# Patient Record
Sex: FEMALE | Race: WHITE | NOT HISPANIC OR LATINO | Employment: OTHER | URBAN - METROPOLITAN AREA
[De-identification: names, ages, dates, MRNs, and addresses within clinical notes are randomized per-mention and may not be internally consistent; named-entity substitution may affect disease eponyms.]

---

## 2017-12-23 ENCOUNTER — HOSPITAL ENCOUNTER (EMERGENCY)
Facility: CLINIC | Age: 82
Discharge: HOME | End: 2017-12-23
Attending: EMERGENCY MEDICINE | Admitting: EMERGENCY MEDICINE
Payer: MEDICARE

## 2017-12-23 ENCOUNTER — APPOINTMENT (OUTPATIENT)
Dept: GENERAL RADIOLOGY | Facility: CLINIC | Age: 82
End: 2017-12-23
Attending: EMERGENCY MEDICINE
Payer: MEDICARE

## 2017-12-23 VITALS
TEMPERATURE: 98.3 F | SYSTOLIC BLOOD PRESSURE: 133 MMHG | OXYGEN SATURATION: 94 % | RESPIRATION RATE: 16 BRPM | HEIGHT: 63 IN | DIASTOLIC BLOOD PRESSURE: 79 MMHG

## 2017-12-23 DIAGNOSIS — J06.9 VIRAL URI WITH COUGH: ICD-10-CM

## 2017-12-23 DIAGNOSIS — R05.9 COUGH: ICD-10-CM

## 2017-12-23 LAB
ANION GAP SERPL CALCULATED.3IONS-SCNC: 5 MMOL/L (ref 3–14)
BASOPHILS # BLD AUTO: 0 10E9/L (ref 0–0.2)
BASOPHILS NFR BLD AUTO: 0.3 %
BUN SERPL-MCNC: 21 MG/DL (ref 7–30)
CALCIUM SERPL-MCNC: 9.4 MG/DL (ref 8.5–10.1)
CHLORIDE SERPL-SCNC: 109 MMOL/L (ref 94–109)
CO2 SERPL-SCNC: 29 MMOL/L (ref 20–32)
CREAT SERPL-MCNC: 1.06 MG/DL (ref 0.52–1.04)
DIFFERENTIAL METHOD BLD: ABNORMAL
EOSINOPHIL # BLD AUTO: 0.1 10E9/L (ref 0–0.7)
EOSINOPHIL NFR BLD AUTO: 1.3 %
ERYTHROCYTE [DISTWIDTH] IN BLOOD BY AUTOMATED COUNT: 12.7 % (ref 10–15)
GFR SERPL CREATININE-BSD FRML MDRD: 49 ML/MIN/1.7M2
GLUCOSE SERPL-MCNC: 125 MG/DL (ref 70–99)
HCT VFR BLD AUTO: 41.3 % (ref 35–47)
HGB BLD-MCNC: 14 G/DL (ref 11.7–15.7)
IMM GRANULOCYTES # BLD: 0 10E9/L (ref 0–0.4)
IMM GRANULOCYTES NFR BLD: 0.3 %
LYMPHOCYTES # BLD AUTO: 1.7 10E9/L (ref 0.8–5.3)
LYMPHOCYTES NFR BLD AUTO: 22.5 %
MCH RBC QN AUTO: 33.3 PG (ref 26.5–33)
MCHC RBC AUTO-ENTMCNC: 33.9 G/DL (ref 31.5–36.5)
MCV RBC AUTO: 98 FL (ref 78–100)
MONOCYTES # BLD AUTO: 0.8 10E9/L (ref 0–1.3)
MONOCYTES NFR BLD AUTO: 10.2 %
NEUTROPHILS # BLD AUTO: 4.9 10E9/L (ref 1.6–8.3)
NEUTROPHILS NFR BLD AUTO: 65.4 %
NRBC # BLD AUTO: 0 10*3/UL
NRBC BLD AUTO-RTO: 0 /100
PLATELET # BLD AUTO: 178 10E9/L (ref 150–450)
POTASSIUM SERPL-SCNC: 4.5 MMOL/L (ref 3.4–5.3)
RBC # BLD AUTO: 4.21 10E12/L (ref 3.8–5.2)
SODIUM SERPL-SCNC: 143 MMOL/L (ref 133–144)
WBC # BLD AUTO: 7.4 10E9/L (ref 4–11)

## 2017-12-23 PROCEDURE — 25000128 H RX IP 250 OP 636: Performed by: EMERGENCY MEDICINE

## 2017-12-23 PROCEDURE — 96360 HYDRATION IV INFUSION INIT: CPT

## 2017-12-23 PROCEDURE — 85025 COMPLETE CBC W/AUTO DIFF WBC: CPT | Performed by: EMERGENCY MEDICINE

## 2017-12-23 PROCEDURE — 71020 XR CHEST 2 VW: CPT

## 2017-12-23 PROCEDURE — 80048 BASIC METABOLIC PNL TOTAL CA: CPT | Performed by: EMERGENCY MEDICINE

## 2017-12-23 PROCEDURE — 99284 EMERGENCY DEPT VISIT MOD MDM: CPT | Mod: 25

## 2017-12-23 PROCEDURE — 94640 AIRWAY INHALATION TREATMENT: CPT

## 2017-12-23 PROCEDURE — 25000125 ZZHC RX 250: Performed by: EMERGENCY MEDICINE

## 2017-12-23 RX ORDER — ALBUTEROL SULFATE 5 MG/ML
2.5 SOLUTION RESPIRATORY (INHALATION) ONCE
Status: COMPLETED | OUTPATIENT
Start: 2017-12-23 | End: 2017-12-23

## 2017-12-23 RX ADMIN — SODIUM CHLORIDE 1000 ML: 9 INJECTION, SOLUTION INTRAVENOUS at 19:30

## 2017-12-23 RX ADMIN — ALBUTEROL SULFATE 2.5 MG: 2.5 SOLUTION RESPIRATORY (INHALATION) at 19:30

## 2017-12-23 ASSESSMENT — ENCOUNTER SYMPTOMS
NAUSEA: 0
FATIGUE: 1
COUGH: 1
VOMITING: 0
DYSURIA: 0
DIARRHEA: 0

## 2017-12-23 NOTE — ED AVS SNAPSHOT
Emergency Department    [address] [facility] [address]    Phone:  [phone]    Fax:  [phone]                                       Joelle Freeman   MRN: [id number]    Department:   Emergency Department   Date of Visit:  12/23/2017           Patient Information     Date Of Birth          5/25/1928        Your diagnoses for this visit were:     Cough     Viral URI with cough        You were seen by Roxanna Yu MD.      Follow-up Information     Follow up with Osman Small In 3 days.    Specialty:  Family Practice    Contact information:    [facility]  [address] [facility] [address]  [facility] [address] [phone]          Follow up with  Emergency Department.    Specialty:  EMERGENCY MEDICINE    Why:  If symptoms worsen    Contact information:    [address] [facility] [address] [phone]        Discharge Instructions       Continue albuterol. Cough after pneumonia can linger for a couple of weeks.  Follow-up with your primary care provider.   Return to the emergency department if you have shortness of breath, chest pain, fever greater than 101F, or any other concerns.    Discharge References/Attachments     URI, VIRAL, NO ABX (ADULT) (ENGLISH)    BRONCHITIS, NO ANTIBIOTIC (ADULT) (ENGLISH)      24 Hour Appointment Hotline       To make an appointment at any [facility], call [phone] ([phone]). If you don't have a family doctor or clinic, we will help you find one. [facility] [place] [facility] are conveniently located to serve the needs of you and your family.             Review of your medicines      Our records show that you are taking the medicines listed below. If these are incorrect, please call your family doctor or clinic.        Dose / Directions Last dose taken    * ACETAMINOPHEN PO   Dose:  650 mg        Take 650 mg by mouth 2 times daily as needed for pain   Refills:  0        * ACETAMINOPHEN PO   Dose:  650 mg        Take 650 mg by mouth  daily Daily at 16:00.   Refills:  0        aspirin 81 MG EC tablet   Dose:  81 mg   Quantity:  30 tablet        Take 1 tablet (81 mg) by mouth daily   Refills:  1        atorvastatin 10 MG tablet   Commonly known as:  LIPITOR   Dose:  10 mg   Quantity:  30 tablet        Take 1 tablet (10 mg) by mouth daily   Refills:  1        cholecalciferol 1000 UNITS Tabs   Dose:  1000 Units   Quantity:  30 tablet        Take 1,000 Units by mouth daily   Refills:  1        citalopram 10 MG tablet   Commonly known as:  celeXA   Dose:  10 mg   Quantity:  30 tablet        Take 1 tablet (10 mg) by mouth daily   Refills:  1        donepezil 10 MG tablet   Commonly known as:  ARICEPT   Dose:  10 mg   Quantity:  30 tablet        Take 1 tablet (10 mg) by mouth At Bedtime   Refills:  1        levothyroxine 100 MCG tablet   Commonly known as:  SYNTHROID/LEVOTHROID   Dose:  100 mcg   Quantity:  30 tablet        Take 1 tablet (100 mcg) by mouth daily   Refills:  1        LISINOPRIL PO   Dose:  10 mg        Take 10 mg by mouth daily Hold if SBP<110 and notify NP   Refills:  0        MELATONIN PO   Dose:  3 mg        Take 3 mg by mouth nightly as needed   Refills:  0        nystatin cream   Commonly known as:  MYCOSTATIN        Apply topically as needed for dry skin (use with butt paste)   Refills:  0        senna-docusate 8.6-50 MG per tablet   Commonly known as:  SENOKOT-S;PERICOLACE   Dose:  1 tablet        Take 1 tablet by mouth 2 times daily as needed   Refills:  0        TOPROL XL PO   Dose:  12.5 mg        Take 12.5 mg by mouth daily Hold if SBP<110 or HR <55 and notify NP/MD   Refills:  0        * Notice:  This list has 2 medication(s) that are the same as other medications prescribed for you. Read the directions carefully, and ask your doctor or other care provider to review them with you.            Procedures and tests performed during your visit     Basic metabolic panel    CBC with platelets differential    XR Chest 2 Views       Orders Needing Specimen Collection     None      Pending Results     No orders found from 12/21/2017 to 12/24/2017.            Pending Culture Results     No orders found from 12/21/2017 to 12/24/2017.            Pending Results Instructions     If you had any lab results that were not finalized at the time of your Discharge, you can call the ED Lab Result RN at [phone]. You will be contacted by this team for any positive Lab results or changes in treatment. The nurses are available 7 days a week from 10A to 6:30P.  You can leave a message 24 hours per day and they will return your call.        Test Results From Your Hospital Stay        12/23/2017  7:04 PM      Component Results     Component Value Ref Range & Units Status    WBC 7.4 4.0 - 11.0 10e9/L Final    RBC Count 4.21 3.8 - 5.2 10e12/L Final    Hemoglobin 14.0 11.7 - 15.7 g/dL Final    Hematocrit 41.3 35.0 - 47.0 % Final    MCV 98 78 - 100 fl Final    MCH 33.3 (H) 26.5 - 33.0 pg Final    MCHC 33.9 31.5 - 36.5 g/dL Final    RDW 12.7 10.0 - 15.0 % Final    Platelet Count 178 150 - 450 10e9/L Final    Diff Method Automated Method  Final    % Neutrophils 65.4 % Final    % Lymphocytes 22.5 % Final    % Monocytes 10.2 % Final    % Eosinophils 1.3 % Final    % Basophils 0.3 % Final    % Immature Granulocytes 0.3 % Final    Nucleated RBCs 0 0 /100 Final    Absolute Neutrophil 4.9 1.6 - 8.3 10e9/L Final    Absolute Lymphocytes 1.7 0.8 - 5.3 10e9/L Final    Absolute Monocytes 0.8 0.0 - 1.3 10e9/L Final    Absolute Eosinophils 0.1 0.0 - 0.7 10e9/L Final    Absolute Basophils 0.0 0.0 - 0.2 10e9/L Final    Abs Immature Granulocytes 0.0 0 - 0.4 10e9/L Final    Absolute Nucleated RBC 0.0  Final         12/23/2017  7:17 PM      Component Results     Component Value Ref Range & Units Status    Sodium 143 133 - 144 mmol/L Final    Potassium 4.5 3.4 - 5.3 mmol/L Final    Chloride 109 94 - 109 mmol/L Final    Carbon Dioxide 29 20 - 32 mmol/L Final    Anion Gap 5 3 - 14  mmol/L Final    Glucose 125 (H) 70 - 99 mg/dL Final    Urea Nitrogen 21 7 - 30 mg/dL Final    Creatinine 1.06 (H) 0.52 - 1.04 mg/dL Final    GFR Estimate 49 (L) >60 mL/min/1.7m2 Final    Non  GFR Calc    GFR Estimate If Black 59 (L) >60 mL/min/1.7m2 Final    African American GFR Calc    Calcium 9.4 8.5 - 10.1 mg/dL Final         12/23/2017  8:15 PM      Narrative     CHEST TWO VIEWS    12/23/2017 7:53 PM     HISTORY: Cough.    COMPARISON: Chest x-ray 6/22/2015.        Impression     IMPRESSION: No focal airspace opacity. No pleural effusion or  pneumothorax. Cardiac silhouette is unchanged. Presumed  ventriculoperitoneal shunt catheter projects over the right chest  wall. Mild anterior wedging of a midthoracic vertebral body is  unchanged.     LOIS GUERRERO MD                Clinical Quality Measure: Blood Pressure Screening     Your blood pressure was checked while you were in the emergency department today. The last reading we obtained was  BP: 133/79 . Please read the guidelines below about what these numbers mean and what you should do about them.  If your systolic blood pressure (the top number) is less than 120 and your diastolic blood pressure (the bottom number) is less than 80, then your blood pressure is normal. There is nothing more that you need to do about it.  If your systolic blood pressure (the top number) is 120-139 or your diastolic blood pressure (the bottom number) is 80-89, your blood pressure may be higher than it should be. You should have your blood pressure rechecked within a year by a primary care provider.  If your systolic blood pressure (the top number) is 140 or greater or your diastolic blood pressure (the bottom number) is 90 or greater, you may have high blood pressure. High blood pressure is treatable, but if left untreated over time it can put you at risk for heart attack, stroke, or kidney failure. You should have your blood pressure rechecked by a primary care  "provider within the next 4 weeks.  If your provider in the emergency department today gave you specific instructions to follow-up with your doctor or provider even sooner than that, you should follow that instruction and not wait for up to 4 weeks for your follow-up visit.        Thank you for choosing [facility]       Thank you for choosing [facility] for your care. Our goal is always to provide you with excellent care. Hearing back from our patients is one way we can continue to improve our services. Please take a few minutes to complete the written survey that you may receive in the mail after you visit with us. Thank you!        [employer] Information     [employer] lets you send messages to your doctor, view your test results, renew your prescriptions, schedule appointments and more. To sign up, go to [email or web address] . Click on \"Log in\" on the left side of the screen, which will take you to the Welcome page. Then click on \"Sign up Now\" on the right side of the page.     You will be asked to enter the access code listed below, as well as some personal information. Please follow the directions to create your username and password.     Your access code is: [email or web address]  Expires: 3/23/2018  8:59 PM     Your access code will  in 90 days. If you need help or a new code, please call your [facility] clinic or [phone].        Care EveryWhere ID     This is your Care EveryWhere ID. This could be used by other organizations to access your [facility] medical records  [id number]        Equal Access to Services     MARCY WHITE : Hadii little herbert Sozack, waaxda lufridaadaha, qaybta kaalmaalexander obregon . So [facility] [phone].    ATENCIÓN: Si habla español, tiene a simpson disposición servicios gratuitos de asistencia lingüística. Llame al [phone].    We comply with applicable federal civil rights laws and [place] laws. We do not discriminate on the basis of race, color, " national origin, age, disability, sex, sexual orientation, or gender identity.            After Visit Summary       This is your record. Keep this with you and show to your community pharmacist(s) and doctor(s) at your next visit.

## 2017-12-23 NOTE — ED AVS SNAPSHOT
Emergency Department    [address] [place] [address]    Phone:  [phone]    Fax:  [phone]                                       Joelle Freeman   MRN: [id number]    Department:   Emergency Department   Date of Visit:  12/23/2017           After Visit Summary Signature Page     I have received my discharge instructions, and my questions have been answered. I have discussed any challenges I see with this plan with the nurse or doctor.    ..........................................................................................................................................  Patient/Patient Representative Signature      ..........................................................................................................................................  Patient Representative Print Name and Relationship to Patient    ..................................................               ................................................  Date                                            Time    ..........................................................................................................................................  Reviewed by Signature/Title    ...................................................              ..............................................  Date                                                            Time

## 2017-12-24 NOTE — DISCHARGE INSTRUCTIONS
Continue albuterol. Cough after pneumonia can linger for a couple of weeks.  Follow-up with your primary care provider.   Return to the emergency department if you have shortness of breath, chest pain, fever greater than 101F, or any other concerns.

## 2017-12-24 NOTE — ED PROVIDER NOTES
History     Chief Complaint:  Cough    The history is provided by the patient.      Joelle Freeman is a 89 year old female with a recent pneumonia s/p Augmentin who presents to the emergency department today for evaluation of cough. The patient notes she took Augmentin (and prior to that a second antibiotic she does not know the name of) over the last 2 weeks. However, today her cough seems worse. She states cough has been non-productive and today she also feels fatigued. Denies sinus congestion. Patient has had recent sick contacts at her assisted living facility. No nausea, vomiting, diarrhea, or dysuria. No chest pain or alexandr shortness of breath.     Allergies:  Tramadol    Medications:    Metoprolol Succinate (TOPROL XL PO)  LISINOPRIL PO  nystatin (MYCOSTATIN) cream  aspirin EC 81 MG EC tablet  citalopram (CELEXA) 10 MG tablet  atorvastatin (LIPITOR) 10 MG tablet  donepezil (ARICEPT) 10 MG tablet  levothyroxine (SYNTHROID,LEVOTHROID) 100 MCG tablet  senna-docusate (SENOKOT-S;PERICOLACE) 8.6-50 MG per tablet     Past Medical History:    Asthma   CAD (coronary artery disease)   Cerebral ventriculomegaly   CVA (cerebral infarction)    Dementia   Depression   Diverticulosis   Dyslipidemia   Heterozygous factor V Leiden mutation (H)   HTN (hypertension)   Hyperlipidemia   Hypothyroidism   Normal pressure hydrocephalus   Posterior reversible encephalopathy syndrome   Subarachnoid hemorrhage (H)   Subdural hematoma (H)   TIA (transient ischaemic attack)   Unspecified cerebral artery occlusion with cerebral infarction   Urinary incontinence   UTI (lower urinary tract infection)    Past Surgical History:    APPENDECTOMY   BIOPSY-breast  CARDIAC SURGERY-stent to the right coronary artery in 2001  CHOLECYSTECTOMY   ENT SURGERY-onsillectomy  GENITOURINARY SURGERY   Bladder suspension  GYN SURGERY-D & C  GYN SURGERY-laproscopy oophorectomy unilateral  HERNIA REPAIR   HYSTERECTOMY   LUMBAR PUNCTURE   OPEN REDUCTION  "INTERNAL FIXATION HIP NAILING   FRACTURE;  Surgeon: John Venegas MD;  Location:  OR  OPTICAL TRACKING SYSTEM IMPLANT SHUNT VENTRICULOPERITONEAL   STENT- RCA  wisdom teeth extraction     Family History:    Blood disease  Cerebrovascular disease     Social History:  The patient was accompanied to the ED by son.  Smoking Status: Never smoker  Smokeless Tobacco: No  Alcohol Use: Yes  Marital Status:        Review of Systems   Constitutional: Positive for fatigue.   HENT: Negative for congestion.    Respiratory: Positive for cough.    Gastrointestinal: Negative for diarrhea, nausea and vomiting.   Genitourinary: Negative for dysuria.   All other systems reviewed and are negative.    Physical Exam   Vitals:  Patient Vitals for the past 24 hrs:   BP Temp Temp src Heart Rate Resp SpO2 Height   12/23/17 1825 133/79 98.3  F (36.8  C) Oral 75 16 97 % 1.6 m (5' 3\")       Physical Exam   General: Well-developed and well-nourished; well appearing elderly  female; cooperative  Head:  Atraumatic  Eyes:  Conjunctivae, lids, and sclerae are normal  ENT:    Normal nose; moist mucous membranes  Neck:  Supple; normal range of motion  CV:  Regular rate and rhythm; normal heart sounds with no murmurs, rubs, or gallops detected  Resp:  No respiratory distress; no decreased breath sounds, wheezing, or rales; scattered rhonchi  GI:  Soft; non-distended; non-tender    MS:  Normal ROM  Skin:  Warm; non-diaphoretic; no pallor  Neuro:  Awake; A&Ox3; normal strength  Psych: Normal mood and affect; normal speech  Vitals reviewed.    Emergency Department Course   EKG  Time: 1907  Rate 81 bpm. UT interval 156. QRS duration 90. QT/QTc 372/432.   Normal sinus rhythm with sinus arrhythmia   Pulmonary disease pattern  Left anterior fascicular block  Voltage criteria for left ventricular hypertrophy  Cannot rule out septal infarct, age undetermined  Abnormal ECG  No acute ST changes.  T waves  In III, V3-V6 no longer " inverted as compared to prior, dated 11/22/2015    Imaging:  Radiology findings were communicated with the patient who voiced understanding of the findings.    Chest XR 2 Views:  No focal airspace opacity. No pleural effusion or  pneumothorax. Cardiac silhouette is unchanged. Presumed  ventriculoperitoneal shunt catheter projects over the right chest  wall. Mild anterior wedging of a midthoracic vertebral body is  unchanged.   Reading per radiology    Laboratory:  Laboratory findings were communicated with the patient who voiced understanding of the findings.    CBC: AWNL. (WBC 7.4, HGB 14.0, )     basic metabolic panel: Glucose: 125(H), Creatinine: 1.06(H), GFR: 49(L)    Interventions:  1930- NS 1000 mL IV  1930- Albuterol 2.5 mg Nebulization      Emergency Department Course:  Nursing notes and vitals reviewed.  I performed an exam of the patient as documented above.     IV was inserted and blood was drawn for laboratory testing, results above.    The patient was sent for a XR while in the emergency department, results above.     2029: Patient was rechecked and was updated on the results of laboratory and imaging studies. She notes breathing treatment did not help; however, on reexamination lung sounds sound improved from initial examination.     I discussed the treatment plan with the patient. She expressed understanding of this plan and consented to discharge.     Impression & Plan      Medical Decision Making:  Joelle is an 89-year-old female who presents with cough.  She notes that she has had cough over the last 2 weeks that is nonproductive.  She was given 2 different rounds of antibiotics (she does not know the name of the first one and paperwork that accompanies patients indicates the second was a recently completed 10 day course of Augmentin).  Patient has no other complaints.  She is very well-appearing on exam with normal vitals.  She does have some scattered rhonchi.    Patient was given  albuterol for cough with scattered rhonchi.  CBC is reassuring.  BMP reveals a creatinine of 1.06 which is minimally increased from 0.7, though this value is from 2015 and may be reflective of gradual chronic disease rather than an acute kidney injury.  Patient was given IV fluids during her workup.  Chest x-ray is unremarkable.  There is no evidence of pneumonia, pleural effusion, or pneumothorax.   shunt noted.  Unchanged vertebral body wedging noted.    Patient was reevaluated after albuterol treatment and states she did not think it helped much.  However, the rhonchi is resolved on repeat lung auscultation.  I discussed with patient that her cough may be residual from her recent diagnosis of pneumonia versus a new viral URI/bronchitis.  I recommended she continue albuterol and follow-up with her primary care provider.  I do not believe antibiotics are indicated and patient has no evidence of severe infection or dehydration and has recently completed 2 courses.  I provided return precautions and answered all the patient's questions.  She verbalized understanding and is amenable to discharge.    Diagnosis:    ICD-10-CM    1. Cough R05    2. Viral URI with cough J06.9     B97.89          Disposition:   Findings and plan explained to the patient. Patient discharged home with instructions regarding supportive care, medications, and reasons to return. The importance of close follow-up was reviewed.    Scribe Disclosure:  CELESTE, Rodolfo Wright, am serving as a scribe at 6:47 PM on 12/23/2017 to document services personally performed by Roxanna Yu MD, based on my observations and the provider's statements to me.    12/23/2017    EMERGENCY DEPARTMENT       Roxanna Yu MD  12/24/17 0146

## 2018-07-25 ENCOUNTER — TELEPHONE (OUTPATIENT)
Dept: NEUROSURGERY | Facility: CLINIC | Age: 83
End: 2018-07-25

## 2018-07-25 NOTE — TELEPHONE ENCOUNTER
Amanda, pt's daughter in law, called because the pt has not been sleeping and is getting up at night alone.  Amanda is concern about the pt's safety.  Also, the pt is experiencing auditory hallucinations. No answer.  A message was left.

## 2018-07-26 ENCOUNTER — APPOINTMENT (OUTPATIENT)
Dept: CT IMAGING | Facility: CLINIC | Age: 83
End: 2018-07-26
Attending: EMERGENCY MEDICINE
Payer: MEDICARE

## 2018-07-26 ENCOUNTER — APPOINTMENT (OUTPATIENT)
Dept: GENERAL RADIOLOGY | Facility: CLINIC | Age: 83
End: 2018-07-26
Attending: EMERGENCY MEDICINE
Payer: MEDICARE

## 2018-07-26 ENCOUNTER — HOSPITAL ENCOUNTER (EMERGENCY)
Facility: CLINIC | Age: 83
Discharge: HOME | End: 2018-07-26
Attending: EMERGENCY MEDICINE | Admitting: EMERGENCY MEDICINE
Payer: MEDICARE

## 2018-07-26 VITALS
RESPIRATION RATE: 18 BRPM | TEMPERATURE: 97.4 F | SYSTOLIC BLOOD PRESSURE: 139 MMHG | HEIGHT: 64 IN | WEIGHT: 125 LBS | OXYGEN SATURATION: 98 % | DIASTOLIC BLOOD PRESSURE: 62 MMHG | BODY MASS INDEX: 21.34 KG/M2

## 2018-07-26 DIAGNOSIS — S09.90XA CLOSED HEAD INJURY, INITIAL ENCOUNTER: ICD-10-CM
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
## 2018-07-26 LAB — INTERPRETATION ECG - MUSE: NORMAL

## 2018-07-26 PROCEDURE — 93005 ELECTROCARDIOGRAM TRACING: CPT

## 2018-07-26 PROCEDURE — 70450 CT HEAD/BRAIN W/O DYE: CPT

## 2018-07-26 PROCEDURE — 99285 EMERGENCY DEPT VISIT HI MDM: CPT | Mod: 25

## 2018-07-26 PROCEDURE — 73630 X-RAY EXAM OF FOOT: CPT | Mod: RT

## 2018-07-26 ASSESSMENT — ENCOUNTER SYMPTOMS
LIGHT-HEADEDNESS: 0
DIZZINESS: 0
HEADACHES: 0

## 2018-07-26 NOTE — ED PROVIDER NOTES
History     Chief Complaint:  Fall     HPI  Joelle Freeman is a 89 year old female who has two stents in her brain after bleeding who presents to the emergency department today for evaluation after a fall. The patient report that she was in the bathroom today, standing to put on her briefs, when she fell and hit her head. She says she has pain in her right foot but no head pain and did not feel lightheaded or dizzy before falling.     Allergies:  Tramadol      Medications:    Aspirin  Lipitor  Celexa  Aricept  Levothyroxine  Lisinopril  Toprol  Nystatin  Senna-docusate     Past Medical History:    Asthma   CAD (coronary artery disease)   Cerebral ventriculomegaly   CVA (cerebral infarction)   Dementia   Dementia   Depression   Diverticulosis   Dyslipidemia   Heterozygous factor V Leiden mutation   HTN (hypertension)   Hyperlipidemia   Hypothyroidism   Normal pressure hydrocephalus   Posterior reversible encephalopathy syndrome   Subarachnoid hemorrhage    Subdural hematoma    TIA (transient ischaemic attack) x2   Unspecified cerebral artery occlusion with cerebral infarction   Urinary incontinence   UTI (lower urinary tract infection)     Past Surgical History:    Appendectomy  Breast biopsy   Stent to right coronary artery  Cholecystectomy  Tonsillectomy  Bladder suspension  D&C  Laparoscopic oophorectomy unilateral  Hernia repair  Hysterectomy  Lumbar puncture   Open reduction internal fixation hip nailing  Optical tracking system implant shunt ventriculoperitoneal  RCA stent  [place] teeth extraction     Family History:    Mother: Cerebrovascular disease  Father: Cerebrovascular disease  Son: Blood disease    Social History:  The patient was accompanied to the ED by son.  Smoking Status: Never Smoker  Alcohol Use: Negative   Marital Status:       Review of Systems   Musculoskeletal:        Right foot pain   Neurological: Negative for dizziness, light-headedness and headaches.   All other systems reviewed  "and are negative.    Physical Exam     Patient Vitals for the past 24 hrs:   BP Temp Temp src Heart Rate Resp SpO2 Height Weight   07/26/18 0100 (!) 123/92 - - - - 96 % - -   07/26/18 0045 139/74 - - - - - - -   07/26/18 0008 137/72 97.4  F (36.3  C) Oral 74 18 97 % 1.626 m (5' 4\") 56.7 kg (125 lb)     Physical Exam  General: Appears well-developed and well-nourished.   Head: No signs of trauma. Right sided stent in place  Mouth/Throat: Oropharynx is clear and moist.   Neck: Normal range of motion. No midline tenderness.  CV: Normal rate and regular rhythm.    Resp: Effort normal and breath sounds normal. No respiratory distress.   GI: Soft. There is no tenderness.  No rebound or guarding.  Normal bowel sounds.    MSK: Normal range of motion. No tenderness to extremities or spine.  Small area of ecchymosis to dorsal aspect of right foot without point tenderness or deformities.    Neuro: The patient is alert and oriented to person, place, and time.  PERRLA, EOMI, strength in upper/lower extremities normal and symmetrical.   Sensation normal. Speech normal.  GCS 15  Skin: Skin is warm and dry. No rash noted.   Psych: normal mood and affect. behavior is normal.       Emergency Department Course     ECG:  ECG taken at 0017, ECG read at 0018  Normal sinus rhythm  Left anterior fascicular block  Moderate voltage criteria for LVH, may be normal variant  Abnormal ECG  Rate 72 bpm. AZ interval 166 ms. QRS duration 94 ms. QT/QTc 408/446 ms. P-R-T axes 54 -51 43.    Imaging:  Radiology findings were communicated with the patient who voiced understanding of the findings.    CT head:  FINDINGS: Prior right frontotemporal craniotomy. Moderate diffuse  cerebral atrophy. Moderate periventricular white matter low  attenuation, likely relating to chronic small vessel ischemic disease.  A right occipital ventriculostomy catheter is present with distal  catheter tip in the frontal horn of the left lateral cerebral  ventricle. No " dilatation of the cerebral ventricles. No intra-axial  mass, mass effect or midline shift. Normal gray-white matter  differentiation. No visualized acute intra-axial hemorrhage. The basal  cisterns are patent. Prior right craniectomy. No acute extra-axial  fluid collection. Unchanged old thin subdural fluid collections in  bilateral frontal regions. The visualized portions of the paranasal  sinuses and mastoid air cells are unremarkable.      Foot  XR, G/E 3 views, right   Preliminary Result   IMPRESSION:   1. No visualized acute fracture or malalignment of the right foot.   2. Mild degenerative changes in the right first metatarsophalangeal   joint.   3. Diffuse osteopenia.      Head CT w/o contrast    (Results Pending)     Emergency Department Course:    0020 Nursing notes and vitals reviewed.    0017 EKG performed as noted above.     0023 I performed an exam of the patient as documented above.     0107 The patient was sent for a XR while in the emergency department, results above.     0115 The patient was sent for a CT while in the emergency department, results above.      I personally reviewed the imaging results with the patient and answered all related questions prior to discharge.    Impression & Plan      Medical Decision Making:  Joelle Freeman is a 89 year old female who presents to the emergency department today for evaluation after a fall.  She is in a nursing home and is apparently quite unstable on her feet at baseline and uses a wheelchair to get around.  An aide was helping to transfer from the toilet back to the wheelchair and while she was trying to adjust her underwear, apparently the patient fell.  Patient denies any loss of consciousness.  She did strike the left side of her head, but denies any current head pain. She did have some pain to the right foot.  X-ray of the right foot was obtained and did not show any signs of acute fracture.  CT scan of the head showed shunt in place but no signs  of acute trauma or injury.  The patient appears well overall, and she will be discharged back to the nursing home where she will continue to be in a monitored setting and receive assistance.  She was instructed to return for any further concerns.    Diagnosis:    ICD-10-CM    1. Fall from standing, initial encounter W19.XXXA    2. Closed head injury, initial encounter S09.90XA         Disposition:   The patient is discharged to home.    Discharge Medications:  New Prescriptions    No medications on file     Scribe Disclosure:  Maryjo ALONSO, am serving as a scribe at 12:23 AM on 7/26/2018 to document services personally performed by Erasmo Yuen MD based on my observations and the provider's statements to me.       EMERGENCY DEPARTMENT       Erasmo Yuen MD  07/28/18 2926

## 2018-07-26 NOTE — ED AVS SNAPSHOT
Emergency Department    [address] [place] [address]    Phone:  [phone]    Fax:  [phone]                                       Joelle Freeman   MRN: [id number]    Department:   Emergency Department   Date of Visit:  7/26/2018           After Visit Summary Signature Page     I have received my discharge instructions, and my questions have been answered. I have discussed any challenges I see with this plan with the nurse or doctor.    ..........................................................................................................................................  Patient/Patient Representative Signature      ..........................................................................................................................................  Patient Representative Print Name and Relationship to Patient    ..................................................               ................................................  Date                                            Time    ..........................................................................................................................................  Reviewed by Signature/Title    ...................................................              ..............................................  Date                                                            Time

## 2018-07-26 NOTE — ED AVS SNAPSHOT
Emergency Department    [address] [facility] [address]    Phone:  [phone]    Fax:  [phone]                                       Joelle Freeman   MRN: [id number]    Department:   Emergency Department   Date of Visit:  7/26/2018           Patient Information     Date Of Birth          5/25/1928        Your diagnoses for this visit were:     Fall from standing, initial encounter     Closed head injury, initial encounter        You were seen by rEasmo Yuen MD.      Follow-up Information     Call Osman Small    Specialty:  Family Practice    Contact information:    [facility]  [address]   [facility] [address] [phone]          Follow up with  Emergency Department.    Specialty:  EMERGENCY MEDICINE    Why:  As needed, If symptoms worsen    Contact information:    [address] [facility] [id number] [phone]        Discharge Instructions          * HEAD INJURY    You have had a head injury. It does not appear serious at this time. Sometimes symptoms of a more serious problem (concussion, bruising or bleeding in the brain) may appear later. Therefore, watch for the warning signs listed below.  HOME CARE:      During the next 24 hours someone must stay with you to check for the signs below. It is not necessary to stay awake or be awakened during the night.    If you have swelling of the face or scalp, apply an ice pack (ice cubes in a plastic bag, wrapped in a towel) for 20 minutes. Do this every 1-2 hours until the swelling starts to go down.    You may use acetaminophen (Tylenol) 650-1000 mg every 6 hours or ibuprofen (Motrin, Advil) 600 mg every 6-8 hours with food to control pain, if you are able to take these medicines. [NOTE: If you have chronic liver or kidney disease or ever had a stomach ulcer or GI bleeding, talk with your doctor before using these medicines.] Do not take aspirin after a head injury.    For the next 24  hours:  ? Do not take alcohol, sedatives or medicines that make you sleepy.  ? Do not drive or operate machinery.  ? Avoid strenuous activities. No lifting or straining.    If you have had any symptoms of a concussion today (nausea, vomiting, dizziness, confusion, headache, memory loss or if you were knocked out), do not return to sports or any activity that could result in another head injury until 2 full weeks after all symptoms are gone and you have been cleared by your doctor. A second head injury before fully recovering from the first one can lead to serious brain injury.  FOLLOW UP with your doctor if symptoms are not improving after 24 hours, or as directed.  GET PROMPT MEDICAL ATTENTION if any of the following warning signs occur:    Repeated vomiting    Severe or worsening headache or dizziness    Unusual drowsiness, or unable to awaken as usual    Confusion or change in behavior or speech, memory loss, blurred vision    Convulsion (seizure)    Increasing scalp or face swelling    Redness, warmth or pus from the swollen area    Fluid drainage or bleeding from the nose or ears    2560-0862 [employer]. [address] [place] [address]. All rights reserved. This information is not intended as a substitute for professional medical care. Always follow your healthcare professional's instructions.  This information has been modified by your health care provider with permission from the publisher.      Your next 10 appointments already scheduled     Sep 11, 2018  3:00 PM CDT   (Arrive by 2:45 PM)   Return Visit with Kev Soto MD   [facility] ([facility])    [address] [place] [address] [phone]              24 Hour Appointment Hotline       To make an appointment at any [facility] clinic, call [phone] ([phone]). If you don't have a family doctor or clinic, we will help you find one. [facility]  clinics are conveniently located to serve the needs of you and your family.             Review of your medicines      Our records show that you are taking the medicines listed below. If these are incorrect, please call your family doctor or clinic.        Dose / Directions Last dose taken    * ACETAMINOPHEN PO   Dose:  650 mg        Take 650 mg by mouth 2 times daily as needed for pain   Refills:  0        * ACETAMINOPHEN PO   Dose:  650 mg        Take 650 mg by mouth daily Daily at 16:00.   Refills:  0        aspirin 81 MG EC tablet   Dose:  81 mg   Quantity:  30 tablet        Take 1 tablet (81 mg) by mouth daily   Refills:  1        atorvastatin 10 MG tablet   Commonly known as:  LIPITOR   Dose:  10 mg   Quantity:  30 tablet        Take 1 tablet (10 mg) by mouth daily   Refills:  1        cholecalciferol 1000 units Tabs   Dose:  1000 Units   Quantity:  30 tablet        Take 1,000 Units by mouth daily   Refills:  1        citalopram 10 MG tablet   Commonly known as:  celeXA   Dose:  10 mg   Quantity:  30 tablet        Take 1 tablet (10 mg) by mouth daily   Refills:  1        donepezil 10 MG tablet   Commonly known as:  ARICEPT   Dose:  10 mg   Quantity:  30 tablet        Take 1 tablet (10 mg) by mouth At Bedtime   Refills:  1        levothyroxine 100 MCG tablet   Commonly known as:  SYNTHROID/LEVOTHROID   Dose:  100 mcg   Quantity:  30 tablet        Take 1 tablet (100 mcg) by mouth daily   Refills:  1        LISINOPRIL PO   Dose:  10 mg        Take 10 mg by mouth daily Hold if SBP<110 and notify NP   Refills:  0        MELATONIN PO   Dose:  3 mg        Take 3 mg by mouth nightly as needed   Refills:  0        nystatin cream   Commonly known as:  MYCOSTATIN        Apply topically as needed for dry skin (use with butt paste)   Refills:  0        senna-docusate 8.6-50 MG per tablet   Commonly known as:  SENOKOT-S;PERICOLACE   Dose:  1 tablet        Take 1 tablet by mouth 2 times daily as needed   Refills:  0         TOPROL XL PO   Dose:  12.5 mg        Take 12.5 mg by mouth daily Hold if SBP<110 or HR <55 and notify NP/MD   Refills:  0        * Notice:  This list has 2 medication(s) that are the same as other medications prescribed for you. Read the directions carefully, and ask your doctor or other care provider to review them with you.            Procedures and tests performed during your visit     EKG 12 lead    Foot  XR, G/E 3 views, right    Head CT w/o contrast      Orders Needing Specimen Collection     None      Pending Results     Date and Time Order Name Status Description    7/26/2018 0028 Foot  XR, G/E 3 views, right Preliminary     7/26/2018 0028 Head CT w/o contrast Preliminary             Pending Culture Results     No orders found from 7/24/2018 to 7/27/2018.            Pending Results Instructions     If you had any lab results that were not finalized at the time of your Discharge, you can call the ED Lab Result RN at [phone]. You will be contacted by this team for any positive Lab results or changes in treatment. The nurses are available 7 days a week from 10A to 6:30P.  You can leave a message 24 hours per day and they will return your call.        Test Results From Your Hospital Stay        7/26/2018  1:59 AM      Narrative     CT HEAD WITHOUT CONTRAST  7/26/2018 1:15 AM     HISTORY: Fell. Struck head.    COMPARISON: 7/14/2016.    TECHNIQUE: Without intravenous contrast, helical sections were  acquired through the brain. Coronal reconstructions were generated.  Radiation dose for this scan was reduced using automated exposure  control, adjustment of the mA and/or kV according to the patient's  size, or iterative reconstruction technique.    FINDINGS: Prior right frontotemporal craniectomy. Moderate diffuse  cerebral atrophy. Moderate periventricular white matter low  attenuation, likely relating to chronic small vessel ischemic disease.  A right occipital ventriculostomy catheter is present with  distal  catheter tip in the frontal horn of the left lateral cerebral  ventricle. No dilatation of the cerebral ventricles. No intra-axial  mass, mass effect or midline shift. Normal gray-white matter  differentiation. No visualized acute intra-axial hemorrhage. The basal  cisterns are patent. Prior right craniectomy. No acute extra-axial  fluid collection. Unchanged old thin subdural fluid collections in  bilateral frontal regions. The visualized portions of the paranasal  sinuses and mastoid air cells are unremarkable.        Impression     IMPRESSION: No convincing evidence of acute intracranial abnormality.         7/26/2018  1:14 AM      Narrative     RIGHT FOOT 3 VIEWS  7/26/2018 1:07 AM     HISTORY: Fall. Foot pain.    COMPARISON: None.        Impression     IMPRESSION:  1. No visualized acute fracture or malalignment of the right foot.  2. Mild degenerative changes in the right first metatarsophalangeal  joint.  3. Diffuse osteopenia.                Clinical Quality Measure: Blood Pressure Screening     Your blood pressure was checked while you were in the emergency department today. The last reading we obtained was  BP: 144/73 . Please read the guidelines below about what these numbers mean and what you should do about them.  If your systolic blood pressure (the top number) is less than 120 and your diastolic blood pressure (the bottom number) is less than 80, then your blood pressure is normal. There is nothing more that you need to do about it.  If your systolic blood pressure (the top number) is 120-139 or your diastolic blood pressure (the bottom number) is 80-89, your blood pressure may be higher than it should be. You should have your blood pressure rechecked within a year by a primary care provider.  If your systolic blood pressure (the top number) is 140 or greater or your diastolic blood pressure (the bottom number) is 90 or greater, you may have high blood pressure. High blood pressure is  "treatable, but if left untreated over time it can put you at risk for heart attack, stroke, or kidney failure. You should have your blood pressure rechecked by a primary care provider within the next 4 weeks.  If your provider in the emergency department today gave you specific instructions to follow-up with your doctor or provider even sooner than that, you should follow that instruction and not wait for up to 4 weeks for your follow-up visit.        Thank you for choosing [facility]       Thank you for choosing [facility] for your care. Our goal is always to provide you with excellent care. Hearing back from our patients is one way we can continue to improve our services. Please take a few minutes to complete the written survey that you may receive in the mail after you visit with us. Thank you!        [employer] Information     [employer] lets you send messages to your doctor, view your test results, renew your prescriptions, schedule appointments and more. To sign up, go to [email or web address] . Click on \"Log in\" on the left side of the screen, which will take you to the Welcome page. Then click on \"Sign up Now\" on the right side of the page.     You will be asked to enter the access code listed below, as well as some personal information. Please follow the directions to create your username and password.     Your access code is: [email or web address]  Expires: 10/24/2018  2:12 AM     Your access code will  in 90 days. If you need help or a new code, please call your [facility] clinic or [phone].        Care EveryWhere ID     This is your Care EveryWhere ID. This could be used by other organizations to access your [facility] medical records  [id number]        Equal Access to Services     [facility] Development[facility] CenterJOSEF : Betty Elias, vladimir mccray, alexander sosa. So [facility] [phone].    ATENCIÓN: Si habla español, tiene a simpson disposición servicios gratuitos de " asistencia lingüística. Chrissy al [phone].    We comply with applicable federal civil rights laws and [place] laws. We do not discriminate on the basis of race, color, national origin, age, disability, sex, sexual orientation, or gender identity.            After Visit Summary       This is your record. Keep this with you and show to your community pharmacist(s) and doctor(s) at your next visit.

## 2018-07-26 NOTE — DISCHARGE INSTRUCTIONS
* HEAD INJURY    You have had a head injury. It does not appear serious at this time. Sometimes symptoms of a more serious problem (concussion, bruising or bleeding in the brain) may appear later. Therefore, watch for the warning signs listed below.  HOME CARE:      During the next 24 hours someone must stay with you to check for the signs below. It is not necessary to stay awake or be awakened during the night.    If you have swelling of the face or scalp, apply an ice pack (ice cubes in a plastic bag, wrapped in a towel) for 20 minutes. Do this every 1-2 hours until the swelling starts to go down.    You may use acetaminophen (Tylenol) 650-1000 mg every 6 hours or ibuprofen (Motrin, Advil) 600 mg every 6-8 hours with food to control pain, if you are able to take these medicines. [NOTE: If you have chronic liver or kidney disease or ever had a stomach ulcer or GI bleeding, talk with your doctor before using these medicines.] Do not take aspirin after a head injury.    For the next 24 hours:  ? Do not take alcohol, sedatives or medicines that make you sleepy.  ? Do not drive or operate machinery.  ? Avoid strenuous activities. No lifting or straining.    If you have had any symptoms of a concussion today (nausea, vomiting, dizziness, confusion, headache, memory loss or if you were knocked out), do not return to sports or any activity that could result in another head injury until 2 full weeks after all symptoms are gone and you have been cleared by your doctor. A second head injury before fully recovering from the first one can lead to serious brain injury.  FOLLOW UP with your doctor if symptoms are not improving after 24 hours, or as directed.  GET PROMPT MEDICAL ATTENTION if any of the following warning signs occur:    Repeated vomiting    Severe or worsening headache or dizziness    Unusual drowsiness, or unable to awaken as usual    Confusion or change in behavior or speech, memory loss, blurred  vision    Convulsion (seizure)    Increasing scalp or face swelling    Redness, warmth or pus from the swollen area    Fluid drainage or bleeding from the nose or ears    9324-3587 [employer]. [address] [place] [address], [place] [address]. All rights reserved. This information is not intended as a substitute for professional medical care. Always follow your healthcare professional's instructions.  This information has been modified by your health care provider with permission from the publisher.

## 2018-07-26 NOTE — ED NOTES
Bed: ED21  Expected date:   Expected time:   Means of arrival:   Comments:  [place]; 89F Fall, head injury

## 2018-09-11 ENCOUNTER — OFFICE VISIT (OUTPATIENT)
Dept: NEUROSURGERY | Facility: CLINIC | Age: 83
End: 2018-09-11
Payer: MEDICARE

## 2018-09-11 VITALS — OXYGEN SATURATION: 96 % | SYSTOLIC BLOOD PRESSURE: 134 MMHG | HEART RATE: 71 BPM | DIASTOLIC BLOOD PRESSURE: 68 MMHG

## 2018-09-11 DIAGNOSIS — G91.2 IDIOPATHIC NORMAL PRESSURE HYDROCEPHALUS (INPH) (H): Primary | ICD-10-CM

## 2018-09-11 DIAGNOSIS — S06.5XAA SDH (SUBDURAL HEMATOMA) (H): ICD-10-CM

## 2018-09-11 DIAGNOSIS — Z98.2 S/P VP SHUNT: ICD-10-CM

## 2018-09-11 ASSESSMENT — PAIN SCALES - GENERAL: PAINLEVEL: NO PAIN (0)

## 2018-09-11 NOTE — PATIENT INSTRUCTIONS
Please have a head CT done before you leave the clinic today.  Referral to physical therapy for evaluation and treatment of gait and balance.

## 2018-09-11 NOTE — PROGRESS NOTES
Dear Dr. 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     This is a 89 year old female with a history of stroke with resulting right sided weakness, SDH and s/p [place] by Dr. Kevin Marie [place] [facility] [place] and s/p shunted hydrocepehalus by Dr. Tayo Conley in 7/2014. She presented to the emergency department in November 2015 with fatigue and cognitive decline. Her  shunt valve was adjusted from 1.5 to 1.0. She felt improvement following shut adjustment, We last saw her in our office in 1/2016 with a follow up head CT and it showed normal ventricles and stable thin subdural collections. We had released her to PRN follow up.     The patient presents with a chief complaint of worsening balance and gait as well as inability to ambulate since she stopped getting PT 5 months ago and (2) constant auditory hallucination, which has stopped. Her cognition has not changed, however, her shot term memory has declined over the past 2 years. She denies headaches. Of note, the patient presented to the emergency department in 7/2018 after taking a fall in SNF. She denies LOC. She had a head CT while in ED and this was negative for acute intracranial abnormality. She was discharged home on the same day.    Per her son, the patient was doing well after the  shunt placement for as long as she was receiving PT. She lives in SNF and has been wheelchair bound over the past 5 months, because no staff or PT came to take her for exercise. They would like to have the physiotherapy re-initiated if possible.   Of note, the patient presented to the emergency department in 7/2018 after taking a fall in SNF. She denies LOC. She had a head CT while in ED and this was negative for acute intracranial abnormality. She was discharged home on the same day. With regards to her auditory hallucinations. She describes hearing music in both ears, not a buzzing sound  or noise from her hearing aid.  Fortunately, this has spontaneously resolved for the past 2 weeks.    PAST MEDICAL HISTORY: Asthma, SDH, stroke, NPH, dementia, LAKSHMI, migraine, CAD, hyperlipidemia, recurrent UTI, DVT, diverticulosis,heterozygous factor V Lediden Mutation.    PAST SURGICAL HISTORY: Appendectomy, breast biopsy, stent to right coronary artery, cholecystectomy, tonsillectomy, bladder suspension, D&C, hernia repair, shunt placement, s/p SDH evacuation and hysterectomy,    CURRENT MEDICATIONS: Aspirin, Lipitor, Celexa, Aricept, levothyroxine, lisinopril, Toprol, nystatin, and senna- docusate. Please refer to EPIC medication reconciliation/ list for specific dosages.    ALLERGIES: Tramadol    REVIEW OF SYSTEM: Her 14 point review of system is as stated above in HPI, PMH and PSH. Of note here includes right foot pain    NEUROLOGIC EXAMINATION:  On exam, she is elderly and sitting in a wheelchair. She is awake, alert, and oriented. She answers questions appropriately. She repeats herself fairly often which seems to be her baseline.   We confirmed her Strata shunt setting remains at 1.0.    REVIEW OF STUDIES: None new.     IMPRESSION AND PLAN: We will perform a CT scan to check ventricle size and on subdural hematomas. We will make additional plans accordingly if needed. We will also order physical therapy for her gait and balance in SNF.    > 50% of the 60 minute visit today we spent in counseling, education, and coordination of care regarding the problem outlined above    The patient was seen in conjunction with attending .  reviewed the history and image(s), examined the patient, and jointly developed the plan of care.    MD Etta Magaña, DNP, APRN, FNP-BC    CELESTE, Arjun Lugo, am serving as a scribe to document services personally performed by Kev Soto MD, based upon my observations and the provider's statements to me. All documentation has been  reviewed by the aforementioned doctor prior to being entered into the official medical record.    I, Kev Soto, attest that above named individual is acting in scribe capacity, has observed my performance of the services and has documented them in accordance with my direction. The documentation recorded by the scribe accurately reflects the service I personally performed and the decisions made by me.

## 2018-09-11 NOTE — LETTER
9/11/2018       RE: Joelle Freeman  [address]  [place] [address]     Dear Colleague,    Thank you for referring your patient, Joelle Freeman, to the [facility] at [facility]. Please see a copy of my visit note below.    Dear Dr. 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     This is a 89 year old female with a history of stroke with resulting right sided weakness, SDH and [place] [facility] [place] and s/p shunted hydrocepehalus by Dr. Tayo Conley in 7/2014. She presented to the emergency department in November 2015 with fatigue and cognitive decline. Her  shunt valve was adjusted from 1.5 to 1.0. She felt improvement following shut adjustment, We last saw her in our office in 1/2016 with a follow up head CT and it showed normal ventricles and stable thin subdural collections. We had released her to PRN follow up.     The patient presents with a chief complaint of worsening balance and gait as well as inability to ambulate since she stopped getting PT 5 months ago and (2) constant auditory hallucination, which has stopped. Her cognition has not changed, however, her shot term memory has declined over the past 2 years. She denies headaches. Of note, the patient presented to the emergency department in 7/2018 after taking a fall in SNF. She denies LOC. She had a head CT while in ED and this was negative for acute intracranial abnormality. She was discharged home on the same day.    Per her son, the patient was doing well after the  shunt placement for as long as she was receiving PT. She lives in SNF and has been wheelchair bound over the past 5 months, because no staff or PT came to take her for exercise. They would like to have the physiotherapy re-initiated if possible.   Of note, the patient presented to the emergency department in  7/2018 after taking a fall in SNF. She denies LOC. She had a head CT while in ED and this was negative for acute intracranial abnormality. She was discharged home on the same day. With regards to her auditory hallucinations. She describes hearing music in both ears, not a buzzing sound or noise from her hearing aid.  Fortunately, this has spontaneously resolved for the past 2 weeks.    PAST MEDICAL HISTORY: Asthma, SDH, stroke, NPH, dementia, LAKSHMI, migraine, CAD, hyperlipidemia, recurrent UTI, DVT, diverticulosis,heterozygous factor V Lediden Mutation.    PAST SURGICAL HISTORY: Appendectomy, breast biopsy, stent to right coronary artery, cholecystectomy, tonsillectomy, bladder suspension, D&C, hernia repair, shunt placement, s/p SDH evacuation and hysterectomy,    CURRENT MEDICATIONS: Aspirin, Lipitor, Celexa, Aricept, levothyroxine, lisinopril, Toprol, nystatin, and senna- docusate. Please refer to EPIC medication reconciliation/ list for specific dosages.    ALLERGIES: Tramadol    REVIEW OF SYSTEM: Her 14 point review of system is as stated above in HPI, PMH and PSH. Of note here includes right foot pain    NEUROLOGIC EXAMINATION:  On exam, she is elderly and sitting in a wheelchair. She is awake, alert, and oriented. She answers questions appropriately. She repeats herself fairly often which seems to be her baseline.   We confirmed her Strata shunt setting remains at 1.0.    REVIEW OF STUDIES: None new.     IMPRESSION AND PLAN: We will perform a CT scan to check ventricle size and on subdural hematomas. We will make additional plans accordingly if needed. We will also order physical therapy for her gait and balance in SNF.    > 50% of the 60 minute visit today we spent in counseling, education, and coordination of care regarding the problem outlined above    The patient was seen in conjunction with attending .  reviewed the history and image(s), examined the patient, and jointly developed the  plan of care.      I, Arjun Brittney, am serving as a scribe to document services personally performed by Kev Soto MD, based upon my observations and the provider's statements to me. All documentation has been reviewed by the aforementioned doctor prior to being entered into the official medical record.    I, Kev Soto, attest that above named individual is acting in scribe capacity, has observed my performance of the services and has documented them in accordance with my direction. The documentation recorded by the scribe accurately reflects the service I personally performed and the decisions made by me.      Again, thank you for allowing me to participate in the care of your patient.      Sincerely,    Kev Soto MD

## 2018-09-11 NOTE — MR AVS SNAPSHOT
After Visit Summary   9/11/2018    Joelle Freeman    MRN: [id number]           Patient Information     Date Of Birth          5/25/1928        Visit Information        Provider Department      9/11/2018 3:00 PM Kev Soto MD [facility] Neurosurgery        Today's Diagnoses     Idiopathic normal pressure hydrocephalus (INPH): Shunt 7/2013    -  1    Presence of cerebrospinal fluid drainage device          Care Instructions    Please have a head CT done before you leave the clinic today.  Referral to physical therapy for evaluation and treatment of gait and balance.          Follow-ups after your visit        Additional Services     PT Referral (External Facility)       Physical Therapy Location:  Facility of the patient's choice.  Evaluate and treat unstable gait and balance.                  Future tests that were ordered for you today     Open Future Orders        Priority Expected Expires Ordered    CT Head without contrast [[id number]] Routine  9/11/2019 9/11/2018            Who to contact     Please call your clinic at [phone] to:    Ask questions about your health    Make or cancel appointments    Discuss your medicines    Learn about your test results    Speak to your doctor            Additional Information About Your Visit        Care EveryWhere ID     This is your Care EveryWhere ID. This could be used by other organizations to access your [place] medical records  [id number]        Your Vitals Were     Pulse Pulse Oximetry                71 96%           Blood Pressure from Last 3 Encounters:   09/11/18 134/68   07/26/18 139/62   12/23/17 133/79    Weight from Last 3 Encounters:   07/26/18 56.7 kg (125 lb)   01/19/16 51.7 kg (114 lb)   12/15/15 53.5 kg (118 lb)              We Performed the Following     PT Referral (External Facility)        Primary Care Provider Office Phone # Fax #    Osman Small [phone]       [facility] [address]  [place] [address]        Equal Access to Services     [facility] HospitalJOSE[facility] : Hadii little gifford keon Elias, wadafneda luqadaha, qaybta xochitlgusalexander obregonrebeccamargaret eduardo. So [facility] [phone].    ATENCIÓN: Si habla español, tiene a simpson disposición servicios gratuitos de asistencia lingüística. [facility] [phone].    We comply with applicable federal civil rights laws and [place] laws. We do not discriminate on the basis of race, color, national origin, age, disability, sex, sexual orientation, or gender identity.            Thank you!     Thank you for choosing [facility]  for your care. Our goal is always to provide you with excellent care. Hearing back from our patients is one way we can continue to improve our services. Please take a few minutes to complete the written survey that you may receive in the mail after your visit with us. Thank you!             Your Updated Medication List - Protect others around you: Learn how to safely use, store and throw away your medicines at www.disposemymeds.org.          This list is accurate as of 9/11/18  3:50 PM.  Always use your most recent med list.                   Brand Name Dispense Instructions for use Diagnosis    * ACETAMINOPHEN PO      Take 650 mg by mouth 2 times daily as needed for pain        * ACETAMINOPHEN PO      Take 650 mg by mouth daily Daily at 16:00.        aspirin 81 MG EC tablet     30 tablet    Take 1 tablet (81 mg) by mouth daily    CAD (coronary artery disease)       atorvastatin 10 MG tablet    LIPITOR    30 tablet    Take 1 tablet (10 mg) by mouth daily    Hyperlipidemia       cholecalciferol 1000 units Tabs     30 tablet    Take 1,000 Units by mouth daily    Vitamin D deficiency       citalopram 10 MG tablet    celeXA    30 tablet    Take 1 tablet (10 mg) by mouth daily    Depression       donepezil 10 MG tablet    ARICEPT    30 tablet    Take 1 tablet (10 mg) by mouth At Bedtime    Dementia        levothyroxine 100 MCG tablet    SYNTHROID/LEVOTHROID    30 tablet    Take 1 tablet (100 mcg) by mouth daily    Hypothyroidism       LISINOPRIL PO      Take 10 mg by mouth daily Hold if SBP<110 and notify NP        MELATONIN PO      Take 3 mg by mouth nightly as needed        nystatin cream    MYCOSTATIN     Apply topically as needed for dry skin (use with butt paste)        senna-docusate 8.6-50 MG per tablet    SENOKOT-S;PERICOLACE     Take 1 tablet by mouth 2 times daily as needed        TOPROL XL PO      Take 12.5 mg by mouth daily Hold if SBP<110 or HR <55 and notify NP/MD        * Notice:  This list has 2 medication(s) that are the same as other medications prescribed for you. Read the directions carefully, and ask your doctor or other care provider to review them with you.

## 2018-09-11 NOTE — NURSING NOTE
Chief Complaint   Patient presents with     RECHECK     [facility] RETURN - INPH/ SHUNT       Cameron Rousseau, EMT

## 2018-09-17 ENCOUNTER — PATIENT OUTREACH (OUTPATIENT)
Dept: NEUROSURGERY | Facility: CLINIC | Age: 83
End: 2018-09-17

## 2018-09-17 DIAGNOSIS — G91.2 IDIOPATHIC NORMAL PRESSURE HYDROCEPHALUS (INPH) (H): Primary | ICD-10-CM

## 2018-09-17 NOTE — PROGRESS NOTES
Returned call.  Pt's daughter asked for a new physical therapy order so pt could have physical therapy in her home.  When writer returned the call the issue with physical therapy had been worked.  Pt's daughter will call if further assistance is needed.

## 2018-12-03 ENCOUNTER — RECORDS - HEALTHEAST (OUTPATIENT)
Dept: LAB | Facility: CLINIC | Age: 83
End: 2018-12-03

## 2018-12-04 LAB
25(OH)D3 SERPL-MCNC: 35.8 NG/ML (ref 30–80)
ANION GAP SERPL CALCULATED.3IONS-SCNC: 11 MMOL/L (ref 5–18)
BUN SERPL-MCNC: 15 MG/DL (ref 8–28)
CALCIUM SERPL-MCNC: 9.6 MG/DL (ref 8.5–10.5)
CHLORIDE BLD-SCNC: 109 MMOL/L (ref 98–107)
CO2 SERPL-SCNC: 21 MMOL/L (ref 22–31)
CREAT SERPL-MCNC: 0.81 MG/DL (ref 0.6–1.1)
ERYTHROCYTE [DISTWIDTH] IN BLOOD BY AUTOMATED COUNT: 12.6 % (ref 11–14.5)
GFR SERPL CREATININE-BSD FRML MDRD: >60 ML/MIN/1.73M2
GLUCOSE BLD-MCNC: 88 MG/DL (ref 70–125)
HCT VFR BLD AUTO: 43.6 % (ref 35–47)
HGB BLD-MCNC: 14.2 G/DL (ref 12–16)
MCH RBC QN AUTO: 31.8 PG (ref 27–34)
MCHC RBC AUTO-ENTMCNC: 32.6 G/DL (ref 32–36)
MCV RBC AUTO: 98 FL (ref 80–100)
PLATELET # BLD AUTO: 183 THOU/UL (ref 140–440)
PMV BLD AUTO: 11.8 FL (ref 8.5–12.5)
POTASSIUM BLD-SCNC: 4.7 MMOL/L (ref 3.5–5)
RBC # BLD AUTO: 4.47 MILL/UL (ref 3.8–5.4)
SODIUM SERPL-SCNC: 141 MMOL/L (ref 136–145)
TSH SERPL DL<=0.005 MIU/L-ACNC: 2.28 UIU/ML (ref 0.3–5)
WBC: 8.6 THOU/UL (ref 4–11)

## 2019-06-29 ENCOUNTER — APPOINTMENT (OUTPATIENT)
Dept: GENERAL RADIOLOGY | Facility: CLINIC | Age: 84
DRG: 689 | End: 2019-06-29
Attending: EMERGENCY MEDICINE
Payer: MEDICARE

## 2019-06-29 ENCOUNTER — HOSPITAL ENCOUNTER (INPATIENT)
Facility: CLINIC | Age: 84
LOS: 3 days | Discharge: HOME HEALTH | DRG: 689 | End: 2019-07-02
Attending: EMERGENCY MEDICINE | Admitting: HOSPITALIST
Payer: MEDICARE

## 2019-06-29 ENCOUNTER — APPOINTMENT (OUTPATIENT)
Dept: CT IMAGING | Facility: CLINIC | Age: 84
DRG: 689 | End: 2019-06-29
Attending: EMERGENCY MEDICINE
Payer: MEDICARE

## 2019-06-29 DIAGNOSIS — R47.01 APHASIA: ICD-10-CM

## 2019-06-29 DIAGNOSIS — F03.90 DEMENTIA WITHOUT BEHAVIORAL DISTURBANCE, UNSPECIFIED DEMENTIA TYPE: ICD-10-CM

## 2019-06-29 DIAGNOSIS — N39.0 URINARY TRACT INFECTION WITHOUT HEMATURIA, SITE UNSPECIFIED: ICD-10-CM

## 2019-06-29 DIAGNOSIS — G45.9 TIA (TRANSIENT ISCHEMIC ATTACK): ICD-10-CM

## 2019-06-29 DIAGNOSIS — R53.81 PHYSICAL DECONDITIONING: Primary | ICD-10-CM

## 2019-06-29 LAB
ALBUMIN SERPL-MCNC: 3.3 G/DL (ref 3.4–5)
ALBUMIN UR-MCNC: NEGATIVE MG/DL
ALP SERPL-CCNC: 93 U/L (ref 40–150)
ALT SERPL W P-5'-P-CCNC: 20 U/L (ref 0–50)
ANION GAP SERPL CALCULATED.3IONS-SCNC: 5 MMOL/L (ref 3–14)
APPEARANCE UR: CLEAR
AST SERPL W P-5'-P-CCNC: 13 U/L (ref 0–45)
BACTERIA #/AREA URNS HPF: ABNORMAL /HPF
BASOPHILS # BLD AUTO: 0 10E9/L (ref 0–0.2)
BASOPHILS NFR BLD AUTO: 0.1 %
BILIRUB SERPL-MCNC: 0.5 MG/DL (ref 0.2–1.3)
BILIRUB UR QL STRIP: NEGATIVE
BUN SERPL-MCNC: 25 MG/DL (ref 7–30)
CALCIUM SERPL-MCNC: 8.7 MG/DL (ref 8.5–10.1)
CHLORIDE SERPL-SCNC: 112 MMOL/L (ref 94–109)
CK SERPL-CCNC: 43 U/L (ref 30–225)
CO2 SERPL-SCNC: 26 MMOL/L (ref 20–32)
COLOR UR AUTO: YELLOW
CREAT SERPL-MCNC: 0.75 MG/DL (ref 0.52–1.04)
DIFFERENTIAL METHOD BLD: ABNORMAL
EOSINOPHIL # BLD AUTO: 0.1 10E9/L (ref 0–0.7)
EOSINOPHIL NFR BLD AUTO: 1.3 %
ERYTHROCYTE [DISTWIDTH] IN BLOOD BY AUTOMATED COUNT: 13.5 % (ref 10–15)
GFR SERPL CREATININE-BSD FRML MDRD: 69 ML/MIN/{1.73_M2}
GLUCOSE BLDC GLUCOMTR-MCNC: 77 MG/DL (ref 70–99)
GLUCOSE SERPL-MCNC: 97 MG/DL (ref 70–99)
GLUCOSE UR STRIP-MCNC: NEGATIVE MG/DL
HCT VFR BLD AUTO: 41.2 % (ref 35–47)
HGB BLD-MCNC: 14.2 G/DL (ref 11.7–15.7)
HGB UR QL STRIP: NEGATIVE
IMM GRANULOCYTES # BLD: 0 10E9/L (ref 0–0.4)
IMM GRANULOCYTES NFR BLD: 0.3 %
INTERPRETATION ECG - MUSE: NORMAL
KETONES UR STRIP-MCNC: NEGATIVE MG/DL
LACTATE BLD-SCNC: 0.9 MMOL/L (ref 0.7–2)
LEUKOCYTE ESTERASE UR QL STRIP: ABNORMAL
LYMPHOCYTES # BLD AUTO: 2.2 10E9/L (ref 0.8–5.3)
LYMPHOCYTES NFR BLD AUTO: 30.3 %
MAGNESIUM SERPL-MCNC: 2.3 MG/DL (ref 1.6–2.3)
MCH RBC QN AUTO: 33.2 PG (ref 26.5–33)
MCHC RBC AUTO-ENTMCNC: 34.5 G/DL (ref 31.5–36.5)
MCV RBC AUTO: 96 FL (ref 78–100)
MONOCYTES # BLD AUTO: 0.7 10E9/L (ref 0–1.3)
MONOCYTES NFR BLD AUTO: 10 %
MUCOUS THREADS #/AREA URNS LPF: PRESENT /LPF
NEUTROPHILS # BLD AUTO: 4.2 10E9/L (ref 1.6–8.3)
NEUTROPHILS NFR BLD AUTO: 58 %
NITRATE UR QL: POSITIVE
NRBC # BLD AUTO: 0 10*3/UL
NRBC BLD AUTO-RTO: 0 /100
PH UR STRIP: 6 PH (ref 5–7)
PLATELET # BLD AUTO: 150 10E9/L (ref 150–450)
POTASSIUM SERPL-SCNC: 4.4 MMOL/L (ref 3.4–5.3)
PROT SERPL-MCNC: 6.4 G/DL (ref 6.8–8.8)
RBC # BLD AUTO: 4.28 10E12/L (ref 3.8–5.2)
RBC #/AREA URNS AUTO: 4 /HPF (ref 0–2)
SODIUM SERPL-SCNC: 143 MMOL/L (ref 133–144)
SOURCE: ABNORMAL
SP GR UR STRIP: 1.01 (ref 1–1.03)
TROPONIN I SERPL-MCNC: 0.07 UG/L (ref 0–0.04)
TROPONIN I SERPL-MCNC: 0.08 UG/L (ref 0–0.04)
TROPONIN I SERPL-MCNC: 0.09 UG/L (ref 0–0.04)
UROBILINOGEN UR STRIP-MCNC: NORMAL MG/DL (ref 0–2)
WBC # BLD AUTO: 7.2 10E9/L (ref 4–11)
WBC #/AREA URNS AUTO: 35 /HPF (ref 0–5)

## 2019-06-29 PROCEDURE — 84484 ASSAY OF TROPONIN QUANT: CPT | Performed by: EMERGENCY MEDICINE

## 2019-06-29 PROCEDURE — 83735 ASSAY OF MAGNESIUM: CPT | Performed by: EMERGENCY MEDICINE

## 2019-06-29 PROCEDURE — 00000146 ZZHCL STATISTIC GLUCOSE BY METER IP

## 2019-06-29 PROCEDURE — 82550 ASSAY OF CK (CPK): CPT | Performed by: HOSPITALIST

## 2019-06-29 PROCEDURE — 25000128 H RX IP 250 OP 636: Performed by: EMERGENCY MEDICINE

## 2019-06-29 PROCEDURE — 83605 ASSAY OF LACTIC ACID: CPT | Performed by: EMERGENCY MEDICINE

## 2019-06-29 PROCEDURE — 85025 COMPLETE CBC W/AUTO DIFF WBC: CPT | Performed by: EMERGENCY MEDICINE

## 2019-06-29 PROCEDURE — 80053 COMPREHEN METABOLIC PANEL: CPT | Performed by: EMERGENCY MEDICINE

## 2019-06-29 PROCEDURE — 93005 ELECTROCARDIOGRAM TRACING: CPT

## 2019-06-29 PROCEDURE — 81001 URINALYSIS AUTO W/SCOPE: CPT | Performed by: EMERGENCY MEDICINE

## 2019-06-29 PROCEDURE — 25000132 ZZH RX MED GY IP 250 OP 250 PS 637: Mod: GY | Performed by: HOSPITALIST

## 2019-06-29 PROCEDURE — 99285 EMERGENCY DEPT VISIT HI MDM: CPT | Mod: 25

## 2019-06-29 PROCEDURE — 70450 CT HEAD/BRAIN W/O DYE: CPT

## 2019-06-29 PROCEDURE — 71046 X-RAY EXAM CHEST 2 VIEWS: CPT

## 2019-06-29 PROCEDURE — 84484 ASSAY OF TROPONIN QUANT: CPT | Performed by: HOSPITALIST

## 2019-06-29 PROCEDURE — 36415 COLL VENOUS BLD VENIPUNCTURE: CPT | Performed by: HOSPITALIST

## 2019-06-29 PROCEDURE — 25000132 ZZH RX MED GY IP 250 OP 250 PS 637: Mod: GY | Performed by: EMERGENCY MEDICINE

## 2019-06-29 PROCEDURE — 96374 THER/PROPH/DIAG INJ IV PUSH: CPT

## 2019-06-29 PROCEDURE — 12000000 ZZH R&B MED SURG/OB

## 2019-06-29 PROCEDURE — 25800030 ZZH RX IP 258 OP 636: Performed by: HOSPITALIST

## 2019-06-29 PROCEDURE — 99223 1ST HOSP IP/OBS HIGH 75: CPT | Mod: AI | Performed by: HOSPITALIST

## 2019-06-29 RX ORDER — ATORVASTATIN CALCIUM 10 MG/1
10 TABLET, FILM COATED ORAL DAILY
Status: DISCONTINUED | OUTPATIENT
Start: 2019-06-30 | End: 2019-07-02 | Stop reason: HOSPADM

## 2019-06-29 RX ORDER — LANOLIN ALCOHOL/MO/W.PET/CERES
3 CREAM (GRAM) TOPICAL AT BEDTIME
Status: DISCONTINUED | OUTPATIENT
Start: 2019-06-29 | End: 2019-07-02 | Stop reason: HOSPADM

## 2019-06-29 RX ORDER — LOPERAMIDE HCL 2 MG
2 CAPSULE ORAL 3 TIMES DAILY PRN
Status: DISCONTINUED | OUTPATIENT
Start: 2019-06-29 | End: 2019-07-02 | Stop reason: HOSPADM

## 2019-06-29 RX ORDER — LEVOTHYROXINE SODIUM 88 UG/1
88 TABLET ORAL DAILY
Status: DISCONTINUED | OUTPATIENT
Start: 2019-06-30 | End: 2019-07-02 | Stop reason: HOSPADM

## 2019-06-29 RX ORDER — ASPIRIN 325 MG
325 TABLET ORAL ONCE
Status: COMPLETED | OUTPATIENT
Start: 2019-06-29 | End: 2019-06-29

## 2019-06-29 RX ORDER — LIDOCAINE 40 MG/G
CREAM TOPICAL
Status: DISCONTINUED | OUTPATIENT
Start: 2019-06-29 | End: 2019-07-02 | Stop reason: HOSPADM

## 2019-06-29 RX ORDER — LISINOPRIL 10 MG/1
2.5 TABLET ORAL DAILY
COMMUNITY
End: 2021-01-01

## 2019-06-29 RX ORDER — LOPERAMIDE HYDROCHLORIDE 2 MG/1
2 TABLET ORAL PRN
Status: ON HOLD | COMMUNITY
End: 2020-07-21

## 2019-06-29 RX ORDER — ONDANSETRON 2 MG/ML
4 INJECTION INTRAMUSCULAR; INTRAVENOUS EVERY 6 HOURS PRN
Status: DISCONTINUED | OUTPATIENT
Start: 2019-06-29 | End: 2019-07-02 | Stop reason: HOSPADM

## 2019-06-29 RX ORDER — SODIUM CHLORIDE 9 MG/ML
INJECTION, SOLUTION INTRAVENOUS CONTINUOUS
Status: DISCONTINUED | OUTPATIENT
Start: 2019-06-29 | End: 2019-07-01

## 2019-06-29 RX ORDER — BISACODYL 10 MG
10 SUPPOSITORY, RECTAL RECTAL DAILY PRN
Status: DISCONTINUED | OUTPATIENT
Start: 2019-06-29 | End: 2019-07-02 | Stop reason: HOSPADM

## 2019-06-29 RX ORDER — ACETAMINOPHEN 650 MG/1
650 SUPPOSITORY RECTAL EVERY 4 HOURS PRN
Status: DISCONTINUED | OUTPATIENT
Start: 2019-06-29 | End: 2019-07-02 | Stop reason: HOSPADM

## 2019-06-29 RX ORDER — NALOXONE HYDROCHLORIDE 0.4 MG/ML
.1-.4 INJECTION, SOLUTION INTRAMUSCULAR; INTRAVENOUS; SUBCUTANEOUS
Status: DISCONTINUED | OUTPATIENT
Start: 2019-06-29 | End: 2019-07-02 | Stop reason: HOSPADM

## 2019-06-29 RX ORDER — ESCITALOPRAM OXALATE 10 MG/1
10 TABLET ORAL DAILY
COMMUNITY
End: 2020-07-19

## 2019-06-29 RX ORDER — CEFTRIAXONE 1 G/1
1 INJECTION, POWDER, FOR SOLUTION INTRAMUSCULAR; INTRAVENOUS ONCE
Status: COMPLETED | OUTPATIENT
Start: 2019-06-29 | End: 2019-06-29

## 2019-06-29 RX ORDER — ESCITALOPRAM OXALATE 10 MG/1
10 TABLET ORAL DAILY
Status: DISCONTINUED | OUTPATIENT
Start: 2019-06-30 | End: 2019-07-02 | Stop reason: HOSPADM

## 2019-06-29 RX ORDER — ACETAMINOPHEN 325 MG/1
650 TABLET ORAL EVERY 4 HOURS PRN
Status: DISCONTINUED | OUTPATIENT
Start: 2019-06-29 | End: 2019-07-02 | Stop reason: HOSPADM

## 2019-06-29 RX ORDER — CEFTRIAXONE 1 G/1
1 INJECTION, POWDER, FOR SOLUTION INTRAMUSCULAR; INTRAVENOUS EVERY 24 HOURS
Status: DISCONTINUED | OUTPATIENT
Start: 2019-06-30 | End: 2019-07-02 | Stop reason: HOSPADM

## 2019-06-29 RX ORDER — METOPROLOL SUCCINATE 25 MG/1
25 TABLET, EXTENDED RELEASE ORAL DAILY
Status: DISCONTINUED | OUTPATIENT
Start: 2019-06-30 | End: 2019-07-02 | Stop reason: HOSPADM

## 2019-06-29 RX ORDER — NYSTATIN 100000 [USP'U]/G
POWDER TOPICAL 2 TIMES DAILY PRN
COMMUNITY

## 2019-06-29 RX ORDER — METOPROLOL SUCCINATE 25 MG/1
25 TABLET, EXTENDED RELEASE ORAL DAILY
Status: ON HOLD | COMMUNITY
End: 2020-08-09

## 2019-06-29 RX ORDER — LISINOPRIL 10 MG/1
10 TABLET ORAL DAILY
Status: DISCONTINUED | OUTPATIENT
Start: 2019-06-30 | End: 2019-07-02 | Stop reason: HOSPADM

## 2019-06-29 RX ORDER — ASPIRIN 81 MG/1
81 TABLET ORAL DAILY
Status: DISCONTINUED | OUTPATIENT
Start: 2019-06-30 | End: 2019-07-02 | Stop reason: HOSPADM

## 2019-06-29 RX ORDER — ONDANSETRON 4 MG/1
4 TABLET, ORALLY DISINTEGRATING ORAL EVERY 6 HOURS PRN
Status: DISCONTINUED | OUTPATIENT
Start: 2019-06-29 | End: 2019-07-02 | Stop reason: HOSPADM

## 2019-06-29 RX ORDER — DOCUSATE SODIUM 100 MG/1
100 CAPSULE, LIQUID FILLED ORAL 2 TIMES DAILY
Status: DISCONTINUED | OUTPATIENT
Start: 2019-06-29 | End: 2019-07-02

## 2019-06-29 RX ORDER — LEVOTHYROXINE SODIUM 88 UG/1
88 TABLET ORAL DAILY
Status: ON HOLD | COMMUNITY
End: 2020-07-21

## 2019-06-29 RX ADMIN — MELATONIN TAB 3 MG 3 MG: 3 TAB at 21:51

## 2019-06-29 RX ADMIN — CEFTRIAXONE 1 G: 1 INJECTION, POWDER, FOR SOLUTION INTRAMUSCULAR; INTRAVENOUS at 16:52

## 2019-06-29 RX ADMIN — ASPIRIN 325 MG ORAL TABLET 325 MG: 325 PILL ORAL at 15:27

## 2019-06-29 RX ADMIN — DOCUSATE SODIUM 100 MG: 100 CAPSULE, LIQUID FILLED ORAL at 21:51

## 2019-06-29 RX ADMIN — SODIUM CHLORIDE: 9 INJECTION, SOLUTION INTRAVENOUS at 18:33

## 2019-06-29 ASSESSMENT — ENCOUNTER SYMPTOMS
ABDOMINAL PAIN: 0
DIFFICULTY URINATING: 0
DIARRHEA: 0
HEADACHES: 0
DIZZINESS: 0
SHORTNESS OF BREATH: 0
HEMATURIA: 0
NUMBNESS: 0
FREQUENCY: 0
DYSURIA: 0
SPEECH DIFFICULTY: 1
WEAKNESS: 0
FEVER: 0

## 2019-06-29 ASSESSMENT — ACTIVITIES OF DAILY LIVING (ADL): ADLS_ACUITY_SCORE: 24

## 2019-06-29 ASSESSMENT — MIFFLIN-ST. JEOR: SCORE: 967

## 2019-06-29 NOTE — PLAN OF CARE
Pt arrived on unit at 1810. A&O x4. VSS on RA. Denies pain. Pt not OOB this shift. Regular diet. PIV infusing NS @75ml/hr. Skin intact-blanchable redness to coccyx. LS clear. Denies cough. Troponin elevated-chronic per pt chart. Tele NSR. BS active, + Flatus. LBM yesterday per pt. PT/OT/ST, SW consulted. Progressing toward plan of care.

## 2019-06-29 NOTE — ED NOTES
"[facility]  ED Nurse Handoff Report    ED Chief complaint: Altered Mental Status      ED Diagnosis:   Final diagnoses:   TIA (transient ischemic attack)   Aphasia       Code Status: DNR / DNI, to be assessed by admitting provider    Allergies:   Allergies   Allergen Reactions     Tramadol        Activity level - Baseline/Home:  Stand with Assist  Activity Level - Current:   Stand with Assist of 2    Patient's Preferred language: English   Needed?: No    Isolation: No  Infection: Not Applicable  Bariatric?: No    Vital Signs:   Vitals:    06/29/19 1330 06/29/19 1345 06/29/19 1400 06/29/19 1415   BP: 145/71 148/65 145/74 141/64   Pulse: 62 64 62 58   Resp:  15 11 21   Temp:       TempSrc:       SpO2: 94% 93% 95% 93%   Weight:       Height:           Cardiac Rhythm: ,        Pain level:      Is this patient confused?: Yes   Does this patient have a guardian?  No         If yes, is there guardianship documents in the Epic \"Code/ACP\" activity?  N/A         Guardian Notified?  N/A  [place] - Suicide Severity Rating Scale Completed?  No, secondary to PSS-3  If yes, what color did the patient score?  N/A    Patient Report: Initial Complaint: Altered Mental Status  Focused Assessment: Cardiac WNL, Respiratory WNL, Neuro WNL except slow speech and reported difficulty speaking, and lethargic.   Tests Performed: CT head and chest, Urinalysis, Troponin, CMP, CBC, Lactic and BG  Abnormal Results: Chronically elevated Troponin, Urine positive for nitrites, large esterase, elevated WBCs, elevated RBCs, and bacteremia.   Abnormal Labs Reviewed   CBC WITH PLATELETS DIFFERENTIAL - Abnormal; Notable for the following components:       Result Value    MCH 33.2 (*)     All other components within normal limits   COMPREHENSIVE METABOLIC PANEL - Abnormal; Notable for the following components:    Chloride 112 (*)     Albumin 3.3 (*)     Protein Total 6.4 (*)     All other components within normal limits "   TROPONIN I - Abnormal; Notable for the following components:    Troponin I ES 0.074 (*)     All other components within normal limits   URINE MACROSCOPIC WITH REFLEX TO MICRO - Abnormal; Notable for the following components:    Nitrite Urine Positive (*)     Leukocyte Esterase Urine Large (*)     RBC Urine 4 (*)     WBC Urine 35 (*)     Bacteria Urine Many (*)     Mucous Urine Present (*)     All other components within normal limits     Treatments provided: ASA.    Family Comments: SonOsman, with patient.     OBS brochure/video discussed/provided to patient/family: N/A              Name of person given brochure if not patient:               Relationship to patient:     ED Medications:   Medications   aspirin (ASA) tablet 325 mg (has no administration in time range)       Drips infusing?:  No    For the majority of the shift this patient was Green.   Interventions performed were NA.    Severe Sepsis OR Septic Shock Diagnosis Present: No    To be done/followed up on inpatient unit:      ED NURSE PHONE NUMBER: [phone]

## 2019-06-29 NOTE — PROGRESS NOTES
RECEIVING UNIT ED HANDOFF REVIEW    ED Nurse Handoff Report was reviewed by: Rosamaria Mars on June 29, 2019 at 5:40 PM

## 2019-06-29 NOTE — PHARMACY-ADMISSION MEDICATION HISTORY
Admission medication history interview status for the 6/29/2019  admission is complete. See EPIC admission navigator for prior to admission medications     Medication history source reliability:Good- [facility] from [facility]    Actions taken by pharmacist (provider contacted, etc): called facility to verify last dose administration times     Additional medication history information not noted on PTA med list :None    Medication reconciliation/reorder completed by provider prior to medication history? No    Time spent in this activity: 10 min    Prior to Admission medications    Medication Sig Last Dose Taking? Auth Provider   ACETAMINOPHEN PO Take 650 mg by mouth 2 times daily as needed for pain prn Yes Unknown, Entered By History   aspirin EC 81 MG EC tablet Take 1 tablet (81 mg) by mouth daily 6/29/2019 at 0800 Yes Patric Pinedo MD   atorvastatin (LIPITOR) 10 MG tablet Take 1 tablet (10 mg) by mouth daily 6/29/2019 at 0800 Yes Patric Pinedo MD   cholecalciferol 1000 UNITS TABS Take 1,000 Units by mouth daily 6/29/2019 at 0800 Yes Patric Pinedo MD   escitalopram (LEXAPRO) 10 MG tablet Take 10 mg by mouth daily 6/29/2019 at 0800 Yes Unknown, Entered By History   levothyroxine (SYNTHROID/LEVOTHROID) 88 MCG tablet Take 88 mcg by mouth daily 6/29/2019 at 0800 Yes Unknown, Entered By History   lisinopril (PRINIVIL/ZESTRIL) 10 MG tablet Take 10 mg by mouth daily 6/29/2019 at 0800 Yes Unknown, Entered By History   loperamide (IMODIUM A-D) 2 MG tablet Take 2 mg by mouth as needed for diarrhea (8 times daily as needeed for diarrhea) prn Yes Unknown, Entered By History   MELATONIN PO Take 3 mg by mouth At Bedtime  6/28/2019 at 2100 Yes Unknown, Entered By History   metoprolol succinate ER (TOPROL-XL) 25 MG 24 hr tablet Take 25 mg by mouth daily 6/29/2019 at 0800 Yes Unknown, Entered By History   nystatin (MYCOSTATIN) 462496 UNIT/GM external powder Apply topically 2  times daily as needed (intertriginal dermatitis) prn Yes Unknown, Entered By History   senna-docusate (SENOKOT-S;PERICOLACE) 8.6-50 MG per tablet Take 1 tablet by mouth 2 times daily as needed  prn Yes Reported, Patient   zinc oxide 16 % (BOUDREAUXS BUTT PASTE) PSTE paste Apply topically as needed for irritation (red buttocks) prn Yes Unknown, Entered By History

## 2019-06-29 NOTE — ED TRIAGE NOTES
"Pt presents to the ED from assisted living.  Per EMS report, patient woke this AM and \"didn't feel well\"  noted that the patient was acting lethargic, slow to respond and had muffled speech. Pt A&O x4 upon arrival to ED, denies numbness, tingling or weakness. Hx of CVA x2.  "

## 2019-06-29 NOTE — ED PROVIDER NOTES
"  History     Chief Complaint:  Altered Mental Status    The history is provided by the EMS personnel, the patient and a relative.      Joelle Freeman is a 89 year old female with a history of stroke, dementia who presents via EMS for altered mental status. The patient lives in an independent living home and she woke up this morning \"not feeling right.\" The patients son reports that she is having slow and soft speech that was gargled. He also states that she was having episodes of unresponsiveness when she would just look at the ceiling. The patient states that she is having trouble with her talking, she states that she was having a little trouble when she woke up but it has gotten worse. She denies weakness, numbness, tingling, dizziness, headache, chest pain, shortness of breath, abdominal pain, nausea, vomiting, diarrhea, urinary abnormalities, and recent cold or flu symptoms.  Is unclear how reliable her history is given dementia and memory problems.  Patient and the patient's family deny any recent falls traumas or injuries. Patient denies any other symptoms. Patient's daughter later notes that the patient seemed \"off\" last night as well.    Allergies:  Tramadol     Medications:    Acetaminophen po  Aspirin ec 81 mg ec tablet  Atorvastatin (lipitor) 10 mg tablet  Cholecalciferol 1000 units tabs  Citalopram (celexa) 10 mg tablet  Donepezil (aricept) 10 mg tablet  Levothyroxine (synthroid,levothroid) 100 mcg tablet  Lisinopril po  Metoprolol succinate (toprol xl po)  Senna-docusate (senokot-s;pericolace) 8.6-50 mg per tablet    Past Medical History:    Asthma   CAD (coronary artery disease)   Cerebral ventriculomegaly   CVA (cerebral infarction)   Dementia   Depression   Diverticulosis   Dyslipidemia   Heterozygous factor V Leiden mutation   Hypertension  Hyperlipidemia   Hypothyroidism   Normal pressure hydrocephalus   Posterior reversible encephalopathy syndrome   Subarachnoid hemorrhage    Subdural " hematoma   TIA (transient ischaemic attack)   Unspecified cerebral artery occlusion with cerebral infarction   Urinary incontinence   UTI (lower urinary tract infection)  Hip fracture, intertrochanteric   DVT (deep venous thrombosis)   Staring spell  Acute colitis  Generalized muscle weakness  Severe aortic stenosis  Abdominal pain, generalized  Abnormality of gait  Atrial fibrillation   Anemia  Macular puckering of retina  Chronic respiratory insufficiency  Unspecified persistent mental disorders due to conditions classified elsewhere  Osteoarthritis  Diarrhea  Tear film insufficiency  Dysphagia  Closed fracture of patella  LAKSHMI (generalized anxiety disorder)  Insomnia  Migraine  Closed rib fracture  Ataxia, late effect of cerebrovascular disease  Other malaise and fatigue     Past Surgical History:    Appendectomy  Breast biopsy  Stent to the right coronary artery in 2001  Cholecystectomy  Tonsillectomy  Bladder suspension  Gyn surgery, D & C  Oophorectomy unilateral  Hernia repair  Hysterectomy  ORIF hip  [place] teeth extraction    Family History:    Cerebrovascular disease  Blood disease    Social History:  Patient is   Tobacco Use: No  Alcohol Use: No  PCP: EDNA CARCAMO     Review of Systems   Constitutional: Negative for fever.   Respiratory: Negative for shortness of breath.    Cardiovascular: Negative for chest pain.   Gastrointestinal: Negative for abdominal pain and diarrhea.   Genitourinary: Negative for difficulty urinating, dysuria, frequency and hematuria.   Neurological: Positive for speech difficulty. Negative for dizziness, weakness, numbness and headaches.   All other systems reviewed and are negative.    Physical Exam   First Vitals:  Patient Vitals for the past 24 hrs:   BP Temp Temp src Pulse Heart Rate Resp SpO2 Height Weight   06/29/19 1600 137/75 -- -- 63 62 9 94 % -- --   06/29/19 1545 151/69 -- -- 61 62 23 94 % -- --   06/29/19 1530 147/82 -- -- 64 63 10 96 % -- --   06/29/19 1515  "144/64 -- -- 62 61 13 94 % -- --   06/29/19 1500 149/70 -- -- 63 63 10 95 % -- --   06/29/19 1415 141/64 -- -- 58 60 21 93 % -- --   06/29/19 1400 145/74 -- -- 62 60 11 95 % -- --   06/29/19 1345 148/65 -- -- 64 62 15 93 % -- --   06/29/19 1330 145/71 -- -- 62 -- -- 94 % -- --   06/29/19 1315 137/63 -- -- 61 -- -- 94 % -- --   06/29/19 1245 124/57 -- -- 60 61 17 -- -- --   06/29/19 1230 137/84 -- -- 64 64 22 -- -- --   06/29/19 1215 138/75 -- -- 67 67 9 94 % -- --   06/29/19 1200 134/59 -- -- 63 63 14 94 % -- --   06/29/19 1146 148/70 97.8  F (36.6  C) Oral 66 -- 18 92 % 1.626 m (5' 4\") 56.7 kg (125 lb)     Physical Exam  General: Well appearing, nontoxic. Resting comfortably  Head:  Scalp, face, and head appear normal, atraumatic   Eyes:  Pupils are equal, round, and reactive to light, EOMI, no nystagmus     Conjunctivae non-injected and sclerae white  ENT:    The external nose is normal    Pinnae are normal    The oropharynx is normal, mucous membranes moist    Uvula is in the midline  Neck:  Normal range of motion    There is no rigidity noted    Trachea is in the midline  CV:  Regular rate and rhythm     Normal S1/S2, no S3/S4    No murmur or rub. Radial pulses 2+ bilaterally   Resp:  Lungs are clear and equal bilaterally    There is no tachypnea    No increased work of breathing    No rales, wheezing, or rhonchi  GI:  Abdomen is soft, no rigidity or guarding    No distension, or mass    No tenderness or rebound tenderness   MS:  Normal muscular tone. Full painless ROM of all extremities. Extremities non tender to palpation and atraumatic.    Symmetric motor strength    No lower extremity edema. No calf swelling or tenderness.   Skin:  No rash or acute skin lesions noted  Neuro: A&Ox3, GCS 15    CN II - XII intact    Slight speech hesitation but no definite dysarthria or aphasia.  No receptive aphasia.    Strength 5/5 and symmetric in bilateral upper and lower extremities.    No pronator drift. No leg drift. " SILT throughout.    No ankle clonus    FTN testing normal. No tremor.     No meningismus.  Patient exhibits mild short-term memory deficits.  Psych:  Normal affect.  Appropriate interactions.    Emergency Department Course   ECG:  @ 1231  Indication: Medical evaluation  Vent. Rate 64 bpm. NJ interval 174 ms. QRS duration 92 ms. QT/QTc 430/443 ms. P-R-T axis 70 -47 57.   Normal sinus rhythm. Left anterior fascicular block. Minimal voltage criteria for LVH, may be normal variant. Abnormal ECG.  No significant change when compared to previous ECG from 11/22/15   Read @ 124 by Dr. Person.    Imaging:  Radiographic findings were communicated with the patient and family who voiced understanding of the findings.  XR chest 2 views:  Cardiac size is within normal limits. Tortuous aorta.  Pulmonary vasculature is not distended. Lungs are clear. No pleural  fluid.  shunt catheter seen overlying the right chest. No acute  disease. No significant change. Per radiology read.   CT head w/o contrast:  1.  shunt catheter again noted without change. No evidence for  hydrocephalus.  2. Dural thickening or thin subdural collections along the cerebral  convexities on both sides again noted without change, likely  representing chronic changes due to  shunt catheter drainage.  3. Diffuse cerebral volume loss and cerebral white matter changes  consistent with chronic small vessel ischemic disease. No evidence for  acute intracranial pathology. Per radiology read.    Laboratory:  CBC:  WBC 7.2, HGB 14.2,   CMP: Chloride 112 high, Albumin 3.3 low, Protein Total 6.4 low, o/w WNL. (Creatinine 0.75)  Lactic Acid: 0.9  Glucose: 77  Troponin: 0.074 high  Magnesium: 2.3  UA: Nitrite positive (A), Leukocyte Esterase large (A), RBC 4 high, WBC 35 high, Bacteria many (A), Mucous present (A) o/w WNL    Interventions:  1527: Aspirin 325mg PO  1652: Rocephin 1g IV    Emergency Department Course:  11:48 AM Nursing notes and vitals  reviewed.  I performed an exam of the patient as documented above.     4:02 PM I rechecked on and updated the patient.    Findings and plan explained to the patient who consents to admission.   4:09 PM I discussed the patient with Dr. Luna of the hospitalist service, who will admit the patient to a medical bed for further monitoring, evaluation, and treatment.    Impression & Plan      Medical Decision Making:  Joelle Freeman is a 89 year old female who presents for evaluation of altered mental status and changes in speech.  Patient son endorses that the patient had abnormal speech pattern had trouble speaking and seemed confused.  On my evaluation the patient has no focal neurologic deficits.  Her speech appears to be clear although somewhat hesitant.  She is well-appearing and afebrile.  Hemodynamically stable.  She denies any headache, fall injury or trauma.  A broad differential diagnosis is considered including TIA, stroke, infection, underlying metabolic disturbance among others.  There is no evidence of trauma or injury on head to toe evaluation.  EKG was obtained and was negative for any evidence of ischemia or dysrhythmia.  Work-up in the emergency department revealed chronic changes but no evidence of acute intracranial hemorrhage, mass lesion, stroke.  The patient is in place.  No hydrocephalus.  Laboratory studies reveal reassuring CMP and CBC.  No leukocytosis.  Glucose normal.  Lactic acid normal.  Troponin is very minimally elevated.  Any point having had chest pain or shortness of breath or other anginal equivalents.  I do not feel this represents an acute coronary syndrome although this will need to be trended.  EKG is nonischemic.  Urinalysis was obtained and positive for urinary tract infection with pyuria and nitrites.  Patient was treated with ceftriaxone.  Her symptoms could certainly be explained by UTI.  Patient felt improved and had no recurrence of her speech difficulty while in the  emergency department.  No focal deficits to suggest stroke.  Patient may require ongoing work-up for possible TIA given her history of stroke in the past and vascular risk factors.  The case was discussed with the hospitalist and the patient was omitted for ongoing evaluation and treatment.  Patient was admitted in stable condition.      Diagnosis:    ICD-10-CM    1. TIA (transient ischemic attack) G45.9 UA reflex to Microscopic     UA reflex to Microscopic     CANCELED: *UA reflex to Microscopic (ED Lab POCT Only 3-11)   2. Aphasia R47.01    3. Urinary tract infection without hematuria, site unspecified N39.0        Disposition:  Admitted to the hospitalist    I, Bradley Aasen, am serving as a scribe on 6/29/2019 at 11:47 AM to personally document services performed by Moe Person MD based on my observations and the provider's statements to me.          Moe Person MD  06/29/19 6757

## 2019-06-29 NOTE — H&P
[facility]    History and Physical  Hospitalist    Joelle Freeman MRN# [id number]   Age: 89 year old YOB: 1928     Date of Admission:  6/29/2019    Primary care provider: Osman Small          Assessment and Plan:     Joelle Freeman is a 89 year old  female with medical history of stroke/TIA, SDH and s/p evacuation in 4/2013 and s/p shunted hydrocepehalus in 7/2014 for normal pressure hydrocephalus, dementia, depression, hypertension, hyperlipidemia, coronary artery disease brought into the ED via EMS for altered mental status.    Slurred speech resolved.  Possible TIA vs metabolic encephalopathy improved.  History of stroke/TIA.  History of staring spells with concern for dementia in past.  Idiopathic NPH with  Shunt   Patient resident of assisted living facility, woke up this morning not feeling right.  Per patient's son had slow and soft speech that was gargled.  Also had episode of unresponsiveness when she would just look at the ceiling.  Per patient's son has been having progressively worsening confusion intermittently.    In the ED on arrival hemodynamically stable, slurred speech resolved.  No dysarthria or aphasia.  No focal neurological deficit.  CT head without contrast showed a  shunt catheter again noted without change.  No evidence for hydrocephalus.Dural thickening or thin subdural collections along the cerebral convexities on both sides again noted without change, likely representing chronic changes due to  shunt catheter drainage. Diffuse cerebral volume loss and cerebral white matter changes consistent with chronic small vessel ischemic disease. No evidence for acute intracranial pathology  Hemoglobin 14.2.  Albumin 3.3.  Total protein 6.4. glucose 77, magnesium 2.3.  Admit to inpatient.  Neurochecks q4 hr.  Telemetry monitoring.  Echocardiogram with bubble study.  Continue PTA aspirin 81 mg oral daily, Lipitor 10 mg oral daily.  We will check TSH, CPK  levels.  PT, OT Assessment   Speech therapy, social work assistance requested.  Have discussed with patient's son, if no neuro changes and symptoms improved we will plan for discharge with close outpatient neurology follow-up.  Fall precaution.  Minimize interruptions, frequent reorientation.  Avoid narcotics and benzodiazepines    Urinary tract infection.  UA positive for large leukocyte esterase, 4 WBCs, 35 RBCs, many bacteria.  WBC 7.2, creatinine 0.75, lactic acid 0.9.  Received 1 g of IV Rocephin and aspirin 325 mg in the ED.  Follow urine culture results.  Continue IV ceftriaxone 1 g every 24 hours and de-escalate antibiotics accordingly.    Troponin elevation likely from demand ischemia and severe aortic stenosis.  CAD s/p RCA Stenting (2001)  HTN / HLD  Patient without symptoms of chest pain or discomfort.  No shortness of breath or palpitation.  EKG showed normal sinus rhythm left anterior fascicular block, heart rate of 64, QTc 443.  Chest x-ray no acute pathology.  Troponin 0.074  On review of chart has had elevated troponin in 2015.  Continue on PTA ASA 81 mg daily, atorvastatin 10 mg daily, lisinopril 10 mg daily, metoprolol XL   Telemetry monitoring.  Trend troponin.  Echocardiogram for evaluation of heart function.     Severe Aortic Stenosis 11/2015  Not felt to be a candidate for valve replacement or TAVR in 2015.     Hypothyroidisim  TSH 2.61 in 2015   Check TSH levels.  Continue PTA levothyroxine.     Depression  continues on citalopram 10 mg daily     Physical Deconditioning from senile fragility/medical comorbid's  PT, OT, speech therapy, social work assistance with transition of care requested.    DVT Prophylaxis: Pneumatic Compression Devices  Code Status: DNI    Disposition: Expected discharge in 1 to 2 days pending clinical improvement.  Patient, her son over the telephone, interdisciplinary team involved in care and agrees with plan.    Total time > 40 minutes. More than 60% of time spent in  direct patient care, care coordination, patient/caregiver counseling, and formalizing plan of care.    Joann Luna MD          Chief Complaint:     History is obtained from the patient, medical records, her son over the telephone    Joelle Freeman is a 89 year old  female with medical history of stroke/TIA, SDH and s/p evacuation in 4/2013 and s/p shunted hydrocepehalus in 7/2014 for normal pressure hydrocephalus, dementia, depression, hypertension, hyperlipidemia, coronary artery disease brought into the ED via EMS for altered mental status.    Patient resident of assisted living facility, lives independently woke up this morning not feeling right.  Per patient's son had slow and soft speech that was gargled.  Also had episode of unresponsiveness when she would just look at the ceiling.  Per patient's son has been having progressively worsening confusion intermittently.  Was noted to be off last night by patient's daughter.  Patient denies any chest pain or shortness of breath.  No palpitations, no cough.  No fever or chills.  No headache or dizziness.  No nausea vomiting.  No abdominal pain, no urinary disturbance.  No focal weakness, no incontinence of bladder or bladder that is new.    In the ED on arrival hemodynamically stable, slurred speech resolved.  No dysarthria or aphasia.  No focal neurological deficit.  EKG showed normal sinus rhythm left anterior fascicular block, heart rate of 64, QTc 443.  Chest x-ray no acute pathology.  CT head without contrast showed a  shunt catheter again noted without change.  No evidence for hydrocephalus.Dural thickening or thin subdural collections along the cerebral convexities on both sides again noted without change, likely representing chronic changes due to  shunt catheter drainage. Diffuse cerebral volume loss and cerebral white matter changes consistent with chronic small vessel ischemic disease. No evidence for acute intracranial pathology  WBC 7.2,  hemoglobin 14.2.  Albumin 3.3.  Total protein 6.4.  Creatinine 0.75.  Lactic acid 0.9, glucose 77, magnesium 2.3.  UA positive for large leukocyte esterase, 4 WBCs, 35 RBCs, many bacteria.  Received 1 g of IV Rocephin and aspirin 325 mg in the ED.         Review of Systems:     GENERAL:no fever or chills  EENT: No new vision changes, no sinus problems, no difficulty swallowing, no hearing difficulty  PULMONARY: No shortness of breath, no cough, no wheezing  CARDIAC: no chest pain, no irregular or fast heart beats   GI: No abdominal pain, nausea, vomiting, diarrhea, constipation, black or bloody stools  : No burning/pain with urination, no urgency, no hematuria.   NEURO: No significant headaches, no seizures, no dizziness, no loss of consciousness  ENDOCRINE: No excessive thirst, no excessive bruising.  MUSCULOSKELETAL: No joint pain or swelling.  SKIN: No skin rashes  PSYCHIATRY no new anxiety or depression    Medical History:     Past Medical History:   Diagnosis Date     Asthma      CAD (coronary artery disease)     stent post RCA 2001     Cerebral ventriculomegaly      CVA (cerebral infarction) 2002     Dementia      Dementia      Depression      Diverticulosis      Dyslipidemia      Heterozygous factor V Leiden mutation (H)      HTN (hypertension)      Hyperlipidemia      Hypothyroidism      Normal pressure hydrocephalus      Posterior reversible encephalopathy syndrome      Subarachnoid hemorrhage (H) 3/23/2013     Subdural hematoma (H)      TIA (transient ischaemic attack)     multiple     TIA (transient ischaemic attack)      Unspecified cerebral artery occlusion with cerebral infarction      Urinary incontinence      UTI (lower urinary tract infection)     recurrent        Surgical History:      Past Surgical History:   Procedure Laterality Date     APPENDECTOMY       BIOPSY      breast     CARDIAC SURGERY      stent to the right coronary artery in 2001     CHOLECYSTECTOMY       ENT SURGERY       tonsillectomy     GENITOURINARY SURGERY      Bladder suspension     GYN SURGERY      D & C     GYN SURGERY      laproscopy oophorectomy unilateral     HERNIA REPAIR       HYSTERECTOMY       LUMBAR PUNCTURE       OPEN REDUCTION INTERNAL FIXATION HIP NAILING  11/18/2013    Procedure: OPEN REDUCTION INTERNAL FIXATION HIP NAILING;  RIGHT HIP FRACTURE;  Surgeon: John Venegas MD;  Location:  OR     OPTICAL TRACKING SYSTEM IMPLANT SHUNT VENTRICULOPERITONEAL  7/16/2013    Procedure: OPTICAL TRACKING SYSTEM IMPLANT SHUNT VENTRICULOPERITONEAL;  RIGHT OCCIPITAL VENTRICULOPERITONEAL SHUNT, IMAGE GUIDANCE, NEUROENDOSCOPY, INTRATHECAL ANTIBIOTICS (STEALTH AXIEM, STRATA II LEVEL 2.5, PATIENT IN SUPINE 30-80, GEL DONUT WITH HEAD TURNED TO LEFT)      ;  Surgeon: Taoy Conley MD;  Location:  OR     STENT  2001    RCA     wisdom teeth extraction[               Social History:      Social History     Tobacco Use     Smoking status: Never Smoker     Smokeless tobacco: Never Used   Substance Use Topics     Alcohol use: No             Family History:     Family History   Problem Relation Age of Onset     Cerebrovascular Disease Mother      Cerebrovascular Disease Father      Blood Disease Son              Allergies:     Allergies   Allergen Reactions     Tramadol              Medications:   Home meds reviewed          Physical Exam      Admission Weight: 56.7 kg (125 lb)  Current Weight: 56.7 kg (125 lb)    Vital Signs with Ranges  Temp:  [97.8  F (36.6  C)] 97.8  F (36.6  C)  Pulse:  [58-67] 63  Heart Rate:  [60-67] 62  Resp:  [9-23] 9  BP: (124-151)/(57-84) 137/75  SpO2:  [92 %-96 %] 94 %    Intake/Output Summary (Last 24 hours) at 6/29/2019 1632  Last data filed at 6/29/2019 1445  Gross per 24 hour   Intake --   Output 500 ml   Net -500 ml     PHYSICAL EXAM  GENERAL: Patient is in no distress. Alert and oriented.  HEENT: Oropharynx pink, moist. Pupils equal  HEART: Regular rate and rhythm. S1S2. No  murmurs  LUNGS: Clear to auscultation bilaterally. No expiratory wheeze.  Respirations unlabored  ABDOMEN: Soft, no abdominal tenderness, bowel sounds heard   No suprapubic tenderness, no costovertebral angle tenderness.  NEURO: Cranial nerves II-XII intact. Motor and sensory system in normal range  Speech normal  EXTREMITIES: No pedal edema. 2+ peripheral pulses.  SKIN: Warm, dry. No rash or bruising.  PSYCHIATRY Cooperative         Data:   All new lab and imaging data was reviewed.

## 2019-06-29 NOTE — ED NOTES
Bed: ED06  Expected date:   Expected time:   Means of arrival:   Comments:  keith Vivas F AMS eta 1134

## 2019-06-30 ENCOUNTER — APPOINTMENT (OUTPATIENT)
Dept: ULTRASOUND IMAGING | Facility: CLINIC | Age: 84
DRG: 689 | End: 2019-06-30
Attending: PSYCHIATRY & NEUROLOGY
Payer: MEDICARE

## 2019-06-30 ENCOUNTER — APPOINTMENT (OUTPATIENT)
Dept: SPEECH THERAPY | Facility: CLINIC | Age: 84
DRG: 689 | End: 2019-06-30
Attending: HOSPITALIST
Payer: MEDICARE

## 2019-06-30 ENCOUNTER — APPOINTMENT (OUTPATIENT)
Dept: CARDIOLOGY | Facility: CLINIC | Age: 84
DRG: 689 | End: 2019-06-30
Attending: HOSPITALIST
Payer: MEDICARE

## 2019-06-30 LAB
ANION GAP SERPL CALCULATED.3IONS-SCNC: 5 MMOL/L (ref 3–14)
BUN SERPL-MCNC: 19 MG/DL (ref 7–30)
CALCIUM SERPL-MCNC: 8.4 MG/DL (ref 8.5–10.1)
CHLORIDE SERPL-SCNC: 114 MMOL/L (ref 94–109)
CO2 SERPL-SCNC: 24 MMOL/L (ref 20–32)
CREAT SERPL-MCNC: 0.69 MG/DL (ref 0.52–1.04)
GFR SERPL CREATININE-BSD FRML MDRD: 76 ML/MIN/{1.73_M2}
GLUCOSE SERPL-MCNC: 92 MG/DL (ref 70–99)
POTASSIUM SERPL-SCNC: 4 MMOL/L (ref 3.4–5.3)
SODIUM SERPL-SCNC: 143 MMOL/L (ref 133–144)
TSH SERPL DL<=0.005 MIU/L-ACNC: 1.84 MU/L (ref 0.4–4)

## 2019-06-30 PROCEDURE — 93306 TTE W/DOPPLER COMPLETE: CPT | Mod: 26 | Performed by: INTERNAL MEDICINE

## 2019-06-30 PROCEDURE — 80048 BASIC METABOLIC PNL TOTAL CA: CPT | Performed by: HOSPITALIST

## 2019-06-30 PROCEDURE — 12000000 ZZH R&B MED SURG/OB

## 2019-06-30 PROCEDURE — 93880 EXTRACRANIAL BILAT STUDY: CPT

## 2019-06-30 PROCEDURE — 92610 EVALUATE SWALLOWING FUNCTION: CPT | Mod: GN

## 2019-06-30 PROCEDURE — 25500064 ZZH RX 255 OP 636: Performed by: HOSPITALIST

## 2019-06-30 PROCEDURE — 99233 SBSQ HOSP IP/OBS HIGH 50: CPT | Performed by: INTERNAL MEDICINE

## 2019-06-30 PROCEDURE — 36415 COLL VENOUS BLD VENIPUNCTURE: CPT | Performed by: HOSPITALIST

## 2019-06-30 PROCEDURE — 25800030 ZZH RX IP 258 OP 636: Performed by: HOSPITALIST

## 2019-06-30 PROCEDURE — 40000894 ZZH STATISTIC OT IP EVAL DEFER: Performed by: OCCUPATIONAL THERAPIST

## 2019-06-30 PROCEDURE — 40000264 ECHOCARDIOGRAM COMPLETE

## 2019-06-30 PROCEDURE — 84443 ASSAY THYROID STIM HORMONE: CPT | Performed by: HOSPITALIST

## 2019-06-30 PROCEDURE — 99222 1ST HOSP IP/OBS MODERATE 55: CPT | Performed by: PSYCHIATRY & NEUROLOGY

## 2019-06-30 PROCEDURE — 25000132 ZZH RX MED GY IP 250 OP 250 PS 637: Mod: GY | Performed by: HOSPITALIST

## 2019-06-30 PROCEDURE — 25000128 H RX IP 250 OP 636: Performed by: HOSPITALIST

## 2019-06-30 RX ADMIN — METOPROLOL SUCCINATE 25 MG: 25 TABLET, EXTENDED RELEASE ORAL at 09:11

## 2019-06-30 RX ADMIN — LEVOTHYROXINE SODIUM 88 MCG: 88 TABLET ORAL at 06:00

## 2019-06-30 RX ADMIN — HUMAN ALBUMIN MICROSPHERES AND PERFLUTREN 9 ML: 10; .22 INJECTION, SOLUTION INTRAVENOUS at 13:47

## 2019-06-30 RX ADMIN — ATORVASTATIN CALCIUM 10 MG: 10 TABLET, FILM COATED ORAL at 09:11

## 2019-06-30 RX ADMIN — DOCUSATE SODIUM 100 MG: 100 CAPSULE, LIQUID FILLED ORAL at 09:11

## 2019-06-30 RX ADMIN — SODIUM CHLORIDE: 9 INJECTION, SOLUTION INTRAVENOUS at 23:48

## 2019-06-30 RX ADMIN — CEFTRIAXONE 1 G: 1 INJECTION, POWDER, FOR SOLUTION INTRAMUSCULAR; INTRAVENOUS at 16:40

## 2019-06-30 RX ADMIN — LISINOPRIL 10 MG: 10 TABLET ORAL at 09:12

## 2019-06-30 RX ADMIN — DOCUSATE SODIUM 100 MG: 100 CAPSULE, LIQUID FILLED ORAL at 21:31

## 2019-06-30 RX ADMIN — ASPIRIN 81 MG: 81 TABLET, COATED ORAL at 09:12

## 2019-06-30 RX ADMIN — MELATONIN TAB 3 MG 3 MG: 3 TAB at 21:32

## 2019-06-30 RX ADMIN — SODIUM CHLORIDE: 9 INJECTION, SOLUTION INTRAVENOUS at 07:37

## 2019-06-30 RX ADMIN — ESCITALOPRAM OXALATE 10 MG: 10 TABLET ORAL at 09:10

## 2019-06-30 ASSESSMENT — ACTIVITIES OF DAILY LIVING (ADL)
RETIRED_COMMUNICATION: 0-->UNDERSTANDS/COMMUNICATES WITHOUT DIFFICULTY
ADLS_ACUITY_SCORE: 28
TOILETING: 3-->ASSISTIVE EQUIPMENT AND PERSON
DRESS: 2-->ASSISTIVE PERSON
ADLS_ACUITY_SCORE: 29
RETIRED_EATING: 2-->ASSISTIVE PERSON
ADLS_ACUITY_SCORE: 29
ADLS_ACUITY_SCORE: 28
ADLS_ACUITY_SCORE: 28
BATHING: 3-->ASSISTIVE EQUIPMENT AND PERSON
ADLS_ACUITY_SCORE: 29
SWALLOWING: 2-->DIFFICULTY SWALLOWING LIQUIDS/FOODS

## 2019-06-30 NOTE — PLAN OF CARE
PT is A&O- very forgetful. VSS. Denies pain. Up A1-2 to BSC. Reg diet. PIV infusing NS @ 75. Neuros WDL. Blanchable redness on coccyx- pt refusing repositioning- educated on changing positions. Deyanira sandoval, MD aware. Tele NSR. Discharge pending clinical improvement, possibly today. Echo still needed. Slept between cares.

## 2019-06-30 NOTE — PROGRESS NOTES
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     Slurred speech resolved.  Possible TIA vs metabolic encephalopathy improved.  History of stroke/TIA.  History of staring spells with concern for dementia in past.  Idiopathic NPH with  Shunt   Patient resident of assisted living facility, woke up this morning not feeling right.  Per patient's son had slow and soft speech that was gargled.  Also had episode of unresponsiveness when she would just look at the ceiling.  Per patient's son has been having progressively worsening confusion intermittently.    In the ED on arrival hemodynamically stable, slurred speech resolved.  No dysarthria or aphasia.  No focal neurological deficit.  CT head without contrast showed a  shunt catheter again noted without change.  No evidence for hydrocephalus.Dural thickening or thin subdural collections along the cerebral convexities on both sides again noted without change, likely representing chronic changes due to  shunt catheter drainage. Diffuse cerebral volume loss and cerebral white matter changes consistent with chronic small vessel ischemic disease. No evidence for acute intracranial pathology  Hemoglobin 14.2.  Albumin 3.3.  Total protein 6.4. glucose 77, magnesium 2.3.  Admit to inpatient.  Neurochecks q4 hr.  Telemetry monitoring.  Echocardiogram with bubble study.  Continue PTA aspirin 81 mg oral daily, Lipitor 10 mg oral daily.  PT, OT Assessment   Speech therapy, social work assistance requested.  Will have neurology eval patient for possible TIA.  Fall  precaution.  Minimize interruptions, frequent reorientation.  Avoid narcotics and benzodiazepines     Urinary tract infection.  UA positive for large leukocyte esterase, 4 WBCs, 35 RBCs, many bacteria.  WBC 7.2, creatinine 0.75, lactic acid 0.9.  Received 1 g of IV Rocephin and aspirin 325 mg in the ED.  Follow urine culture results.  Continue IV ceftriaxone 1 g every 24 hours and de-escalate antibiotics accordingly.     Troponin elevation likely from demand ischemia and severe aortic stenosis.  CAD s/p RCA Stenting (2001)  HTN / HLD  Patient without symptoms of chest pain or discomfort.  No shortness of breath or palpitation.  EKG showed normal sinus rhythm left anterior fascicular block, heart rate of 64, QTc 443.  Chest x-ray no acute pathology.  Troponin 0.074  On review of chart has had elevated troponin in 2015.  Continue on PTA ASA 81 mg daily, atorvastatin 10 mg daily, lisinopril 10 mg daily, metoprolol XL   Telemetry monitoring.  Trend troponin.  Echocardiogram for evaluation of heart function.     Severe Aortic Stenosis 11/2015  Not felt to be a candidate for valve replacement or TAVR in 2015.      Hypothyroidisim  TSH 2.61 in 2015   Continue PTA levothyroxine.     Depression  continues on citalopram 10 mg daily     Physical Deconditioning from senile fragility/medical comorbid's  PT, OT, speech therapy, social work assistance with transition of care requested.  Diet: Combination Diet Regular Diet Adult    DVT Prophylaxis: Pneumatic Compression Devices  Benítez Catheter: not present  Code Status: DNI      Disposition Plan   Expected discharge: Tomorrow, recommended to prior living arrangement once antibiotic plan established.  Entered: José Miguel Mckeon MD 06/30/2019, 10:08 AM       The patient's care was discussed with the Patient.    José Miguel Mckeon MD  Hospitalist Service  [facility]    ______________________________________________________________________    Interval History     No chest  pain/SOB/fever/chills/N/V/HA. Has some RLE weakness which appears chronic.    Data reviewed today: I reviewed all medications, new labs and imaging results over the last 24 hours. I personally reviewed no images or EKG's today.    Physical Exam   Vital Signs: Temp: 98  F (36.7  C) Temp src: Oral BP: 124/60 Pulse: 61 Heart Rate: 61 Resp: 16 SpO2: 91 % O2 Device: None (Room air)    Weight: 125 lbs 0 oz    Gen - Alert, awake, oriented to self, situation and year.  Lungs - CTA B.  Heart - RR, S1+S2 nml, 2/6 systolic murmur.  Abd - soft, NT, ND, + BS.  Ext - no edema. CN II-XII wnl, decreased power in RLE which is probably chronic per patient    Data   Recent Labs   Lab 06/30/19  0727 06/29/19  2139 06/29/19  1831 06/29/19  1149   WBC  --   --   --  7.2   HGB  --   --   --  14.2   MCV  --   --   --  96   PLT  --   --   --  150     --   --  143   POTASSIUM 4.0  --   --  4.4   CHLORIDE 114*  --   --  112*   CO2 24  --   --  26   BUN 19  --   --  25   CR 0.69  --   --  0.75   ANIONGAP 5  --   --  5   JAZLYN 8.4*  --   --  8.7   GLC 92  --   --  97   ALBUMIN  --   --   --  3.3*   PROTTOTAL  --   --   --  6.4*   BILITOTAL  --   --   --  0.5   ALKPHOS  --   --   --  93   ALT  --   --   --  20   AST  --   --   --  13   TROPI  --  0.079* 0.087* 0.074*     Recent Results (from the past 24 hour(s))   CT Head w/o Contrast    Narrative    CT OF THE HEAD WITHOUT CONTRAST 6/29/2019 12:56 PM     COMPARISON: Head CT 7/26/2018.    HISTORY: Speech changes now resolved, altered mental status.    TECHNIQUE: 5 mm thick axial CT images of the head were acquired  without IV contrast material.    FINDINGS: A  shunt catheter placed through a right parietal approach  with its tip in the anterior aspect of the left lateral ventricle is  again noted without change in position. The lateral and third  ventricles are unchanged in size and remain within normal limits given  the degree of cerebral volume loss. Dural thickening versus  chronic  subdural collections along the cerebral convexities on both sides is  again noted without change and is likely related to the  shunt  catheter.     There is moderate diffuse cerebral volume loss. There are subtle  patchy areas of decreased density in the cerebral white matter  bilaterally that are consistent with sequela of chronic small vessel  ischemic disease. The basal cisterns are within normal limits in  configuration given the degree of cerebral volume loss.  There is no  midline shift.    No intracranial hemorrhage, mass or recent infarct.    The visualized paranasal sinuses are well-aerated. There is no  mastoiditis. There are no fractures of the visualized bones.      Impression    IMPRESSION:  1.  shunt catheter again noted without change. No evidence for  hydrocephalus.  2. Dural thickening or thin subdural collections along the cerebral  convexities on both sides again noted without change, likely  representing chronic changes due to  shunt catheter drainage.  3. Diffuse cerebral volume loss and cerebral white matter changes  consistent with chronic small vessel ischemic disease. No evidence for  acute intracranial pathology.      Radiation dose for this scan was reduced using automated exposure  control, adjustment of the mA and/or kV according to patient size, or  iterative reconstruction technique.    JOSE FELIX MD   XR Chest 2 Views    Narrative    CHEST TWO VIEWS  6/29/2019 12:59 PM     HISTORY: Altered mental status.    COMPARISON: 12/23/2017.      Impression    IMPRESSION: Cardiac size is within normal limits. Tortuous aorta.  Pulmonary vasculature is not distended. Lungs are clear. No pleural  fluid.  shunt catheter seen overlying the right chest. No acute  disease. No significant change.    ARELI APPLE MD     Medications     sodium chloride 75 mL/hr at 06/30/19 0737       aspirin  81 mg Oral Daily     atorvastatin  10 mg Oral Daily     cefTRIAXone  1 g Intravenous Q24H      docusate sodium  100 mg Oral BID     escitalopram  10 mg Oral Daily     levothyroxine  88 mcg Oral Daily     lisinopril  10 mg Oral Daily     melatonin  3 mg Oral At Bedtime     metoprolol succinate ER  25 mg Oral Daily     sodium chloride (PF)  3 mL Intracatheter Q8H

## 2019-06-30 NOTE — PROVIDER NOTIFICATION
Dr. Mckeon paged re: ataxia noted to BLE on neuro exam. Also only oriented to self and place. Neuros otherwise intact. Awaiting call back.

## 2019-06-30 NOTE — PROGRESS NOTES
MD Notification    Notified Person: MD    Notified Person Name: Jeremy    Notification Date/Time: 6/29/19 @ 1940    Notification Interaction: Paged (by Rosamaria Mars RN- I took over)     Purpose of Notification: FYI: Pt trops continue to be elevated. Pt wishes to have code status be DNR/DNI.    Orders Received: no change, per POLST only DNI     Comments:

## 2019-06-30 NOTE — PROGRESS NOTES
Social Work: Assessment with Discharge Plan    Patient Name: Joelle Freeman  : 1928  Age: 89 year old  MRN: [id number]  Completed assessment with: [facility]    Presenting Information   Reason for Referral: discharge planning  Date of intake: 2019  Referral Source: MD  Decision Maker: Leroy Brewer  Alternate Decision Maker: daughter Amanda  Health Care Directive: Copy in Chart  Living Situation: [facility]  Previous Functional Status: Assist of 1 w/ transfers, facility managed grooming, meds, and dressing  Patient and family understanding of hospitalization: yes  Cultural/Language/Spiritual Considerations: pt is very [place]  Adjustment to Illness: coping well    Physical Health  Reason for admission:   1. TIA (transient ischemic attack)    2. Aphasia    3. Urinary tract infection without hematuria, site unspecified      Services Needed/Recommended: undecided at this time    Mental Health/Chemical Dependency:   Diagnosis: Depression  Support/Services in Place: medication- Citalopram 10 mg  Services Needed/Recommended: none- continue current plan    Support System  Significant Relationship at Present time:  family  Family of Origin is available for support: yes  Other support available:  Facility staff  Current in home services: Medication, transfer assists, meals, grooming  Gaps in Support System: none noted    Provider Information   Primary Care Physician:Osman Small      Clinic: [facility]      :     Financial   Income Source: not discussed  Financial Concerns:  None noted  Insurance: Medicare, [employer]      Discharge Plan   Patient and family discharge goal: back to [place]  Provided Education on discharge plan: YES  Barriers to discharge: Medical Stability    Discharge Recommendations   Disposition: Back to [facility] pending status  Transportation Needs: unknown  Name of Transportation Company and Phone:     Additional comments   Pt is from [facility],  where she was getting an assist of 1 for transfers. Will await therapy assessment to determine if the pt will need TCU, otherwise will return to [place] facility at discharge.     GUY Ayala, [facility]  Phone [phone]

## 2019-06-30 NOTE — PROGRESS NOTES
Bedside Swallow Evaluation     06/30/19 0900   General Information   Onset Date 06/29/19   Start of Care Date 06/30/19   Patient Profile Review/OT: Additional Occupational Profile Info See Profile for full history and prior level of function   Patient/Family Goals Statement Patient did not state   Swallowing Evaluation Bedside swallow evaluation   Behaviorial Observations WFL (within functional limits)   Mode of current nutrition Oral diet   Type of oral diet Regular;Thin liquid   Respiratory Status Room air   Comments Per MD note: Joelle Freeman is a 89 year old  female with medical history of stroke/TIA, SDH and s/p evacuation in 4/2013 and s/p shunted hydrocepehalus in 7/2014 for normal pressure hydrocephalus, dementia, depression, hypertension, hyperlipidemia, coronary artery disease brought into the ED via EMS for altered mental status. Swallowing: no concerns or hx of swallowing deficits.    Clinical Swallow Evaluation   Oral Musculature generally intact   Structural Abnormalities none present   Dentition present and adequate   Mucosal Quality adequate   Mandibular Strength and Mobility intact   Oral Labial Strength and Mobility WFL   Lingual Strength and Mobility WFL   Velar Elevation intact   Buccal Strength and Mobility intact   Laryngeal Function Throat clear;Swallow;Voicing initiated;Dry swallow palpated   Additional Documentation Yes   Additional evaluation(s) completed today No   Clinical Swallow Eval: Thin Liquid Texture Trial   Mode of Presentation, Thin Liquids cup;straw;self-fed   Volume of Liquid or Food Presented >3 oz   Oral Phase of Swallow WFL   Pharyngeal Phase of Swallow intact;throat clearing   Diagnostic Statement Throat clearing with straw 1x, no overt s/sx of aspiration   Clinical Swallow Eval: Puree Solid Texture Trial   Mode of Presentation, Puree spoon;self-fed   Volume of Puree Presented 3 teaspoons   Oral Phase, Puree WFL   Pharyngeal Phase, Puree intact   Diagnostic Statement No  difficulties, timely swallow initiation, no oral residue   Clinical Swallow Eval: Solid Food Texture Trial   Mode of Presentation, Solid self-fed   Volume of Solid Food Presented 1/4 cracker   Oral Phase, Solid WFL   Pharyngeal Phase, Solid intact   Diagnostic Statement Mild oral residue that cleared with a liquid rinse, adequate mastication, no concerns   Swallow Compensations   Swallow Compensations Pacing;Reduce amounts;Alternate viscosity of consistencies   Results No difficulties noted   Swallow Eval: Clinical Impressions   Skilled Criteria for Therapy Intervention No problems identified which require skilled intervention   Functional Assessment Scale (FAS) 7   Treatment Diagnosis No oral-pharyngeal dysphagia    Diet texture recommendations Regular diet;Thin liquids   Recommended Feeding/Eating Techniques no straws;small sips/bites;alternate between small bites and sips of food/liquid   Anticipated Discharge Disposition other (see comments)  (Return to assisted living facility)   Risks and Benefits of Treatment have been explained. Yes   Patient, family and/or staff in agreement with Plan of Care Yes   Clinical Impression Comments Patient seen for bedside swallow evaluation. Patient presents with functional swallow secondary to altered mental status and slurred speech. Pt with hx of CVA/TIA, SDH and s/p evacuation in 4/2013 and s/p shunted hydrocepehalus, and dementia. Pt tolerated single and sequential sips from the cup and straw demonstrating throat clear with thin liquid from the straw. Pt tolerated purees and solid textures demonstrating adequate mastication and oral clearance, and no overt difficulties. Pt tolerating baseline diet with no new concerns. Pt reported that her speech is much better then yesterday and she has no concerns. Recommend regular diet with thin liquids. Swallow strategies: slow rate, upright in the chair, no straws, and alternate consistencies. Pt tolerating baseline diet and slurred  speech has resolved. No current skilled speech services required- will complete the orders.    Total Evaluation Time   Total Evaluation Time (Minutes) 40

## 2019-06-30 NOTE — PLAN OF CARE
Disoriented to time and situation; forgetful. VSS on RA. Denies pain. Tele SR. Neuros ataxic on heel to shin bilaterally; RLE weaker than LLE (Neuro aware), otherwise intact. Adequate UO, incontinent x 1. Up w/ 2+GB+walker. Tolerating regular diet. Plan: continue IV abx.

## 2019-06-30 NOTE — PLAN OF CARE
Discharge Planner OT   Patient plan for discharge: back to [facility]  Current status: eval orders received, met with patient and discussed with nursing. Pt reports she lives at [facility], states she uses a w/c for mobility, but does stand pivot  and has been working with PT at the [facility] and [facility] with assist for short distance. Pt states she has assist with dressing AM/PM, help with toileting, showers, grooming and has transport to meals. At this time no acute OT needs. Will defer transfers to nursing and PT assessment if appropriate.  Barriers to return to prior living situation: none anticipated, will defer to medical team  Recommendations for discharge:  back to [facility]  Rationale for recommendations: pt has assist with all ADL's IADl's from [facility]. No acute OT needs. Will complete orders       Entered by: Reyna Aguilar 06/30/2019 3:22 PM

## 2019-06-30 NOTE — CONSULTS
[facility]      Stroke Consult Note    Reason for Consult:  Non-emergent consult request for speech difficulty    HPI  Joelle Freeman is a 89 year old female who presents with possible aphasia. She states that yesterday in the assisted living facility, she remembers speaking gibberish which alarmed her. She thinks it may have lasted 2 hours or so and resolved. She does have dementia though and does not remember passing out but as per chart she did have unresponsiveness and was looking at the ceiling. Patient also noted to have progressive worsening confusion intermittently. No other complaints offered by patient. She uses wheelchair mostly to move around.    Impression  Likely metabolic/toxic encephalopathy  Possible TIA vs seizure vs related to dementia (although hard to discern due to unclear history)  H/o VPS NPH  UTI  Sev aortic stenosis    Recommendations  -Continue Aspirin  -Echo - pending  -CUS (ordered)  -As for seizure, I think EEG is of low utility at this time; Patient can follow up as outpatient with Neurology (had EEG 2015 for similar issue without seizure found)    Patient Follow-up    - in 4-6 weeks with local neurologist      Please contact the Stroke Service with any questions.    Matt Claudio MD  Neurology    Text Page [phone])  __________________________________________________    Past Medical History:   Diagnosis Date     Asthma      CAD (coronary artery disease)     stent post RCA 2001     Cerebral ventriculomegaly      CVA (cerebral infarction) 2002     Dementia      Dementia      Depression      Diverticulosis      Dyslipidemia      Heterozygous factor V Leiden mutation (H)      HTN (hypertension)      Hyperlipidemia      Hypothyroidism      Normal pressure hydrocephalus      Posterior reversible encephalopathy syndrome      Subarachnoid hemorrhage (H) 3/23/2013     Subdural hematoma (H)      TIA (transient ischaemic attack)     multiple     TIA (transient ischaemic attack)       Unspecified cerebral artery occlusion with cerebral infarction      Urinary incontinence      UTI (lower urinary tract infection)     recurrent       Past Surgical History:   Procedure Laterality Date     APPENDECTOMY       BIOPSY      breast     CARDIAC SURGERY      stent to the right coronary artery in 2001     CHOLECYSTECTOMY       ENT SURGERY      tonsillectomy     GENITOURINARY SURGERY      Bladder suspension     GYN SURGERY      D & C     GYN SURGERY      laproscopy oophorectomy unilateral     HERNIA REPAIR       HYSTERECTOMY       LUMBAR PUNCTURE       OPEN REDUCTION INTERNAL FIXATION HIP NAILING  11/18/2013    Procedure: OPEN REDUCTION INTERNAL FIXATION HIP NAILING;  RIGHT HIP FRACTURE;  Surgeon: John Venegas MD;  Location: [facility] OR     OPTICAL TRACKING SYSTEM IMPLANT SHUNT VENTRICULOPERITONEAL  7/16/2013    Procedure: OPTICAL TRACKING SYSTEM IMPLANT SHUNT VENTRICULOPERITONEAL;  RIGHT OCCIPITAL VENTRICULOPERITONEAL SHUNT, IMAGE GUIDANCE, NEUROENDOSCOPY, INTRATHECAL ANTIBIOTICS (STEALTH AXIEM, STRATA II LEVEL 2.5, PATIENT IN SUPINE 30-80, GEL DONUT WITH HEAD TURNED TO LEFT)      ;  Surgeon: Tayo Conley MD;  Location: [facility] OR     STENT  2001    RCA     wisdom teeth extraction[         Medications   Current Facility-Administered Medications   Medication     acetaminophen (TYLENOL) Suppository 650 mg     acetaminophen (TYLENOL) tablet 650 mg     aspirin EC tablet 81 mg     atorvastatin (LIPITOR) tablet 10 mg     bisacodyl (DULCOLAX) Suppository 10 mg     cefTRIAXone (ROCEPHIN) 1 g vial to attach to  mL bag for ADULTS or NS 50 mL bag for PEDS     docusate sodium (COLACE) capsule 100 mg     escitalopram (LEXAPRO) tablet 10 mg     levothyroxine (SYNTHROID/LEVOTHROID) tablet 88 mcg     lidocaine (LMX4) cream     lidocaine 1 % 0.1-1 mL     lisinopril (PRINIVIL/ZESTRIL) tablet 10 mg     loperamide (IMODIUM) capsule 2 mg     magnesium hydroxide (MILK OF MAGNESIA) suspension 30 mL      melatonin tablet 1 mg     melatonin tablet 3 mg     metoprolol succinate ER (TOPROL-XL) 24 hr tablet 25 mg     naloxone (NARCAN) injection 0.1-0.4 mg     ondansetron (ZOFRAN-ODT) ODT tab 4 mg    Or     ondansetron (ZOFRAN) injection 4 mg     sodium chloride (PF) 0.9% PF flush 3 mL     sodium chloride (PF) 0.9% PF flush 3 mL     sodium chloride 0.9% infusion       Medications Prior to Admission   Medication Sig Dispense Refill Last Dose     ACETAMINOPHEN PO Take 650 mg by mouth 2 times daily as needed for pain   prn     aspirin EC 81 MG EC tablet Take 1 tablet (81 mg) by mouth daily 30 tablet 1 6/29/2019 at 0800     atorvastatin (LIPITOR) 10 MG tablet Take 1 tablet (10 mg) by mouth daily 30 tablet 1 6/29/2019 at 0800     cholecalciferol 1000 UNITS TABS Take 1,000 Units by mouth daily 30 tablet 1 6/29/2019 at 0800     escitalopram (LEXAPRO) 10 MG tablet Take 10 mg by mouth daily   6/29/2019 at 0800     levothyroxine (SYNTHROID/LEVOTHROID) 88 MCG tablet Take 88 mcg by mouth daily   6/29/2019 at 0800     lisinopril (PRINIVIL/ZESTRIL) 10 MG tablet Take 10 mg by mouth daily   6/29/2019 at 0800     loperamide (IMODIUM A-D) 2 MG tablet Take 2 mg by mouth as needed for diarrhea (8 times daily as needed for diarrhea)    prn     MELATONIN PO Take 3 mg by mouth At Bedtime    6/28/2019 at 2100     metoprolol succinate ER (TOPROL-XL) 25 MG 24 hr tablet Take 25 mg by mouth daily   6/29/2019 at 0800     nystatin (MYCOSTATIN) 425896 UNIT/GM external powder Apply topically 2 times daily as needed (intertriginal dermatitis)   prn     senna-docusate (SENOKOT-S;PERICOLACE) 8.6-50 MG per tablet Take 1 tablet by mouth 2 times daily as needed    prn     zinc oxide 16 % (BOUDREAUXS BUTT PASTE) PSTE paste Apply topically as needed for irritation (red buttocks)   prn       Allergies   Allergies   Allergen Reactions     Tramadol        Family History   Family History     Problem (# of Occurrences) Relation (Name,Age of Onset)    Blood Disease  (1) Son    Cerebrovascular Disease (2) Mother, Father          Social History   Social History     Socioeconomic History     Marital status:      Spouse name: Not on file     Number of children: Not on file     Years of education: Not on file     Highest education level: Not on file   Occupational History     Not on file   Social Needs     Financial resource strain: Not on file     Food insecurity:     Worry: Not on file     Inability: Not on file     Transportation needs:     Medical: Not on file     Non-medical: Not on file   Tobacco Use     Smoking status: Never Smoker     Smokeless tobacco: Never Used   Substance and Sexual Activity     Alcohol use: No     Drug use: No     Sexual activity: Not on file   Lifestyle     Physical activity:     Days per week: Not on file     Minutes per session: Not on file     Stress: Not on file   Relationships     Social connections:     Talks on phone: Not on file     Gets together: Not on file     Attends Yarsanism service: Not on file     Active member of club or organization: Not on file     Attends meetings of clubs or organizations: Not on file     Relationship status: Not on file     Intimate partner violence:     Fear of current or ex partner: Not on file     Emotionally abused: Not on file     Physically abused: Not on file     Forced sexual activity: Not on file   Other Topics Concern     Parent/sibling w/ CABG, MI or angioplasty before 65F 55M? Not Asked   Social History Narrative     Not on file          ROS  The 10 point Review of Systems is negative other than noted in the HPI or here.     PHYSICAL EXAMINATION  B/P:     124/60 (06/30/19 0802)    Heart Rate: 61 (06/30/19 0802)    Pulse: 61 (06/29/19 1730)   Resp:  16 (06/30/19 0802)  Temp: 98  F (36.7  C)   Oral (06/30/19 0802)    General:  patient lying in bed without any acute distress    HEENT:  normocephalic/atraumatic  Cardio:  RRR  Pulmonary:  no respiratory distress  Abdomen:  soft  Extremities:  no  edema  Skin:  intact     Neurologic  Mental Status:  Awake, alert, speaking fluently and normal understanding  Cranial Nerves: EOMI, no VF deficit, no facial drooping  Motor:  5/5 except right leg 3/5  Sensory:  light touch sensation intact and symmetric throughout upper and lower extremities (assessed by nurse), no extinction on double simultaneous stimulation (assessed by nurse)  Coordination:  normal finger-to-nose and heel-to-shin bilaterally without dysmetria, rapid alternating movements symmetric    Labs/Imaging  Labs and imaging were reviewed and used in developing plan; pertinent results included.  Data   CBC  WBC (10e9/L)   Date Value   06/29/2019 7.2   12/23/2017 7.4   11/24/2015 7.3    RBC Count (10e12/L)   Date Value   06/29/2019 4.28   12/23/2017 4.21   11/24/2015 3.98    Hemoglobin (g/dL)   Date Value   06/29/2019 14.2   12/23/2017 14.0   11/24/2015 13.2      Hematocrit (%)   Date Value   06/29/2019 41.2   12/23/2017 41.3   11/24/2015 38.3    Platelet Count (10e9/L)   Date Value   06/29/2019 150   12/23/2017 178   11/24/2015 167         BMP  Sodium (mmol/L)   Date Value   06/30/2019 143   06/29/2019 143   12/23/2017 143    Potassium (mmol/L)   Date Value   06/30/2019 4.0   06/29/2019 4.4   12/23/2017 4.5    Chloride (mmol/L)   Date Value   06/30/2019 114 (H)   06/29/2019 112 (H)   12/23/2017 109      Carbon Dioxide (mmol/L)   Date Value   06/30/2019 24   06/29/2019 26   12/23/2017 29    Glucose (mg/dL)   Date Value   06/30/2019 92   06/29/2019 97   12/23/2017 125 (H)    Urea Nitrogen (mg/dL)   Date Value   06/30/2019 19   06/29/2019 25   12/23/2017 21      Creatinine (mg/dL)   Date Value   06/30/2019 0.69   06/29/2019 0.75   12/23/2017 1.06 (H)    Calcium (mg/dL)   Date Value   06/30/2019 8.4 (L)   06/29/2019 8.7   12/23/2017 9.4         INR Troponin I A1C   INR (no units)   Date Value   05/31/2015 1.07   01/20/2015 1.01   11/17/2013 1.01    Troponin I ES (ug/L)   Date Value   06/29/2019 0.079 (H)    06/29/2019 0.087 (H)   06/29/2019 0.074 (H)    No results found for: A1C     Liver Panel  Protein Total (g/dL)   Date Value   06/29/2019 6.4 (L)   11/21/2015 6.7 (L)   11/16/2015 7.2    Albumin (g/dL)   Date Value   06/29/2019 3.3 (L)   11/21/2015 3.6   11/16/2015 3.8    Bilirubin Total (mg/dL)   Date Value   06/29/2019 0.5   11/21/2015 0.6   11/16/2015 0.7      Alkaline Phosphatase (U/L)   Date Value   06/29/2019 93   11/21/2015 89   11/16/2015 86    AST (U/L)   Date Value   06/29/2019 13   11/21/2015 20   11/16/2015 25    ALT (U/L)   Date Value   06/29/2019 20   11/21/2015 27   11/16/2015 33      No results found for: BILIDIRECT       Lipid Profile  Cholesterol (mg/dL)   Date Value   06/21/2015 125   06/28/2013 177    HDL Cholesterol (mg/dL)   Date Value   06/21/2015 35 (L)   06/28/2013 33 (L)    LDL Cholesterol Calculated (mg/dL)   Date Value   06/21/2015 62   06/28/2013 117      Triglycerides (mg/dL)   Date Value   06/21/2015 141   06/28/2013 135    Cholesterol/HDL Ratio (no units)   Date Value   06/21/2015 3.6   06/28/2013 5.4 (H)              I have personally spent a total of 50 minutes providing care and consulting with this patient's medical providers today, with more than 50% of this time spent in consultation, coordination of care, and discussion with the patient and/or family regarding diagnostic results, prognosis, symptom management, risks and benefits of management options, and development of plan of care.     This was a non-emergent, non-tele stroke consult.

## 2019-06-30 NOTE — PROGRESS NOTES
MD Notification    Notified Person: MD    Notified Person Name: Jeremy    Notification Date/Time: 6/29/19 @ 1940    Notification Interaction: Paged    Purpose of Notification: FYI: Pt trops continue to be elevated. Pt wishes to have code status be DNR/DNI.    Orders Received:    Comments:
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
## 2019-07-01 ENCOUNTER — APPOINTMENT (OUTPATIENT)
Dept: PHYSICAL THERAPY | Facility: CLINIC | Age: 84
DRG: 689 | End: 2019-07-01
Payer: MEDICARE

## 2019-07-01 PROCEDURE — 25000132 ZZH RX MED GY IP 250 OP 250 PS 637: Mod: GY | Performed by: HOSPITALIST

## 2019-07-01 PROCEDURE — 97162 PT EVAL MOD COMPLEX 30 MIN: CPT | Mod: GP

## 2019-07-01 PROCEDURE — 99233 SBSQ HOSP IP/OBS HIGH 50: CPT | Performed by: INTERNAL MEDICINE

## 2019-07-01 PROCEDURE — 99207 ZZC APP CREDIT; MD BILLING SHARED VISIT: CPT | Performed by: PHYSICIAN ASSISTANT

## 2019-07-01 PROCEDURE — 25000128 H RX IP 250 OP 636: Performed by: HOSPITALIST

## 2019-07-01 PROCEDURE — 97530 THERAPEUTIC ACTIVITIES: CPT | Mod: GP

## 2019-07-01 PROCEDURE — 12000000 ZZH R&B MED SURG/OB

## 2019-07-01 RX ADMIN — DOCUSATE SODIUM 100 MG: 100 CAPSULE, LIQUID FILLED ORAL at 10:37

## 2019-07-01 RX ADMIN — MELATONIN TAB 3 MG 3 MG: 3 TAB at 20:39

## 2019-07-01 RX ADMIN — LEVOTHYROXINE SODIUM 88 MCG: 88 TABLET ORAL at 06:37

## 2019-07-01 RX ADMIN — ESCITALOPRAM OXALATE 10 MG: 10 TABLET ORAL at 10:37

## 2019-07-01 RX ADMIN — METOPROLOL SUCCINATE 25 MG: 25 TABLET, EXTENDED RELEASE ORAL at 10:37

## 2019-07-01 RX ADMIN — LISINOPRIL 10 MG: 10 TABLET ORAL at 10:37

## 2019-07-01 RX ADMIN — DOCUSATE SODIUM 100 MG: 100 CAPSULE, LIQUID FILLED ORAL at 20:38

## 2019-07-01 RX ADMIN — CEFTRIAXONE 1 G: 1 INJECTION, POWDER, FOR SOLUTION INTRAMUSCULAR; INTRAVENOUS at 16:43

## 2019-07-01 RX ADMIN — ATORVASTATIN CALCIUM 10 MG: 10 TABLET, FILM COATED ORAL at 10:37

## 2019-07-01 RX ADMIN — ASPIRIN 81 MG: 81 TABLET, COATED ORAL at 10:37

## 2019-07-01 ASSESSMENT — ACTIVITIES OF DAILY LIVING (ADL)
ADLS_ACUITY_SCORE: 34
ADLS_ACUITY_SCORE: 29

## 2019-07-01 NOTE — PLAN OF CARE
Pt disoriented, irritable when woken up. VSS on RA. Denied pain. Tele: NSR. Neuro intact. PIV infusing NS @ 75 ml/hr. Urinary incontinence x1. Refused repositioning overnight.
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
## 2019-07-01 NOTE — PROGRESS NOTES
[facility]    Hospitalist Progress Note      Assessment & Plan   Joelle Freeman is a 89 year old female with a complex past medical history significant for dementia, NPH s/p  shunt, coronary artery disease, aortic stenosis, hypertension, hyperlipidemia, hypothyroidism, history of CVA, recurrent UTI who was admitted on 6/29/2019 after presenting from her care facility with altered mental status.     TIA vs metabolic encephalopathy vs seizure  Slurred speech, resolved  History of CVA  History of staring spells with concern for dementia  NPH s/p  shunt  Patient presented from care facility the day of admission not feeling right. Noted to have slow, garbled speech by family which had resolved in the ED. Family also notes progressively worsening intermittent confusion.  CT head showed prior  shunt without change as well as diffuse cerebral volume loss and white matter changes consistent with chronic small vessel ischemic disease. UA also grossly abnormal raising question of infectious encephalopathy as well. On exam today she is alert and oriented to place, time, situation. Does not know the year. She feels better overall but is still noting word finding difficulty at times. Per discussion between son and Dr. Odonnell, pt still not at her baseline.   --Neurology consulted 6/30, differentials include infection, TIA, seizure. Carotid US ordered. No plan for EEG at this time. Will discuss whether further workup indicated with Neurology as patient is not back to baseline despite IV abx.   --carotid US with <50% stenosis bilaterally  --treat UTI as below   --daily asa  --PT following to help assess whether she is safe to discharge back to [facility] vs TCU  --SW    Urinary tract infection. Pt with history of recurrent UTI. UA grossly abnormal with large LE< positive nitrites, >35 WBC and many bacteria. Unfortunately it does not appear a urine culture was ever sent. WBC normal on admission and patient has  remained afebrile.  --continue IV Rocephin during admission, will transition to po at discharge    Elevated troponin.   History of coronary artery disease s/p RCA stent 2001  Hypertension  Dyslipidemia  Patient without symptoms of chest pain or dyspnea during admission. EKG with sinus rhythm with left anterior fascicular block. CXR negative. Troponin 0.074 on admission and remained flat x3. ECHO 6/30 shows preserved EF 55-60% without wall motion abnormalities.   --continue prior to admission asa, statin, beta blocker, ACEI  --telemetry   --at this time will not pursue further cardiac workup in absence of symptoms     Severe Aortic Stenosis 11/2015  Not felt to be a candidate for valve replacement or TAVR in 2015.      Hypothyroidisim  TSH WNL.  Continue PTA levothyroxine.     Depression  continue on citalopram 10 mg daily    DVT Prophylaxis: Pneumatic Compression Devices  Code Status: DNI    Disposition: Expected discharge in 1-2 days pending improvement in mental status, Neurology follow up    This patient was seen and examined with Dr. Odonnell who agrees with the above plan.    Sneha Boss PA-C    Interval History   Feeling a little better today. Feels speech is much more fluent and does not feel confused. She continues to have intermittent word finding difficulties, however. Denies focal weakness, notes some mild RLE weakness compared to left from prior stroke. She denies urinary symptoms, fever, chills, abdominal pain or nausea. Denies headache or visual changes.     I was not able to get a hold of patient's son, Irvin. He was seen with patient by Dr. Odonnell this afternoon.     -Data reviewed today: I reviewed all new labs and imaging results over the last 24 hours. I personally reviewed no images or EKG's today.    Physical Exam   Temp: 98  F (36.7  C) Temp src: Oral BP: 112/51 Pulse: 69 Heart Rate: 61 Resp: 18 SpO2: 91 % O2 Device: None (Room air)    Vitals:    06/29/19 1146   Weight: 56.7 kg (125 lb)      Vital Signs with Ranges  Temp:  [97.6  F (36.4  C)-98  F (36.7  C)] 98  F (36.7  C)  Pulse:  [69] 69  Heart Rate:  [61-69] 61  Resp:  [16-18] 18  BP: (112-161)/(51-73) 112/51  SpO2:  [91 %] 91 %  I/O last 3 completed shifts:  In: 1925 [P.O.:540; I.V.:1385]  Out: -     Constitutional: Alert and oriented to person, place, situation, month. Did not know the year. appropriately conversant, intermittent reported word finding difficulty.   ENT: normal cephalic, moist mucous membranes  Respiratory: Lungs clear to auscultation bilaterally, no increased work of breathing or wheezing   Cardiovascular: Regular rate and rhythm, no murmur, no edema, distal pulses +2/4  GI: Normal active bowel sounds, abdomen soft, non-tender  Skin/Integumen: warm, dry  MSK:  Moves all four extremities. No gross deformity   Neuro:  Cranial nerves 2-12 grossly intact. Speech is clear, a little slow. Face symmetric. Sensation intact. Finger to nose intact. No drift. Dorsi and plantar flexion +5/5 and equal. Right hip flexion 4/5 compared to 5/5 on left- patient tells me this is baseline since prior CVA.     Medications     sodium chloride 75 mL/hr at 06/30/19 2348       aspirin  81 mg Oral Daily     atorvastatin  10 mg Oral Daily     cefTRIAXone  1 g Intravenous Q24H     docusate sodium  100 mg Oral BID     escitalopram  10 mg Oral Daily     levothyroxine  88 mcg Oral Daily     lisinopril  10 mg Oral Daily     melatonin  3 mg Oral At Bedtime     metoprolol succinate ER  25 mg Oral Daily     sodium chloride (PF)  3 mL Intracatheter Q8H       Data   Recent Labs   Lab 06/30/19  0727 06/29/19  2139 06/29/19  1831 06/29/19  1149   WBC  --   --   --  7.2   HGB  --   --   --  14.2   MCV  --   --   --  96   PLT  --   --   --  150     --   --  143   POTASSIUM 4.0  --   --  4.4   CHLORIDE 114*  --   --  112*   CO2 24  --   --  26   BUN 19  --   --  25   CR 0.69  --   --  0.75   ANIONGAP 5  --   --  5   JAZLYN 8.4*  --   --  8.7   GLC 92  --   --  97    ALBUMIN  --   --   --  3.3*   PROTTOTAL  --   --   --  6.4*   BILITOTAL  --   --   --  0.5   ALKPHOS  --   --   --  93   ALT  --   --   --  20   AST  --   --   --  13   TROPI  --  0.079* 0.087* 0.074*       Recent Results (from the past 24 hour(s))   US Carotid Bilateral    Narrative    BILATERAL CAROTID ULTRASOUND   6/30/2019 8:40 PM     HISTORY: Aphasia.    COMPARISON: 11/25/2015    RIGHT CAROTID FINDINGS:  Mild to moderate plaque.  Right ICA PSV:  67 cm/sec.  Right ICA EDV:  20 cm/sec.  Right ICA/CCA PSV Ratio:  1.3    These indicate less than 50% diameter stenosis of the right ICA.    Right Vertebral: Antegrade flow.   Right ECA: Antegrade flow.     LEFT CAROTID FINDINGS:  Mild to moderate plaque.  Left ICA PSV:  50 cm/sec.  Left ICA EDV:  14 cm/sec.  Left ICA/CCA PSV Ratio:  0.9    These indicate less than 50% diameter stenosis of the left ICA.    Left Vertebral: Antegrade flow.   Left ECA: Antegrade flow.     Causes of Decreased Accuracy:   None.       Impression    IMPRESSION:    1. Less than 50% diameter stenosis of the right ICA relative to the  distal ICA diameter.  2. Less than 50% diameter stenosis of the left ICA relative to the  distal ICA diameter.    TIO CAIN MD

## 2019-07-01 NOTE — PLAN OF CARE
Denies pain. Up to chair and commode with 1 assist. Tolerating PO intake. VSS. Continue IV abx. Tele NSR.

## 2019-07-01 NOTE — PLAN OF CARE
OT: new order received. Please see care plan note from 6/30 from OT as pt has A with all ADL/IADL's at facility. PT to address transfers and mobility. Defer OT eval at this time as pt needs currently being met by PT.

## 2019-07-01 NOTE — PLAN OF CARE
Discharge Planner PT   Patient plan for discharge: Not stated   Current status:     Eval complete, treatment indicated. Pt is very [place]. Pocket talker in room. Pt pleasant and cooperative. Supine upon arrival. Increased time for all mobility due to impaired hearing, cognition, slow speed of transfers. SUpine > sit with cues for technqiue, required CGA, slow speed. Able to scoot towards EOB with cues. Pt sat EOB for several minutes prior to transfer traininng. SPT to R side to BSC with FWW and min A x 1 with cues for advancing RLE. Noted mild RLE ataxia. unsteady. STS from BS with min A x 1. Inc time to assist in proper RLE ft placement for improved BELKYS and balance. Total A with pericares and brief. SPT to recliner with FWW and min A x 1. Cues for proper stand > sit tecnique. Pt left sitting in chair, breakfast arrived, NST present, alarm on     Barriers to return to prior living situation: Pt near baseline for transfers, requires A x 1, fall risk   Recommendations for discharge: return to group home, continue PT at group home   Rationale for recommendations: Pt currently requires A x 1 with bed mobility and transfers, pt safe to return to group home with resumption of services, A x 1 for all mobility, PT at group home to improve LE strength, balance, transfers          Entered by: Jackie Rice 07/01/2019 9:40 AM

## 2019-07-01 NOTE — PLAN OF CARE
Pt disoriented, pleasant. VSS on RA exp WO=383/73. Tele: NSR. Denies pain. Neuros intact exp increased bilat weakness. Tolerating regular diet. Incontinent x 1, 1 medium hard stool this shift. PIV infu NS @75 mL/hr. Up w/ assist x2 +lift, weak.

## 2019-07-02 ENCOUNTER — APPOINTMENT (OUTPATIENT)
Dept: PHYSICAL THERAPY | Facility: CLINIC | Age: 84
DRG: 689 | End: 2019-07-02
Payer: MEDICARE

## 2019-07-02 VITALS
RESPIRATION RATE: 18 BRPM | SYSTOLIC BLOOD PRESSURE: 134 MMHG | HEIGHT: 64 IN | WEIGHT: 125 LBS | HEART RATE: 58 BPM | OXYGEN SATURATION: 96 % | TEMPERATURE: 95.8 F | BODY MASS INDEX: 21.34 KG/M2 | DIASTOLIC BLOOD PRESSURE: 60 MMHG

## 2019-07-02 LAB
ANION GAP SERPL CALCULATED.3IONS-SCNC: 2 MMOL/L (ref 3–14)
BUN SERPL-MCNC: 16 MG/DL (ref 7–30)
CALCIUM SERPL-MCNC: 8.4 MG/DL (ref 8.5–10.1)
CHLORIDE SERPL-SCNC: 116 MMOL/L (ref 94–109)
CO2 SERPL-SCNC: 26 MMOL/L (ref 20–32)
CREAT SERPL-MCNC: 0.64 MG/DL (ref 0.52–1.04)
GFR SERPL CREATININE-BSD FRML MDRD: 78 ML/MIN/{1.73_M2}
GLUCOSE SERPL-MCNC: 94 MG/DL (ref 70–99)
POTASSIUM SERPL-SCNC: 3.9 MMOL/L (ref 3.4–5.3)
SODIUM SERPL-SCNC: 144 MMOL/L (ref 133–144)
WBC # BLD AUTO: 5.8 10E9/L (ref 4–11)

## 2019-07-02 PROCEDURE — 36415 COLL VENOUS BLD VENIPUNCTURE: CPT | Performed by: PHYSICIAN ASSISTANT

## 2019-07-02 PROCEDURE — 85048 AUTOMATED LEUKOCYTE COUNT: CPT | Performed by: PHYSICIAN ASSISTANT

## 2019-07-02 PROCEDURE — 25000132 ZZH RX MED GY IP 250 OP 250 PS 637: Mod: GY | Performed by: HOSPITALIST

## 2019-07-02 PROCEDURE — 97110 THERAPEUTIC EXERCISES: CPT | Mod: GP

## 2019-07-02 PROCEDURE — 99239 HOSP IP/OBS DSCHRG MGMT >30: CPT | Performed by: PHYSICIAN ASSISTANT

## 2019-07-02 PROCEDURE — 80048 BASIC METABOLIC PNL TOTAL CA: CPT | Performed by: PHYSICIAN ASSISTANT

## 2019-07-02 PROCEDURE — 97530 THERAPEUTIC ACTIVITIES: CPT | Mod: GP

## 2019-07-02 PROCEDURE — 25000128 H RX IP 250 OP 636: Performed by: HOSPITALIST

## 2019-07-02 RX ORDER — CEFUROXIME AXETIL 500 MG/1
500 TABLET ORAL 2 TIMES DAILY
Qty: 6 TABLET | Refills: 0 | Status: SHIPPED | OUTPATIENT
Start: 2019-07-02 | End: 2019-07-26

## 2019-07-02 RX ORDER — CEFUROXIME AXETIL 500 MG/1
500 TABLET ORAL 2 TIMES DAILY
Qty: 6 TABLET | Refills: 0 | DISCHARGE
Start: 2019-07-02 | End: 2019-07-02

## 2019-07-02 RX ADMIN — LISINOPRIL 10 MG: 10 TABLET ORAL at 09:34

## 2019-07-02 RX ADMIN — ESCITALOPRAM OXALATE 10 MG: 10 TABLET ORAL at 09:34

## 2019-07-02 RX ADMIN — ASPIRIN 81 MG: 81 TABLET, COATED ORAL at 09:34

## 2019-07-02 RX ADMIN — LEVOTHYROXINE SODIUM 88 MCG: 88 TABLET ORAL at 06:44

## 2019-07-02 RX ADMIN — ATORVASTATIN CALCIUM 10 MG: 10 TABLET, FILM COATED ORAL at 09:34

## 2019-07-02 RX ADMIN — CEFTRIAXONE 1 G: 1 INJECTION, POWDER, FOR SOLUTION INTRAMUSCULAR; INTRAVENOUS at 16:09

## 2019-07-02 RX ADMIN — METOPROLOL SUCCINATE 25 MG: 25 TABLET, EXTENDED RELEASE ORAL at 09:34

## 2019-07-02 ASSESSMENT — ACTIVITIES OF DAILY LIVING (ADL)
ADLS_ACUITY_SCORE: 33

## 2019-07-02 NOTE — PLAN OF CARE
A/Ox4, forgetful. Neuros intact. Pt refused to move to bed by window. Denies pain. Tele: SB. Blanchable redness to coccyx, turn/repo at pt allows - occasionally refusing. Regular diet. PIV SL. Ax1 to commode. No acute events.

## 2019-07-02 NOTE — PLAN OF CARE
"PT: Attempted to see pt for PT. Pt up in chair, eating breakfast. Pt politely but firmly declines therapy until \"later today\".  "

## 2019-07-02 NOTE — CONSULTS
Care Transition Initial Assessment - SYED     Met with: Patient and RN at [facility]- [phone]    Active Problems:    UTI (urinary tract infection)       DATA  Lives With: facility resident @[facility]  Living Arrangements: assisted living  Who is your support system?: Children, Facility resident(s)/Staff  Identified issues/concerns regarding health management: discharge needs     ASSESSMENT  Cognitive Status:  awake, alert and disoriented  Concerns to be addressed: discharge needs.  SW consulted to assist with discharge planning, emotional support and transportation arrangements.  Pt is a 89 yr old female who admitted on 6/29/19 for a UTI. Patient previously lived at [facility] and contracted for services; assist of one for bed mobility and transfers and is wheelchair bound at baseline.   PLAN  Financial costs for the patient includes N/A .  Patient given options and choices for discharge : prefers returning to [facility] .  Patient/family is agreeable to the plan?  TBD  Transportation/person available to transport on day of discharge  is TBD and have they been notified/set up TBD  Patient Goals and Preferences: TBD .  Patient anticipates discharging to:  TBD .  SYED spoke to CECIL Chaudhary at [facility] who indicated patient is able to return to [facility] if at her baseline. P.A. To speak to son when he arrives this afternoon to assist with determining baseline status.  SW to fax discharge orders to: [phone]  SW to continue to follow and assist with discharge planning.    ADDENDUM: SYED confirmed with [facility] that patient is able to receive Home PT/OT upon return. The [facility] uses [facility] for therapies if agency is acceptable by family. [facility] will fax therapy orders to  agency if family accepts agency.    ADDENDUM: SYED confirmed with patient and son Osman that they are fine with [facility]. SYED informed patient, son, [facility] RN Kelley ([phone]) and medical team that patient will be transported via   Transport at 1915 this evening.    DC orders: faxed to [phone]  Scripts: hard script to be faxed to [facility]  Trans: HE at 1915        SUSANNE Quijano  Daytime (8:00am-4:30pm): [phone]  After-Hours  Pager (4:30pm-11:30pm): [phone]

## 2019-07-02 NOTE — PLAN OF CARE
Pt is alert but confused at times, forgetful. VSS on RA, denies pain. Neuros intact. PIV SL w/ intermittent abx. Blanchable redness to coccyx. Up in chair most of shift, geomat prt cusion in place on chair. Tele NSR. MD recommended pt be moved to bed 2 by window to help decrease delirium, however pt refused. Continue to provide frequent reorientation.

## 2019-07-02 NOTE — DISCHARGE SUMMARY
"[facility]    Discharge Summary  Hospitalist    Date of Admission:  6/29/2019  Date of Discharge:  7/2/2019  Discharging Provider: Sneha Boss PA-C    Discharge Diagnoses   Encephalopathy, suspect metabolic related to UTI  Slurred speech, resolved  Urinary tract infection  History of CVA  NPH s/p shunt placement  Elevated troponin  History of coronary artery disease s/p RCA stent  Severe aortic stenosis  Hypothyroidism  Depression    History of Present Illness   Joelle Freeman is an 89 year old female  with a complex past medical history significant for dementia, NPH s/p  shunt, coronary artery disease, aortic stenosis, hypertension, hyperlipidemia, hypothyroidism, history of CVA, recurrent UTI who was admitted on 6/29/2019 after presenting from her care facility with altered mental status. The patient reported waking up the day of admission feeling like something was wrong. She had significant difficulty with speech and felt she was \"speaking gibberish.\" her son noted her speech seemed garbled and soft. He also reported a period of unresponsiveness where she seemed to be staring at the ceiling. Due to concerns for altered mental status and speech difficulty, she presented to the ED for evaluation. Please see admission H&P by Dr. Luna for additional information.     On day of discharge the patient is feeling well. She feels back to her baseline and denies any further speech difficulty. She is tolerating po and ambulating with assistance. Denies abdominal pain, chest pain, dyspnea, or focal weakness. Her son, Osman, visited earlier today and feels she is back to baseline. She denies any urinary symptoms.    Hospital Course   Joelle Freeman is a 89 year old female with a complex past medical history significant for dementia, NPH s/p  shunt, coronary artery disease, aortic stenosis, hypertension, hyperlipidemia, hypothyroidism, history of CVA, recurrent UTI who was admitted on " 6/29/2019 after presenting from her care facility with altered mental status.      Metabolic encephalopathy vs TIA vs seizure  Slurred speech, resolved  History of CVA  History of staring spells with concern for dementia  NPH s/p  shunt  Patient presented from care facility the day of admission not feeling right. Noted to have slow, garbled speech by family which had resolved in the ED. Family also notes progressively worsening intermittent confusion.  CT head showed prior  shunt without change as well as diffuse cerebral volume loss and white matter changes consistent with chronic small vessel ischemic disease.  Symptoms significantly improved through admission and patient and son feel she is back to baseline day of discharge. Overall suspicious that symptoms are secondary to metabolic encephalopathy in the setting of UTI as symptoms improved with ongoing abx administration. She continued to have some mild word finding difficulty day after admission until day of discharge making TIA less likely. Seizure also considered as was intermittent delirium in the setting of dementia.   --Neurology consulted 6/30, differentials include infection, TIA, seizure. Carotid US ordered and largely unremarkable. No plan for EEG at this time. Recommend outpatient Neurology follow up 4-6 weeks.   --carotid US with <50% stenosis bilaterally  --UTI treated as below   --daily asa  --PT evaluated, ok to discharge back to [facility] with previous assistance      Urinary tract infection. Pt with history of recurrent UTI. UA grossly abnormal with large LE< positive nitrites, >35 WBC and many bacteria. Unfortunately it does not appear a urine culture was ever sent. WBC normal on admission and patient has remained afebrile.  --continue IV Rocephin during admission, will transition to po Ceftin at discharge     Elevated troponin.   History of coronary artery disease s/p RCA stent 2001  Hypertension  Dyslipidemia  Patient without symptoms of chest  pain or dyspnea during admission. EKG with sinus rhythm with left anterior fascicular block. CXR negative. Troponin 0.074 on admission and remained flat x3. ECHO 6/30 shows preserved EF 55-60% without wall motion abnormalities. She has had mild troponin elevation during previous admissions as well.   --continued prior to admission asa, statin, beta blocker, ACEI  --at this time will not pursue further inpatient cardiac workup in absence of symptoms, follow up with PCP      Severe Aortic Stenosis 11/2015  Not felt to be a candidate for valve replacement or TAVR in 2015.      Hypothyroidisim  TSH WNL.  Continue PTA levothyroxine.     Depression  continued citalopram 10 mg daily    This patient was seen and examined with Dr. Odonnell who agrees with the above plan.    Sneha Boss PA-C    Significant Results and Procedures   See below     Code Status   DNI       Primary Care Physician   EDNA CARCAMO    Physical Exam   Temp: 97  F (36.1  C) Temp src: Oral BP: 131/62 Pulse: 58 Heart Rate: 63 Resp: 18 SpO2: 95 % O2 Device: None (Room air)    Vitals:    06/29/19 1146   Weight: 56.7 kg (125 lb)     Vital Signs with Ranges  Temp:  [96.2  F (35.7  C)-97  F (36.1  C)] 97  F (36.1  C)  Pulse:  [58] 58  Heart Rate:  [63-73] 63  Resp:  [18] 18  BP: (129-146)/(62-68) 131/62  SpO2:  [95 %] 95 %  I/O last 3 completed shifts:  In: 360 [P.O.:360]  Out: -     Constitutional: Alert and oriented to person, place, situation, month. Did not know the year. appropriately conversant, speech is fluent.   ENT: normal cephalic, moist mucous membranes  Respiratory: Lungs clear to auscultation bilaterally, no increased work of breathing or wheezing   Cardiovascular: Regular rate and rhythm, no murmur, no edema, distal pulses +2/4  GI: Normal active bowel sounds, abdomen soft, non-tender  Skin/Integumen: warm, dry  MSK:  Moves all four extremities. No gross deformity   Neuro:  Cranial nerves 2-12 grossly intact. Speech is clear, a little  slow. Face symmetric. Sensation intact. Finger to nose intact. No drift. Dorsi and plantar flexion +5/5 and equal.     Discharge Disposition   Discharged to assisted living  Condition at discharge: Stable    Consultations This Hospital Stay   PHYSICAL THERAPY ADULT IP CONSULT  OCCUPATIONAL THERAPY ADULT IP CONSULT  SOCIAL WORK IP CONSULT  SPEECH LANGUAGE PATH ADULT IP CONSULT  OCCUPATIONAL THERAPY ADULT IP CONSULT  NEUROLOGY IP CONSULT  SOCIAL WORK IP CONSULT    Time Spent on this Encounter   I, Sneha Boss, personally saw the patient today and spent greater than 30 minutes discharging this patient.    Discharge Orders   No discharge procedures on file.  Discharge Medications   Current Discharge Medication List      CONTINUE these medications which have NOT CHANGED    Details   ACETAMINOPHEN PO Take 650 mg by mouth 2 times daily as needed for pain      aspirin EC 81 MG EC tablet Take 1 tablet (81 mg) by mouth daily  Qty: 30 tablet, Refills: 1    Associated Diagnoses: CAD (coronary artery disease)      atorvastatin (LIPITOR) 10 MG tablet Take 1 tablet (10 mg) by mouth daily  Qty: 30 tablet, Refills: 1    Associated Diagnoses: Hyperlipidemia      cholecalciferol 1000 UNITS TABS Take 1,000 Units by mouth daily  Qty: 30 tablet, Refills: 1    Associated Diagnoses: Vitamin D deficiency      escitalopram (LEXAPRO) 10 MG tablet Take 10 mg by mouth daily      levothyroxine (SYNTHROID/LEVOTHROID) 88 MCG tablet Take 88 mcg by mouth daily      lisinopril (PRINIVIL/ZESTRIL) 10 MG tablet Take 10 mg by mouth daily      loperamide (IMODIUM A-D) 2 MG tablet Take 2 mg by mouth as needed for diarrhea (8 times daily as needed for diarrhea)       MELATONIN PO Take 3 mg by mouth At Bedtime       metoprolol succinate ER (TOPROL-XL) 25 MG 24 hr tablet Take 25 mg by mouth daily      nystatin (MYCOSTATIN) 399961 UNIT/GM external powder Apply topically 2 times daily as needed (intertriginal dermatitis)      senna-docusate  (SENOKOT-S;PERICOLACE) 8.6-50 MG per tablet Take 1 tablet by mouth 2 times daily as needed       zinc oxide 16 % (BOUDREAUXS BUTT PASTE) PSTE paste Apply topically as needed for irritation (red buttocks)           Allergies   Allergies   Allergen Reactions     Tramadol      Data   Most Recent 3 CBC's:  Recent Labs   Lab Test 07/02/19  0643 06/29/19  1149 12/23/17  1844 11/24/15  0530   WBC 5.8 7.2 7.4 7.3   HGB  --  14.2 14.0 13.2   MCV  --  96 98 96   PLT  --  150 178 167      Most Recent 3 BMP's:  Recent Labs   Lab Test 07/02/19  0643 06/30/19  0727 06/29/19  1149    143 143   POTASSIUM 3.9 4.0 4.4   CHLORIDE 116* 114* 112*   CO2 26 24 26   BUN 16 19 25   CR 0.64 0.69 0.75   ANIONGAP 2* 5 5   JAZLYN 8.4* 8.4* 8.7   GLC 94 92 97     Most Recent 2 LFT's:  Recent Labs   Lab Test 06/29/19  1149 11/21/15  1950   AST 13 20   ALT 20 27   ALKPHOS 93 89   BILITOTAL 0.5 0.6     Most Recent INR's and Anticoagulation Dosing History:  Anticoagulation Dose History     Recent Dosing and Labs Latest Ref Rng & Units 6/27/2013 11/17/2013 1/20/2015 5/31/2015    INR 0.86 - 1.14 0.98 1.01 1.01 1.07        Most Recent 3 Troponin's:  Recent Labs   Lab Test 06/29/19  2139 06/29/19  1831 06/29/19  1149   TROPI 0.079* 0.087* 0.074*     Most Recent Cholesterol Panel:  Recent Labs   Lab Test 06/21/15  0935   CHOL 125   LDL 62   HDL 35*   TRIG 141     Most Recent 6 Bacteria Isolates From Any Culture (See EPIC Reports for Culture Details):  Recent Labs   Lab Test 07/29/15  0909 05/31/15  1808 01/20/15  1640 07/17/13  1740 07/17/13  1737 06/30/13  1015   CULT No growth >100,000 colonies/mL Escherichia coli* >100,000 colonies/mL Corynebacterium species Susceptibility testing not routinely   done  10,000 to 50,000 colonies/mL Gram positive bacilli resembling Lactobacillus  Susceptibility testing not routinely done  * No growth No growth No growth     Most Recent TSH, T4 and A1c Labs:  Recent Labs   Lab Test 06/30/19  0727   TSH 1.84      Results for orders placed or performed during the hospital encounter of 06/29/19   CT Head w/o Contrast    Narrative    CT OF THE HEAD WITHOUT CONTRAST 6/29/2019 12:56 PM     COMPARISON: Head CT 7/26/2018.    HISTORY: Speech changes now resolved, altered mental status.    TECHNIQUE: 5 mm thick axial CT images of the head were acquired  without IV contrast material.    FINDINGS: A  shunt catheter placed through a right parietal approach  with its tip in the anterior aspect of the left lateral ventricle is  again noted without change in position. The lateral and third  ventricles are unchanged in size and remain within normal limits given  the degree of cerebral volume loss. Dural thickening versus chronic  subdural collections along the cerebral convexities on both sides is  again noted without change and is likely related to the  shunt  catheter.     There is moderate diffuse cerebral volume loss. There are subtle  patchy areas of decreased density in the cerebral white matter  bilaterally that are consistent with sequela of chronic small vessel  ischemic disease. The basal cisterns are within normal limits in  configuration given the degree of cerebral volume loss.  There is no  midline shift.    No intracranial hemorrhage, mass or recent infarct.    The visualized paranasal sinuses are well-aerated. There is no  mastoiditis. There are no fractures of the visualized bones.      Impression    IMPRESSION:  1.  shunt catheter again noted without change. No evidence for  hydrocephalus.  2. Dural thickening or thin subdural collections along the cerebral  convexities on both sides again noted without change, likely  representing chronic changes due to  shunt catheter drainage.  3. Diffuse cerebral volume loss and cerebral white matter changes  consistent with chronic small vessel ischemic disease. No evidence for  acute intracranial pathology.      Radiation dose for this scan was reduced using automated  exposure  control, adjustment of the mA and/or kV according to patient size, or  iterative reconstruction technique.    JOSE FELIX MD   XR Chest 2 Views    Narrative    CHEST TWO VIEWS  6/29/2019 12:59 PM     HISTORY: Altered mental status.    COMPARISON: 12/23/2017.      Impression    IMPRESSION: Cardiac size is within normal limits. Tortuous aorta.  Pulmonary vasculature is not distended. Lungs are clear. No pleural  fluid.  shunt catheter seen overlying the right chest. No acute  disease. No significant change.    ARELI APPLE MD   US Carotid Bilateral    Narrative    BILATERAL CAROTID ULTRASOUND   6/30/2019 8:40 PM     HISTORY: Aphasia.    COMPARISON: 11/25/2015    RIGHT CAROTID FINDINGS:  Mild to moderate plaque.  Right ICA PSV:  67 cm/sec.  Right ICA EDV:  20 cm/sec.  Right ICA/CCA PSV Ratio:  1.3    These indicate less than 50% diameter stenosis of the right ICA.    Right Vertebral: Antegrade flow.   Right ECA: Antegrade flow.     LEFT CAROTID FINDINGS:  Mild to moderate plaque.  Left ICA PSV:  50 cm/sec.  Left ICA EDV:  14 cm/sec.  Left ICA/CCA PSV Ratio:  0.9    These indicate less than 50% diameter stenosis of the left ICA.    Left Vertebral: Antegrade flow.   Left ECA: Antegrade flow.     Causes of Decreased Accuracy:   None.       Impression    IMPRESSION:    1. Less than 50% diameter stenosis of the right ICA relative to the  distal ICA diameter.  2. Less than 50% diameter stenosis of the left ICA relative to the  distal ICA diameter.    TIO CAIN MD

## 2019-07-03 ENCOUNTER — RECORDS - HEALTHEAST (OUTPATIENT)
Dept: LAB | Facility: CLINIC | Age: 84
End: 2019-07-03

## 2019-07-03 LAB
ANION GAP SERPL CALCULATED.3IONS-SCNC: 9 MMOL/L (ref 5–18)
BUN SERPL-MCNC: 14 MG/DL (ref 8–28)
CALCIUM SERPL-MCNC: 9.4 MG/DL (ref 8.5–10.5)
CHLORIDE BLD-SCNC: 112 MMOL/L (ref 98–107)
CO2 SERPL-SCNC: 21 MMOL/L (ref 22–31)
CREAT SERPL-MCNC: 0.7 MG/DL (ref 0.6–1.1)
GFR SERPL CREATININE-BSD FRML MDRD: >60 ML/MIN/1.73M2
GLUCOSE BLD-MCNC: 87 MG/DL (ref 70–125)
POTASSIUM BLD-SCNC: 3.8 MMOL/L (ref 3.5–5)
SODIUM SERPL-SCNC: 142 MMOL/L (ref 136–145)

## 2019-07-03 NOTE — PLAN OF CARE
A&Ox4. A1 w/gb. Up in chair most of shift. Tolerating diet. VSS on RA. AVS given to pt. Plan to discharge via [facility] to [place].

## 2019-07-03 NOTE — PLAN OF CARE
Physical Therapy Discharge Summary    Reason for therapy discharge:    Discharged to home with home therapy.    Progress towards therapy goal(s). See goals on Care Plan in [facility] electronic health record for goal details.  Goals not met.  Barriers to achieving goals:   discharge from facility.    Therapy recommendation(s):    Continued therapy is recommended.  Rationale/Recommendations:   PT to progress gross strength for functional transfers.

## 2019-07-17 ENCOUNTER — RECORDS - HEALTHEAST (OUTPATIENT)
Dept: LAB | Facility: CLINIC | Age: 84
End: 2019-07-17

## 2019-07-17 LAB
ALBUMIN UR-MCNC: NEGATIVE MG/DL
APPEARANCE UR: CLEAR
BACTERIA #/AREA URNS HPF: ABNORMAL HPF
BILIRUB UR QL STRIP: NEGATIVE
COLOR UR AUTO: YELLOW
GLUCOSE UR STRIP-MCNC: NEGATIVE MG/DL
HGB UR QL STRIP: NEGATIVE
KETONES UR STRIP-MCNC: NEGATIVE MG/DL
LEUKOCYTE ESTERASE UR QL STRIP: ABNORMAL
MUCOUS THREADS #/AREA URNS LPF: ABNORMAL LPF
NITRATE UR QL: NEGATIVE
PH UR STRIP: 6 [PH] (ref 4.5–8)
RBC #/AREA URNS AUTO: ABNORMAL HPF
SP GR UR STRIP: 1.01 (ref 1–1.03)
SQUAMOUS #/AREA URNS AUTO: ABNORMAL LPF
UROBILINOGEN UR STRIP-ACNC: ABNORMAL
WBC #/AREA URNS AUTO: ABNORMAL HPF

## 2019-07-19 LAB — BACTERIA SPEC CULT: NORMAL

## 2019-07-26 ENCOUNTER — OFFICE VISIT (OUTPATIENT)
Dept: NEUROLOGY | Facility: CLINIC | Age: 84
End: 2019-07-26
Payer: MEDICARE

## 2019-07-26 VITALS
SYSTOLIC BLOOD PRESSURE: 122 MMHG | RESPIRATION RATE: 16 BRPM | OXYGEN SATURATION: 93 % | HEART RATE: 67 BPM | TEMPERATURE: 98 F | DIASTOLIC BLOOD PRESSURE: 51 MMHG

## 2019-07-26 DIAGNOSIS — R41.0 ACUTE CONFUSION: ICD-10-CM

## 2019-07-26 DIAGNOSIS — F03.90 DEMENTIA WITHOUT BEHAVIORAL DISTURBANCE, UNSPECIFIED DEMENTIA TYPE: ICD-10-CM

## 2019-07-26 DIAGNOSIS — G91.2 NPH (NORMAL PRESSURE HYDROCEPHALUS) (H): Primary | ICD-10-CM

## 2019-07-26 DIAGNOSIS — R26.9 GAIT DISORDER: ICD-10-CM

## 2019-07-26 NOTE — LETTER
7/26/2019       RE: Joelle Freeman  [address]  [place] [address]     Dear Colleague,    Thank you for referring your patient, Joelle Freeman, to the [facility] NEUROLOGY at [facility]. Please see a copy of my visit note below.    Service Date: 07/26/2019      HISTORY OF PRESENT ILLNESS:  Joelle Freeman is a 89-year-old female here to follow up on confusional episodes.  She is accompanied by her son, Osman.        She does have a complex medical history.  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  She does not recall that but does report that she has had weakness on her right side for some undetermined amount of time.  She has a history of dementia.  She has had a  shunt placed for normal pressure hydrocephalus with initially good response.  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      She does reside in a nursing home/assisted living.      Her son, Osman, who accompanied her today, provides the recent history as well as a review of her medical records.      On 06/29/2019, Osman called her to talk.  Her speech was unintelligible.  He got to the care center about 10 minutes later.  She was lying in bed, staring at the ceiling.  There was not a gaze preference.  He checked her  but felt it was equal bilaterally.  Her eyes were glassy and she appeared to be in a daze.  She was arousable eventually and moaned.  He did not appreciate any seizure activity.      She was taken to [facility].  Her evaluation there included a head CT scan which was reviewed.  There is a  shunt catheter without any significant hydrocephalus.  There is some dural thickening and also atrophy with white matter changes.  Overall, no acute abnormalities were noted and the study appears stable compared to one done a year ago.  Laboratory work included a normal CBC,  comprehensive metabolic panel and lactic acid level.  She did, however, have a urinalysis done that revealed 35 white cells and was leukocyte esterase positive.  A culture was not done.  It was felt she did have a urinary tract infection and she was treated with Rocephin during her hospitalization.      She was seen by the Stroke Service and it was felt that she probably had a toxic metabolic encephalopathy.  The possibility of TIA and seizure was also raised.  She did improve during her hospitalization and was discharged back to the [facility] on 07/02/2019.  She received an additional 3 days of antibiotics orally.      Additional testing done during her hospitalization included a cardiac echo that revealed aortic stenosis but no cardioembolic source.  She had a carotid ultrasound that revealed less than 50% stenosis bilaterally.      EKG revealed a sinus rhythm and review of available EKGs in her chart dating back several years shows that she was in sinus rhythm on those occasions.      Osman states that after discharge she still was having spells that could last up to a day and a half and they were recurrent.  Again, these were episodes where she would stare and be less responsive, although there were no focal changes noted and no twitching or jerking.  He tells me they did check another urinalysis in the nursing home and it came back negative, but again, she had been treated with antibiotics.  After the episodes, the patient indicates she knew something had happened, but she could not recall what it was.  She has no prior history of seizures.  Osman also indicates her speech seems less distinct since all this occurred.      Osman does tell me the episodes resolved over the past 2 weeks.      As noted, she does have a  shunt for normal pressure hydrocephalus.  She is mainly in a wheelchair with some ambulation with assistance and a walker.  She last saw Dr. Brittany greene 01/2016.  He had made an adjustment in  "her  shunt and following that it is reported she was doing \"much better.\"  The patient was in a wheelchair at that time.      PAST MEDICAL HISTORY:  In addition to the above, is positive for aortic stenosis, hypertension, hyperlipidemia, hypothyroidism.      CURRENTE MEDICATIONS:  We did not receive a list of medications from the care center but medicines listed in her medical record include:   1.  Aspirin 81 mg.   2.  Atorvastatin.   3.  Vitamin D.   4.  Lexapro.   5.  Levothyroxine.   6.  Lisinopril.   7.  Imodium.   8.  Melatonin.   9.  Metoprolol.     10.  Nystatin.   11.  Senokot.      ALLERGIES:  Tramadol.      SOCIAL HISTORY:  She resides in a care center.  She does not smoke or use alcohol.      PHYSICAL EXAMINATION:  Examination reveals a very pleasant, alert, and cooperative female.  Heart rate 67 and regular.  Blood pressure 122/51.      Her spontaneous speech is normal.  She is oriented to her name.  She knows she is in a medical facility.  She believes she is in [place].  She could identify her son.  She knows it is Friday, the end of July and summer but she could not give me the year.  Her spontaneous speech, naming and repetition were normal.  She recalled 2/3 objects at 1 minute and again recalled 2/3 after 5 minutes.      Her pupils are myotic but reactive.  Extraocular motility is full.  Visual fields are intact.  Otherwise, cranial nerves II-XII are intact.  Motor examination reveals intact upper and lower extremity strength.  There is no pronator drift.  Sensory examination is intact to position sense and double simultaneous sensory stimuli.  She has absent vibratory sense in the toes.  Finger-to-nose is done well.  She requires assistance to get up out of a wheelchair and ambulate.  She has a magnetic type gait with short steps.  Deep tendon reflexes are 2+ in the upper extremities, 3+ at the knees, 1+ at the ankles.  Right plantar response appears extensor, left is flexor.      IMPRESSION: "   1.  Acute confusional episodes.   2.  History of normal pressure hydrocephalus, status post shunt.   3.  Dementia.   4.  Gait disturbance.      PLAN:  I did review the situation that occurred recently with her son.  It does appear she may have had a metabolic encephalopathy related to urinary tract infection, but this does not fully explain the recurrent episodes that she was having up until about 2 weeks ago.  I do not think that these represent TIAs as the description is nonfocal.  The possibility of nonconvulsive seizures certainly needs to be considered.  I also told the patient that I did not think that these episodes would necessarily be indicative of shunt malfunction, but given her worsening gait according to Osman, I think it would be worthwhile to have her see Dr. Soto back again.        She is going to have a 3-hour EEG to look for any potential for seizures.      I am referring her back to Dr. Soto for reassessment of her shunt.      I will see her back following the EEG.      ADDENDUM:  Total visit time 45 minutes.  More than 50% of this time was spent in counseling and coordination of care.         CRICKET SHAFFER MD             D: 2019   T: 2019   MT: [email or web address]      Name:     RENA YING   MRN:      [id number]        Account:      [id number]   :      1928           Service Date: 2019      Document: [id number]

## 2019-07-26 NOTE — LETTER
7/26/2019      RE: Joelle Freeman  [address]  [facility] [address]       Service Date: 07/26/2019      HISTORY OF PRESENT ILLNESS:  Joelle Freeman is a 89-year-old female here to follow up on confusional episodes.  She is accompanied by her son, Osman.        She does have a complex medical history.  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  She does not recall that but does report that she has had weakness on her right side for some undetermined amount of time.  She has a history of dementia.  She has had a  shunt placed for normal pressure hydrocephalus with initially good response.  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      She does reside in a nursing home/assisted living.      Her son, Osman, who accompanied her today, provides the recent history as well as a review of her medical records.      On 06/29/2019, Osman called her to talk.  Her speech was unintelligible.  He got to the care center about 10 minutes later.  She was lying in bed, staring at the ceiling.  There was not a gaze preference.  He checked her  but felt it was equal bilaterally.  Her eyes were glassy and she appeared to be in a daze.  She was arousable eventually and moaned.  He did not appreciate any seizure activity.      She was taken to [facility].  Her evaluation there included a head CT scan which was reviewed.  There is a  shunt catheter without any significant hydrocephalus.  There is some dural thickening and also atrophy with white matter changes.  Overall, no acute abnormalities were noted and the study appears stable compared to one done a year ago.  Laboratory work included a normal CBC, comprehensive metabolic panel and lactic acid level.  She did, however, have a urinalysis done that revealed 35 white cells and was leukocyte esterase positive.  A culture was not done.  It was felt she  "did have a urinary tract infection and she was treated with Rocephin during her hospitalization.      She was seen by the Stroke Service and it was felt that she probably had a toxic metabolic encephalopathy.  The possibility of TIA and seizure was also raised.  She did improve during her hospitalization and was discharged back to the [facility] center on 07/02/2019.  She received an additional 3 days of antibiotics orally.      Additional testing done during her hospitalization included a cardiac echo that revealed aortic stenosis but no cardioembolic source.  She had a carotid ultrasound that revealed less than 50% stenosis bilaterally.      EKG revealed a sinus rhythm and review of available EKGs in her chart dating back several years shows that she was in sinus rhythm on those occasions.      Osman states that after discharge she still was having spells that could last up to a day and a half and they were recurrent.  Again, these were episodes where she would stare and be less responsive, although there were no focal changes noted and no twitching or jerking.  He tells me they did check another urinalysis in the nursing home and it came back negative, but again, she had been treated with antibiotics.  After the episodes, the patient indicates she knew something had happened, but she could not recall what it was.  She has no prior history of seizures.  Osman also indicates her speech seems less distinct since all this occurred.      Osman does tell me the episodes resolved over the past 2 weeks.      As noted, she does have a  shunt for normal pressure hydrocephalus.  She is mainly in a wheelchair with some ambulation with assistance and a walker.  She last saw Dr. Brittany greene 01/2016.  He had made an adjustment in her  shunt and following that it is reported she was doing \"much better.\"  The patient was in a wheelchair at that time.      PAST MEDICAL HISTORY:  In addition to the above, is positive for aortic " stenosis, hypertension, hyperlipidemia, hypothyroidism.      CURRENTE MEDICATIONS:  We did not receive a list of medications from the care center but medicines listed in her medical record include:   1.  Aspirin 81 mg.   2.  Atorvastatin.   3.  Vitamin D.   4.  Lexapro.   5.  Levothyroxine.   6.  Lisinopril.   7.  Imodium.   8.  Melatonin.   9.  Metoprolol.     10.  Nystatin.   11.  Senokot.      ALLERGIES:  Tramadol.      SOCIAL HISTORY:  She resides in a care center.  She does not smoke or use alcohol.      PHYSICAL EXAMINATION:  Examination reveals a very pleasant, alert, and cooperative female.  Heart rate 67 and regular.  Blood pressure 122/51.      Her spontaneous speech is normal.  She is oriented to her name.  She knows she is in a medical facility.  She believes she is in [place].  She could identify her son.  She knows it is Friday, the end of July and summer but she could not give me the year.  Her spontaneous speech, naming and repetition were normal.  She recalled 2/3 objects at 1 minute and again recalled 2/3 after 5 minutes.      Her pupils are myotic but reactive.  Extraocular motility is full.  Visual fields are intact.  Otherwise, cranial nerves II-XII are intact.  Motor examination reveals intact upper and lower extremity strength.  There is no pronator drift.  Sensory examination is intact to position sense and double simultaneous sensory stimuli.  She has absent vibratory sense in the toes.  Finger-to-nose is done well.  She requires assistance to get up out of a wheelchair and ambulate.  She has a magnetic type gait with short steps.  Deep tendon reflexes are 2+ in the upper extremities, 3+ at the knees, 1+ at the ankles.  Right plantar response appears extensor, left is flexor.      IMPRESSION:   1.  Acute confusional episodes.   2.  History of normal pressure hydrocephalus, status post shunt.   3.  Dementia.   4.  Gait disturbance.      PLAN:  I did review the situation that occurred  recently with her son.  It does appear she may have had a metabolic encephalopathy related to urinary tract infection, but this does not fully explain the recurrent episodes that she was having up until about 2 weeks ago.  I do not think that these represent TIAs as the description is nonfocal.  The possibility of nonconvulsive seizures certainly needs to be considered.  I also told the patient that I did not think that these episodes would necessarily be indicative of shunt malfunction, but given her worsening gait according to Osman, I think it would be worthwhile to have her see Dr. Soto back again.        She is going to have a 3-hour EEG to look for any potential for seizures.      I am referring her back to Dr. Soto for reassessment of her shunt.      I will see her back following the EEG.      ADDENDUM:  Total visit time 45 minutes.  More than 50% of this time was spent in counseling and coordination of care.         CRICKET SHAFFER MD             D: 2019   T: 2019   MT: maryjane      Name:     RENA YING   MRN:      [id number]        Account:      [id number]   :      1928           Service Date: 2019      Document: [id number]

## 2019-07-26 NOTE — PROGRESS NOTES
Service Date: 07/26/2019      HISTORY OF PRESENT ILLNESS:  Joelle Freeman is a 89-year-old female here to follow up on confusional episodes.  She is accompanied by her son, Osman.        She does have a complex medical history.  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  She does not recall that but does report that she has had weakness on her right side for some undetermined amount of time.  She has a history of dementia.  She has had a  shunt placed for normal pressure hydrocephalus with initially good response.  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      She does reside in a nursing home/assisted living.      Her son, Osman, who accompanied her today, provides the recent history as well as a review of her medical records.      On 06/29/2019, Osman called her to talk.  Her speech was unintelligible.  He got to the care center about 10 minutes later.  She was lying in bed, staring at the ceiling.  There was not a gaze preference.  He checked her  but felt it was equal bilaterally.  Her eyes were glassy and she appeared to be in a daze.  She was arousable eventually and moaned.  He did not appreciate any seizure activity.      She was taken to [facility].  Her evaluation there included a head CT scan which was reviewed.  There is a  shunt catheter without any significant hydrocephalus.  There is some dural thickening and also atrophy with white matter changes.  Overall, no acute abnormalities were noted and the study appears stable compared to one done a year ago.  Laboratory work included a normal CBC, comprehensive metabolic panel and lactic acid level.  She did, however, have a urinalysis done that revealed 35 white cells and was leukocyte esterase positive.  A culture was not done.  It was felt she did have a urinary tract infection and she was treated with Rocephin during her  "hospitalization.      She was seen by the Stroke Service and it was felt that she probably had a toxic metabolic encephalopathy.  The possibility of TIA and seizure was also raised.  She did improve during her hospitalization and was discharged back to the [facility] center on 07/02/2019.  She received an additional 3 days of antibiotics orally.      Additional testing done during her hospitalization included a cardiac echo that revealed aortic stenosis but no cardioembolic source.  She had a carotid ultrasound that revealed less than 50% stenosis bilaterally.      EKG revealed a sinus rhythm and review of available EKGs in her chart dating back several years shows that she was in sinus rhythm on those occasions.      Osman states that after discharge she still was having spells that could last up to a day and a half and they were recurrent.  Again, these were episodes where she would stare and be less responsive, although there were no focal changes noted and no twitching or jerking.  He tells me they did check another urinalysis in the nursing home and it came back negative, but again, she had been treated with antibiotics.  After the episodes, the patient indicates she knew something had happened, but she could not recall what it was.  She has no prior history of seizures.  Osman also indicates her speech seems less distinct since all this occurred.      Osman does tell me the episodes resolved over the past 2 weeks.      As noted, she does have a  shunt for normal pressure hydrocephalus.  She is mainly in a wheelchair with some ambulation with assistance and a walker.  She last saw Dr. Brittany greene 01/2016.  He had made an adjustment in her  shunt and following that it is reported she was doing \"much better.\"  The patient was in a wheelchair at that time.      PAST MEDICAL HISTORY:  In addition to the above, is positive for aortic stenosis, hypertension, hyperlipidemia, hypothyroidism.      CURRENTE MEDICATIONS: "  We did not receive a list of medications from the care center but medicines listed in her medical record include:   1.  Aspirin 81 mg.   2.  Atorvastatin.   3.  Vitamin D.   4.  Lexapro.   5.  Levothyroxine.   6.  Lisinopril.   7.  Imodium.   8.  Melatonin.   9.  Metoprolol.     10.  Nystatin.   11.  Senokot.      ALLERGIES:  Tramadol.      SOCIAL HISTORY:  She resides in a care center.  She does not smoke or use alcohol.      PHYSICAL EXAMINATION:  Examination reveals a very pleasant, alert, and cooperative female.  Heart rate 67 and regular.  Blood pressure 122/51.      Her spontaneous speech is normal.  She is oriented to her name.  She knows she is in a medical facility.  She believes she is in [place].  She could identify her son.  She knows it is Friday, the end of July and summer but she could not give me the year.  Her spontaneous speech, naming and repetition were normal.  She recalled 2/3 objects at 1 minute and again recalled 2/3 after 5 minutes.      Her pupils are myotic but reactive.  Extraocular motility is full.  Visual fields are intact.  Otherwise, cranial nerves II-XII are intact.  Motor examination reveals intact upper and lower extremity strength.  There is no pronator drift.  Sensory examination is intact to position sense and double simultaneous sensory stimuli.  She has absent vibratory sense in the toes.  Finger-to-nose is done well.  She requires assistance to get up out of a wheelchair and ambulate.  She has a magnetic type gait with short steps.  Deep tendon reflexes are 2+ in the upper extremities, 3+ at the knees, 1+ at the ankles.  Right plantar response appears extensor, left is flexor.      IMPRESSION:   1.  Acute confusional episodes.   2.  History of normal pressure hydrocephalus, status post shunt.   3.  Dementia.   4.  Gait disturbance.      PLAN:  I did review the situation that occurred recently with her son.  It does appear she may have had a metabolic encephalopathy related  to urinary tract infection, but this does not fully explain the recurrent episodes that she was having up until about 2 weeks ago.  I do not think that these represent TIAs as the description is nonfocal.  The possibility of nonconvulsive seizures certainly needs to be considered.  I also told the patient that I did not think that these episodes would necessarily be indicative of shunt malfunction, but given her worsening gait according to Osman, I think it would be worthwhile to have her see Dr. Soto back again.        She is going to have a 3-hour EEG to look for any potential for seizures.      I am referring her back to Dr. Soto for reassessment of her shunt.      I will see her back following the EEG.      ADDENDUM:  Total visit time 45 minutes.  More than 50% of this time was spent in counseling and coordination of care.         CRICKET SHAFFER MD             D: 2019   T: 2019   MT: maryjane      Name:     RENA YING   MRN:      [id number]        Account:      [id number]   :      1928           Service Date: 2019      Document: [id number]

## 2019-07-26 NOTE — LETTER
7/26/2019       RE: Joelle Freeman  [address]  [facility] [address]     Dear Colleague,    Thank you for referring your patient, Joelle Freeman, to the [facility] at [facility]. Please see a copy of my visit note below.    Service Date: 07/26/2019      HISTORY OF PRESENT ILLNESS:  Joelle Freeman is a 89-year-old female here to follow up on confusional episodes.  She is accompanied by her son, Osman.        She does have a complex medical history.  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  She does not recall that but does report that she has had weakness on her right side for some undetermined amount of time.  She has a history of dementia.  She has had a  shunt placed for normal pressure hydrocephalus with initially good response.  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      She does reside in a nursing home/assisted living.      Her son, Osman, who accompanied her today, provides the recent history as well as a review of her medical records.      On 06/29/2019, Osman called her to talk.  Her speech was unintelligible.  He got to the care center about 10 minutes later.  She was lying in bed, staring at the ceiling.  There was not a gaze preference.  He checked her  but felt it was equal bilaterally.  Her eyes were glassy and she appeared to be in a daze.  She was arousable eventually and moaned.  He did not appreciate any seizure activity.      She was taken to [facility].  Her evaluation there included a head CT scan which was reviewed.  There is a  shunt catheter without any significant hydrocephalus.  There is some dural thickening and also atrophy with white matter changes.  Overall, no acute abnormalities were noted and the study appears stable compared to one done a year ago.  Laboratory work included a normal CBC,  comprehensive metabolic panel and lactic acid level.  She did, however, have a urinalysis done that revealed 35 white cells and was leukocyte esterase positive.  A culture was not done.  It was felt she did have a urinary tract infection and she was treated with Rocephin during her hospitalization.      She was seen by the Stroke Service and it was felt that she probably had a toxic metabolic encephalopathy.  The possibility of TIA and seizure was also raised.  She did improve during her hospitalization and was discharged back to the [facility] on 07/02/2019.  She received an additional 3 days of antibiotics orally.      Additional testing done during her hospitalization included a cardiac echo that revealed aortic stenosis but no cardioembolic source.  She had a carotid ultrasound that revealed less than 50% stenosis bilaterally.      EKG revealed a sinus rhythm and review of available EKGs in her chart dating back several years shows that she was in sinus rhythm on those occasions.      Osman states that after discharge she still was having spells that could last up to a day and a half and they were recurrent.  Again, these were episodes where she would stare and be less responsive, although there were no focal changes noted and no twitching or jerking.  He tells me they did check another urinalysis in the nursing home and it came back negative, but again, she had been treated with antibiotics.  After the episodes, the patient indicates she knew something had happened, but she could not recall what it was.  She has no prior history of seizures.  Osman also indicates her speech seems less distinct since all this occurred.      Osman does tell me the episodes resolved over the past 2 weeks.      As noted, she does have a  shunt for normal pressure hydrocephalus.  She is mainly in a wheelchair with some ambulation with assistance and a walker.  She last saw Dr. Brittany greene 01/2016.  He had made an adjustment in  "her  shunt and following that it is reported she was doing \"much better.\"  The patient was in a wheelchair at that time.      PAST MEDICAL HISTORY:  In addition to the above, is positive for aortic stenosis, hypertension, hyperlipidemia, hypothyroidism.      CURRENTE MEDICATIONS:  We did not receive a list of medications from the care center but medicines listed in her medical record include:   1.  Aspirin 81 mg.   2.  Atorvastatin.   3.  Vitamin D.   4.  Lexapro.   5.  Levothyroxine.   6.  Lisinopril.   7.  Imodium.   8.  Melatonin.   9.  Metoprolol.     10.  Nystatin.   11.  Senokot.      ALLERGIES:  Tramadol.      SOCIAL HISTORY:  She resides in a care center.  She does not smoke or use alcohol.      PHYSICAL EXAMINATION:  Examination reveals a very pleasant, alert, and cooperative female.  Heart rate 67 and regular.  Blood pressure 122/51.      Her spontaneous speech is normal.  She is oriented to her name.  She knows she is in a medical facility.  She believes she is in [place].  She could identify her son.  She knows it is Friday, the end of July and summer but she could not give me the year.  Her spontaneous speech, naming and repetition were normal.  She recalled 2/3 objects at 1 minute and again recalled 2/3 after 5 minutes.      Her pupils are myotic but reactive.  Extraocular motility is full.  Visual fields are intact.  Otherwise, cranial nerves II-XII are intact.  Motor examination reveals intact upper and lower extremity strength.  There is no pronator drift.  Sensory examination is intact to position sense and double simultaneous sensory stimuli.  She has absent vibratory sense in the toes.  Finger-to-nose is done well.  She requires assistance to get up out of a wheelchair and ambulate.  She has a magnetic type gait with short steps.  Deep tendon reflexes are 2+ in the upper extremities, 3+ at the knees, 1+ at the ankles.  Right plantar response appears extensor, left is flexor.      IMPRESSION: "   1.  Acute confusional episodes.   2.  History of normal pressure hydrocephalus, status post shunt.   3.  Dementia.   4.  Gait disturbance.      PLAN:  I did review the situation that occurred recently with her son.  It does appear she may have had a metabolic encephalopathy related to urinary tract infection, but this does not fully explain the recurrent episodes that she was having up until about 2 weeks ago.  I do not think that these represent TIAs as the description is nonfocal.  The possibility of nonconvulsive seizures certainly needs to be considered.  I also told the patient that I did not think that these episodes would necessarily be indicative of shunt malfunction, but given her worsening gait according to Osman, I think it would be worthwhile to have her see Dr. Soto back again.        She is going to have a 3-hour EEG to look for any potential for seizures.      I am referring her back to Dr. Soto for reassessment of her shunt.      I will see her back following the EEG.      ADDENDUM:  Total visit time 45 minutes.  More than 50% of this time was spent in counseling and coordination of care.         CRICKET SHAFFER MD             D: 2019   T: 2019   MT: maryjane      Name:     RENA YING   MRN:      [id number]        Account:      [id number]   :      1928           Service Date: 2019      Document: [id number]

## 2019-07-26 NOTE — NURSING NOTE
Chief Complaint   Patient presents with     New Patient     P SPEECH DIFFICULTY     Sayra Pereira

## 2020-01-01 ENCOUNTER — NURSING HOME VISIT (OUTPATIENT)
Dept: GERIATRICS | Facility: CLINIC | Age: 85
End: 2020-01-01
Payer: MEDICARE

## 2020-01-01 ENCOUNTER — TRANSFERRED RECORDS (OUTPATIENT)
Dept: HEALTH INFORMATION MANAGEMENT | Facility: CLINIC | Age: 85
End: 2020-01-01

## 2020-01-01 VITALS
WEIGHT: 109.7 LBS | TEMPERATURE: 97.2 F | HEIGHT: 64 IN | DIASTOLIC BLOOD PRESSURE: 74 MMHG | HEART RATE: 64 BPM | RESPIRATION RATE: 18 BRPM | SYSTOLIC BLOOD PRESSURE: 115 MMHG | BODY MASS INDEX: 18.73 KG/M2 | OXYGEN SATURATION: 96 %

## 2020-01-01 VITALS
HEART RATE: 66 BPM | OXYGEN SATURATION: 97 % | BODY MASS INDEX: 18.44 KG/M2 | DIASTOLIC BLOOD PRESSURE: 72 MMHG | TEMPERATURE: 97 F | RESPIRATION RATE: 20 BRPM | HEIGHT: 64 IN | WEIGHT: 108 LBS | SYSTOLIC BLOOD PRESSURE: 120 MMHG

## 2020-01-01 DIAGNOSIS — I25.10 CORONARY ARTERY DISEASE INVOLVING NATIVE CORONARY ARTERY OF NATIVE HEART WITHOUT ANGINA PECTORIS: ICD-10-CM

## 2020-01-01 DIAGNOSIS — F03.90 DEMENTIA WITHOUT BEHAVIORAL DISTURBANCE, UNSPECIFIED DEMENTIA TYPE: ICD-10-CM

## 2020-01-01 DIAGNOSIS — R63.4 WEIGHT LOSS: ICD-10-CM

## 2020-01-01 DIAGNOSIS — I62.03 CHRONIC SUBDURAL HEMATOMA (H): ICD-10-CM

## 2020-01-01 DIAGNOSIS — I10 ESSENTIAL HYPERTENSION: ICD-10-CM

## 2020-01-01 DIAGNOSIS — R63.4 WEIGHT LOSS: Primary | ICD-10-CM

## 2020-01-01 DIAGNOSIS — E03.9 HYPOTHYROIDISM, UNSPECIFIED TYPE: ICD-10-CM

## 2020-01-01 DIAGNOSIS — G91.2 IDIOPATHIC NORMAL PRESSURE HYDROCEPHALUS (INPH) (H): ICD-10-CM

## 2020-01-01 DIAGNOSIS — R53.81 PHYSICAL DECONDITIONING: ICD-10-CM

## 2020-01-01 DIAGNOSIS — G40.909 SEIZURE DISORDER (H): Primary | ICD-10-CM

## 2020-01-01 LAB
ANION GAP SERPL CALCULATED.3IONS-SCNC: 12 MMOL/L (ref 7–16)
BUN SERPL-MCNC: 21 MG/DL (ref 7–26)
CALCIUM SERPL-MCNC: 9.5 MG/DL (ref 8.4–10.4)
CHLORIDE SERPLBLD-SCNC: 110 MMOL/L (ref 98–109)
CO2 SERPL-SCNC: 21 MMOL/L (ref 20–29)
CREAT SERPL-MCNC: 0.93 MG/DL (ref 0.55–1.02)
DIFFERENTIAL: ABNORMAL
ERYTHROCYTE [DISTWIDTH] IN BLOOD BY AUTOMATED COUNT: 12.5 % (ref 11.9–15.5)
GFR SERPL CREATININE-BSD FRML MDRD: 54 ML/MIN/1.73M2
GLUCOSE SERPL-MCNC: 120 MG/DL (ref 70–100)
HCT VFR BLD AUTO: 45.7 % (ref 34.9–44.5)
HEMOGLOBIN: 14.8 G/DL (ref 12–15.5)
MCH RBC QN AUTO: 33.3 PG (ref 27.6–33.3)
MCHC RBC AUTO-ENTMCNC: 32.4 G/DL (ref 31.5–35.2)
MCV RBC AUTO: 102.9 FL (ref 80–100)
PLATELET # BLD AUTO: 169 X10(9)/L (ref 150–450)
POTASSIUM SERPL-SCNC: 3.9 MMOL/L (ref 3.5–5.1)
RBC # BLD AUTO: 4.44 (ref 3.9–5.03)
SODIUM SERPL-SCNC: 143 MMOL/L (ref 136–145)
TSH SERPL-ACNC: 2.99 UIU/ML (ref 0.3–4.5)
WBC # BLD AUTO: 10 X10(9)/L (ref 3.5–10.5)

## 2020-01-01 PROCEDURE — 99310 SBSQ NF CARE HIGH MDM 45: CPT | Performed by: NURSE PRACTITIONER

## 2020-01-01 PROCEDURE — 99309 SBSQ NF CARE MODERATE MDM 30: CPT | Performed by: NURSE PRACTITIONER

## 2020-01-01 ASSESSMENT — MIFFLIN-ST. JEOR
SCORE: 892.6
SCORE: 884.88

## 2020-01-25 ENCOUNTER — RECORDS - HEALTHEAST (OUTPATIENT)
Dept: LAB | Facility: CLINIC | Age: 85
End: 2020-01-25

## 2020-01-25 LAB
ALBUMIN UR-MCNC: NEGATIVE MG/DL
APPEARANCE UR: ABNORMAL
BACTERIA #/AREA URNS HPF: ABNORMAL HPF
BILIRUB UR QL STRIP: NEGATIVE
COLOR UR AUTO: YELLOW
GLUCOSE UR STRIP-MCNC: NEGATIVE MG/DL
HGB UR QL STRIP: NEGATIVE
KETONES UR STRIP-MCNC: NEGATIVE MG/DL
LEUKOCYTE ESTERASE UR QL STRIP: NEGATIVE
MUCOUS THREADS #/AREA URNS LPF: ABNORMAL LPF
NITRATE UR QL: POSITIVE
PH UR STRIP: 5.5 [PH] (ref 4.5–8)
RBC #/AREA URNS AUTO: ABNORMAL HPF
SP GR UR STRIP: 1.02 (ref 1–1.03)
SQUAMOUS #/AREA URNS AUTO: ABNORMAL LPF
UROBILINOGEN UR STRIP-ACNC: ABNORMAL
WBC #/AREA URNS AUTO: ABNORMAL HPF

## 2020-01-28 LAB — BACTERIA SPEC CULT: ABNORMAL

## 2020-02-13 ENCOUNTER — RECORDS - HEALTHEAST (OUTPATIENT)
Dept: LAB | Facility: CLINIC | Age: 85
End: 2020-02-13

## 2020-02-13 LAB
ALBUMIN UR-MCNC: NEGATIVE MG/DL
APPEARANCE UR: ABNORMAL
BACTERIA #/AREA URNS HPF: ABNORMAL HPF
BILIRUB UR QL STRIP: NEGATIVE
COLOR UR AUTO: YELLOW
GLUCOSE UR STRIP-MCNC: NEGATIVE MG/DL
HGB UR QL STRIP: NEGATIVE
KETONES UR STRIP-MCNC: NEGATIVE MG/DL
LEUKOCYTE ESTERASE UR QL STRIP: ABNORMAL
NITRATE UR QL: POSITIVE
PH UR STRIP: 5.5 [PH] (ref 4.5–8)
RBC #/AREA URNS AUTO: ABNORMAL HPF
SP GR UR STRIP: 1.02 (ref 1–1.03)
SQUAMOUS #/AREA URNS AUTO: ABNORMAL LPF
UROBILINOGEN UR STRIP-ACNC: ABNORMAL
WBC #/AREA URNS AUTO: ABNORMAL HPF
WBC CLUMPS #/AREA URNS HPF: PRESENT /[HPF]

## 2020-02-15 LAB — BACTERIA SPEC CULT: ABNORMAL

## 2020-02-29 ENCOUNTER — HOSPITAL ENCOUNTER (EMERGENCY)
Facility: CLINIC | Age: 85
Discharge: HOME | End: 2020-02-29
Attending: EMERGENCY MEDICINE | Admitting: EMERGENCY MEDICINE
Payer: MEDICARE

## 2020-02-29 ENCOUNTER — APPOINTMENT (OUTPATIENT)
Dept: CT IMAGING | Facility: CLINIC | Age: 85
End: 2020-02-29
Attending: EMERGENCY MEDICINE
Payer: MEDICARE

## 2020-02-29 VITALS
TEMPERATURE: 97.9 F | SYSTOLIC BLOOD PRESSURE: 138 MMHG | HEART RATE: 70 BPM | OXYGEN SATURATION: 96 % | RESPIRATION RATE: 18 BRPM | DIASTOLIC BLOOD PRESSURE: 66 MMHG
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
## 2020-02-29 DIAGNOSIS — S01.81XA FOREHEAD LACERATION, INITIAL ENCOUNTER: ICD-10-CM

## 2020-02-29 DIAGNOSIS — S02.2XXA CLOSED FRACTURE OF NASAL BONE, INITIAL ENCOUNTER: ICD-10-CM

## 2020-02-29 PROCEDURE — 72125 CT NECK SPINE W/O DYE: CPT

## 2020-02-29 PROCEDURE — 12011 RPR F/E/E/N/L/M 2.5 CM/<: CPT | Mod: XU

## 2020-02-29 PROCEDURE — 70450 CT HEAD/BRAIN W/O DYE: CPT

## 2020-02-29 PROCEDURE — 21310 ZZHC CLOSED TX NASAL BONE FRACTURE W/O MANIPULATION: CPT

## 2020-02-29 PROCEDURE — 99285 EMERGENCY DEPT VISIT HI MDM: CPT | Mod: 25

## 2020-02-29 PROCEDURE — 70486 CT MAXILLOFACIAL W/O DYE: CPT

## 2020-02-29 ASSESSMENT — ENCOUNTER SYMPTOMS: WOUND: 1

## 2020-02-29 NOTE — ED AVS SNAPSHOT
Emergency Department  [address] [facility] [address]  Phone:  [phone]  Fax:  [phone]                                    Joelle Freeman   MRN: [id number]    Department:   Emergency Department   Date of Visit:  2/29/2020           After Visit Summary Signature Page    I have received my discharge instructions, and my questions have been answered. I have discussed any challenges I see with this plan with the nurse or doctor.    ..........................................................................................................................................  Patient/Patient Representative Signature      ..........................................................................................................................................  Patient Representative Print Name and Relationship to Patient    ..................................................               ................................................  Date                                   Time    ..........................................................................................................................................  Reviewed by Signature/Title    ...................................................              ..............................................  Date                                               Time          22EPIC Rev 08/18

## 2020-03-01 NOTE — ED NOTES
Called [facility] and spoke to the nurse and gave report regarding pt going back to the nursing home.  Nurse informed that pt has sutures that will need to be taken out in 5-6 days, and Test results and visit summary will be sent with pt via EMS.  Nurse stated that there is no trained medical staff to assess pt upon arrival to facility until tomorrow, but there will be someone to open doors for EMS.  Gely Tillman RN,.......................................... 2/29/2020   9:31 PM

## 2020-03-01 NOTE — DISCHARGE INSTRUCTIONS
Your head CT and neck imaging are normal.  Please follow-up for suture removal in 6 to 7 days.  Use bacitracin twice a day to for the skin you also did have a small fracture of the nasal bone.  This does not require any significant treatment.  Use Tylenol for pain.  If you develop severe headache or repetitive vomiting return to the emergency room for reassessment.

## 2020-03-01 NOTE — ED TRIAGE NOTES
Pt BIBA. Per EMs report, pt fell from wheelchair after leaning forward in an atttempt to retrieve a family members purse that had fallen. Pt hit her head and has a large laceration along the hairline and forehead. No LOC or neck tenderness reported. Pt does have some baseline confusion. Hx of Brain surgery X2.  Blood glucose =104.  Gely Tillman RN,.......................................... 2/29/2020   6:54 PM

## 2020-03-17 ENCOUNTER — RECORDS - HEALTHEAST (OUTPATIENT)
Dept: LAB | Facility: CLINIC | Age: 85
End: 2020-03-17

## 2020-03-17 LAB
ANION GAP SERPL CALCULATED.3IONS-SCNC: 9 MMOL/L (ref 5–18)
BUN SERPL-MCNC: 27 MG/DL (ref 8–28)
CALCIUM SERPL-MCNC: 9.6 MG/DL (ref 8.5–10.5)
CHLORIDE BLD-SCNC: 104 MMOL/L (ref 98–107)
CO2 SERPL-SCNC: 25 MMOL/L (ref 22–31)
CREAT SERPL-MCNC: 0.85 MG/DL (ref 0.6–1.1)
ERYTHROCYTE [DISTWIDTH] IN BLOOD BY AUTOMATED COUNT: 13.1 % (ref 11–14.5)
GFR SERPL CREATININE-BSD FRML MDRD: >60 ML/MIN/1.73M2
GLUCOSE BLD-MCNC: 125 MG/DL (ref 70–125)
HCT VFR BLD AUTO: 45.1 % (ref 35–47)
HGB BLD-MCNC: 14.9 G/DL (ref 12–16)
MCH RBC QN AUTO: 32.5 PG (ref 27–34)
MCHC RBC AUTO-ENTMCNC: 33 G/DL (ref 32–36)
MCV RBC AUTO: 98 FL (ref 80–100)
PLATELET # BLD AUTO: 175 THOU/UL (ref 140–440)
PMV BLD AUTO: 12 FL (ref 8.5–12.5)
POTASSIUM BLD-SCNC: 4.5 MMOL/L (ref 3.5–5)
RBC # BLD AUTO: 4.59 MILL/UL (ref 3.8–5.4)
SODIUM SERPL-SCNC: 138 MMOL/L (ref 136–145)
TSH SERPL DL<=0.005 MIU/L-ACNC: 3.97 UIU/ML (ref 0.3–5)
WBC: 9.5 THOU/UL (ref 4–11)

## 2020-03-18 LAB — 25(OH)D3 SERPL-MCNC: 31 NG/ML (ref 30–80)

## 2020-04-27 ENCOUNTER — RECORDS - HEALTHEAST (OUTPATIENT)
Dept: LAB | Facility: CLINIC | Age: 85
End: 2020-04-27

## 2020-04-27 LAB
ALBUMIN UR-MCNC: NEGATIVE MG/DL
APPEARANCE UR: ABNORMAL
BACTERIA #/AREA URNS HPF: ABNORMAL HPF
BILIRUB UR QL STRIP: NEGATIVE
COLOR UR AUTO: YELLOW
GLUCOSE UR STRIP-MCNC: NEGATIVE MG/DL
HGB UR QL STRIP: NEGATIVE
HYALINE CASTS #/AREA URNS LPF: ABNORMAL LPF
KETONES UR STRIP-MCNC: NEGATIVE MG/DL
LEUKOCYTE ESTERASE UR QL STRIP: ABNORMAL
NITRATE UR QL: POSITIVE
PH UR STRIP: 6 [PH] (ref 4.5–8)
RBC #/AREA URNS AUTO: ABNORMAL HPF
SP GR UR STRIP: 1.02 (ref 1–1.03)
SQUAMOUS #/AREA URNS AUTO: ABNORMAL LPF
UROBILINOGEN UR STRIP-ACNC: ABNORMAL
WBC #/AREA URNS AUTO: ABNORMAL HPF

## 2020-04-29 LAB — BACTERIA SPEC CULT: ABNORMAL

## 2020-05-10 ENCOUNTER — RECORDS - HEALTHEAST (OUTPATIENT)
Dept: LAB | Facility: CLINIC | Age: 85
End: 2020-05-10

## 2020-05-10 LAB
ALBUMIN UR-MCNC: NEGATIVE MG/DL
APPEARANCE UR: ABNORMAL
BACTERIA #/AREA URNS HPF: ABNORMAL HPF
BILIRUB UR QL STRIP: NEGATIVE
COLOR UR AUTO: YELLOW
GLUCOSE UR STRIP-MCNC: NEGATIVE MG/DL
HGB UR QL STRIP: NEGATIVE
KETONES UR STRIP-MCNC: NEGATIVE MG/DL
LEUKOCYTE ESTERASE UR QL STRIP: ABNORMAL
MUCOUS THREADS #/AREA URNS LPF: ABNORMAL LPF
NITRATE UR QL: POSITIVE
PH UR STRIP: 5.5 [PH] (ref 4.5–8)
RBC #/AREA URNS AUTO: ABNORMAL HPF
SP GR UR STRIP: 1.02 (ref 1–1.03)
SQUAMOUS #/AREA URNS AUTO: ABNORMAL LPF
UROBILINOGEN UR STRIP-ACNC: ABNORMAL
WBC #/AREA URNS AUTO: ABNORMAL HPF

## 2020-05-13 LAB — BACTERIA SPEC CULT: ABNORMAL

## 2020-05-24 ENCOUNTER — RECORDS - HEALTHEAST (OUTPATIENT)
Dept: LAB | Facility: CLINIC | Age: 85
End: 2020-05-24

## 2020-05-24 LAB
ALBUMIN UR-MCNC: ABNORMAL MG/DL
APPEARANCE UR: ABNORMAL
BACTERIA #/AREA URNS HPF: ABNORMAL HPF
BILIRUB UR QL STRIP: NEGATIVE
COLOR UR AUTO: YELLOW
GLUCOSE UR STRIP-MCNC: NEGATIVE MG/DL
HGB UR QL STRIP: NEGATIVE
HYALINE CASTS #/AREA URNS LPF: ABNORMAL LPF
KETONES UR STRIP-MCNC: ABNORMAL MG/DL
LEUKOCYTE ESTERASE UR QL STRIP: ABNORMAL
MUCOUS THREADS #/AREA URNS LPF: ABNORMAL LPF
NITRATE UR QL: NEGATIVE
PH UR STRIP: 5.5 [PH] (ref 4.5–8)
RBC #/AREA URNS AUTO: ABNORMAL HPF
SP GR UR STRIP: 1.02 (ref 1–1.03)
SQUAMOUS #/AREA URNS AUTO: ABNORMAL LPF
UROBILINOGEN UR STRIP-ACNC: ABNORMAL
WBC #/AREA URNS AUTO: ABNORMAL HPF
WBC CLUMPS #/AREA URNS HPF: PRESENT /[HPF]

## 2020-05-27 LAB — BACTERIA SPEC CULT: ABNORMAL

## 2020-07-17 ENCOUNTER — HOSPITAL ENCOUNTER (OUTPATIENT)
Dept: LAB | Facility: CLINIC | Age: 85
Discharge: HOME | DRG: 280 | End: 2020-07-17
Attending: FAMILY MEDICINE | Admitting: FAMILY MEDICINE
Payer: MEDICARE

## 2020-07-17 DIAGNOSIS — R30.0 DYSURIA: Primary | ICD-10-CM

## 2020-07-17 DIAGNOSIS — Z87.440 HISTORY OF URINARY TRACT INFECTION: ICD-10-CM

## 2020-07-17 LAB
ALBUMIN UR-MCNC: NEGATIVE MG/DL
APPEARANCE UR: ABNORMAL
BACTERIA #/AREA URNS HPF: ABNORMAL /HPF
BILIRUB UR QL STRIP: NEGATIVE
COLOR UR AUTO: YELLOW
GLUCOSE UR STRIP-MCNC: NEGATIVE MG/DL
HGB UR QL STRIP: NEGATIVE
KETONES UR STRIP-MCNC: NEGATIVE MG/DL
LEUKOCYTE ESTERASE UR QL STRIP: ABNORMAL
MUCOUS THREADS #/AREA URNS LPF: PRESENT /LPF
NITRATE UR QL: POSITIVE
PH UR STRIP: 6.5 PH (ref 5–7)
RBC #/AREA URNS AUTO: 1 /HPF (ref 0–2)
SOURCE: ABNORMAL
SP GR UR STRIP: 1.01 (ref 1–1.03)
SQUAMOUS #/AREA URNS AUTO: 1 /HPF (ref 0–1)
UROBILINOGEN UR STRIP-MCNC: NORMAL MG/DL (ref 0–2)
WBC #/AREA URNS AUTO: 4 /HPF (ref 0–5)

## 2020-07-17 PROCEDURE — 81001 URINALYSIS AUTO W/SCOPE: CPT | Performed by: FAMILY MEDICINE

## 2020-07-17 PROCEDURE — 87086 URINE CULTURE/COLONY COUNT: CPT | Performed by: FAMILY MEDICINE

## 2020-07-19 ENCOUNTER — HOSPITAL ENCOUNTER (INPATIENT)
Facility: CLINIC | Age: 85
LOS: 3 days | Discharge: HOME | DRG: 280 | End: 2020-07-22
Attending: EMERGENCY MEDICINE | Admitting: HOSPITALIST
Payer: MEDICARE

## 2020-07-19 ENCOUNTER — APPOINTMENT (OUTPATIENT)
Dept: CT IMAGING | Facility: CLINIC | Age: 85
DRG: 280 | End: 2020-07-19
Attending: EMERGENCY MEDICINE
Payer: MEDICARE

## 2020-07-19 DIAGNOSIS — R79.89 ELEVATED TROPONIN: ICD-10-CM

## 2020-07-19 DIAGNOSIS — N30.00 ACUTE CYSTITIS WITHOUT HEMATURIA: ICD-10-CM

## 2020-07-19 DIAGNOSIS — E03.9 HYPOTHYROIDISM, UNSPECIFIED TYPE: ICD-10-CM

## 2020-07-19 DIAGNOSIS — K59.01 SLOW TRANSIT CONSTIPATION: ICD-10-CM

## 2020-07-19 DIAGNOSIS — I62.9 INTRACRANIAL HEMORRHAGE (H): Primary | ICD-10-CM

## 2020-07-19 DIAGNOSIS — E53.8 FOLATE DEFICIENCY: ICD-10-CM

## 2020-07-19 DIAGNOSIS — R31.9 URINARY TRACT INFECTION WITH HEMATURIA, SITE UNSPECIFIED: ICD-10-CM

## 2020-07-19 DIAGNOSIS — N39.0 URINARY TRACT INFECTION WITH HEMATURIA, SITE UNSPECIFIED: ICD-10-CM

## 2020-07-19 DIAGNOSIS — Z76.89 HEALTH CARE HOME: ICD-10-CM

## 2020-07-19 DIAGNOSIS — I62.9 INTRACRANIAL BLEED (H): ICD-10-CM

## 2020-07-19 PROBLEM — R55 SYNCOPE: Status: ACTIVE | Noted: 2020-07-19

## 2020-07-19 LAB
ALBUMIN UR-MCNC: 30 MG/DL
ANION GAP SERPL CALCULATED.3IONS-SCNC: 6 MMOL/L (ref 3–14)
ANION GAP SERPL CALCULATED.3IONS-SCNC: NORMAL MMOL/L (ref 6–17)
APPEARANCE UR: ABNORMAL
BACTERIA #/AREA URNS HPF: ABNORMAL /HPF
BACTERIA SPEC CULT: NORMAL
BASOPHILS # BLD AUTO: 0 10E9/L (ref 0–0.2)
BASOPHILS # BLD AUTO: NORMAL 10E9/L (ref 0–0.2)
BASOPHILS NFR BLD AUTO: 0.2 %
BASOPHILS NFR BLD AUTO: NORMAL %
BILIRUB UR QL STRIP: NEGATIVE
BUN SERPL-MCNC: 19 MG/DL (ref 7–30)
BUN SERPL-MCNC: NORMAL MG/DL (ref 7–30)
CALCIUM SERPL-MCNC: 6.8 MG/DL (ref 8.5–10.1)
CALCIUM SERPL-MCNC: NORMAL MG/DL (ref 8.5–10.1)
CHLORIDE SERPL-SCNC: 118 MMOL/L (ref 94–109)
CHLORIDE SERPL-SCNC: NORMAL MMOL/L (ref 94–109)
CO2 SERPL-SCNC: 20 MMOL/L (ref 20–32)
CO2 SERPL-SCNC: NORMAL MMOL/L (ref 20–32)
COLOR UR AUTO: ABNORMAL
CREAT SERPL-MCNC: 0.79 MG/DL (ref 0.52–1.04)
CREAT SERPL-MCNC: NORMAL MG/DL (ref 0.52–1.04)
DIFFERENTIAL METHOD BLD: ABNORMAL
DIFFERENTIAL METHOD BLD: NORMAL
EOSINOPHIL # BLD AUTO: 0.1 10E9/L (ref 0–0.7)
EOSINOPHIL # BLD AUTO: NORMAL 10E9/L (ref 0–0.7)
EOSINOPHIL NFR BLD AUTO: 1.3 %
EOSINOPHIL NFR BLD AUTO: NORMAL %
ERYTHROCYTE [DISTWIDTH] IN BLOOD BY AUTOMATED COUNT: 14.5 % (ref 10–15)
ERYTHROCYTE [DISTWIDTH] IN BLOOD BY AUTOMATED COUNT: NORMAL % (ref 10–15)
GFR SERPL CREATININE-BSD FRML MDRD: 65 ML/MIN/{1.73_M2}
GFR SERPL CREATININE-BSD FRML MDRD: NORMAL ML/MIN/{1.73_M2}
GLUCOSE SERPL-MCNC: 123 MG/DL (ref 70–99)
GLUCOSE SERPL-MCNC: NORMAL MG/DL (ref 70–99)
GLUCOSE UR STRIP-MCNC: NEGATIVE MG/DL
HCT VFR BLD AUTO: 27.1 % (ref 35–47)
HCT VFR BLD AUTO: NORMAL % (ref 35–47)
HGB BLD-MCNC: 8.9 G/DL (ref 11.7–15.7)
HGB BLD-MCNC: NORMAL G/DL (ref 11.7–15.7)
HGB UR QL STRIP: ABNORMAL
IMM GRANULOCYTES # BLD: 0 10E9/L (ref 0–0.4)
IMM GRANULOCYTES # BLD: NORMAL 10E9/L (ref 0–0.4)
IMM GRANULOCYTES NFR BLD: 0.2 %
IMM GRANULOCYTES NFR BLD: NORMAL %
KETONES UR STRIP-MCNC: NEGATIVE MG/DL
LACTATE BLD-SCNC: 1.5 MMOL/L (ref 0.7–2)
LACTATE BLD-SCNC: NORMAL MMOL/L (ref 0.7–2)
LEUKOCYTE ESTERASE UR QL STRIP: ABNORMAL
LYMPHOCYTES # BLD AUTO: 1.7 10E9/L (ref 0.8–5.3)
LYMPHOCYTES # BLD AUTO: NORMAL 10E9/L (ref 0.8–5.3)
LYMPHOCYTES NFR BLD AUTO: 27.4 %
LYMPHOCYTES NFR BLD AUTO: NORMAL %
Lab: NORMAL
MCH RBC QN AUTO: 32 PG (ref 26.5–33)
MCH RBC QN AUTO: NORMAL PG (ref 26.5–33)
MCHC RBC AUTO-ENTMCNC: 32.8 G/DL (ref 31.5–36.5)
MCHC RBC AUTO-ENTMCNC: NORMAL G/DL (ref 31.5–36.5)
MCV RBC AUTO: 98 FL (ref 78–100)
MCV RBC AUTO: NORMAL FL (ref 78–100)
MONOCYTES # BLD AUTO: 0.7 10E9/L (ref 0–1.3)
MONOCYTES # BLD AUTO: NORMAL 10E9/L (ref 0–1.3)
MONOCYTES NFR BLD AUTO: 12 %
MONOCYTES NFR BLD AUTO: NORMAL %
NEUTROPHILS # BLD AUTO: 3.6 10E9/L (ref 1.6–8.3)
NEUTROPHILS # BLD AUTO: NORMAL 10E9/L (ref 1.6–8.3)
NEUTROPHILS NFR BLD AUTO: 58.9 %
NEUTROPHILS NFR BLD AUTO: NORMAL %
NITRATE UR QL: NEGATIVE
NRBC # BLD AUTO: 0 10*3/UL
NRBC # BLD AUTO: NORMAL 10*3/UL
NRBC BLD AUTO-RTO: 0 /100
NRBC BLD AUTO-RTO: NORMAL /100
PH UR STRIP: 6 PH (ref 5–7)
PLATELET # BLD AUTO: 107 10E9/L (ref 150–450)
PLATELET # BLD AUTO: NORMAL 10E9/L (ref 150–450)
POTASSIUM SERPL-SCNC: 3 MMOL/L (ref 3.4–5.3)
POTASSIUM SERPL-SCNC: 3.8 MMOL/L (ref 3.4–5.3)
POTASSIUM SERPL-SCNC: NORMAL MMOL/L (ref 3.4–5.3)
RADIOLOGIST FLAGS: ABNORMAL
RBC # BLD AUTO: 2.78 10E12/L (ref 3.8–5.2)
RBC # BLD AUTO: NORMAL 10E12/L (ref 3.8–5.2)
RBC #/AREA URNS AUTO: 66 /HPF (ref 0–2)
SODIUM SERPL-SCNC: 144 MMOL/L (ref 133–144)
SODIUM SERPL-SCNC: NORMAL MMOL/L (ref 133–144)
SOURCE: ABNORMAL
SP GR UR STRIP: 1.02 (ref 1–1.03)
SPECIMEN SOURCE: NORMAL
T4 FREE SERPL-MCNC: 1.53 NG/DL (ref 0.76–1.46)
TRANS CELLS #/AREA URNS HPF: 44 /HPF (ref 0–1)
TROPONIN I SERPL-MCNC: 0.42 UG/L (ref 0–0.04)
TROPONIN I SERPL-MCNC: 0.46 UG/L (ref 0–0.04)
TROPONIN I SERPL-MCNC: NORMAL UG/L (ref 0–0.04)
TSH SERPL DL<=0.005 MIU/L-ACNC: 0.22 MU/L (ref 0.4–4)
UROBILINOGEN UR STRIP-MCNC: 0.2 MG/DL (ref 0–2)
WBC # BLD AUTO: 6.1 10E9/L (ref 4–11)
WBC # BLD AUTO: NORMAL 10E9/L (ref 4–11)
WBC #/AREA URNS AUTO: 2844 /HPF (ref 0–5)
WBC CLUMPS #/AREA URNS HPF: PRESENT /HPF

## 2020-07-19 PROCEDURE — 84484 ASSAY OF TROPONIN QUANT: CPT | Performed by: EMERGENCY MEDICINE

## 2020-07-19 PROCEDURE — 83605 ASSAY OF LACTIC ACID: CPT | Performed by: EMERGENCY MEDICINE

## 2020-07-19 PROCEDURE — 84484 ASSAY OF TROPONIN QUANT: CPT | Performed by: HOSPITALIST

## 2020-07-19 PROCEDURE — 70450 CT HEAD/BRAIN W/O DYE: CPT

## 2020-07-19 PROCEDURE — 85025 COMPLETE CBC W/AUTO DIFF WBC: CPT | Performed by: EMERGENCY MEDICINE

## 2020-07-19 PROCEDURE — 84439 ASSAY OF FREE THYROXINE: CPT | Performed by: HOSPITALIST

## 2020-07-19 PROCEDURE — U0003 INFECTIOUS AGENT DETECTION BY NUCLEIC ACID (DNA OR RNA); SEVERE ACUTE RESPIRATORY SYNDROME CORONAVIRUS 2 (SARS-COV-2) (CORONAVIRUS DISEASE [COVID-19]), AMPLIFIED PROBE TECHNIQUE, MAKING USE OF HIGH THROUGHPUT TECHNOLOGIES AS DESCRIBED BY CMS-2020-01-R: HCPCS | Performed by: EMERGENCY MEDICINE

## 2020-07-19 PROCEDURE — 25000128 H RX IP 250 OP 636: Performed by: EMERGENCY MEDICINE

## 2020-07-19 PROCEDURE — 99285 EMERGENCY DEPT VISIT HI MDM: CPT | Mod: 25

## 2020-07-19 PROCEDURE — 84443 ASSAY THYROID STIM HORMONE: CPT | Performed by: HOSPITALIST

## 2020-07-19 PROCEDURE — 99222 1ST HOSP IP/OBS MODERATE 55: CPT | Mod: AI | Performed by: HOSPITALIST

## 2020-07-19 PROCEDURE — 84132 ASSAY OF SERUM POTASSIUM: CPT | Performed by: HOSPITALIST

## 2020-07-19 PROCEDURE — 36415 COLL VENOUS BLD VENIPUNCTURE: CPT | Performed by: HOSPITALIST

## 2020-07-19 PROCEDURE — 12000000 ZZH R&B MED SURG/OB

## 2020-07-19 PROCEDURE — 80048 BASIC METABOLIC PNL TOTAL CA: CPT | Performed by: EMERGENCY MEDICINE

## 2020-07-19 PROCEDURE — 87040 BLOOD CULTURE FOR BACTERIA: CPT | Performed by: HOSPITALIST

## 2020-07-19 PROCEDURE — 93005 ELECTROCARDIOGRAM TRACING: CPT

## 2020-07-19 PROCEDURE — 87086 URINE CULTURE/COLONY COUNT: CPT | Performed by: EMERGENCY MEDICINE

## 2020-07-19 PROCEDURE — 87186 SC STD MICRODIL/AGAR DIL: CPT | Performed by: EMERGENCY MEDICINE

## 2020-07-19 PROCEDURE — 81001 URINALYSIS AUTO W/SCOPE: CPT | Performed by: EMERGENCY MEDICINE

## 2020-07-19 PROCEDURE — 25800030 ZZH RX IP 258 OP 636: Performed by: EMERGENCY MEDICINE

## 2020-07-19 PROCEDURE — 87088 URINE BACTERIA CULTURE: CPT | Performed by: EMERGENCY MEDICINE

## 2020-07-19 PROCEDURE — 96361 HYDRATE IV INFUSION ADD-ON: CPT

## 2020-07-19 PROCEDURE — 96365 THER/PROPH/DIAG IV INF INIT: CPT | Mod: 59

## 2020-07-19 RX ORDER — ATORVASTATIN CALCIUM 10 MG/1
10 TABLET, FILM COATED ORAL DAILY
Status: DISCONTINUED | OUTPATIENT
Start: 2020-07-20 | End: 2020-07-22 | Stop reason: HOSPADM

## 2020-07-19 RX ORDER — POTASSIUM CHLORIDE 1.5 G/1.58G
20-40 POWDER, FOR SOLUTION ORAL
Status: DISCONTINUED | OUTPATIENT
Start: 2020-07-19 | End: 2020-07-22 | Stop reason: HOSPADM

## 2020-07-19 RX ORDER — ACETAMINOPHEN 325 MG/1
650 TABLET ORAL EVERY 6 HOURS PRN
Status: DISCONTINUED | OUTPATIENT
Start: 2020-07-19 | End: 2020-07-22 | Stop reason: HOSPADM

## 2020-07-19 RX ORDER — AMOXICILLIN 250 MG
1 CAPSULE ORAL 2 TIMES DAILY
Status: DISCONTINUED | OUTPATIENT
Start: 2020-07-19 | End: 2020-07-22 | Stop reason: HOSPADM

## 2020-07-19 RX ORDER — METOPROLOL SUCCINATE 25 MG/1
25 TABLET, EXTENDED RELEASE ORAL DAILY
Status: DISCONTINUED | OUTPATIENT
Start: 2020-07-20 | End: 2020-07-22 | Stop reason: HOSPADM

## 2020-07-19 RX ORDER — MAGNESIUM SULFATE HEPTAHYDRATE 40 MG/ML
4 INJECTION, SOLUTION INTRAVENOUS EVERY 4 HOURS PRN
Status: DISCONTINUED | OUTPATIENT
Start: 2020-07-19 | End: 2020-07-22 | Stop reason: HOSPADM

## 2020-07-19 RX ORDER — CEFTRIAXONE 1 G/1
1 INJECTION, POWDER, FOR SOLUTION INTRAMUSCULAR; INTRAVENOUS EVERY 24 HOURS
Status: DISCONTINUED | OUTPATIENT
Start: 2020-07-20 | End: 2020-07-21

## 2020-07-19 RX ORDER — POTASSIUM CL/LIDO/0.9 % NACL 10MEQ/0.1L
10 INTRAVENOUS SOLUTION, PIGGYBACK (ML) INTRAVENOUS
Status: DISCONTINUED | OUTPATIENT
Start: 2020-07-19 | End: 2020-07-22 | Stop reason: HOSPADM

## 2020-07-19 RX ORDER — POTASSIUM CHLORIDE 7.45 MG/ML
10 INJECTION INTRAVENOUS
Status: DISCONTINUED | OUTPATIENT
Start: 2020-07-19 | End: 2020-07-22 | Stop reason: HOSPADM

## 2020-07-19 RX ORDER — ONDANSETRON 2 MG/ML
4 INJECTION INTRAMUSCULAR; INTRAVENOUS EVERY 6 HOURS PRN
Status: DISCONTINUED | OUTPATIENT
Start: 2020-07-19 | End: 2020-07-22 | Stop reason: HOSPADM

## 2020-07-19 RX ORDER — CEFTRIAXONE 1 G/1
1 INJECTION, POWDER, FOR SOLUTION INTRAMUSCULAR; INTRAVENOUS ONCE
Status: COMPLETED | OUTPATIENT
Start: 2020-07-19 | End: 2020-07-19

## 2020-07-19 RX ORDER — LEVOTHYROXINE SODIUM 75 UG/1
75 TABLET ORAL DAILY
Status: DISCONTINUED | OUTPATIENT
Start: 2020-07-20 | End: 2020-07-22 | Stop reason: HOSPADM

## 2020-07-19 RX ORDER — POLYETHYLENE GLYCOL 3350 17 G/17G
17 POWDER, FOR SOLUTION ORAL 2 TIMES DAILY PRN
Status: DISCONTINUED | OUTPATIENT
Start: 2020-07-19 | End: 2020-07-22 | Stop reason: HOSPADM

## 2020-07-19 RX ORDER — POTASSIUM CL/LIDO/0.9 % NACL 10MEQ/0.1L
10 INTRAVENOUS SOLUTION, PIGGYBACK (ML) INTRAVENOUS
Status: DISCONTINUED | OUTPATIENT
Start: 2020-07-19 | End: 2020-07-19

## 2020-07-19 RX ORDER — NALOXONE HYDROCHLORIDE 0.4 MG/ML
.1-.4 INJECTION, SOLUTION INTRAMUSCULAR; INTRAVENOUS; SUBCUTANEOUS
Status: DISCONTINUED | OUTPATIENT
Start: 2020-07-19 | End: 2020-07-22 | Stop reason: HOSPADM

## 2020-07-19 RX ORDER — POTASSIUM CHLORIDE 1500 MG/1
20-40 TABLET, EXTENDED RELEASE ORAL
Status: DISCONTINUED | OUTPATIENT
Start: 2020-07-19 | End: 2020-07-22 | Stop reason: HOSPADM

## 2020-07-19 RX ORDER — HYDRALAZINE HYDROCHLORIDE 20 MG/ML
10 INJECTION INTRAMUSCULAR; INTRAVENOUS EVERY 4 HOURS PRN
Status: DISCONTINUED | OUTPATIENT
Start: 2020-07-19 | End: 2020-07-22 | Stop reason: HOSPADM

## 2020-07-19 RX ORDER — DOCUSATE SODIUM 100 MG/1
100 CAPSULE, LIQUID FILLED ORAL DAILY
Status: ON HOLD | COMMUNITY
End: 2020-07-21

## 2020-07-19 RX ORDER — LIDOCAINE 40 MG/G
CREAM TOPICAL
Status: DISCONTINUED | OUTPATIENT
Start: 2020-07-19 | End: 2020-07-22 | Stop reason: HOSPADM

## 2020-07-19 RX ORDER — SODIUM CHLORIDE 9 MG/ML
INJECTION, SOLUTION INTRAVENOUS CONTINUOUS
Status: DISCONTINUED | OUTPATIENT
Start: 2020-07-19 | End: 2020-07-19

## 2020-07-19 RX ORDER — ESCITALOPRAM OXALATE 5 MG/1
5 TABLET ORAL DAILY
Status: DISCONTINUED | OUTPATIENT
Start: 2020-07-20 | End: 2020-07-22 | Stop reason: HOSPADM

## 2020-07-19 RX ORDER — SODIUM CHLORIDE 9 MG/ML
INJECTION, SOLUTION INTRAVENOUS CONTINUOUS
Status: DISCONTINUED | OUTPATIENT
Start: 2020-07-19 | End: 2020-07-20

## 2020-07-19 RX ORDER — LEVOTHYROXINE SODIUM 88 UG/1
88 TABLET ORAL DAILY
Status: DISCONTINUED | OUTPATIENT
Start: 2020-07-20 | End: 2020-07-19

## 2020-07-19 RX ORDER — ONDANSETRON 4 MG/1
4 TABLET, ORALLY DISINTEGRATING ORAL EVERY 6 HOURS PRN
Status: DISCONTINUED | OUTPATIENT
Start: 2020-07-19 | End: 2020-07-22 | Stop reason: HOSPADM

## 2020-07-19 RX ORDER — ESCITALOPRAM OXALATE 5 MG/1
5 TABLET ORAL DAILY
COMMUNITY
End: 2020-08-09

## 2020-07-19 RX ORDER — POTASSIUM CHLORIDE 29.8 MG/ML
20 INJECTION INTRAVENOUS
Status: DISCONTINUED | OUTPATIENT
Start: 2020-07-19 | End: 2020-07-22 | Stop reason: HOSPADM

## 2020-07-19 RX ADMIN — CEFTRIAXONE 1 G: 1 INJECTION, POWDER, FOR SOLUTION INTRAMUSCULAR; INTRAVENOUS at 16:16

## 2020-07-19 RX ADMIN — SODIUM CHLORIDE 1000 ML: 9 INJECTION, SOLUTION INTRAVENOUS at 11:42

## 2020-07-19 RX ADMIN — SODIUM CHLORIDE: 9 INJECTION, SOLUTION INTRAVENOUS at 14:38

## 2020-07-19 RX ADMIN — Medication 10 MEQ: at 14:32

## 2020-07-19 ASSESSMENT — ACTIVITIES OF DAILY LIVING (ADL): ADLS_ACUITY_SCORE: 34

## 2020-07-19 ASSESSMENT — MIFFLIN-ST. JEOR: SCORE: 966.53

## 2020-07-19 NOTE — CONSULTS
[facility]    Neurosurgery Consultation     Date of Admission:  7/19/2020  Date of Consult (When I saw the patient): 07/19/20    Assessment & Plan   Joelle Freeman is a 89 year old female who was admitted on 7/19/2020. I was asked to see the patient for  shunt status.    Active Problems:  S/p  shunt, placed in 2014. She had what sounds like a possible vaso-vagal episode this am, falling and striking her head. She also has been confused recently. CT scan in the ED showed possible interval development of small bilateral frontal SDHs. Patient is being admitted for further medical care.   Upon review of the chart, the patient typically follows with [facility], and it appears the last known shunt setting is 1.0.   No anticoagulation on board.     Will re scan her in the morning.   She is alert and following commands, moving all ext without difficulty and equally.         I have discussed the following assessment and plan with Dr. Sami Rockwell, who is in agreement with the initial plan and will follow up with further consultation recommendations.    Ryan Castro PA-C  [facility]  [place] [address]    Tel [phone]  Pager [phone]        Code Status    Prior    Reason for Consult   Reason for consult: s/p  shunt    Primary Care Physician   EDNA CARCAMO    Chief Complaint   Fall, confusion    History is obtained from the patient, electronic health record and emergency department physician    History of Present Illness   Joelle Freeman is a 89 year old female who presents with what sounds like a possible vaso-vagal episode this am, falling and striking her head. She also has been confused recently. CT scan in the ED showed possible interval development of small bilateral frontal SDHs. Patient is being admitted for further medical care.   Upon review of the chart, the patient typically follows with [facility], and it appears the last known shunt setting is 1.0.   Will re scan her in the morning.   She is alert and following commands, moving all ext without difficulty and equally.    Past Medical History   I have reviewed this patient's medical history and updated it with pertinent information if needed.   Past Medical History:   Diagnosis Date     Asthma      CAD (coronary artery disease)     stent post RCA 2001     Cerebral ventriculomegaly      CVA (cerebral infarction) 2002     Dementia (H)      Dementia (H)      Depression      Diverticulosis      Dyslipidemia      Heterozygous factor V Leiden mutation (H)      HTN (hypertension)      Hyperlipidemia      Hypothyroidism      Normal pressure hydrocephalus (H)      Posterior reversible encephalopathy syndrome      Subarachnoid hemorrhage (H) 3/23/2013     Subdural hematoma (H)      TIA (transient ischaemic attack)     multiple     TIA (transient ischaemic attack)      Unspecified cerebral artery occlusion with cerebral infarction      Urinary incontinence      UTI (lower urinary tract infection)     recurrent       Past Surgical History   I have reviewed this patient's surgical history and updated it with pertinent information if needed.  Past Surgical History:   Procedure Laterality Date     APPENDECTOMY       BIOPSY      breast     CARDIAC SURGERY      stent to the right coronary artery in 2001     CHOLECYSTECTOMY       ENT SURGERY      tonsillectomy     GENITOURINARY SURGERY      Bladder suspension     GYN SURGERY      D & C     GYN SURGERY      laproscopy oophorectomy unilateral     HERNIA REPAIR       HYSTERECTOMY       LUMBAR PUNCTURE       OPEN REDUCTION INTERNAL FIXATION HIP NAILING  11/18/2013    Procedure: OPEN REDUCTION INTERNAL FIXATION HIP NAILING;  RIGHT HIP FRACTURE;  Surgeon: John Venegas MD;  Location:  OR     OPTICAL TRACKING SYSTEM IMPLANT SHUNT VENTRICULOPERITONEAL  7/16/2013    Procedure: OPTICAL TRACKING SYSTEM IMPLANT  SHUNT VENTRICULOPERITONEAL;  RIGHT OCCIPITAL VENTRICULOPERITONEAL SHUNT, IMAGE GUIDANCE, NEUROENDOSCOPY, INTRATHECAL ANTIBIOTICS (STEALTH AXIEM, STRATA II LEVEL 2.5, PATIENT IN SUPINE 30-80, GEL DONUT WITH HEAD TURNED TO LEFT)      ;  Surgeon: Tayo Conley MD;  Location: [facility] OR     STENT  2001    RCA     wisdom teeth extraction[         Prior to Admission Medications   Prior to Admission Medications   Prescriptions Last Dose Informant Patient Reported? Taking?   ACETAMINOPHEN PO  at PRN  Nursing Home Yes Yes   Sig: Take 650 mg by mouth every 6 hours as needed for pain    aspirin EC 81 MG EC tablet 7/19/2020 at Unknown time senior care No Yes   Sig: Take 1 tablet (81 mg) by mouth daily   atorvastatin (LIPITOR) 10 MG tablet 7/19/2020 at Unknown time senior care No Yes   Sig: Take 1 tablet (10 mg) by mouth daily   cholecalciferol 1000 UNITS TABS 7/19/2020 at Unknown time senior care No Yes   Sig: Take 1,000 Units by mouth daily   docusate sodium (COLACE) 100 MG capsule 7/19/2020 at Unknown time Nursing Home Yes Yes   Sig: Take 100 mg by mouth daily   escitalopram (LEXAPRO) 5 MG tablet 7/19/2020 at Unknown time Nursing Home Yes Yes   Sig: Take 5 mg by mouth daily   levothyroxine (SYNTHROID/LEVOTHROID) 88 MCG tablet 7/19/2020 at Unknown time Nursing Home Yes Yes   Sig: Take 88 mcg by mouth daily   lisinopril (PRINIVIL/ZESTRIL) 10 MG tablet 7/19/2020 at Unknown time Nursing Home Yes Yes   Sig: Take 10 mg by mouth daily   loperamide (IMODIUM A-D) 2 MG tablet  at PRN Nursing Home Yes Yes   Sig: Take 2 mg by mouth as needed for diarrhea (8 times daily as needed for diarrhea)    melatonin 3 MG tablet  at PRN Nursing Home Yes Yes   Sig: Take 1 mg by mouth nightly as needed for sleep   metoprolol succinate ER (TOPROL-XL) 25 MG 24 hr tablet 7/19/2020 at Unknown time Nursing Home Yes Yes   Sig: Take 25 mg by mouth daily   nystatin (MYCOSTATIN) 585322 UNIT/GM external powder  at PRN Nursing Home Yes Yes   Sig: Apply  "topically 2 times daily as needed (intertriginal dermatitis)   senna-docusate (SENOKOT-S;PERICOLACE) 8.6-50 MG per tablet  at PRN Nursing Home Yes Yes   Sig: Take 1 tablet by mouth 2 times daily as needed    zinc oxide 16 % (BOUDREAUXS BUTT PASTE) PSTE paste  at PRN Nursing Home Yes Yes   Sig: Apply topically as needed for irritation (red buttocks)      Facility-Administered Medications: None     Allergies   Allergies   Allergen Reactions     Tramadol        Social History   I have reviewed this patient's social history and updated it with pertinent information if needed. Joelle Freeamn  reports that she has never smoked. She has never used smokeless tobacco. She reports that she does not drink alcohol or use drugs.    Family History   I have reviewed this patient's family history and updated it with pertinent information if needed.   Family History   Problem Relation Age of Onset     Cerebrovascular Disease Mother      Cerebrovascular Disease Father      Blood Disease Son        Review of Systems   10 point review of systems is negative with the exception of HPI and PMH.       Physical Exam   Temp: 96.7  F (35.9  C) Temp src: Temporal BP: 118/55 Pulse: 78 Heart Rate: 76 Resp: 15 SpO2: 96 % O2 Device: None (Room air)    Vital Signs with Ranges  Temp:  [96.7  F (35.9  C)] 96.7  F (35.9  C)  Pulse:  [67-80] 78  Heart Rate:  [67-80] 76  Resp:  [15-24] 15  BP: (105-134)/(33-69) 118/55  SpO2:  [94 %-96 %] 96 %  126 lbs 0 oz    Heart Rate: 76, Blood pressure 118/55, pulse 78, temperature 96.7  F (35.9  C), temperature source Temporal, resp. rate 15, height 5' 4\" (1.626 m), weight 126 lb (57.2 kg), SpO2 96 %.  126 lbs 0 oz  HEENT:  Normocephalic, atraumatic.  PERRLA.  EOM s intact.   Neck:  Supple, non-tender, without lymphadenopathy.  Heart:  No peripheral edema  Lungs:  No SOB  Abdomen:  Soft, non-tender, non-distended.  Skin:  Warm and dry, good capillary refill.  Extremities:  Good radial and dorsalis pedis pulses " bilaterally, no edema, cyanosis or clubbing.    NEUROLOGICAL EXAMINATION:     Mental status:  Alert and Oriented x 3, speech is fluent.  Cranial nerves:  II-XII intact.   Motor:  Strength is 5/5 throughout the upper and lower extremities  Sensation:  intact  Reflexes:   Negative Babinski.  Negative Clonus.    Coordination:  Smooth finger to nose testing.   Negative pronator drift.       Data   All new lab and imaging data was personally reviewed by me.  CT:IMPRESSION:  1. Interval development of bilateral subdural fluid collections which  could be due to subdural hygromas or subdural hematomas.  2. Right frontal ventricular catheter. No evidence for hydrocephalus.  3. Cerebral and cerebellar atrophy.  4. Small lacunar infarcts in the caudate nuclei bilaterally.  5.   [Critical Result: Bilateral subdural hygromas versus chronic    CBC RESULTS:   Recent Labs   Lab Test 07/19/20  1137   WBC 6.1   RBC 2.78*   HGB 8.9*   HCT 27.1*   MCV 98   MCH 32.0   MCHC 32.8   RDW 14.5   *     Basic Metabolic Panel:  Lab Results   Component Value Date     07/19/2020      Lab Results   Component Value Date    POTASSIUM 3.0 07/19/2020     Lab Results   Component Value Date    CHLORIDE 118 07/19/2020     Lab Results   Component Value Date    JAZLYN 6.8 07/19/2020     Lab Results   Component Value Date    CO2 20 07/19/2020     Lab Results   Component Value Date    BUN 19 07/19/2020     Lab Results   Component Value Date    CR 0.79 07/19/2020     Lab Results   Component Value Date     07/19/2020     INR:  Lab Results   Component Value Date    INR 1.07 05/31/2015    INR 1.01 01/20/2015    INR 1.01 11/17/2013    INR 0.98 06/27/2013

## 2020-07-19 NOTE — ED NOTES
DATE:  7/19/2020   TIME OF RECEIPT FROM LAB:  1250  LAB TEST:  troponin  LAB VALUE:  0.419  RESULTS GIVEN WITH READ-BACK TO (PROVIDER):  Joseline Slaughter MD  TIME LAB VALUE REPORTED TO PROVIDER:   [id number]

## 2020-07-19 NOTE — ED PROVIDER NOTES
History     Chief Complaint:  Loss of Consciousness    HPI   Joelle Freeman is a 89 year old female who presents along a syncopal episode.  Patient was on the toilet trying to have a bowel movement with the assistance of her family.  It is not clear how her family was assisting.  While she was on the toilet she lost consciousness and then was not speaking when she became alert.  On EMS arrival she had low blood pressure and appeared unwell.  She was a phasic for them.  In the emergency department the patient is able to answer yes/no questions but with poor focus.  She is able to name basic objects.  She moves all extremities to command.  She is an exceedingly poor historian and is unable to provide much history.  Per her son she had a recent UTI and was started on antibiotics.    Allergies:  Tramadol      Medications:    Aspirin 81mg  Atorvastatin  Vitamin D  Lexapro  Levothyroxine   Lisinopril   Melatonin  Metoprolol     Past Medical History:    Asthma  CAD  Cerebral ventriculomegaly  Stroke   Dementia  Depression   Diverticulosis  Factor V Leiden mutation, heterozygous  Hypertension  Hypothyroidism   Hyperlipidemia   Hydrocephalus  Posterior reversible encephalopathy syndrome  Subarachnoid hemorrhage  Subdural hematoma  TIA   Cerebral artery occulusion   UTI    Past Surgical History:    Appendectomy  Breast biopsy  Right coronary artery stent placement  Cholecystectomy   Tonsillectomy   Bladder suspension   D&C  Herniorrhaphy   Unilateral oophorectomy   Hysterectomy  Right hip ORIF  Right ventriculoperitoneal shunt implant       Family History:    Cerebrovascular disease: mother  Cerebrovascular disease: father  Blood disorder: son     Social History:  Smoking status: Never smoker    Substance use: No  Alcohol use: No  Marital Status:   [5]     Review of Systems   Unable to perform ROS: Mental status change       Physical Exam     Patient Vitals for the past 24 hrs:   BP Temp Temp src Pulse Heart Rate  "Resp SpO2 Height Weight   07/19/20 1200 113/57 -- -- 77 -- -- 94 % -- --   07/19/20 1158 -- 96.7  F (35.9  C) Temporal -- -- -- -- -- --   07/19/20 1145 119/45 -- -- 72 -- -- 95 % -- --   07/19/20 1130 (!) 106/33 -- -- 67 -- -- 95 % -- --   07/19/20 1127 105/69 -- -- -- 67 22 94 % 1.626 m (5' 4\") 57.2 kg (126 lb)     Physical Exam  General: Resting on the gurney, appears comfortable  Head:  The scalp, face, and head appear normal  Mouth/Throat: Mucus membranes are moist  CV:  Regular rate    Normal S1 and S2  No pathological murmur   Resp:  Breath sounds clear and equal bilaterally    Non-labored, no retractions or accessory muscle use    No coarseness    No wheezing   GI:  Abdomen is soft, no rigidity    No tenderness to palpation  MS:  Normal motor assessment of all extremities.    Good capillary refill noted.  Skin:  No rash or lesions noted.  Neuro: Speech is slow and quiet.  Answers yes/no questions.  Not oriented to place or time.  Names basic objects.  Psych: Awake. Alert.  Normal affect.      Appropriate interactions.    Emergency Department Course   ECG:  Minimal ST depression, no acute ST elevation.  NSR rate 67. T wave inversion present.    Imaging:  Radiographic findings were communicated with the patient who voiced understanding of the findings.    CT Head w/o Contrast   Final Result   Abnormal   IMPRESSION:   1. Interval development of bilateral subdural fluid collections which   could be due to subdural hygromas or subdural hematomas.   2. Right frontal ventricular catheter. No evidence for hydrocephalus.   3. Cerebral and cerebellar atrophy.   4. Small lacunar infarcts in the caudate nuclei bilaterally.   5.    [Critical Result: Bilateral subdural hygromas versus chronic   hematomas.]      Finding was identified on 7/19/2020 12:20 PM.       Dr. Slaughter was contacted by me on 7/19/2020 12:26 PM and verbalized   understanding of the critical result.       TIO CAIN MD        Laboratory:  Laboratory " findings were communicated with the patient who voiced understanding of the findings.    Labs Ordered and Resulted from Time of ED Arrival Up to the Time of Departure from the ED   BASIC METABOLIC PANEL - Abnormal; Notable for the following components:       Result Value    Potassium 3.0 (*)     Chloride 118 (*)     All other components within normal limits   CBC WITH PLATELETS DIFFERENTIAL - Abnormal; Notable for the following components:    RBC Count 2.78 (*)     Hemoglobin 8.9 (*)     Hematocrit 27.1 (*)     Platelet Count 107 (*)     All other components within normal limits   CBC WITH PLATELETS DIFFERENTIAL   BASIC METABOLIC PANEL   TROPONIN I   LACTIC ACID WHOLE BLOOD   ROUTINE UA WITH MICROSCOPIC   LACTIC ACID WHOLE BLOOD   TROPONIN I   URINE CULTURE AEROBIC BACTERIAL        Interventions:  Medications   0.9% sodium chloride BOLUS (1,000 mLs Intravenous New Bag 7/19/20 1142)     Followed by   sodium chloride 0.9% infusion (has no administration in time range)     Emergency Department Course:  Pt arrived in ED  Labs obtained.    CT obtained.  Discussed case with radiology  Discussed with neurosurgery  Admit to hospitalist       Impression & Plan      Medical Decision Making:  Joelle Freeman is a 89 year old female who presents to the emergency department with initial concerns for stroke.  On careful examination she has no focal neurologic deficit and symptoms are more consistent with weakness and possible encephalopathy.  CT scan of the head was obtained and the radiologist was concerned for intracranial bleeding and the case was discussed with neurosurgery.  Dr. Jain and the neurosurgery PA reviewed the images and note that they do not think that her CT looks different than prior and that there is been ongoing fluctuation but no severe acute pathology.  They recommend rechecking her shunt tomorrow.  He does have a UTI and her symptoms are most likely related to that.  She was given a dose of IV Rocephin in  the emergency department.  Additionally she was found to have an elevated troponin.  She has had elevated troponins previously however today seems more elevated than the recent past.  She specifically denies chest pain or shortness of breath.  With her syncopal episode she could either have had hypotension leading to an elevated troponin or could have had an arrhythmia and subsequent demand.  Of course it is possible that her elevation in troponin is from cardiac ischemia and her EKG does show T wave changes, however given her head CT findings she is not a good candidate for heparinization.  She will be admitted to the hospitalist service for ongoing IV antibiotics, repeat EKG, repeat troponin, and clearing of her mental status.    Diagnosis:    ICD-10-CM    1. Urinary tract infection with hematuria, site unspecified  N39.0 Urine Culture    R31.9    2. Elevated troponin  R79.89    3. Intracranial bleed (H)  I62.9        Disposition:  Admitted to [facility]    7/19/2020    EMERGENCY DEPARTMENT       Joseline Slaughter MD  07/19/20 [id number]

## 2020-07-19 NOTE — ED NOTES
[facility]  ED Nurse Handoff Report    ED Chief complaint: Loss of Consciousness      ED Diagnosis:   Final diagnoses:   None       Code Status: DNR / DNI    Allergies:   Allergies   Allergen Reactions     Tramadol        Patient Story: Patient comes from home, lives with son. Reported that she was up to the toilet and passed out.  Pt was difficult to arouse so patient called family.  Son reports recently moved back in with him after being at a care facility.  Also reports pt was started on an AB after being dx with a UTI on Friday.  Focused Assessment:  Pt arrived via EMS, arouses to voice.  Improved mentation after IVF.  +FATIMA.  No signs of injury.  Pending head CT and UA.  Treatments and/or interventions provided: IVF  Patient's response to treatments and/or interventions:     To be done/followed up on inpatient unit:  na    Does this patient have any cognitive concerns?: Forgetful, [place]    Activity level - Baseline/Home:  Stand with Assist and Walker  Activity Level - Current:   Stand with assist x2    Patient's Preferred language: English   Needed?: No    Isolation: None  Infection: Not Applicable  Bariatric?: No    Vital Signs:   Vitals:    07/19/20 1158 07/19/20 1200 07/19/20 1215 07/19/20 1230   BP:  113/57 134/67 129/63   Pulse:  77 76 79   Resp:   18 24   Temp: 96.7  F (35.9  C)      TempSrc: Temporal      SpO2:  94% 96% 95%   Weight:       Height:           Cardiac Rhythm:Cardiac Rhythm: Normal sinus rhythm    Was the PSS-3 completed:   Yes  What interventions are required if any?               Family Comments: son here  OBS brochure/video discussed/provided to patient/family: N/A              Name of person given brochure if not patient:               Relationship to patient:     For the majority of the shift this patient's behavior was Green.   Behavioral interventions performed were .    ED NURSE PHONE NUMBER: [phone]

## 2020-07-19 NOTE — PHARMACY-ADMISSION MEDICATION HISTORY
Pharmacy Medication History  Admission medication history interview status for the 7/19/2020  admission is complete. See EPIC admission navigator for prior to admission medications     Medication history sources: MAR ([facility] )  Medication history source reliability: Good  Adherence assessment: Good    Significant changes made to the medication list:  Added docusate , changed direction for Tylenol, changed escitalopram dose       Additional medication history information:   Patient reported taking all her medication today but vomited after 20 minutes. Both Docusate and Senna S are listed in the MAR as scheduled and PRN.      Medication reconciliation completed by provider prior to medication history? No    Time spent in this activity: 15 minutes       Prior to Admission medications    Medication Sig Last Dose Taking? Auth Provider   ACETAMINOPHEN PO Take 650 mg by mouth every 6 hours as needed for pain   at PRN  Yes Unknown, Entered By History   aspirin EC 81 MG EC tablet Take 1 tablet (81 mg) by mouth daily 7/19/2020 at Unknown time Yes Patric Pinedo MD   atorvastatin (LIPITOR) 10 MG tablet Take 1 tablet (10 mg) by mouth daily 7/19/2020 at Unknown time Yes Patric Pinedo MD   cholecalciferol 1000 UNITS TABS Take 1,000 Units by mouth daily 7/19/2020 at Unknown time Yes Patric Pinedo MD   docusate sodium (COLACE) 100 MG capsule Take 100 mg by mouth daily 7/19/2020 at Unknown time Yes Unknown, Entered By History   escitalopram (LEXAPRO) 5 MG tablet Take 5 mg by mouth daily 7/19/2020 at Unknown time Yes Unknown, Entered By History   levothyroxine (SYNTHROID/LEVOTHROID) 88 MCG tablet Take 88 mcg by mouth daily 7/19/2020 at Unknown time Yes Unknown, Entered By History   lisinopril (PRINIVIL/ZESTRIL) 10 MG tablet Take 10 mg by mouth daily 7/19/2020 at Unknown time Yes Unknown, Entered By History   loperamide (IMODIUM A-D) 2 MG tablet Take 2 mg by mouth as  needed for diarrhea (8 times daily as needed for diarrhea)   at PRN Yes Unknown, Entered By History   melatonin 3 MG tablet Take 1 mg by mouth nightly as needed for sleep  at PRN Yes Unknown, Entered By History   metoprolol succinate ER (TOPROL-XL) 25 MG 24 hr tablet Take 25 mg by mouth daily 7/19/2020 at Unknown time Yes Unknown, Entered By History   nystatin (MYCOSTATIN) [id number] UNIT/GM external powder Apply topically 2 times daily as needed (intertriginal dermatitis)  at PRN Yes Unknown, Entered By History   senna-docusate (SENOKOT-S;PERICOLACE) 8.6-50 MG per tablet Take 1 tablet by mouth 2 times daily as needed   at PRN Yes Reported, Patient   zinc oxide 16 % (BOUDREAUXS BUTT PASTE) PSTE paste Apply topically as needed for irritation (red buttocks)  at PRN Yes Unknown, Entered By History   Herminia Said   Student Pharmacist

## 2020-07-19 NOTE — H&P
[facility]    History and Physical - Hospitalist Service       Date of Admission:  7/19/2020    Assessment & Plan   Joelle Freeman is a 89 year old female admitted on 7/19/2020.     Syncopal episode likely vasovagal  History of severe AORTIC STENOSIS  Was told in the past she was not a candidate for repair of the valve in the past  Could have been vasovagal occurred during valsalva as well  Plan  - telemetry, echocardiogram  - ? If hospice is appropriate if her valve is starting to cause her symptoms, given poor prognosis. Son indicates they are moving toward comfort oriented care      Troponin elevation most likely type 2 NSTEMI in the setting of UTI  Demand ischemia most likely, could be related to primary NSTEMI with arrhythmogenic syncope  At this point no obvious cardiopulmonary complaints , no hypoxemia  SDH make anticoagulation contraindicated  Plan  - trend troponins and obtain an echocardiogram    Urinary tract infection   Started on amoxicillin on Friday, UA still consistent with infection  - ceftriaxone and follow-up on the cultures    Constipation  - schedule pericolace and bid prn miralax    Bilateral hygromas versus chronic subdural hematomas  History of NPH s/p  shunt  - neurosurgery eval in the ED, would like repeat head CT in the AM  - goal SBP < 160  - neuro exam q 6 hours    Normocytic anemia  Drop of 14-9 over the last year  - anemia lab workup    Anxiety/depression:  Concerns about overmedications  - resume lexapro    Hypothyroidism  - reduce thyroid from 88 to 75 mcg daily    HTN/HLD  - hold lisinopril, prn iv hydralazine  - continue statin           Diet: regular  DVT Prophylaxis: Pneumatic Compression Devices  Benítez Catheter: not present  Code Status: DNR/DNI         Disposition Plan   Expected discharge: 2 - 3 days, recommended to prior living arrangement once workup complete.  Entered: Rey Georges DO 07/19/2020, 3:43 PM     The patient's care was discussed  with the Patient and Patient's Family.    Rey Georges, DO  [facility]    ______________________________________________________________________    Chief Complaint   fall    History is obtained from the patient's family    History of Present Illness   Joelle Freeman is a 89 year old female with past medical history of NPH status post  shunt, hypertension, hyperlipidemia, stroke who presents today with a passing out episode.  She has been staying with her son and medical decision making [place] for week because her facility has been shut down because of COVID infection and they wanted to see the patient.  They notes in the past week she has had significantly foul-smelling urine and spent some time trying to get a urinalysis done on her.  This was started 3 days prior to admission and she started on amoxicillin at this time.  She is also suffered from significant constipation requiring manual disimpaction by her son and daughter-in-law.    In this setting today they were attempting to manually disimpact her rectum for very hard stool and the patient was being instructed to push very hard.  The daughter-in-law notes that there was a little bit of bleeding because of how much she was pushing.  During this time she had an episode where she became unresponsive and after this EMS was called.  When EMS arrived she was still minimally responsive but with squeezing hands on command.    In the emergency department a work-up was obtained with results below.  At this point she is denying any shortness of breath chest pain and moving all her extremities.    Review of Systems    Review of systems not obtained due to patient factors - confusion    Past Medical History    I have reviewed this patient's medical history and updated it with pertinent information if needed.   Past Medical History:   Diagnosis Date     Asthma      CAD (coronary artery disease)     stent post RCA 2001     Cerebral ventriculomegaly       CVA (cerebral infarction) 2002     Dementia (H)      Dementia (H)      Depression      Diverticulosis      Dyslipidemia      Heterozygous factor V Leiden mutation (H)      HTN (hypertension)      Hyperlipidemia      Hypothyroidism      Normal pressure hydrocephalus (H)      Posterior reversible encephalopathy syndrome      Subarachnoid hemorrhage (H) 3/23/2013     Subdural hematoma (H)      TIA (transient ischaemic attack)     multiple     TIA (transient ischaemic attack)      Unspecified cerebral artery occlusion with cerebral infarction      Urinary incontinence      UTI (lower urinary tract infection)     recurrent       Past Surgical History   I have reviewed this patient's surgical history and updated it with pertinent information if needed.  Past Surgical History:   Procedure Laterality Date     APPENDECTOMY       BIOPSY      breast     CARDIAC SURGERY      stent to the right coronary artery in 2001     CHOLECYSTECTOMY       ENT SURGERY      tonsillectomy     GENITOURINARY SURGERY      Bladder suspension     GYN SURGERY      D & C     GYN SURGERY      laproscopy oophorectomy unilateral     HERNIA REPAIR       HYSTERECTOMY       LUMBAR PUNCTURE       OPEN REDUCTION INTERNAL FIXATION HIP NAILING  11/18/2013    Procedure: OPEN REDUCTION INTERNAL FIXATION HIP NAILING;  RIGHT HIP FRACTURE;  Surgeon: John Venegas MD;  Location:  OR     OPTICAL TRACKING SYSTEM IMPLANT SHUNT VENTRICULOPERITONEAL  7/16/2013    Procedure: OPTICAL TRACKING SYSTEM IMPLANT SHUNT VENTRICULOPERITONEAL;  RIGHT OCCIPITAL VENTRICULOPERITONEAL SHUNT, IMAGE GUIDANCE, NEUROENDOSCOPY, INTRATHECAL ANTIBIOTICS (STEALTH AXIEM, STRATA II LEVEL 2.5, PATIENT IN SUPINE 30-80, GEL DONUT WITH HEAD TURNED TO LEFT)      ;  Surgeon: Tayo Conley MD;  Location:  OR     STENT  2001    RCA     wisdom teeth extraction[         Social History   I have reviewed this patient's social history and updated it with pertinent information  if needed.      Family History   I have reviewed this patient's family history and updated it with pertinent information if needed.  Family History   Problem Relation Age of Onset     Cerebrovascular Disease Mother      Cerebrovascular Disease Father      Blood Disease Son        Prior to Admission Medications   Prior to Admission Medications   Prescriptions Last Dose Informant Patient Reported? Taking?   ACETAMINOPHEN PO  at PRN  Nursing Home Yes Yes   Sig: Take 650 mg by mouth every 6 hours as needed for pain    aspirin EC 81 MG EC tablet 7/19/2020 at Unknown time intermediate No Yes   Sig: Take 1 tablet (81 mg) by mouth daily   atorvastatin (LIPITOR) 10 MG tablet 7/19/2020 at Unknown time intermediate No Yes   Sig: Take 1 tablet (10 mg) by mouth daily   cholecalciferol 1000 UNITS TABS 7/19/2020 at Unknown time intermediate No Yes   Sig: Take 1,000 Units by mouth daily   docusate sodium (COLACE) 100 MG capsule 7/19/2020 at Unknown time Nursing Home Yes Yes   Sig: Take 100 mg by mouth daily   escitalopram (LEXAPRO) 5 MG tablet 7/19/2020 at Unknown time Nursing Home Yes Yes   Sig: Take 5 mg by mouth daily   levothyroxine (SYNTHROID/LEVOTHROID) 88 MCG tablet 7/19/2020 at Unknown time Nursing Home Yes Yes   Sig: Take 88 mcg by mouth daily   lisinopril (PRINIVIL/ZESTRIL) 10 MG tablet 7/19/2020 at Unknown time Nursing Home Yes Yes   Sig: Take 10 mg by mouth daily   loperamide (IMODIUM A-D) 2 MG tablet  at PRN Nursing Home Yes Yes   Sig: Take 2 mg by mouth as needed for diarrhea (8 times daily as needed for diarrhea)    melatonin 3 MG tablet  at PRN Nursing Home Yes Yes   Sig: Take 1 mg by mouth nightly as needed for sleep   metoprolol succinate ER (TOPROL-XL) 25 MG 24 hr tablet 7/19/2020 at Unknown time Nursing Home Yes Yes   Sig: Take 25 mg by mouth daily   nystatin (MYCOSTATIN) 300447 UNIT/GM external powder  at PRN Nursing Home Yes Yes   Sig: Apply topically 2 times daily as needed (intertriginal dermatitis)    senna-docusate (SENOKOT-S;PERICOLACE) 8.6-50 MG per tablet  at PRN Nursing Home Yes Yes   Sig: Take 1 tablet by mouth 2 times daily as needed    zinc oxide 16 % (BOUDREAUXS BUTT PASTE) PSTE paste  at PRN Nursing Home Yes Yes   Sig: Apply topically as needed for irritation (red buttocks)      Facility-Administered Medications: None     Allergies   Allergies   Allergen Reactions     Tramadol        Physical Exam   Vital Signs: Temp: 96.7  F (35.9  C) Temp src: Temporal BP: 118/55 Pulse: 78 Heart Rate: 76 Resp: 15 SpO2: 96 % O2 Device: None (Room air)    Weight: 126 lbs 0 oz    General:          Resting on the gurney, appears comfortable  Head:              The scalp, face, and head appear normal  Mouth/Throat:  Mucus membranes are dry  CV:                  Regular rate                          Normal S1 and S2  No pathological murmur   Resp:              Breath sounds clear and equal bilaterally                          Non-labored, no retractions or accessory muscle use                          No coarseness                          No wheezing   GI:                   Abdomen is soft, no rigidity                          No tenderness to palpation  MS:                  Normal motor assessment of all extremities.                          Good capillary refill noted.  Skin:               No rash or lesions noted.  Neuro: Speech is slow and quiet.  Answers yes/no questions.  Not oriented to place or time.  Names basic objects. moves all extremities  Psych:            Awake. Alert.  Normal affect.                            Appropriate interactions.       Data   Data reviewed today: I reviewed all medications, new labs and imaging results over the last 24 hours. I personally reviewed the EKG tracing showing ST depression. Head CT with bilateral SDH    Recent Labs   Lab 07/19/20  1137 07/19/20  1130   WBC 6.1 Canceled, Test credited   HGB 8.9* Canceled, Test credited   MCV 98 Canceled, Test credited   * Canceled,  Test credited    Canceled, Test credited   POTASSIUM 3.0* Canceled, Test credited   CHLORIDE 118* Canceled, Test credited   CO2 20 Canceled, Test credited   BUN 19 Canceled, Test credited   CR 0.79 Canceled, Test credited   ANIONGAP 6 Canceled, Test credited   JAZLYN 6.8* Canceled, Test credited   * Canceled, Test credited   TROPI 0.419* Canceled, Test credited     Most Recent 3 CBC's:  Recent Labs   Lab Test 07/19/20  1137 07/19/20  1130 07/02/19  0643 06/29/19  1149   WBC 6.1 Canceled, Test credited 5.8 7.2   HGB 8.9* Canceled, Test credited  --  14.2   MCV 98 Canceled, Test credited  --  96   * Canceled, Test credited  --  150     Most Recent 3 BMP's:  Recent Labs   Lab Test 07/19/20  1137 07/19/20  1130 07/02/19  0643    Canceled, Test credited 144   POTASSIUM 3.0* Canceled, Test credited 3.9   CHLORIDE 118* Canceled, Test credited 116*   CO2 20 Canceled, Test credited 26   BUN 19 Canceled, Test credited 16   CR 0.79 Canceled, Test credited 0.64   ANIONGAP 6 Canceled, Test credited 2*   JAZLYN 6.8* Canceled, Test credited 8.4*   * Canceled, Test credited 94     Most Recent 2 LFT's:  Recent Labs   Lab Test 06/29/19  1149 11/21/15  1950   AST 13 20   ALT 20 27   ALKPHOS 93 89   BILITOTAL 0.5 0.6

## 2020-07-19 NOTE — ED TRIAGE NOTES
Patient comes from home after having a syncopal episode on the toilet.  Family reported was difficult to arouse and called EMS.  Patient is awake, tired on arrival but follows commands.

## 2020-07-20 ENCOUNTER — APPOINTMENT (OUTPATIENT)
Dept: CT IMAGING | Facility: CLINIC | Age: 85
DRG: 280 | End: 2020-07-20
Attending: PHYSICIAN ASSISTANT
Payer: MEDICARE

## 2020-07-20 ENCOUNTER — APPOINTMENT (OUTPATIENT)
Dept: OCCUPATIONAL THERAPY | Facility: CLINIC | Age: 85
DRG: 280 | End: 2020-07-20
Attending: HOSPITALIST
Payer: MEDICARE

## 2020-07-20 ENCOUNTER — APPOINTMENT (OUTPATIENT)
Dept: PHYSICAL THERAPY | Facility: CLINIC | Age: 85
DRG: 280 | End: 2020-07-20
Attending: HOSPITALIST
Payer: MEDICARE

## 2020-07-20 ENCOUNTER — APPOINTMENT (OUTPATIENT)
Dept: CARDIOLOGY | Facility: CLINIC | Age: 85
DRG: 280 | End: 2020-07-20
Attending: HOSPITALIST
Payer: MEDICARE

## 2020-07-20 LAB
ALBUMIN SERPL-MCNC: 2.6 G/DL (ref 3.4–5)
ALP SERPL-CCNC: 54 U/L (ref 40–150)
ALT SERPL W P-5'-P-CCNC: 14 U/L (ref 0–50)
ANION GAP SERPL CALCULATED.3IONS-SCNC: 4 MMOL/L (ref 3–14)
AST SERPL W P-5'-P-CCNC: 11 U/L (ref 0–45)
BASOPHILS # BLD AUTO: 0 10E9/L (ref 0–0.2)
BASOPHILS NFR BLD AUTO: 0.3 %
BILIRUB SERPL-MCNC: 0.6 MG/DL (ref 0.2–1.3)
BUN SERPL-MCNC: 16 MG/DL (ref 7–30)
CALCIUM SERPL-MCNC: 8.4 MG/DL (ref 8.5–10.1)
CHLORIDE SERPL-SCNC: 116 MMOL/L (ref 94–109)
CO2 SERPL-SCNC: 23 MMOL/L (ref 20–32)
CREAT SERPL-MCNC: 0.66 MG/DL (ref 0.52–1.04)
DIFFERENTIAL METHOD BLD: ABNORMAL
EOSINOPHIL # BLD AUTO: 0.1 10E9/L (ref 0–0.7)
EOSINOPHIL NFR BLD AUTO: 1.8 %
ERYTHROCYTE [DISTWIDTH] IN BLOOD BY AUTOMATED COUNT: 14.3 % (ref 10–15)
FERRITIN SERPL-MCNC: 174 NG/ML (ref 8–252)
FOLATE SERPL-MCNC: 3.9 NG/ML
GFR SERPL CREATININE-BSD FRML MDRD: 77 ML/MIN/{1.73_M2}
GLUCOSE SERPL-MCNC: 91 MG/DL (ref 70–99)
HCT VFR BLD AUTO: 33.2 % (ref 35–47)
HGB BLD-MCNC: 11 G/DL (ref 11.7–15.7)
IMM GRANULOCYTES # BLD: 0 10E9/L (ref 0–0.4)
IMM GRANULOCYTES NFR BLD: 0.2 %
INTERPRETATION ECG - MUSE: NORMAL
IRON SATN MFR SERPL: 26 % (ref 15–46)
IRON SERPL-MCNC: 42 UG/DL (ref 35–180)
LYMPHOCYTES # BLD AUTO: 2 10E9/L (ref 0.8–5.3)
LYMPHOCYTES NFR BLD AUTO: 30 %
MAGNESIUM SERPL-MCNC: 2.2 MG/DL (ref 1.6–2.3)
MCH RBC QN AUTO: 31.8 PG (ref 26.5–33)
MCHC RBC AUTO-ENTMCNC: 33.1 G/DL (ref 31.5–36.5)
MCV RBC AUTO: 96 FL (ref 78–100)
MONOCYTES # BLD AUTO: 0.8 10E9/L (ref 0–1.3)
MONOCYTES NFR BLD AUTO: 11.5 %
NEUTROPHILS # BLD AUTO: 3.7 10E9/L (ref 1.6–8.3)
NEUTROPHILS NFR BLD AUTO: 56.2 %
NRBC # BLD AUTO: 0 10*3/UL
NRBC BLD AUTO-RTO: 0 /100
PLATELET # BLD AUTO: 135 10E9/L (ref 150–450)
POTASSIUM SERPL-SCNC: 3.7 MMOL/L (ref 3.4–5.3)
PROT SERPL-MCNC: 5.6 G/DL (ref 6.8–8.8)
RBC # BLD AUTO: 3.46 10E12/L (ref 3.8–5.2)
SARS-COV-2 RNA SPEC QL NAA+PROBE: NOT DETECTED
SODIUM SERPL-SCNC: 143 MMOL/L (ref 133–144)
SPECIMEN SOURCE: NORMAL
TIBC SERPL-MCNC: 164 UG/DL (ref 240–430)
TROPONIN I SERPL-MCNC: 0.47 UG/L (ref 0–0.04)
TROPONIN I SERPL-MCNC: 0.47 UG/L (ref 0–0.04)
VIT B12 SERPL-MCNC: 534 PG/ML (ref 193–986)
WBC # BLD AUTO: 6.6 10E9/L (ref 4–11)

## 2020-07-20 PROCEDURE — 93306 TTE W/DOPPLER COMPLETE: CPT

## 2020-07-20 PROCEDURE — 99232 SBSQ HOSP IP/OBS MODERATE 35: CPT | Performed by: INTERNAL MEDICINE

## 2020-07-20 PROCEDURE — 25000128 H RX IP 250 OP 636: Performed by: HOSPITALIST

## 2020-07-20 PROCEDURE — 99231 SBSQ HOSP IP/OBS SF/LOW 25: CPT | Performed by: PHYSICIAN ASSISTANT

## 2020-07-20 PROCEDURE — 36415 COLL VENOUS BLD VENIPUNCTURE: CPT | Performed by: HOSPITALIST

## 2020-07-20 PROCEDURE — 25000132 ZZH RX MED GY IP 250 OP 250 PS 637: Mod: GY | Performed by: HOSPITALIST

## 2020-07-20 PROCEDURE — 80053 COMPREHEN METABOLIC PANEL: CPT | Performed by: HOSPITALIST

## 2020-07-20 PROCEDURE — 97535 SELF CARE MNGMENT TRAINING: CPT | Mod: GO | Performed by: OCCUPATIONAL THERAPIST

## 2020-07-20 PROCEDURE — 97530 THERAPEUTIC ACTIVITIES: CPT | Mod: GO | Performed by: OCCUPATIONAL THERAPIST

## 2020-07-20 PROCEDURE — 82746 ASSAY OF FOLIC ACID SERUM: CPT | Performed by: HOSPITALIST

## 2020-07-20 PROCEDURE — 70450 CT HEAD/BRAIN W/O DYE: CPT

## 2020-07-20 PROCEDURE — 83735 ASSAY OF MAGNESIUM: CPT | Performed by: HOSPITALIST

## 2020-07-20 PROCEDURE — 82607 VITAMIN B-12: CPT | Performed by: HOSPITALIST

## 2020-07-20 PROCEDURE — 82728 ASSAY OF FERRITIN: CPT | Performed by: HOSPITALIST

## 2020-07-20 PROCEDURE — 97530 THERAPEUTIC ACTIVITIES: CPT | Mod: GP | Performed by: PHYSICAL THERAPIST

## 2020-07-20 PROCEDURE — 83540 ASSAY OF IRON: CPT | Performed by: HOSPITALIST

## 2020-07-20 PROCEDURE — 25500064 ZZH RX 255 OP 636: Performed by: HOSPITALIST

## 2020-07-20 PROCEDURE — 97116 GAIT TRAINING THERAPY: CPT | Mod: GP | Performed by: PHYSICAL THERAPIST

## 2020-07-20 PROCEDURE — 97161 PT EVAL LOW COMPLEX 20 MIN: CPT | Mod: GP | Performed by: PHYSICAL THERAPIST

## 2020-07-20 PROCEDURE — 83550 IRON BINDING TEST: CPT | Performed by: HOSPITALIST

## 2020-07-20 PROCEDURE — 93306 TTE W/DOPPLER COMPLETE: CPT | Mod: 26 | Performed by: INTERNAL MEDICINE

## 2020-07-20 PROCEDURE — 25000132 ZZH RX MED GY IP 250 OP 250 PS 637: Mod: GY | Performed by: INTERNAL MEDICINE

## 2020-07-20 PROCEDURE — 97165 OT EVAL LOW COMPLEX 30 MIN: CPT | Mod: GO | Performed by: OCCUPATIONAL THERAPIST

## 2020-07-20 PROCEDURE — 84484 ASSAY OF TROPONIN QUANT: CPT | Performed by: HOSPITALIST

## 2020-07-20 PROCEDURE — 12000000 ZZH R&B MED SURG/OB

## 2020-07-20 PROCEDURE — 85025 COMPLETE CBC W/AUTO DIFF WBC: CPT | Performed by: HOSPITALIST

## 2020-07-20 RX ORDER — FOLIC ACID 1 MG/1
1 TABLET ORAL DAILY
Status: DISCONTINUED | OUTPATIENT
Start: 2020-07-20 | End: 2020-07-22 | Stop reason: HOSPADM

## 2020-07-20 RX ORDER — LISINOPRIL 10 MG/1
10 TABLET ORAL DAILY
Status: DISCONTINUED | OUTPATIENT
Start: 2020-07-20 | End: 2020-07-22 | Stop reason: HOSPADM

## 2020-07-20 RX ADMIN — DOCUSATE SODIUM 50 MG AND SENNOSIDES 8.6 MG 1 TABLET: 8.6; 5 TABLET, FILM COATED ORAL at 11:51

## 2020-07-20 RX ADMIN — FOLIC ACID 1 MG: 1 TABLET ORAL at 17:40

## 2020-07-20 RX ADMIN — ATORVASTATIN CALCIUM 10 MG: 10 TABLET, FILM COATED ORAL at 11:51

## 2020-07-20 RX ADMIN — METOPROLOL SUCCINATE 25 MG: 25 TABLET, EXTENDED RELEASE ORAL at 11:51

## 2020-07-20 RX ADMIN — HUMAN ALBUMIN MICROSPHERES AND PERFLUTREN 9 ML: 10; .22 INJECTION, SOLUTION INTRAVENOUS at 11:02

## 2020-07-20 RX ADMIN — LEVOTHYROXINE SODIUM 75 MCG: 75 TABLET ORAL at 11:52

## 2020-07-20 RX ADMIN — MELATONIN TAB 3 MG 1 MG: 3 TAB at 22:41

## 2020-07-20 RX ADMIN — DOCUSATE SODIUM 50 MG AND SENNOSIDES 8.6 MG 1 TABLET: 8.6; 5 TABLET, FILM COATED ORAL at 00:31

## 2020-07-20 RX ADMIN — CEFTRIAXONE 1 G: 1 INJECTION, POWDER, FOR SOLUTION INTRAMUSCULAR; INTRAVENOUS at 17:44

## 2020-07-20 RX ADMIN — ESCITALOPRAM 5 MG: 5 TABLET, FILM COATED ORAL at 11:51

## 2020-07-20 RX ADMIN — LISINOPRIL 10 MG: 10 TABLET ORAL at 17:39

## 2020-07-20 ASSESSMENT — ACTIVITIES OF DAILY LIVING (ADL)
ADLS_ACUITY_SCORE: 34
ADLS_ACUITY_SCORE: 34
ADLS_ACUITY_SCORE: 36
ADLS_ACUITY_SCORE: 34

## 2020-07-20 ASSESSMENT — MIFFLIN-ST. JEOR: SCORE: 965.63

## 2020-07-20 NOTE — PLAN OF CARE
RECEIVING UNIT ED HANDOFF REVIEW    ED Nurse Handoff Report was reviewed by: Lake Mercado RN on July 19, 2020 at 7:02 PM

## 2020-07-20 NOTE — PLAN OF CARE
A&Ox2 disoriented to time and situation, forgetful. VSS on RA.  Neuros intact ex. orientation. Denies pain. Clear liquid diet. K 3.8, recheck 3.7. Tele: SR, Troponin: trending down, peak 0.470. Ax2, GB. PIV infusing. AM CT completed, results pending. Plan for an Echo and Neurosurgery consult.  Continue to monitor

## 2020-07-20 NOTE — PLAN OF CARE
Discharge Planner PT   Patient plan for discharge: Not discussed.  Current status: Evaluation completed and treatment initiated. Pt required Alisha for bed mobility and transfers with use of FWW. Pt ambulated 20 feet with FWW and Alisha, noted forward flexed posture with short shuffling gait pattern. Pt agreeable to remain sitting up in chair at end of session.  Barriers to return to prior living situation: None indicated if pt's family is able to provide assist of 1 with all functional mobility   Recommendations for discharge: Return to son's house with assist of 1 with all functional mobility with use of FWW and Home PT  Rationale for recommendations: Anticipate pt will continue to progress towards independence in order to safely discharge to son's house with assist. Pt will benefit from Home PT in order to progress strength and independence with mobility. Pt is appropriate for Home PT as pt is currently requiring assist of 1 and use of FWW.       Entered by: Elaine James 07/20/2020 12:05 PM

## 2020-07-20 NOTE — PLAN OF CARE
Discharge Planner OT   Patient plan for discharge: Did not verbalize.  Patient poor historian, concerned about waiting for dentist.  Unable to provide PLOF.  Per chart, lives with son and recently moved back in with him from care facility.    Current status: Upon arrival, patient's IV was dislodged and patient bleeding heavily.  Patient alert but unaware.  Patient following one step directions 50-75% of the time.  Min A x 1 (2 for safety) with walker and assist for maneuvering walker to/from bathroom with SOB and fatigue.  Max A for toilet hygiene and clothing management.  O2 96% on RA, HR 76 and /61.    Barriers to return to prior living situation: Cognition, weakness  Recommendations for discharge: Recommend home with son if able to provide 24 hour care and assist for mobility; may need TCU if unable to provide level of care.  Rationale for recommendations: Continued therapy recommended to increase independence in ADLs/mobility.       Entered by: Jeannette Jorge 07/20/2020 10:28 AM

## 2020-07-20 NOTE — PROVIDER NOTIFICATION
MD Notification    Notified Person: MD    Notified Person Name:    Notification Date/Time:    Notification Interaction:    Purpose of Notification:    Room 504 bed 2 E.K.    FYI  Troponin: 0.470     Lety Polanco RN    Orders Received:    Continue to monitor  Comments:

## 2020-07-20 NOTE — PROGRESS NOTES
07/20/20 1000   Quick Adds   Type of Visit Initial Occupational Therapy Evaluation   Living Environment   Living Environment Comment Poor historian, per chart recently moved in with son after being at care facility   Self-Care   Activity/Exercise/Self-Care Comment Poor historian, per chart recently moved in with son after being at care facility   Functional Level   Fall history within last six months yes   Prior Functional Level Comment Poor historian, per chart recently moved in with son after being at care facility   General Information   Onset of Illness/Injury or Date of Surgery - Date 07/19/20   Referring Physician Dr. Georges   Patient/Family Goals Statement did not verbalize   Additional Occupational Profile Info/Pertinent History of Current Problem Joelle Freeman is a 89 year old female with past medical history of NPH status post  shunt, aortic stenosis, hypertension, hyperlipidemia, stroke and recent UTI (on oral antibiotics) who was admitted on 7/19,2020 after a witnessed unresponsive episode at home.  In the ER, she was noted to have an elevated troponin, abnormal urinalysis, and CT head showed evidence of 2 new possible SDH   Precautions/Limitations fall precautions   General Observations On RA, alert   Cognitive Status Examination   Orientation not oriented to person, place or time   Level of Consciousness alert   Follows Commands (Cognition) follows one step commands;50-74% accuracy   Pain Assessment   Patient Currently in Pain No   Mobility   Bed Mobility Bed mobility skill: Sit to supine;Bed mobility skill: Supine to sit   Bed Mobility Skill: Sit to Supine   Level of [place]: Sit/Supine maximum assist (25% patients effort)   Bed Mobility Skill: Supine to Sit   Level of [place]: Supine/Sit stand-by assist   Transfer Skill: Bed to Chair/Chair to Bed   Level of [place]: Bed to Chair contact guard   Assistive Device - Transfer Skill Bed to Chair Chair to Bed Rehab Eval standard  "walker   Transfer Skill: Sit to Stand   Level of [place]: Sit/Stand contact guard   Assistive Device for Transfer: Sit/Stand standard walker   Transfer Skill: Toilet Transfer   Level of [place]: Toilet minimum assist (75% patients effort)   Assistive Device standard walker;grab bars   Lower Body Dressing   Level of [place]: Dress Lower Body other (see comments)  (not assessed; having echo)   Toileting   Level of [place]: Toilet maximum assist (25% patients effort)   Grooming   Level of [place]: Grooming other (see comments)  (not assessed)   Activities of Daily Living Analysis   Impairments Contributing to Impaired Activities of Daily Living cognition impaired;strength decreased   General Therapy Interventions   Planned Therapy Interventions ADL retraining   Clinical Impression   Criteria for Skilled Therapeutic Interventions Met yes, treatment indicated   OT Diagnosis impaired self-cares/mobility   Influenced by the following impairments weakness; cognition   Assessment of Occupational Performance 1-3 Performance Deficits   Identified Performance Deficits dressing, bathing, toileting   Clinical Decision Making (Complexity) Low complexity   Therapy Frequency Daily   Predicted Duration of Therapy Intervention (days/wks) 2-3 days   Anticipated Discharge Disposition Home with Assist;Transitional Care Facility   Risks and Benefits of Treatment have been explained. Yes   Patient, Family & other staff in agreement with plan of care Yes   [facility] TM \"6 Clicks\"   2016, Trustees of [facility], under license to [employer].  All rights reserved.   6 Clicks Short Forms Daily Activity Inpatient Short Form   [facility]  \"6 Clicks\" Daily Activity Inpatient Short Form   1. Putting on and taking off regular lower body clothing? 2 - A Lot   2. Bathing (including washing, rinsing, drying)? 2 - A Lot   3. Toileting, which includes using toilet, bedpan or urinal? 2 - A Lot "   4. Putting on and taking off regular upper body clothing? 2 - A Lot   5. Taking care of personal grooming such as brushing teeth? 3 - A Little   6. Eating meals? 3 - A Little   Daily Activity Raw Score (Score out of 24.Lower scores equate to lower levels of function) 14   Total Evaluation Time   Total Evaluation Time (Minutes) 8

## 2020-07-20 NOTE — UTILIZATION REVIEW
Admission Status; Secondary Review Determination    Under the authority of the Utilization Management Committee, the utilization review process indicated a secondary review on the above patient. The review outcome is based on review of the medical records, discussions with staff, and applying clinical experience noted on the date of the review.    (x) Inpatient Status Appropriate - This patient's medical care is consistent with medical management for inpatient care and reasonable inpatient medical practice.    RATIONALE FOR DETERMINATION    Joelle Freeman is a 89 year old female with past medical history of NPH status post  shunt, aortic stenosis, hypertension, hyperlipidemia, stroke and recent UTI (on oral antibiotics) who was admitted on 7/19,2020 after a witnessed unresponsive episode at home.  In the ER, she was noted to have an elevated troponin, abnormal urinalysis, and CT head showed evidence of 2 new possible SDH.  IVF, IV antibiotics, telemetry monitoring and serial troponin checks were initiated.  Neurosurgery and cardiology consultations requested.    At the time of admission with the information available to the attending physician more than 2 nights Hospital complex care was anticipated, based on patient risk of adverse outcome if treated as outpatient and complex care required. Inpatient admission is appropriate based on the Medicare guidelines.    The information on this document is developed by the utilization review team in order for the business office to ensure compliance. This only denotes the appropriateness of proper admission status and does not reflect the quality of care rendered.    The definitions of Inpatient Status and Observation Status used in making the determination above are those provided in the CMS Coverage Manual, Chapter 1 and Chapter 6, section 70.4.    Sincerely,    Irena Schultz MD   Utilization Review  Physician Advisor  [facility].

## 2020-07-20 NOTE — PROGRESS NOTES
[facility]    Hospitalist Progress Note      Assessment & Plan   Joelle Freeman is a 89 year old female admitted on 7/19/2020.      Syncopal episode likely vasovagal  History of severe aortic stenosis  Was told in the past she was not a candidate for repair of the valve  Family was attempting to manually disimpact her rectum for very hard stool and the patient was being instructed to push very hard.  During this time she had an episode where she became unresponsive and after this EMS was called.  When EMS arrived she was still minimally responsive but with squeezing hands on command.  Could have been vasovagal occurred during valsalva.   - TTE shows LVEF 55-60%, severe aortic stenosis slightly worse compared to prior study  - tele-sinus rhythm  - continue telemetry     Troponin elevation most likely type 2 NSTEMI in the setting of UTI/ vasovagal syncopal event  Demand ischemia most likely given flat troponin.   At this point no obvious cardiopulmonary complaints , no hypoxemia  SDH make anticoagulation contraindicated  - Trop 0.419--0.458--0.469  - TTE as above preserved EF, normal wall motion     Urinary tract infection   Started on amoxicillin on Friday, culture from 7/17 shows mixed urogenital fabiola.  - UA here abnormal, culture pending (has been on amoxicillin PTA and culture may not grow anythin)  - continue ceftriaxone  - f/u culture results     Constipation  - continue schedule pericolace and bid prn miralax  - noted Imodium on PTA list (family hasnot given it to her, probably received it while she was at SNF.) advised family not to give her that anymore     Bilateral hygromas versus chronic subdural hematomas  History of NPH s/p  shunt  - neurosurgery eval in the ED, repeat Head CT stable   - discussed with neurosurgery, no antiplat/anticoag.  Will stop PTA ASA at time of discharge  - f/u with neurosurgery in 4-6 weeks with head CT     Normocytic anemia  Drop of 14-9 over the last year  -  recheck hgb 11. Ferritin 174  - Folate 3.9, vitamin b12 534  - start folate supplement  - anemia lab workup     Anxiety/depression:  Concerns about overmedications  - resume lexapro     Hypothyroidism  - TSH Low, FT high  - reduce thyroid from 88 to 75 mcg daily     HTN/HLD  - resume lisinopril. Monitor BP since she is pre-load dependent in the setting of Severe aortic stenosis  - continue with PTA Metoprolol          Disposition PlanDVT Prophylaxis: Pneumatic Compression Devices  Code Status: No CPR- Do NOT Intubate    Disposition: Expected discharge tomorrow pending urine culture results    Son updated by phone.     Heather Odonnell MD  Text Page  (7am to 6pm)    Interval History   Patient unable to tell me where she is. Knows it is 2020.   Denies chest pain, nausea or vomiting. She is afebrile.     -Data reviewed today: I reviewed all new labs and imaging results over the last 24 hours. I personally reviewed the echo  image(s) showing as menitoned above.    Physical Exam   Temp: 97.9  F (36.6  C) Temp src: Oral BP: 134/62 Pulse: 79 Heart Rate: 76 Resp: 16 SpO2: 96 % O2 Device: None (Room air)    Vitals:    07/19/20 1127 07/20/20 0505   Weight: 57.2 kg (126 lb) 57.1 kg (125 lb 12.8 oz)     Vital Signs with Ranges  Temp:  [97.6  F (36.4  C)-98.3  F (36.8  C)] 97.9  F (36.6  C)  Pulse:  [72-79] 79  Heart Rate:  [63-76] 76  Resp:  [16-20] 16  BP: (118-134)/(57-70) 134/62  SpO2:  [92 %-96 %] 96 %  I/O last 3 completed shifts:  In: 160 [P.O.:160]  Out: -     Constitutional: Alert, awake and no apparent distress  Respiratory: Clear to auscultation bilaterally, no wheezing  Cardiovascular: regular rate and rhythm, + mumrur  GI: soft and non-tender  Skin/Integumen: warm and dry  Other:      Medications       atorvastatin  10 mg Oral Daily     cefTRIAXone  1 g Intravenous Q24H     escitalopram  5 mg Oral Daily     levothyroxine  75 mcg Oral Daily     metoprolol succinate ER  25 mg Oral Daily     senna-docusate  1  tablet Oral BID     sodium chloride (PF)  3 mL Intracatheter Q8H       Data   Recent Labs   Lab 07/20/20  0345 07/20/20  0009 07/19/20 2000 07/19/20  1137 07/19/20  1130   WBC 6.6  --   --  6.1 Canceled, Test credited   HGB 11.0*  --   --  8.9* Canceled, Test credited   MCV 96  --   --  98 Canceled, Test credited   *  --   --  107* Canceled, Test credited     --   --  144 Canceled, Test credited   POTASSIUM 3.7  --  3.8 3.0* Canceled, Test credited   CHLORIDE 116*  --   --  118* Canceled, Test credited   CO2 23  --   --  20 Canceled, Test credited   BUN 16  --   --  19 Canceled, Test credited   CR 0.66  --   --  0.79 Canceled, Test credited   ANIONGAP 4  --   --  6 Canceled, Test credited   JAZLYN 8.4*  --   --  6.8* Canceled, Test credited   GLC 91  --   --  123* Canceled, Test credited   ALBUMIN 2.6*  --   --   --   --    PROTTOTAL 5.6*  --   --   --   --    BILITOTAL 0.6  --   --   --   --    ALKPHOS 54  --   --   --   --    ALT 14  --   --   --   --    AST 11  --   --   --   --    TROPI 0.469* 0.470* 0.458* 0.419* Canceled, Test credited       Recent Results (from the past 24 hour(s))   CT Head w/o Contrast    Narrative    CT SCAN OF THE HEAD WITHOUT CONTRAST   7/20/2020 6:41 AM     HISTORY: Intracranial hemorrhage, known, follow-up. Status post   shunt.    TECHNIQUE:  Axial images of the head and coronal reformations without  IV contrast material. Radiation dose for this scan was reduced using  automated exposure control, adjustment of the mA and/or kV according  to patient size, or iterative reconstruction technique.    COMPARISON: CT head dated 7/19/2020.    FINDINGS: There has been no significant change in the bilateral  hypodense cerebral convexity subdural fluid collections measuring up  to 8-9 mm in thickness on the left and 8 mm in thickness on the right.  Mild associated mass effect on the lateral aspect of the cerebral  hemispheres without shift/herniation. There is a right  occipital  approach ventricular shunt catheter again noted, extending through the  right lateral ventricle with tip crossing midline across the septum  pellucidum and terminating in the anterior body of the left lateral  ventricle, unchanged. The ventricles are stable in size and  configuration, without alexandr dilatation or evidence for progressive  ventriculomegaly.    Unchanged bilateral basal ganglia lacunar infarcts. 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 Moderate global brain parenchymal volume loss and mild to  moderate presumed chronic small vessel ischemic disease in the  cerebral white matter, unchanged. Scattered atherosclerotic  calcifications primarily involving the carotid siphons, as before.  Small focus of pneumocephalus along the right frontal region,  unchanged.    Right frontotemporal craniotomy changes again noted. There is  curvilinear ossification deep to the right-sided craniotomy flap that  may represent dystrophic ossification along the dura. This is  unchanged. Visualized aspects of the orbits, paranasal sinuses, and  mastoids demonstrate no acute abnormality.      Impression    IMPRESSION:  1. No change in small bilateral cerebral convexity hypodense subdural  fluid collections with mild associated mass effect but no midline  shift/herniation.  2. Unchanged right occipital approach ventricular shunt catheter with  tip crossing midline and terminating in the anterior aspect of the  body of the left lateral ventricle. The ventricles are stable in size  and configuration.  3. Other stable chronic findings, as detailed in the body of the  report.    ZAYRA LACY MD   Echocardiogram Complete    Narrative    [id number]  [id number]  [id number]  [id number]^CICI^CORINA^TORIBIO           [facility]  Echocardiography Laboratory  [address] [place] [address]  [place] [id number]        Name: RENA YING  MRN: [id number]  : 1928  Study Date: 2020 10:13 AM  Age: 89 yrs  Gender: Female  Patient Location: [facility]  Reason For Study: Syncope and Collapse  Ordering Physician: CORINA BOLANOS  Referring Physician: EDNA CARCAMO  Performed By: Marcelino Singh RDCS     BSA: 1.6 m2  Height: 64 in  Weight: 126 lb  HR: 68  BP: 127/70 mmHg  _____________________________________________________________________________  __        Procedure  Complete Portable Echo Adult. Optison (NDC #[id number]) given intravenously.  _____________________________________________________________________________  __        Interpretation Summary     There is sclerosis, calcification, and restriction of the aortic valve opening  compatible with severe aortic stenosis.  The peak AoV pressure gradient is 82mmHg.  The mean AoV pressure gradient is 52mmHg.  The calculated aortic valve area is 0.5cm2.  The left ventricle is normal in size.  There is moderate concentric left ventricular hypertrophy.  Left ventricular systolic function is normal.  The visual ejection fraction is estimated at 55-60%.     Compared to the prior study, the AS may be slighlty worse.  _____________________________________________________________________________  __        Left Ventricle  The left ventricle is normal in size. There is moderate concentric left  ventricular hypertrophy. Left ventricular systolic function is normal. The  visual ejection fraction is estimated at 55-60%. Grade I or early diastolic  dysfunction. Normal left ventricular wall motion.     Right Ventricle  The right ventricle is normal in structure, function and size.     Atria  The left atrium is mildly dilated. Right atrial size is normal. There is no  atrial shunt seen.     Mitral Valve  There is moderate mitral annular calcification. There is mild (1+) mitral  regurgitation.        Tricuspid Valve  The tricuspid valve is normal in structure and function.  There is trace  tricuspid regurgitation. Right ventricular systolic pressure could not be  approximated due to inadequate tricuspid regurgitation.     Aortic Valve  No aortic regurgitation is present. There is sclerosis, calcification, and  restriction of the aortic valve opening compatible with severe aortic  stenosis. The peak AoV pressure gradient is 82mmHg. The mean AoV pressure  gradient is 52mmHg. The calculated aortic valve area is 0.5cm2.     Pulmonic Valve  The pulmonic valve is not well seen, but is grossly normal. There is trace  pulmonic valvular regurgitation.     Vessels  Normal size aorta.     Pericardium  There is no pericardial effusion. Small left pleural effusion.        Rhythm  Sinus rhythm was noted.  _____________________________________________________________________________  __  MMode/2D Measurements & Calculations  IVSd: 1.2 cm     LVIDd: 4.4 cm  LVIDs: 2.7 cm  LVPWd: 1.0 cm  FS: 39.8 %  LV mass(C)d: 169.1 grams  LV mass(C)dI: 105.2 grams/m2  Ao root diam: 3.4 cm  LA dimension: 2.9 cm  asc Aorta Diam: 3.5 cm  LA/Ao: 0.88  LVOT diam: 1.9 cm  LVOT area: 2.8 cm2  LA Volume (BP): 52.9 ml  LA Volume Index (BP): 32.9 ml/m2  RWT: 0.46           Doppler Measurements & Calculations  MV E max jett: 66.2 cm/sec  MV A max jett: 94.1 cm/sec  MV E/A: 0.70  MV dec time: 0.24 sec  Ao V2 max: 464.2 cm/sec  Ao max P.0 mmHg  Ao V2 mean: 342.2 cm/sec  Ao mean P.3 mmHg  Ao V2 VTI: 140.4 cm  FLIP(I,D): 0.48 cm2  FLIP(V,D): 0.49 cm2  LV V1 max P.7 mmHg  LV V1 max: 82.0 cm/sec  LV V1 VTI: 24.0 cm  SV(LVOT): 66.9 ml  SI(LVOT): 41.6 ml/m2  PA acc time: 0.11 sec  AV Jett Ratio (DI): 0.18  FLIP Index (cm2/m2): 0.30  E/E' av.9  Lateral E/e': 11.8  Medial E/e': 18.0              _____________________________________________________________________________  __        Report approved by: Walter Nicolas 2020 02:30 PM

## 2020-07-20 NOTE — PROVIDER NOTIFICATION
MD Notification    Notified Person: MD    Notified Person Name: Karyn    Notification Date/Time: 7/19/2020    Notification Interaction: Amcom     Purpose of Notification:    Room 504 E.K.    Troponin 0.458. denies chest pain or SOB.    Thanks,  Lety JACOB    Orders Received:    Comments:

## 2020-07-20 NOTE — PLAN OF CARE
Patient alert to self and place, forgetful and pleasant,re orientated throughout the day.  VSS on room air, lungs clear, denies pain.  Neuros intact. Up with 1-2 assist, GB, and walker.  Voided on BSC and has been continent.  No BM, family reports issues with constipation.  Neurosurgery made recommendations for follow up in one month for repeat CT.   Son Osman, and his wife Amanda updated.

## 2020-07-20 NOTE — PROGRESS NOTES
Spiritual Health  55    SH visited Pt per request. Pt was very hard of hearing but she requested prayer.     SH prayed with Pt.     SH will remain available as needed.     Ludmila Schwartz  Chaplain Resident

## 2020-07-20 NOTE — PROGRESS NOTES
[facility]    Neurosurgery Progress Note    Date of Service (when I saw the patient): 07/20/2020     Assessment & Plan   Joelle Freeman is a 89 year old female who was admitted on 7/19/2020. She is S/p  shunt, placed in 2014. She had what sounds like a possible vaso-vagal episode, falling and striking her head. She also has been confused recently. CT scan in the ED showed possible interval development of small bilateral frontal SDHs. Repeat scan this AM stable without signs of worsening hemorrhage or ventriculomegaly. Shunt last known setting 1.0 and follows at [facility]  No anticoagulation on board. Today, she is lying in bed undergoing EEG. States she is feeling well. She remains neuro intact. Will have her follow up in 4-6 weeks with head CT prior. NS will sign off.    Active Problems:    Syncope    Assessment: stable small bilateral cerebral convexity hypodense subdural  fluid collections.  shunt in place with stable ventricles    Plan:   -Follow up 4-6 weeks with head CT prior  -NS will sign off      I have discussed the following assessment and plan with  who is in agreement with initial plan and will follow up with further consultation recommendations.      Robyn GOODE [facility]  [facility]  [address]  [address] [place] [address]    Tel [phone]  Pager [phone]      Interval History   Stable    Physical Exam   Temp: 97.9  F (36.6  C) Temp src: Oral BP: 134/62 Pulse: 79 Heart Rate: 76 Resp: 16 SpO2: 96 % O2 Device: None (Room air)    Vitals:    07/19/20 1127 07/20/20 0505   Weight: 57.2 kg (126 lb) 57.1 kg (125 lb 12.8 oz)     Vital Signs with Ranges  Temp:  [96.7  F (35.9  C)-98.3  F (36.8  C)] 97.9  F (36.6  C)  Pulse:  [67-80] 79  Heart Rate:  [63-80] 76  Resp:  [15-24] 16  BP: (105-134)/(33-70) 134/62  SpO2:  [92 %-96 %] 96 %  I/O last 3 completed shifts:  In: 60 [P.O.:60]  Out: -     Heart Rate: 76, Blood pressure  "134/62, pulse 79, temperature 97.9  F (36.6  C), temperature source Oral, resp. rate 16, height 1.626 m (5' 4\"), weight 57.1 kg (125 lb 12.8 oz), SpO2 96 %.  125 lbs 12.8 oz  HEENT:  Normocephalic, atraumatic.  PERRLA.  EOM s intact.    Neck:  Supple, non-tender, without lymphadenopathy.  Heart:  No peripheral edema  Lungs:  No SOB  Skin:  Warm and dry, good capillary refill.  Extremities: No edema, cyanosis or clubbing.    NEUROLOGICAL EXAMINATION:   Mental status:  Alert and Oriented x 3, speech is fluent.  Cranial nerves:  II-XII intact.   Motor:  FATIMA  Sensation:  intact  Coordination:  Smooth finger to nose testing.     Medications     sodium chloride 50 mL/hr at 07/19/20 2056       atorvastatin  10 mg Oral Daily     cefTRIAXone  1 g Intravenous Q24H     escitalopram  5 mg Oral Daily     levothyroxine  75 mcg Oral Daily     metoprolol succinate ER  25 mg Oral Daily     senna-docusate  1 tablet Oral BID     sodium chloride (PF)  3 mL Intracatheter Q8H       Data   CBC RESULTS:   Recent Labs   Lab Test 07/20/20  0345   WBC 6.6   RBC 3.46*   HGB 11.0*   HCT 33.2*   MCV 96   MCH 31.8   MCHC 33.1   RDW 14.3   *     Basic Metabolic Panel:  Lab Results   Component Value Date     07/20/2020      Lab Results   Component Value Date    POTASSIUM 3.7 07/20/2020     Lab Results   Component Value Date    CHLORIDE 116 07/20/2020     Lab Results   Component Value Date    JAZLYN 8.4 07/20/2020     Lab Results   Component Value Date    CO2 23 07/20/2020     Lab Results   Component Value Date    BUN 16 07/20/2020     Lab Results   Component Value Date    CR 0.66 07/20/2020     Lab Results   Component Value Date    GLC 91 07/20/2020     INR:  Lab Results   Component Value Date    INR 1.07 05/31/2015    INR 1.01 01/20/2015    INR 1.01 11/17/2013    INR 0.98 06/27/2013         "

## 2020-07-20 NOTE — PROGRESS NOTES
07/20/20 1123   Quick Adds   Type of Visit Initial PT Evaluation   Living Environment   Lives With child(irwin), adult   Living Arrangements house   Living Environment Comment Per discussion with RN who spoke with pt's son, pt lives in [place] at baseline although recently moved in with son secondary to COVID outbreak at [facility].    Self-Care   Usual Activity Tolerance moderate   Current Activity Tolerance fair   Regular Exercise No   Equipment Currently Used at Home walker, rolling;wheelchair, manual   Functional Level Prior   Ambulation 3-->assistive equipment and person   Transferring 3-->assistive equipment and person   Fall history within last six months yes   Number of times patient has fallen within last six months 1   Prior Functional Level Comment Pt had difficulty providing subjective history. Pt inconsistently reports use of FWW vs wheelchair.   General Information   Onset of Illness/Injury or Date of Surgery - Date 07/19/20   Referring Physician Rey Georges, DO   Patient/Family Goals Statement None stated.    Pertinent History of Current Problem (include personal factors and/or comorbidities that impact the POC) 89 y/o female admitted after syncopal episode, found to have UTI and troponin elevation with likely type 2 NSTEMI secondary to UTI. PMH including dementia, SAH, SDH, TIA.    Precautions/Limitations fall precautions   General Observations Pt in supine upon arrival of therapist.    General Info Comments Up with assist.    Cognitive Status Examination   Orientation person   Level of Consciousness confused   Follows Commands and Answers Questions 50% of the time   Personal Safety and Judgment at risk behaviors demonstrated   Pain Assessment   Patient Currently in Pain No   Integumentary/Edema   Integumentary/Edema Comments No deficits noted.    Posture    Posture Comments Noted forward head and shoulder posture upon sitting EOB and standing at FWW.    Range of Motion (ROM)   ROM Comment B LEs  "WFL.    Strength   Strength Comments Not formally assessed, pt demonstrates at least 3/5 grossly in B LEs with functional mobility.    Bed Mobility   Bed Mobility Comments Supine-sit with Alisha.    Transfer Skills   Transfer Comments Sit <> stand with FWW and Alisha.    Gait   Gait Comments Pt amb 5' with FWW and Alisha.    Balance   Balance Comments Noted good sitting balance and fair standing balance at FWW.    Sensory Examination   Sensory Perception Comments Pt denied numbness/tingling in B LEs.    General Therapy Interventions   Planned Therapy Interventions balance training;bed mobility training;gait training;ROM;strengthening;transfer training   Clinical Impression   Criteria for Skilled Therapeutic Intervention yes, treatment indicated   PT Diagnosis Difficulty with functional mobility.    Influenced by the following impairments Generalized weakness, Decreased activity tolerance, Impaired balance   Functional limitations due to impairments Limited functional mobility requiring AD and assist.    Clinical Presentation Stable/Uncomplicated   Clinical Presentation Rationale Based on PMH, current presentation, social support.    Clinical Decision Making (Complexity) Low complexity   Therapy Frequency 5x/week   Predicted Duration of Therapy Intervention (days/wks) 3 days   Anticipated Discharge Disposition Home with Assist;Home with Home Therapy   Risk & Benefits of therapy have been explained Yes   Patient, Family & other staff in agreement with plan of care Yes   [facility] [employer] \"6 Clicks\"   2016, Trustees of [facility], under license to [employer].  All rights reserved.   6 Clicks Short Forms Basic Mobility Inpatient Short Form   [facility] [employer]  \"6 Clicks\" V.2 Basic Mobility Inpatient Short Form   1. Turning from your back to your side while in a flat bed without using bedrails? 3 - A Little   2. Moving from lying on your back to sitting on the side of a flat bed without using " bedrails? 3 - A Little   3. Moving to and from a bed to a chair (including a wheelchair)? 3 - A Little   4. Standing up from a chair using your arms (e.g., wheelchair, or bedside chair)? 3 - A Little   5. To walk in hospital room? 2 - A Lot   6. Climbing 3-5 steps with a railing? 1 - Total   Basic Mobility Raw Score (Score out of 24.Lower scores equate to lower levels of function) 15   Total Evaluation Time   Total Evaluation Time (Minutes) 5

## 2020-07-21 LAB
BACTERIA SPEC CULT: ABNORMAL
BACTERIA SPEC CULT: ABNORMAL
SPECIMEN SOURCE: ABNORMAL

## 2020-07-21 PROCEDURE — 25000132 ZZH RX MED GY IP 250 OP 250 PS 637: Mod: GY | Performed by: INTERNAL MEDICINE

## 2020-07-21 PROCEDURE — 12000000 ZZH R&B MED SURG/OB

## 2020-07-21 PROCEDURE — 99239 HOSP IP/OBS DSCHRG MGMT >30: CPT | Performed by: INTERNAL MEDICINE

## 2020-07-21 PROCEDURE — 25000132 ZZH RX MED GY IP 250 OP 250 PS 637: Mod: GY | Performed by: HOSPITALIST

## 2020-07-21 RX ORDER — CEFUROXIME AXETIL 250 MG/1
250 TABLET ORAL EVERY 12 HOURS SCHEDULED
Status: DISCONTINUED | OUTPATIENT
Start: 2020-07-21 | End: 2020-07-22 | Stop reason: HOSPADM

## 2020-07-21 RX ORDER — LEVOTHYROXINE SODIUM 75 UG/1
75 TABLET ORAL DAILY
Qty: 30 TABLET | Refills: 0 | Status: ON HOLD | OUTPATIENT
Start: 2020-07-22 | End: 2021-01-01

## 2020-07-21 RX ORDER — CEFTRIAXONE 1 G/1
1 INJECTION, POWDER, FOR SOLUTION INTRAMUSCULAR; INTRAVENOUS EVERY 24 HOURS
Status: DISCONTINUED | OUTPATIENT
Start: 2020-07-21 | End: 2020-07-21

## 2020-07-21 RX ORDER — AMOXICILLIN 250 MG
1 CAPSULE ORAL 2 TIMES DAILY
Qty: 60 TABLET | Refills: 0 | Status: SHIPPED | OUTPATIENT
Start: 2020-07-21

## 2020-07-21 RX ORDER — FOLIC ACID 1 MG/1
1 TABLET ORAL DAILY
Qty: 30 TABLET | Refills: 0 | Status: SHIPPED | OUTPATIENT
Start: 2020-07-22 | End: 2021-01-01

## 2020-07-21 RX ORDER — CEFUROXIME AXETIL 250 MG/1
250 TABLET ORAL 2 TIMES DAILY
Qty: 10 TABLET | Refills: 0 | Status: SHIPPED | OUTPATIENT
Start: 2020-07-22 | End: 2020-08-02

## 2020-07-21 RX ADMIN — POLYETHYLENE GLYCOL 3350 17 G: 17 POWDER, FOR SOLUTION ORAL at 08:52

## 2020-07-21 RX ADMIN — FOLIC ACID 1 MG: 1 TABLET ORAL at 08:52

## 2020-07-21 RX ADMIN — ESCITALOPRAM 5 MG: 5 TABLET, FILM COATED ORAL at 08:52

## 2020-07-21 RX ADMIN — ATORVASTATIN CALCIUM 10 MG: 10 TABLET, FILM COATED ORAL at 08:52

## 2020-07-21 RX ADMIN — METOPROLOL SUCCINATE 25 MG: 25 TABLET, EXTENDED RELEASE ORAL at 08:52

## 2020-07-21 RX ADMIN — DOCUSATE SODIUM 50 MG AND SENNOSIDES 8.6 MG 1 TABLET: 8.6; 5 TABLET, FILM COATED ORAL at 08:52

## 2020-07-21 RX ADMIN — CEFUROXIME AXETIL 250 MG: 250 TABLET ORAL at 19:50

## 2020-07-21 RX ADMIN — LISINOPRIL 10 MG: 10 TABLET ORAL at 08:52

## 2020-07-21 RX ADMIN — LEVOTHYROXINE SODIUM 75 MCG: 75 TABLET ORAL at 08:52

## 2020-07-21 ASSESSMENT — ACTIVITIES OF DAILY LIVING (ADL)
RETIRED_EATING: 2-->ASSISTIVE PERSON
ADLS_ACUITY_SCORE: 35
COGNITION: 1 - ATTENTION OR MEMORY DEFICITS
SWALLOWING: 0-->SWALLOWS FOODS/LIQUIDS WITHOUT DIFFICULTY
ADLS_ACUITY_SCORE: 35
DRESS: 2-->ASSISTIVE PERSON
FALL_HISTORY_WITHIN_LAST_SIX_MONTHS: YES
RETIRED_COMMUNICATION: 2-->DIFFICULTY UNDERSTANDING (NOT RELATED TO LANGUAGE BARRIER)
ADLS_ACUITY_SCORE: 34
ADLS_ACUITY_SCORE: 34
BATHING: 3-->ASSISTIVE EQUIPMENT AND PERSON
ADLS_ACUITY_SCORE: 35
ADLS_ACUITY_SCORE: 34
TOILETING: 3-->ASSISTIVE EQUIPMENT AND PERSON
NUMBER_OF_TIMES_PATIENT_HAS_FALLEN_WITHIN_LAST_SIX_MONTHS: 1
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
## 2020-07-21 NOTE — PLAN OF CARE
AxO to self, forgetful. VSS, RA. Denies pain. Denies nausea. Neuro's otherwise intact (ex orientation). CMS intact.Takes pills whole. No BM this shift. Adequate UO. Regular diet. Ax1 GB walker. Discharge pending progress.

## 2020-07-21 NOTE — PROVIDER NOTIFICATION
Web paged hospitalist to let her know that son can not pick patient up today. [facility] will transport patient tomorrow at 1100

## 2020-07-21 NOTE — PLAN OF CARE
Discharge    Patient discharged to home with son  Care plan note:  VSS.  No c/o chest pain or SOB.  No c/o pain this shift.  Incontinent of bladder.  BM X1 this shift.  Alert to self and sometimes situation.  Son with pick patient up at 1600.  Belongings were sent home with son earlier in admission.  IV removed.  Will review paperwork with son when he arrives    Listed belongings gathered and returned to patient. Son brought home  Care Plan and Patient education resolved: Yes  Prescriptions if needed, hard copies sent with patient  Yes  Home and hospital acquired medications returned to patient: NA  Medication Bin checked and emptied on discharge Yes  Follow up appointment made for patient: LUCIANO

## 2020-07-21 NOTE — PROGRESS NOTES
Hospitalist Update Note    Notified by RN that patient's son not able to pick patient up today and [facility] transport not able to transport patient until 10pm.  Thus transport has been arranged for 11am tomorrow per RN.  Has not received her ceftriaxone dose earlier today and IV has been removed  Ordered Ceftin 250mg BID to start tonight.     Heather Odonnell MD  Hospitalist

## 2020-07-21 NOTE — CONSULTS
Care Transition Initial Assessment - RN        Spoke with patients azra and POA on the phone today  DATA   Active Problems:    Syncope       Cognitive Status: Pt has dementia.        Contact information and PCP information verified: Yes  Lives With: [facility]; [facility] in [place]  Living Arrangements: [facility] in [place]                 Insurance concerns: No Insurance issues identified  ASSESSMENT  Patient currently receives the following services:  LYN with assist with self care, ADLs, med set up laundry home cleaning        Identified issues/concerns regarding health management: dementia and frequent UTIs, deconditioning with need for PT at home    PLAN  Financial costs for the patient include unknown .  Patient given options and choices for discharge [facility] as this is the agency with [facility] .  Patient/family is agreeable to the plan?  Yes: Azra Brewer is in agreement  Patient anticipates discharging [facility]  .        Patient anticipates needs for home equipment: No  Transportation/person available to transport on day of discharge  [facility] Transportation to  7/22 at 1100  Appointments: Neuro appt and head CT appt       Care  (CTS) will continue to follow as needed.    CECIL Dawson  [facility]  Inpatient Care Coordinator  M: [phone]

## 2020-07-21 NOTE — DISCHARGE SUMMARY
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 Neurosurgery in 4-6 weeks with head   CT prior.  Please call the clinic at [phone] to schedule your   appointment.         Follow-up and recommended labs and tests       Follow up with primary care provider, EDNA CARCAMO, within 7-14 days for   hospital follow- up.  The following labs/tests are recommended: thyroid   function test in 6 weeks.             Unresulted Labs Ordered in the Past 30 Days of this Admission     Date and Time Order Name Status Description    7/19/2020 1938 Blood culture Preliminary           Discharge Disposition   Discharged to assisted living  Condition at discharge: Stable      Hospital Course   Joelle Freeman is a 89 year old female admitted on 7/19/2020.      Syncopal episode likely vasovagal  History of severe aortic stenosis  Was told in the past she was not a candidate for repair of the valve  Family was attempting to manually disimpact her rectum for very hard stool and the patient was being instructed to push very hard.  During this time she had an episode where she became unresponsive and after this EMS was called.  When EMS arrived she was still minimally responsive but with squeezing hands on command. Could have been vasovagal occurred during valsalva. No recurrent syncopal episode in  the hospital. Tele remains in NSR  - TTE shows LVEF 55-60%, severe aortic stenosis slightly worse compared to prior study     Troponin elevation most likely type 2 NSTEMI in the setting of UTI/ vasovagal syncopal event  Demand ischemia most likely given flat troponin. At this point no obvious cardiopulmonary complaints , no hypoxemia  SDH make anticoagulation contraindicated  - Trop 0.419--0.458--0.469  - TTE as above preserved EF, normal wall motion     Urinary tract infection   Started on amoxicillin on Friday, culture from 7/17 shows mixed urogenital fabiola.  - UA here abnormal, culture grew E.coli.   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  Will stop PTA ASA at time of discharge  - f/u with neurosurgery in 4-6 weeks with head CT     Normocytic anemia  Folate deficiency  Drop of 14-9 over the last year  - recheck hgb 11. Ferritin 174  - Folate 3.9, vitamin b12 534  - start folate supplement     Anxiety/depression:  Concerns about overmedications  - resume lexapro     Hypothyroidism  - TSH Low, FT high  - reduce thyroid from 88 to 75 mcg daily  - f/u TSH in 6 weeks     HTN/HLD  - resume PTA Lisinopril and Metoprolol      Osman Harper, updated phone regarding discharge plans. Patient will return to her [facility]  Home RN and PT orders placed        Consultations This Hospital Stay   PHYSICAL THERAPY ADULT IP CONSULT  OCCUPATIONAL THERAPY ADULT IP CONSULT  NEUROSURGERY IP CONSULT    Code Status   No CPR- Do NOT Intubate    Time Spent on this Encounter   I, Heather Odonnell MD, personally saw the patient today and spent 40 minutes discharging this patient.       Heather Odonnell MD  [facility]  Hospital  ______________________________________________________________________    Physical Exam   Vital Signs: Temp: 97.9  F (36.6  C) Temp src: Oral BP: (!) 151/77 Pulse: 60 Heart Rate: 74 Resp: 14 SpO2: 93 % O2 Device: None (Room air)    Weight: 125 lbs 12.8 oz  General Appearance: Alert, awake and no apparent distress  Respiratory: clear to auscultation bilaterally, no wheezing  Cardiovascular: regular rate and rhythm, +murmur  GI: soft and non-tender  Skin: warm and dry         Primary Care Physician   EDNA CARCAMO    Discharge Orders      CT Head w/o contrast*     Home care nursing referral      Home Care PT Referral for Hospital Discharge      Follow-up and recommended labs and tests     Please follow up at [facility] Neurosurgery in 4-6 weeks with head CT prior.  Please call the clinic at [phone] to schedule your appointment.     Follow-up and recommended labs and tests     Follow up with primary care provider, EDNA CARCAMO, within 7-14 days for hospital follow- up.  The following labs/tests are recommended: thyroid function test in 6 weeks.     Activity    Your activity upon discharge: activity as tolerated     Discharge Instructions    Please do not give patient imodium. Hold stool softeners/laxative for loose stools.     MD face to face encounter    Documentation of Face to Face and Certification for Home Health Services    I certify that patient: Joelle Freeman is under my care and that I, or a nurse practitioner or physician's assistant working with me, had a face-to-face encounter that meets the physician face-to-face encounter requirements with this patient on: 7/21/2020.    This encounter with the patient was in whole, or in part, for the following medical condition, which is the primary reason for home health care: urinary tract infection.    I certify that, based on my findings, the following services are medically necessary home health services: Nursing and Physical Therapy.    My  clinical findings support the need for the above services because: Nurse is needed: To assess medication tolerance after changes in medications or other medical regimen.. and Physical Therapy Services are needed to assess and treat the following functional impairments: to improve functional mobility and independce    Further, I certify that my clinical findings support that this patient is homebound (i.e. absences from home require considerable and taxing effort and are for medical reasons or Sh[employer] services or infrequently or of short duration when for other reasons) because: Requires assistance of another person or specialized equipment to access medical services because patient: Is prone to wander/get lost without assistance...    Based on the above findings. I certify that this patient is confined to the home and needs intermittent skilled nursing care, physical therapy and/or speech therapy.  The patient is under my care, and I have initiated the establishment of the plan of care.  This patient will be followed by a physician who will periodically review the plan of care.  Physician/Provider to provide follow up care: Osman Small    Attending hospital physician (the Medicare certified [facility] provider): Heather Odonnell MD  Physician Signature: See electronic signature associated with these discharge orders.  Date: 7/21/2020     No CPR- Do NOT Intubate     Diet    Follow this diet upon discharge: Orders Placed This Encounter      Advance Diet as Tolerated: Regular Diet Adult       Significant Results and Procedures   Most Recent 3 CBC's:  Recent Labs   Lab Test 07/20/20 0345 07/19/20  1137 07/19/20  1130   WBC 6.6 6.1 Canceled, Test credited   HGB 11.0* 8.9* Canceled, Test credited   MCV 96 98 Canceled, Test credited   * 107* Canceled, Test credited     Most Recent 3 BMP's:  Recent Labs   Lab Test 07/20/20  0345 07/19/20 2000 07/19/20  1137 07/19/20  1130     --  144 Canceled, Test  credited   POTASSIUM 3.7 3.8 3.0* Canceled, Test credited   CHLORIDE 116*  --  118* Canceled, Test credited   CO2 23  --  20 Canceled, Test credited   BUN 16  --  19 Canceled, Test credited   CR 0.66  --  0.79 Canceled, Test credited   ANIONGAP 4  --  6 Canceled, Test credited   JAZLYN 8.4*  --  6.8* Canceled, Test credited   GLC 91  --  123* Canceled, Test credited     Most Recent 3 Troponin's:  Recent Labs   Lab Test 07/20/20  0345 07/20/20  0009 07/19/20 2000   TROPI 0.469* 0.470* 0.458*   ,   Results for orders placed or performed during the hospital encounter of 07/19/20   CT Head w/o Contrast     Value    Radiologist flags Bilateral subdural hygromas versus chronic (AA)    Narrative    CT SCAN OF THE HEAD WITHOUT CONTRAST   7/19/2020 12:15 PM     HISTORY: Syncopal episode on toilet. Loss of consciousness.    TECHNIQUE:  Axial images of the head and coronal reformations without  IV contrast material.  Radiation dose for this scan was reduced using  automated exposure control, adjustment of the mA and/or kV according  to patient size, or iterative reconstruction technique.    COMPARISON: 2/29/2020    FINDINGS: There is a right frontal ventricular catheter via a  posterior approach. There is some mild to moderate amount of  ventriculomegaly but there is no dilatation of the temporal horns.  There is no evidence for hydrocephalus. Ventricles are stable in size  There are some bilateral small subdural fluid collections which have  developed from the prior exam. These could be due to chronic subdural  hematomas or hygromas. The right-sided fluid collection measures up to  0.7 cm in thickness and the left-sided fluid collection measures 0.5  cm in thickness. There is no parenchymal hemorrhage. There is no  evidence for acute infarct. The patient has undergone prior  right-sided craniotomy procedure. There are small lacunar infarcts in  the caudate nuclei bilaterally. There is also some mild cerebral and  cerebellar  atrophy. Visualized paranasal sinuses and mastoid air cells  are clear. Vascular calcifications are noted.      Impression    IMPRESSION:  1. Interval development of bilateral subdural fluid collections which  could be due to subdural hygromas or subdural hematomas.  2. Right frontal ventricular catheter. No evidence for hydrocephalus.  3. Cerebral and cerebellar atrophy.  4. Small lacunar infarcts in the caudate nuclei bilaterally.  5.   [Critical Result: Bilateral subdural hygromas versus chronic  hematomas.]    Finding was identified on 7/19/2020 12:20 PM.     Dr. Slaughter was contacted by me on 7/19/2020 12:26 PM and verbalized  understanding of the critical result.     TIO CAIN MD   CT Head w/o Contrast    Narrative    CT SCAN OF THE HEAD WITHOUT CONTRAST   7/20/2020 6:41 AM     HISTORY: Intracranial hemorrhage, known, follow-up. Status post   shunt.    TECHNIQUE:  Axial images of the head and coronal reformations without  IV contrast material. Radiation dose for this scan was reduced using  automated exposure control, adjustment of the mA and/or kV according  to patient size, or iterative reconstruction technique.    COMPARISON: CT head dated 7/19/2020.    FINDINGS: There has been no significant change in the bilateral  hypodense cerebral convexity subdural fluid collections measuring up  to 8-9 mm in thickness on the left and 8 mm in thickness on the right.  Mild associated mass effect on the lateral aspect of the cerebral  hemispheres without shift/herniation. There is a right occipital  approach ventricular shunt catheter again noted, extending through the  right lateral ventricle with tip crossing midline across the septum  pellucidum and terminating in the anterior body of the left lateral  ventricle, unchanged. The ventricles are stable in size and  configuration, without alexandr dilatation or evidence for progressive  ventriculomegaly.    Unchanged bilateral basal ganglia lacunar infarcts. 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 Moderate global brain parenchymal volume loss and mild to  moderate presumed chronic small vessel ischemic disease in the  cerebral white matter, unchanged. Scattered atherosclerotic  calcifications primarily involving the carotid siphons, as before.  Small focus of pneumocephalus along the right frontal region,  unchanged.    Right frontotemporal craniotomy changes again noted. There is  curvilinear ossification deep to the right-sided craniotomy flap that  may represent dystrophic ossification along the dura. This is  unchanged. Visualized aspects of the orbits, paranasal sinuses, and  mastoids demonstrate no acute abnormality.      Impression    IMPRESSION:  1. No change in small bilateral cerebral convexity hypodense subdural  fluid collections with mild associated mass effect but no midline  shift/herniation.  2. Unchanged right occipital approach ventricular shunt catheter with  tip crossing midline and terminating in the anterior aspect of the  body of the left lateral ventricle. The ventricles are stable in size  and configuration.  3. Other stable chronic findings, as detailed in the body of the  report.    ZAYRA LACY MD   Echocardiogram Complete    Narrative    [id number]  [id number]  [id number]  [id number]^CICI^CORINA^[facility]  Echocardiography Laboratory  [address] [place] [address]        Name: RENA YING  MRN: [id number]  : 1928  Study Date: 2020 10:13 AM  Age: 89 yrs  Gender: Female  Patient Location: [facility]  Reason For Study: Syncope and Collapse  Ordering Physician: CORINA BOLANOS  Referring Physician: EDNA CARCAMO  Performed By: Marcelino Singh RDCS     BSA: 1.6 m2  Height: 64 in  Weight: 126 lb  HR: 68  BP: 127/70  mmHg  _____________________________________________________________________________  __        Procedure  Complete Portable Echo Adult. Optison (NDC #[id number]) given intravenously.  _____________________________________________________________________________  __        Interpretation Summary     There is sclerosis, calcification, and restriction of the aortic valve opening  compatible with severe aortic stenosis.  The peak AoV pressure gradient is 82mmHg.  The mean AoV pressure gradient is 52mmHg.  The calculated aortic valve area is 0.5cm2.  The left ventricle is normal in size.  There is moderate concentric left ventricular hypertrophy.  Left ventricular systolic function is normal.  The visual ejection fraction is estimated at 55-60%.     Compared to the prior study, the AS may be slighlty worse.  _____________________________________________________________________________  __        Left Ventricle  The left ventricle is normal in size. There is moderate concentric left  ventricular hypertrophy. Left ventricular systolic function is normal. The  visual ejection fraction is estimated at 55-60%. Grade I or early diastolic  dysfunction. Normal left ventricular wall motion.     Right Ventricle  The right ventricle is normal in structure, function and size.     Atria  The left atrium is mildly dilated. Right atrial size is normal. There is no  atrial shunt seen.     Mitral Valve  There is moderate mitral annular calcification. There is mild (1+) mitral  regurgitation.        Tricuspid Valve  The tricuspid valve is normal in structure and function. There is trace  tricuspid regurgitation. Right ventricular systolic pressure could not be  approximated due to inadequate tricuspid regurgitation.     Aortic Valve  No aortic regurgitation is present. There is sclerosis, calcification, and  restriction of the aortic valve opening compatible with severe aortic  stenosis. The peak AoV pressure gradient is 82mmHg. The mean  AoV pressure  gradient is 52mmHg. The calculated aortic valve area is 0.5cm2.     Pulmonic Valve  The pulmonic valve is not well seen, but is grossly normal. There is trace  pulmonic valvular regurgitation.     Vessels  Normal size aorta.     Pericardium  There is no pericardial effusion. Small left pleural effusion.        Rhythm  Sinus rhythm was noted.  _____________________________________________________________________________  __  MMode/2D Measurements & Calculations  IVSd: 1.2 cm     LVIDd: 4.4 cm  LVIDs: 2.7 cm  LVPWd: 1.0 cm  FS: 39.8 %  LV mass(C)d: 169.1 grams  LV mass(C)dI: 105.2 grams/m2  Ao root diam: 3.4 cm  LA dimension: 2.9 cm  asc Aorta Diam: 3.5 cm  LA/Ao: 0.88  LVOT diam: 1.9 cm  LVOT area: 2.8 cm2  LA Volume (BP): 52.9 ml  LA Volume Index (BP): 32.9 ml/m2  RWT: 0.46           Doppler Measurements & Calculations  MV E max jett: 66.2 cm/sec  MV A max jett: 94.1 cm/sec  MV E/A: 0.70  MV dec time: 0.24 sec  Ao V2 max: 464.2 cm/sec  Ao max P.0 mmHg  Ao V2 mean: 342.2 cm/sec  Ao mean P.3 mmHg  Ao V2 VTI: 140.4 cm  FLIP(I,D): 0.48 cm2  FLIP(V,D): 0.49 cm2  LV V1 max P.7 mmHg  LV V1 max: 82.0 cm/sec  LV V1 VTI: 24.0 cm  SV(LVOT): 66.9 ml  SI(LVOT): 41.6 ml/m2  PA acc time: 0.11 sec  AV Jett Ratio (DI): 0.18  FLIP Index (cm2/m2): 0.30  E/E' av.9  Lateral E/e': 11.8  Medial E/e': 18.0              _____________________________________________________________________________  __        Report approved by: Walter Nicolas 2020 02:30 PM            Discharge Medications   Current Discharge Medication List      START taking these medications    Details   cefuroxime (CEFTIN) 250 MG tablet Take 1 tablet (250 mg) by mouth 2 times daily for 5 days  Qty: 10 tablet, Refills: 0    Associated Diagnoses: Acute cystitis without hematuria      folic acid (FOLVITE) 1 MG tablet Take 1 tablet (1 mg) by mouth daily  Qty: 30 tablet, Refills: 0    Associated Diagnoses: Folate deficiency         CONTINUE  these medications which have CHANGED    Details   levothyroxine (SYNTHROID/LEVOTHROID) 75 MCG tablet Take 1 tablet (75 mcg) by mouth daily  Qty: 30 tablet, Refills: 0    Comments: Future refills by PCP Dr. EDNA CARCAMO with phone number [phone].  Associated Diagnoses: Hypothyroidism, unspecified type      senna-docusate (SENOKOT-S/PERICOLACE) 8.6-50 MG tablet Take 1 tablet by mouth 2 times daily  Qty: 60 tablet, Refills: 0    Associated Diagnoses: Slow transit constipation         CONTINUE these medications which have NOT CHANGED    Details   ACETAMINOPHEN PO Take 650 mg by mouth every 6 hours as needed for pain       atorvastatin (LIPITOR) 10 MG tablet Take 1 tablet (10 mg) by mouth daily  Qty: 30 tablet, Refills: 1    Associated Diagnoses: Hyperlipidemia      cholecalciferol 1000 UNITS TABS Take 1,000 Units by mouth daily  Qty: 30 tablet, Refills: 1    Associated Diagnoses: Vitamin D deficiency      escitalopram (LEXAPRO) 5 MG tablet Take 5 mg by mouth daily      lisinopril (PRINIVIL/ZESTRIL) 10 MG tablet Take 10 mg by mouth daily      melatonin 3 MG tablet Take 1 mg by mouth nightly as needed for sleep      metoprolol succinate ER (TOPROL-XL) 25 MG 24 hr tablet Take 25 mg by mouth daily      nystatin (MYCOSTATIN) 289310 UNIT/GM external powder Apply topically 2 times daily as needed (intertriginal dermatitis)      zinc oxide 16 % (BOUDREAUXS BUTT PASTE) PSTE paste Apply topically as needed for irritation (red buttocks)         STOP taking these medications       aspirin EC 81 MG EC tablet Comments:   Reason for Stopping:         docusate sodium (COLACE) 100 MG capsule Comments:   Reason for Stopping:         loperamide (IMODIUM A-D) 2 MG tablet Comments:   Reason for Stopping:             Allergies   Allergies   Allergen Reactions     Tramadol

## 2020-07-21 NOTE — PLAN OF CARE
Pt here with syncope. A&O x2. Neuros intact. VSS.  regular diet, thin liquids. Takes pills whole. Up with sba walker gaitbelt. Denies pain. Pt scoring green on the Aggression Stop Light Tool. Plan home PT. Discharge back home with soon .

## 2020-07-21 NOTE — PLAN OF CARE
"OT: attempted to see patient, patient sound asleep, awoken briefly to voice, \"I'm always sleepy\". Unable to sustain attention or remain alert to participate.  "

## 2020-07-22 VITALS
RESPIRATION RATE: 16 BRPM | BODY MASS INDEX: 21.48 KG/M2 | DIASTOLIC BLOOD PRESSURE: 66 MMHG | SYSTOLIC BLOOD PRESSURE: 165 MMHG | HEART RATE: 67 BPM | TEMPERATURE: 98 F | HEIGHT: 64 IN | WEIGHT: 125.8 LBS | OXYGEN SATURATION: 93 %

## 2020-07-22 PROCEDURE — 25000132 ZZH RX MED GY IP 250 OP 250 PS 637: Mod: GY | Performed by: HOSPITALIST

## 2020-07-22 PROCEDURE — 25000132 ZZH RX MED GY IP 250 OP 250 PS 637: Mod: GY | Performed by: INTERNAL MEDICINE

## 2020-07-22 RX ADMIN — ESCITALOPRAM 5 MG: 5 TABLET, FILM COATED ORAL at 08:26

## 2020-07-22 RX ADMIN — FOLIC ACID 1 MG: 1 TABLET ORAL at 08:25

## 2020-07-22 RX ADMIN — ATORVASTATIN CALCIUM 10 MG: 10 TABLET, FILM COATED ORAL at 08:26

## 2020-07-22 RX ADMIN — METOPROLOL SUCCINATE 25 MG: 25 TABLET, EXTENDED RELEASE ORAL at 08:25

## 2020-07-22 RX ADMIN — CEFUROXIME AXETIL 250 MG: 250 TABLET ORAL at 08:26

## 2020-07-22 RX ADMIN — LISINOPRIL 10 MG: 10 TABLET ORAL at 08:25

## 2020-07-22 RX ADMIN — LEVOTHYROXINE SODIUM 75 MCG: 75 TABLET ORAL at 08:32

## 2020-07-22 ASSESSMENT — ACTIVITIES OF DAILY LIVING (ADL)
ADLS_ACUITY_SCORE: 35
ADLS_ACUITY_SCORE: 31
ADLS_ACUITY_SCORE: 31

## 2020-07-22 NOTE — PLAN OF CARE
[id number] Pt A/Ox1, follows commands.  VSS on RA. Neuros intact. Incontinent of bladder. Turned and repositioned q2hrs. Up w/ Ax2 and walker to [facility]. Discharge this morning.

## 2020-07-22 NOTE — PLAN OF CARE
PT-  Pt discharged to [facility] prior to PT appt.  PT goals not met.  Pt will have home PT.

## 2020-07-22 NOTE — PLAN OF CARE
VSS except BP elevated, within parameters. RA. A&Ox3, forgetful, very [place]. Denies pain. Neuros intact except confusion, slow and deliberate finger to nose and heel to shin. Tolerated breakfast. Up Ax1. Instructions given to pt for discharge with meds, going back to [facility] via [place]. Attempted to call son since patient requested to go over discharge with him but unable to reach him.

## 2020-07-22 NOTE — PLAN OF CARE
Occupational Therapy Discharge Summary    Reason for therapy discharge:    Discharged to [facility]    Progress towards therapy goal(s). See goals on Care Plan in [facility] electronic health record for goal details.  Goals partially met.  Barriers to achieving goals:   discharge from facility.    Therapy recommendation(s):    Recommended Home with 24 hour supervision versus TCU; patient would benefit from supervision and assist with ADLs

## 2020-07-22 NOTE — PLAN OF CARE
Alert and oriented x 1. Denies pain. Incontinent of bladder . Up with assist of 1. Plan to discharge tomorrow morning.

## 2020-07-25 LAB
BACTERIA SPEC CULT: NO GROWTH
SPECIMEN SOURCE: NORMAL

## 2020-08-02 ENCOUNTER — HOSPITAL ENCOUNTER (OUTPATIENT)
Facility: CLINIC | Age: 85
Setting detail: OBSERVATION
Discharge: NURSING, REHAB OR LONG-TERM CARE | End: 2020-08-05
Attending: EMERGENCY MEDICINE | Admitting: INTERNAL MEDICINE
Payer: MEDICARE

## 2020-08-02 ENCOUNTER — APPOINTMENT (OUTPATIENT)
Dept: GENERAL RADIOLOGY | Facility: CLINIC | Age: 85
End: 2020-08-02
Attending: EMERGENCY MEDICINE
Payer: MEDICARE

## 2020-08-02 ENCOUNTER — APPOINTMENT (OUTPATIENT)
Dept: CT IMAGING | Facility: CLINIC | Age: 85
End: 2020-08-02
Attending: EMERGENCY MEDICINE
Payer: MEDICARE

## 2020-08-02 DIAGNOSIS — R79.89 ELEVATED TROPONIN: ICD-10-CM

## 2020-08-02 DIAGNOSIS — I44.7 LEFT BUNDLE BRANCH BLOCK: ICD-10-CM

## 2020-08-02 DIAGNOSIS — G93.40 ACUTE ENCEPHALOPATHY: ICD-10-CM

## 2020-08-02 DIAGNOSIS — N39.0 URINARY TRACT INFECTION WITHOUT HEMATURIA, SITE UNSPECIFIED: ICD-10-CM

## 2020-08-02 DIAGNOSIS — N39.0 RECURRENT UTI: Primary | ICD-10-CM

## 2020-08-02 LAB
ALBUMIN SERPL-MCNC: 3.9 G/DL (ref 3.4–5)
ALP SERPL-CCNC: 88 U/L (ref 40–150)
ALT SERPL W P-5'-P-CCNC: 14 U/L (ref 0–50)
ANION GAP SERPL CALCULATED.3IONS-SCNC: 8 MMOL/L (ref 3–14)
AST SERPL W P-5'-P-CCNC: 15 U/L (ref 0–45)
BASOPHILS # BLD AUTO: 0 10E9/L (ref 0–0.2)
BASOPHILS NFR BLD AUTO: 0.2 %
BILIRUB DIRECT SERPL-MCNC: 0.2 MG/DL (ref 0–0.2)
BILIRUB SERPL-MCNC: 1 MG/DL (ref 0.2–1.3)
BUN SERPL-MCNC: 13 MG/DL (ref 7–30)
CALCIUM SERPL-MCNC: 9.4 MG/DL (ref 8.5–10.1)
CHLORIDE SERPL-SCNC: 106 MMOL/L (ref 94–109)
CO2 SERPL-SCNC: 23 MMOL/L (ref 20–32)
CREAT SERPL-MCNC: 0.7 MG/DL (ref 0.52–1.04)
DIFFERENTIAL METHOD BLD: ABNORMAL
EOSINOPHIL # BLD AUTO: 0 10E9/L (ref 0–0.7)
EOSINOPHIL NFR BLD AUTO: 0.1 %
ERYTHROCYTE [DISTWIDTH] IN BLOOD BY AUTOMATED COUNT: 13.8 % (ref 10–15)
GFR SERPL CREATININE-BSD FRML MDRD: 75 ML/MIN/{1.73_M2}
GLUCOSE SERPL-MCNC: 117 MG/DL (ref 70–99)
HCT VFR BLD AUTO: 41.7 % (ref 35–47)
HGB BLD-MCNC: 14.1 G/DL (ref 11.7–15.7)
IMM GRANULOCYTES # BLD: 0 10E9/L (ref 0–0.4)
IMM GRANULOCYTES NFR BLD: 0.4 %
INTERPRETATION ECG - MUSE: NORMAL
LIPASE SERPL-CCNC: 168 U/L (ref 73–393)
LYMPHOCYTES # BLD AUTO: 2 10E9/L (ref 0.8–5.3)
LYMPHOCYTES NFR BLD AUTO: 17.6 %
MCH RBC QN AUTO: 31.8 PG (ref 26.5–33)
MCHC RBC AUTO-ENTMCNC: 33.8 G/DL (ref 31.5–36.5)
MCV RBC AUTO: 94 FL (ref 78–100)
MONOCYTES # BLD AUTO: 0.9 10E9/L (ref 0–1.3)
MONOCYTES NFR BLD AUTO: 7.7 %
NEUTROPHILS # BLD AUTO: 8.4 10E9/L (ref 1.6–8.3)
NEUTROPHILS NFR BLD AUTO: 74 %
NRBC # BLD AUTO: 0 10*3/UL
NRBC BLD AUTO-RTO: 0 /100
PLATELET # BLD AUTO: 203 10E9/L (ref 150–450)
POTASSIUM SERPL-SCNC: 3.5 MMOL/L (ref 3.4–5.3)
PROT SERPL-MCNC: 7.7 G/DL (ref 6.8–8.8)
RBC # BLD AUTO: 4.43 10E12/L (ref 3.8–5.2)
SODIUM SERPL-SCNC: 137 MMOL/L (ref 133–144)
TROPONIN I SERPL-MCNC: 0.37 UG/L (ref 0–0.04)
WBC # BLD AUTO: 11.4 10E9/L (ref 4–11)

## 2020-08-02 PROCEDURE — 83690 ASSAY OF LIPASE: CPT | Performed by: EMERGENCY MEDICINE

## 2020-08-02 PROCEDURE — 99285 EMERGENCY DEPT VISIT HI MDM: CPT | Mod: 25

## 2020-08-02 PROCEDURE — C9803 HOPD COVID-19 SPEC COLLECT: HCPCS

## 2020-08-02 PROCEDURE — 36415 COLL VENOUS BLD VENIPUNCTURE: CPT | Performed by: INTERNAL MEDICINE

## 2020-08-02 PROCEDURE — 72125 CT NECK SPINE W/O DYE: CPT

## 2020-08-02 PROCEDURE — 84484 ASSAY OF TROPONIN QUANT: CPT | Performed by: EMERGENCY MEDICINE

## 2020-08-02 PROCEDURE — 80076 HEPATIC FUNCTION PANEL: CPT | Performed by: EMERGENCY MEDICINE

## 2020-08-02 PROCEDURE — G0378 HOSPITAL OBSERVATION PER HR: HCPCS

## 2020-08-02 PROCEDURE — 80048 BASIC METABOLIC PNL TOTAL CA: CPT | Performed by: EMERGENCY MEDICINE

## 2020-08-02 PROCEDURE — 84484 ASSAY OF TROPONIN QUANT: CPT | Performed by: INTERNAL MEDICINE

## 2020-08-02 PROCEDURE — 70450 CT HEAD/BRAIN W/O DYE: CPT

## 2020-08-02 PROCEDURE — U0003 INFECTIOUS AGENT DETECTION BY NUCLEIC ACID (DNA OR RNA); SEVERE ACUTE RESPIRATORY SYNDROME CORONAVIRUS 2 (SARS-COV-2) (CORONAVIRUS DISEASE [COVID-19]), AMPLIFIED PROBE TECHNIQUE, MAKING USE OF HIGH THROUGHPUT TECHNOLOGIES AS DESCRIBED BY CMS-2020-01-R: HCPCS | Performed by: EMERGENCY MEDICINE

## 2020-08-02 PROCEDURE — 71046 X-RAY EXAM CHEST 2 VIEWS: CPT

## 2020-08-02 PROCEDURE — 73130 X-RAY EXAM OF HAND: CPT | Mod: RT

## 2020-08-02 PROCEDURE — 72170 X-RAY EXAM OF PELVIS: CPT

## 2020-08-02 PROCEDURE — 93005 ELECTROCARDIOGRAM TRACING: CPT

## 2020-08-02 PROCEDURE — 85025 COMPLETE CBC W/AUTO DIFF WBC: CPT | Performed by: EMERGENCY MEDICINE

## 2020-08-02 PROCEDURE — 99220 ZZC INITIAL OBSERVATION CARE,LEVL III: CPT | Performed by: INTERNAL MEDICINE

## 2020-08-02 PROCEDURE — 25000132 ZZH RX MED GY IP 250 OP 250 PS 637: Mod: GY | Performed by: INTERNAL MEDICINE

## 2020-08-02 RX ORDER — LISINOPRIL 10 MG/1
10 TABLET ORAL DAILY
Status: DISCONTINUED | OUTPATIENT
Start: 2020-08-03 | End: 2020-08-05 | Stop reason: HOSPADM

## 2020-08-02 RX ORDER — LIDOCAINE 40 MG/G
CREAM TOPICAL
Status: DISCONTINUED | OUTPATIENT
Start: 2020-08-02 | End: 2020-08-05 | Stop reason: HOSPADM

## 2020-08-02 RX ORDER — ONDANSETRON 4 MG/1
4 TABLET, ORALLY DISINTEGRATING ORAL EVERY 6 HOURS PRN
Status: DISCONTINUED | OUTPATIENT
Start: 2020-08-02 | End: 2020-08-02

## 2020-08-02 RX ORDER — AMOXICILLIN 250 MG
1 CAPSULE ORAL 2 TIMES DAILY
Status: DISCONTINUED | OUTPATIENT
Start: 2020-08-02 | End: 2020-08-05 | Stop reason: HOSPADM

## 2020-08-02 RX ORDER — NALOXONE HYDROCHLORIDE 0.4 MG/ML
.1-.4 INJECTION, SOLUTION INTRAMUSCULAR; INTRAVENOUS; SUBCUTANEOUS
Status: DISCONTINUED | OUTPATIENT
Start: 2020-08-02 | End: 2020-08-05 | Stop reason: HOSPADM

## 2020-08-02 RX ORDER — ACETAMINOPHEN 325 MG/1
650 TABLET ORAL EVERY 4 HOURS PRN
Status: DISCONTINUED | OUTPATIENT
Start: 2020-08-02 | End: 2020-08-05 | Stop reason: HOSPADM

## 2020-08-02 RX ORDER — LEVOTHYROXINE SODIUM 75 UG/1
75 TABLET ORAL DAILY
Status: DISCONTINUED | OUTPATIENT
Start: 2020-08-03 | End: 2020-08-05 | Stop reason: HOSPADM

## 2020-08-02 RX ORDER — METOPROLOL SUCCINATE 25 MG/1
25 TABLET, EXTENDED RELEASE ORAL DAILY
Status: DISCONTINUED | OUTPATIENT
Start: 2020-08-03 | End: 2020-08-05 | Stop reason: HOSPADM

## 2020-08-02 RX ORDER — ESCITALOPRAM OXALATE 5 MG/1
5 TABLET ORAL DAILY
Status: DISCONTINUED | OUTPATIENT
Start: 2020-08-03 | End: 2020-08-05 | Stop reason: HOSPADM

## 2020-08-02 RX ORDER — ACETAMINOPHEN 650 MG/1
650 SUPPOSITORY RECTAL EVERY 4 HOURS PRN
Status: DISCONTINUED | OUTPATIENT
Start: 2020-08-02 | End: 2020-08-05 | Stop reason: HOSPADM

## 2020-08-02 RX ORDER — ONDANSETRON 2 MG/ML
4 INJECTION INTRAMUSCULAR; INTRAVENOUS EVERY 6 HOURS PRN
Status: DISCONTINUED | OUTPATIENT
Start: 2020-08-02 | End: 2020-08-02

## 2020-08-02 RX ADMIN — DOCUSATE SODIUM 50 MG AND SENNOSIDES 8.6 MG 1 TABLET: 8.6; 5 TABLET, FILM COATED ORAL at 22:50

## 2020-08-02 ASSESSMENT — ENCOUNTER SYMPTOMS
ABDOMINAL PAIN: 0
HEADACHES: 0
DYSURIA: 0
COLOR CHANGE: 1

## 2020-08-02 NOTE — ED NOTES
Bed: ED06  Expected date: 8/2/20  Expected time: 6:18 PM  Means of arrival: Ambulance  Comments:  [facility] 82f fall ETA 1828

## 2020-08-02 NOTE — ED PROVIDER NOTES
History     Chief Complaint:  Fall    History limited by: dementia.      Joelle Freeman is a 89 year old female, accompanied by her son, with a history of dementia, CAD, hypertension, TIA, and CVA, among others, who presents via EMS with injuries sustained from two unwitnessed falls today. Per staff at her care facility, she was found prone after one fall and supine after the other. They noted bruising on her right forehead and right hand. Upon evaluation the patient denied any headache, dysuria, or pain. Her son stated Joelle has been going to physical therapy and can walk with a walker but has fallen before. He believes she is a bit more confused than her baseline although he hasn't seen her in a few days.    Allergies:  Tramadol     Medications:    Lipitor  Ceftin  Levothyroxine  Senna-docusate  Lexapro  Lisinopril  Toprol    Past Medical History:    Asthma  Anxiety  Macular puckering of retina  Tear film insufficiency  Hypothyroidism  Ataxia   Leukocytosis  Anemia   CAD  Cerebral ventriculomegaly   CVA  Dementia   Depression  Diverticulitis  Dyslipidemia  Heterozygous factor V Leiden mutation  Hypertension  Subdural hematoma  Subarachnoid hemorrhage  TIA  Urinary incontinence  UTI  Posterior reversible encephalopathy syndrome     Past Surgical History:    Appendectomy  Stent placement  Cholecystectomy   Tonsillectomy  Bladder suspension  D & C  Oophorectomy   Hernia repair  Hysterectomy  Lumbar puncture  Reduction internal fixation hip nailing  Implant shunt ventriculoperitoneal  Holden teeth extraction     Family History:    Cerebrovascular disease - mother, father  Blood disease - son    Social History:  Patient accompanied by her son Osman.  Smoking status: no  Smokeless tobacco: no  Alcohol use: no  Drug use: no  Marital Status:   [5]     Review of Systems   Unable to perform ROS: Dementia   Gastrointestinal: Negative for abdominal pain.   Genitourinary: Negative for dysuria.   Musculoskeletal:  "Positive for gait problem.   Skin: Positive for color change.   Neurological: Negative for headaches.   All other systems reviewed and are negative.    Physical Exam     Patient Vitals for the past 24 hrs:   BP Temp Temp src Pulse Heart Rate Resp SpO2   08/02/20 2244 (!) 156/78 -- -- -- 74 18 96 %   08/02/20 2207 (!) 153/78 -- -- -- -- -- 96 %   08/02/20 1833 (!) 152/71 98.4  F (36.9  C) Oral 82 -- 18 96 %       Physical Exam  General: Patient is alert, awake. Baseline dementia   Head: bruising to the right side of the forehead .   Eyes: The pupils are equal, round, and reactive to light. Conjunctivae and sclerae are normal  ENT: No hemotympanum or signs basilar skull fracture. The oropharynx is normal without erythema. No tenderness to palpation of the face, nose or jaw.   Neck: Normal range of motion. No cervical midline tenderness.   CV: Regular rate. S1/S2. No murmurs.   Resp: Lungs are clear without wheezes or rales. No distress. No crepitance.   GI: Abdomen is soft, no rigidity, guarding, or rebound. No contusion. No distension. No tenderness to palpation in any quadrant.     MS: Normal tone. Joints grossly normal without effusions. No asymmetric leg swelling, calf or thigh tenderness. Normal motor assessment of all extremities. PROM performed of all major joints without pain. No C/T/L tenderness in midline  Skin: mild bruising over the fifth right metacarpal  Neuro: oriented to person only. Knows her birthday.  CN\"s II-XII intact. Speech is sparse but clear when speaking no slurred of speech. Face is symmetric. Strength is normal and symmetric.   Psych: flat affect    Emergency Department Course   ECG (18:39:42):  Rate 81 bpm. NC interval 214. QRS duration 144. QT/QTc 444/515. P-R-T axes 11 -54 131. Sinus rhythm with 1st degree AV block. Left axis deviation. Left bundle branch block (new from previous EKG 19-JUL-2020). Abnormal ECG. Interpreted at 1858 by Cameron Franco " MD.    Imaging:  Radiology findings were communicated with the patient and son who voiced understanding of the findings.    XR Chest 2 Views  IMPRESSION:   1. Stable mild cardiomegaly, mitral valve annular calcifications,  right chest wall, neck and upper abdominal peritoneal shunt tubing,  and thoracic aortic calcifications.  2. Question subtle infiltrate projected over the posterior bases on  the lateral view which is likely artifactual due to technique.  Recommend clinical correlation.  3. No other significant changes. No definite fracture or other acute  traumatic injury to the chest is identified.  As read by Radiology.    XR Pelvis 1/2 Views  IMPRESSION: Osteopenia. Internal fixation hardware right hip and proximal femur. No evidence for acute fracture. Catheter overlying the left iliac bone. Vascular calcifications. Tiny opaque foreign bodies overlying the symphysis pubis.  As read by Radiology.    XR Hand Right G/E 3 Views  IMPRESSION:   1.  No evidence of acute fracture or subluxation. Diffuse osteopenia. Scattered at least moderate DIP degenerative changes. Mild widening of the scapholunate interval suggesting scapholunate ligament tear. Chondrocalcinosis of the triangular   fibrocartilage.  As read by Radiology.    Head CT w/o Contrast  IMPRESSION: Stable. No acute bleed identified. No skull fractures are  seen.  As read by Radiology.    CT Cervical Spine w/o Contrast  IMPRESSION:    1. No fractures are identified.  2. Multilevel degenerative changes.  As read by Radiology.    Laboratory:  Laboratory findings were communicated with the patient and son who voiced understanding of the findings.    UA with micro: Pending    Hepatic panel: WNL    Lipase 1912: 168    Troponin I 1912: 0.367(HH)    BMP: Glucose 117(H), o/w WNL (Creatinine 0.70)    CBC: WBC 11.4(H), o/w WNL (HGB 14.1, )     Asymptomatic Covid-19 PCR: Pending    Emergency Department Course:  Past medical records, nursing notes, and vitals  reviewed.  : Patient arrived via EMS.    : I performed an exam of the patient and obtained history, as documented above.     EKG was obtained and reviewed in the emergency department.    IV inserted and blood drawn.    Urinalysis and culture obtained and sent for evaluation.    The patient was sent for a XR x3 and CT x2 while in the emergency department, results above.     : I spoke with Dr. Ybarra of cardiology.    : I rechecked the patient. I reviewed the results with the Patient and son and answered all related questions prior to admission.     Findings and plan explained to the Patient and son who consents to admission. Discussed the patient with Dr. Dhaliwal, who will admit the patient to a obs bed for further monitoring, evaluation, and treatment.    Impression & Plan   Medical Decision Makin-year-old woman with past medical history of dementia, coronary artery disease, hypertension, CVA, severe aortic stenosis, normal pressure hydrocephalus with shunt placement and anemia who presents from her facility with 2 unwitnessed falls today.  On initial evaluation she is hemodynamically stable some mild hypertension but otherwise is well-appearing.  She is afebrile and oxygenating well on room air.  On exam she does have a hematoma to the right side of her forehead and some bruising to her right hand.  No other acute signs of trauma.  CT of the head does not reveal any acute intracranial hemorrhage however she does have some chronic hygromas versus subdurals which are largely unchanged.  Ventricles are also largely unchanged with shunt in place.  The rest of her trauma work-up including x-rays were negative.  Patient does suffer from frequent falls and ataxia so this may be falls from her underlying chronic issues however I did perform a work-up given the unclear nature.  EKG was obtained which shows a new left bundle branch block which is changed from previous.  She does have a left anterior  fascicular block on previous EKGs with T wave changes however this bundle amy block is new. Troponin was obtained and was elevated however she does have a chronically elevated troponin today is actually lower than her previous values.  She does not describe any active ongoing chest pain and given her dementia cannot give good history about chest pain earlier today.  I did discuss case with cardiology and we agree that this is unlikely to represent acute coronary syndrome however she should be admitted for further observation and possible echo.  I will not heparinize the patient as my suspicion for acute ACS is low.  I did discuss with the family that she does have some EKG changes.  However we are in agreement that if her troponin were to continue to rise or she had an abnormal echo demonstrating a wall motion abnormality that we would not pursue catheterization.  Additionally the patient does have some severe aortic stenosis and may be having some syncopal episodes due to this.  Again I do not believe that she be a good candidate for a valve repair and the family is in agreement with this as well.  Patient remained hemodynamically stable while under my care and will be admitted to the hospital in stable condition.    Covid-19  Joelle Freeman was evaluated during a global COVID-19 pandemic, which necessitated consideration that the patient might be at risk for infection with the SARS-CoV-2 virus that causes COVID-19.   Applicable protocols for evaluation were followed during the patient's care.   COVID-19 was considered as part of the patient's evaluation. The plan for testing is:  a test was obtained during this visit.    Diagnosis:    ICD-10-CM   1. Left bundle branch block  I44.7   2. Elevated troponin  R79.89     Disposition:  Admitted to obs.    Fran Lima  8/2/2020    EMERGENCY DEPARTMENT    Scribe Disclosure:  Fran ALONSO, am serving as a scribe at 6:38 PM on 8/2/2020 to document  services personally performed by Cameron Franco MD based on my observations and the provider's statements to me.        Cameron Franco MD  08/03/20 0019

## 2020-08-02 NOTE — ED TRIAGE NOTES
Pt BIBA after having 2 unwitnessed falls today. One fall she was found prone and one supine. Bruising noted to R side of head and R pinky knuckle bruising. Pt A&Ox1 at baseline per care facility. Pt denies any pain at this time.

## 2020-08-03 ENCOUNTER — APPOINTMENT (OUTPATIENT)
Dept: PHYSICAL THERAPY | Facility: CLINIC | Age: 85
End: 2020-08-03
Attending: INTERNAL MEDICINE
Payer: MEDICARE

## 2020-08-03 LAB
ALBUMIN UR-MCNC: 10 MG/DL
APPEARANCE UR: CLEAR
BACTERIA #/AREA URNS HPF: ABNORMAL /HPF
BILIRUB UR QL STRIP: NEGATIVE
COLOR UR AUTO: YELLOW
GLUCOSE UR STRIP-MCNC: NEGATIVE MG/DL
HGB UR QL STRIP: NEGATIVE
KETONES UR STRIP-MCNC: 80 MG/DL
LEUKOCYTE ESTERASE UR QL STRIP: ABNORMAL
NITRATE UR QL: NEGATIVE
PH UR STRIP: 6 PH (ref 5–7)
RBC #/AREA URNS AUTO: 2 /HPF (ref 0–2)
SARS-COV-2 RNA SPEC QL NAA+PROBE: NOT DETECTED
SOURCE: ABNORMAL
SP GR UR STRIP: 1.02 (ref 1–1.03)
SPECIMEN SOURCE: NORMAL
SQUAMOUS #/AREA URNS AUTO: 1 /HPF (ref 0–1)
TROPONIN I SERPL-MCNC: 0.35 UG/L (ref 0–0.04)
TROPONIN I SERPL-MCNC: 0.37 UG/L (ref 0–0.04)
UROBILINOGEN UR STRIP-MCNC: NORMAL MG/DL (ref 0–2)
WBC # BLD AUTO: 8.6 10E9/L (ref 4–11)
WBC #/AREA URNS AUTO: 61 /HPF (ref 0–5)

## 2020-08-03 PROCEDURE — 81001 URINALYSIS AUTO W/SCOPE: CPT | Performed by: PHYSICIAN ASSISTANT

## 2020-08-03 PROCEDURE — 97530 THERAPEUTIC ACTIVITIES: CPT | Mod: GP

## 2020-08-03 PROCEDURE — 36415 COLL VENOUS BLD VENIPUNCTURE: CPT | Performed by: INTERNAL MEDICINE

## 2020-08-03 PROCEDURE — 87186 SC STD MICRODIL/AGAR DIL: CPT | Performed by: INTERNAL MEDICINE

## 2020-08-03 PROCEDURE — 85048 AUTOMATED LEUKOCYTE COUNT: CPT | Performed by: PHYSICIAN ASSISTANT

## 2020-08-03 PROCEDURE — 87088 URINE BACTERIA CULTURE: CPT | Performed by: INTERNAL MEDICINE

## 2020-08-03 PROCEDURE — 96374 THER/PROPH/DIAG INJ IV PUSH: CPT

## 2020-08-03 PROCEDURE — 97161 PT EVAL LOW COMPLEX 20 MIN: CPT | Mod: GP

## 2020-08-03 PROCEDURE — 87040 BLOOD CULTURE FOR BACTERIA: CPT | Performed by: PHYSICIAN ASSISTANT

## 2020-08-03 PROCEDURE — G0378 HOSPITAL OBSERVATION PER HR: HCPCS

## 2020-08-03 PROCEDURE — 87086 URINE CULTURE/COLONY COUNT: CPT | Performed by: INTERNAL MEDICINE

## 2020-08-03 PROCEDURE — 99226 ZZC SUBSEQUENT OBSERVATION CARE,LEVEL III: CPT | Performed by: PHYSICIAN ASSISTANT

## 2020-08-03 PROCEDURE — 36415 COLL VENOUS BLD VENIPUNCTURE: CPT | Performed by: PHYSICIAN ASSISTANT

## 2020-08-03 PROCEDURE — 25000128 H RX IP 250 OP 636: Performed by: PHYSICIAN ASSISTANT

## 2020-08-03 PROCEDURE — 25800030 ZZH RX IP 258 OP 636: Performed by: PHYSICIAN ASSISTANT

## 2020-08-03 PROCEDURE — 25000132 ZZH RX MED GY IP 250 OP 250 PS 637: Mod: GY | Performed by: INTERNAL MEDICINE

## 2020-08-03 PROCEDURE — 84484 ASSAY OF TROPONIN QUANT: CPT | Performed by: INTERNAL MEDICINE

## 2020-08-03 PROCEDURE — 97112 NEUROMUSCULAR REEDUCATION: CPT | Mod: GP

## 2020-08-03 RX ORDER — SODIUM CHLORIDE 9 MG/ML
INJECTION, SOLUTION INTRAVENOUS CONTINUOUS
Status: DISCONTINUED | OUTPATIENT
Start: 2020-08-03 | End: 2020-08-05 | Stop reason: HOSPADM

## 2020-08-03 RX ORDER — CEFTRIAXONE 1 G/1
1 INJECTION, POWDER, FOR SOLUTION INTRAMUSCULAR; INTRAVENOUS EVERY 24 HOURS
Status: DISCONTINUED | OUTPATIENT
Start: 2020-08-03 | End: 2020-08-05

## 2020-08-03 RX ADMIN — LEVOTHYROXINE SODIUM 75 MCG: 75 TABLET ORAL at 09:54

## 2020-08-03 RX ADMIN — CEFTRIAXONE SODIUM 1 G: 1 INJECTION, POWDER, FOR SOLUTION INTRAMUSCULAR; INTRAVENOUS at 16:38

## 2020-08-03 RX ADMIN — LISINOPRIL 10 MG: 10 TABLET ORAL at 09:54

## 2020-08-03 RX ADMIN — SODIUM CHLORIDE: 9 INJECTION, SOLUTION INTRAVENOUS at 16:34

## 2020-08-03 NOTE — H&P
Admitted:     08/02/2020      PRIMARY CARE PHYSICIAN:   Osman Small MD      CHIEF COMPLAINT:  Falls.      HISTORY OF PRESENT ILLNESS:  Had been limited due to the patient's underlying dementia.  I did discuss with ER attending, Dr. Franco, and I reviewed her chart as well.      Mrs. Joelle Freeman is a very pleasant 89-year-old female with past medical history of hypertension, dyslipidemia, severe aortic stenosis, recurrent UTIs, history of syncope, constipation, history of NPH status post  shunt, and history of bilateral hygromas versus chronic subdural hematomas a history of depression/anxiety, hypothyroidism and chronic anemia, who was sent for evaluation of recurrent falls.      It seems that the patient lives in an assisted living facility.  She was recently admitted to [facility] from 07/19-7/21/2020 after she had an episode of syncope thought to be vasovagal.  At that time, she was also found to have mildly elevated troponins of 0.41, 0.45, was thought to be suspected NSTEMI type 2/demand ischemia.  Echocardiogram at that time showed a preserved EF with normal wall motion.  She also had a recent UTI and a urine culture grew 2 strains of E. coli.  She was treated with ceftriaxone in the hospital and discharged on Ceftin for 5 more days.      It seems that she had 2 unwitnessed falls earlier today.  Apparently after a first fall, she was found in prone position and in supine position after her second fall.  She was brought to ER.  She has advanced dementia and she cannot provide any history.  She seems comfortable.  Somehow she denies having any chest pain, no shortness of breath.  She denies any coughing, no abdominal pain.  I am not sure if she has any urinary symptoms at this time.  She denies any headache.  Apparently, her son was present in ER earlier today felt that she was more confused than her baseline and reportedly he feels that usually she gets more confusion when she gets a  urinary tract infection.      In ER, she was seen by Dr. Franco.  Her initial vitals showed a blood pressure of 152/71, heart rate 82, respiratory rate 18, oxygen saturation 96% on room air and temperature 98.4.  She did have basic blood work which showed unremarkable BMP with sodium of 137, potassium 3.5, chloride 106, bicarbonate 23, BUN 13, creatinine 0.7.  Her calcium was 9.4, anion gap of 8.  LFTs with normal albumin 3.9, total protein 7.7, alkaline phosphatase 88, ALT 14, AST 15, lipase was 168.  Her troponin 0.367, but again she had chronically elevated troponins.  Glucose 117.  White blood cells of 11.4, hemoglobin 14.1, hematocrit 41.7 and platelet count 203.  She had a CT of the head which does not show any acute bleeding, no skull fractures.  She does show same small low dense subdural fluid collections that are unchanged in size since the prior CT of the head in the 07/20/2020.  A CT of the C-spine did not show any fractures or dislocation.  X-ray of the pelvis showed some osteopenia, but no evidence of acute fractures.  X-ray of the right hand, did not show any evidence of acute fractures or dislocation.  Her chest x-ray showed stable mild cardiomegaly with mild valve annular calcification.  There is some question of subtle infiltrate projecting over the posterior base in lateral view that is likely artifactual due to technique.  Her EKG showed a normal sinus rhythm at the rate of 80 beats per minute with first-degree AV block and a left bundle branch block which seems to be new.  ER attending discussed with Cardiology on-call.  Given her chronically elevated troponins, it is less likely that her recurrent falls today are related to an acute coronary syndrome.  She does have a known severe aortic stenosis and she had a recent echocardiogram recently.  Reportedly, her son would not want to proceed with cardiac catheterization.      UA was ordered from ER pending at this time.  Hospitalist Service was  called regarding the admission under observational status, given reportedly increased confusion.      PAST MEDICAL HISTORY:   1.  Advanced dementia.   2.  History of bilateral hygromas versus chronic subdural hematoma.  Her aspirin was recently stopped during her prior hospitalization.   3.  History of normal pressure hydrocephalus, status post  shunt.   4.  History of recurrent urinary tract infections.   5.  History of severe aortic stenosis.  Last echocardiogram in 07/20/2020 showed EF of 55%-60%, no wall motion abnormalities, a calculated aortic valve area of 0.5 square cm.   6.  History of syncope.   7.  Chronic anemia.   8.  History of folate deficiency.   9.  Depression/anxiety.   10.  Hypothyroidism.   11.  Hypertension.   12.  Dyslipidemia.      PAST SURGICAL HISTORY:   Past Surgical History:   Procedure Laterality Date     APPENDECTOMY       BIOPSY      breast     CARDIAC SURGERY      stent to the right coronary artery in 2001     CHOLECYSTECTOMY       ENT SURGERY      tonsillectomy     GENITOURINARY SURGERY      Bladder suspension     GYN SURGERY      D & C     GYN SURGERY      laproscopy oophorectomy unilateral     HERNIA REPAIR       HYSTERECTOMY       LUMBAR PUNCTURE       OPEN REDUCTION INTERNAL FIXATION HIP NAILING  11/18/2013    Procedure: OPEN REDUCTION INTERNAL FIXATION HIP NAILING;  RIGHT HIP FRACTURE;  Surgeon: John Venegas MD;  Location:  OR     OPTICAL TRACKING SYSTEM IMPLANT SHUNT VENTRICULOPERITONEAL  7/16/2013    Procedure: OPTICAL TRACKING SYSTEM IMPLANT SHUNT VENTRICULOPERITONEAL;  RIGHT OCCIPITAL VENTRICULOPERITONEAL SHUNT, IMAGE GUIDANCE, NEUROENDOSCOPY, INTRATHECAL ANTIBIOTICS (STEALTH AXIEM, STRATA II LEVEL 2.5, PATIENT IN SUPINE 30-80, GEL DONUT WITH HEAD TURNED TO LEFT)      ;  Surgeon: Tayo Conley MD;  Location:  OR     STENT  2001    RCA     wisdom teeth extraction[          SOCIAL HISTORY:  The patient denies smoking.  She denies alcohol drinking.   She denies illicit drug abuse.      FAMILY HISTORY:    Family History     Problem (# of Occurrences) Relation (Name,Age of Onset)    Blood Disease (1) Son    Cerebrovascular Disease (2) Mother, Father            PRIOR TO ADMISSION MEDICATIONS:      acetaminophen (TYLENOL) 325 MG tablet Take 650 mg by mouth every 6 hours as needed for pain   at prn Yes Unknown, Entered By History   atorvastatin (LIPITOR) 10 MG tablet Take 1 tablet (10 mg) by mouth daily 8/2/2020 at 0800 Yes Patric Pinedo MD   cholecalciferol 1000 UNITS TABS Take 1,000 Units by mouth daily 8/2/2020 at 0800 Yes Patric Pinedo MD   escitalopram (LEXAPRO) 5 MG tablet Take 5 mg by mouth daily 8/2/2020 at 0800 Yes Unknown, Entered By History   folic acid (FOLVITE) 1 MG tablet Take 1 tablet (1 mg) by mouth daily 8/2/2020 at 0800 Yes Heather Odonnell MD   levothyroxine (SYNTHROID/LEVOTHROID) 75 MCG tablet Take 1 tablet (75 mcg) by mouth daily 8/2/2020 at 0800 Yes Heather Odonnell MD   lisinopril (PRINIVIL/ZESTRIL) 10 MG tablet Take 10 mg by mouth daily 8/2/2020 at 0800 Yes Unknown, Entered By History   melatonin 1 MG TABS tablet Take 1 mg by mouth nightly as needed for sleep   at prn Yes Unknown, Entered By History   metoprolol succinate ER (TOPROL-XL) 25 MG 24 hr tablet Take 25 mg by mouth daily 8/2/2020 at 0800 Yes Unknown, Entered By History   nystatin (MYCOSTATIN) 789953 UNIT/GM external powder Apply topically 2 times daily as needed (intertriginal dermatitis)  at prn Yes Unknown, Entered By History   senna-docusate (SENOKOT-S/PERICOLACE) 8.6-50 MG tablet Take 1 tablet by mouth 2 times daily 8/2/2020 at 0800 Yes Heather Odonnell MD   zinc oxide 16 % (BOUDREAUXS BUTT PASTE) PSTE paste Apply topically as needed for irritation (red buttocks)  at prn Yes Unknown, Entered By History              ALLERGIES:  SHE IS ALLERGIC TO TRAMADOL.      REVIEW OF SYSTEMS:  A 10-point review of systems was conducted and it  was negative except for pertinent positives mentioned in history of present illness.      PHYSICAL EXAMINATION:   VITAL SIGNS:  Blood pressure is 153/78, heart rate 80, respiratory rate 18, oxygen saturation 96% on room air, temperature 98.4.   GENERAL:  The patient is awake, alert, confused, no acute distress.   HEENT:  Normocephalic, atraumatic.  Pupils are equally round and reactive to light.  Oral mucosa is moist.   NECK:  Supple.  No cervical lymphadenopathy, no thyromegaly.   CHEST:  There is bilateral air entry, no wheezing, no rales, no crackles.   CARDIOVASCULAR:  There is normal S1, S2, regular rate and rhythm.  There is a systolic murmur 3/6 intensity, precordial area and best heard over right second intercostal space.  No rubs.   ABDOMEN:  Soft, nontender, nondistended.  Bowel sounds are present.   EXTREMITIES:  There is no leg swelling, no calf tenderness, 2+ peripheral pulses are palpable.   SKIN:  Intact, no rash, no cyanosis.   NEUROLOGIC:  The patient is awake, alert, oriented to self only.  There are no focal neurological deficits.   PSYCHIATRIC:  Normal mood, normal affect.   MUSCULOSKELETAL:  She moves all extremities to move all extremities freely.  No obvious joint deformities.      LABORATORY DATA:  Reviewed and dictated above.      ASSESSMENT:  Mrs. Joelle Freeman is a 89-year-old female with complex past medical history of hypertension, dyslipidemia, dementia, severe aortic stenosis, bilateral hygromas versus chronic subdural hematomas, history of recurrent UTIs, history of NSTEMI/demand ischemia, hypothyroidism, constipation and anemia, who was brought in after she had 2 unwitnessed falls in her assisted living facility earlier today.      PLAN:   1.  Recurrent falls.   2.  Confusion.   3.  Advanced dementia.   The patient lives in an assisted living facility.  She does have baseline dementia.  Apparently, she had 2 unwitnessed fall earlier today.  Fortunately, CT of the head and CT of the  C-spine did not show any fractures or bleeding or dislocation.  Also, x-ray of the pelvis and x-ray of the right-hand did show any fractures.  She does have mildly elevated troponin of 0.355.  Her troponins were chronically elevated in the past, so I do not think that this is a culprit for her recurrent falls.  Besides her baseline dementia, reportedly, her son felt that she is more confused than usual, which usually happens with her recurrent urinary tract infection.  She does have mildly elevated white blood cells of 11.4.  We were not able to obtain a urine sample so far.  The patient is admitted under observational status.  We will put her on fall precautions.  We will wait for a UA/urine culture before starting any antibiotics.   4.  Elevated troponins of 0.355.  Of note, she has chronically elevated troponins.  During her recent prior hospitalization her troponins were 0.46, 0.47.  At that time she had an echocardiogram which showed a preserved EF and no wall motion abnormalities.  She does seem to have a new left bundle branch block on EKG.  She denies any chest pain, no shortness of breath.  We will monitor her on telemetry.  We will continue to follow-up the trend of troponins, but otherwise it seems that family is not willing to proceed with more invasive cardiac interventions at this time.  We will continue with her prior to admission beta blocker and Ace inhibitor.  She is also on a statin, which will be held for now given observational status, but she should resume taking it after discharge and of note, her aspirin was recently stopped given her CT head findings of bilateral subdural hygromas versus subdural hematomas.   5.  History of severe aortic stenosis.  Her most echocardiogram on 07/20/2020 showed aortic valve area of 0.5 square cm as per prior notes.  She was considered to not be a surgical candidate.   6.  History of normal pressure hydrocephalus, status post  shunt.  8.     Recent bilateral  hygromas versus chronic subdural hematomas.  A recent CT of the head on  showed bilateral fluid collections suggestive of a hygromas versus chronic subdural hematomas.  She was seen by Neurosurgery at that time.  Her aspirin was stopped at that time, repeat a CT of the head, the next day on  showed stable findings and she was supposed to follow-up with Neurosurgery in 4-6 weeks from that date of the discharge with a repeat CT head at that time.   7.  History of hypothyroidism during her prior hospitalization.  Her TSH was low at 0.22 and free T4 was high at 53.  Her Synthroid was reduced from 88 to 75 mcg daily, which will be continued at this time and she should have repeat TSH in 4 weeks.   8.  Depression/anxiety.  We will resume her prior to admission Lexapro.   9.  Dementia.  We will continue supportive care at this time.  She is at high risk for increased confusion/agitation/delirium.   10.  History of hypertension:  Her blood pressure seems to be on higher side at this time.  We will continue her prior to admission Toprol-XL 25 mg p.o. daily and lisinopril 10 mg p.o. daily.   11.  History of dyslipidemia.  Holding her prior to admission statin given observational status.   12.  Code status.  She has advance directive in the chart, patient is DNR/DNI.   13.  COVID-19 status.  She was tested asymptomatic patient for COVID.  She does not seem to have any symptoms related to COVID infection, so she is a low suspicion for COVID-19.   14.  DISPOSITION:  Admit under observational status.  Anticipate patient will be discharged in the next 24 hours pending UA/urine culture report and PT evaluation.         MOSES MCGUIRE MD             D: 2020   T: 2020   MT: [id number]      Name:     RENA YING   MRN:      [id number]        Account:      [id number]   :      1928        Admitted:     2020                   Document: [id number]       cc: Osman Small MD

## 2020-08-03 NOTE — PROVIDER NOTIFICATION
Paged LETI Diaz at [phone]. Most recent trop trended up. Awaiting any new orders.     ADDENDUM: PA called back at 0774; no new orders.

## 2020-08-03 NOTE — ED NOTES
[facility]  ED Nurse Handoff Report    ED Chief complaint: Fall      ED Diagnosis:   Final diagnoses:   Left bundle branch block   Elevated troponin   Acute encephalopathy       Code Status: DNR / DNI    Allergies:   Allergies   Allergen Reactions     Tramadol        Patient Story: Pt had unwitnessed fall at care facility.  Focused Assessment:  Alert to self.  Hypertensive but other VSS.  Pt has dementia at baseline but son thinks she is slightly more confused than baseline.  Pt has bruising to R forehead and R pinky knuckle.    Treatments and/or interventions provided: NA  Patient's response to treatments and/or interventions:     To be done/followed up on inpatient unit:  See epic    Does this patient have any cognitive concerns?: Baseline dementia    Activity level - Baseline/Home:  SBA  Activity Level - Current:   Stand with assist x2    Patient's Preferred language: English   Needed?: No    Isolation: None  Infection: Not Applicable  Bariatric?: No    Vital Signs:   Vitals:    08/02/20 1833   BP: (!) 152/71   Pulse: 82   Resp: 18   Temp: 98.4  F (36.9  C)   TempSrc: Oral   SpO2: 96%       Cardiac Rhythm:     Was the PSS-3 completed:   Yes  What interventions are required if any?               Family Comments: Son was @ bedside.  OBS brochure/video discussed/provided to patient/family: Yes              Name of person given brochure if not patient:               Relationship to patient:     For the majority of the shift this patient's behavior was Green.   Behavioral interventions performed were .    ED NURSE PHONE NUMBER: [id number]

## 2020-08-03 NOTE — PROGRESS NOTES
[facility]    Medicine Progress Note - Hospitalist Service       Date of Admission:  8/2/2020  Assessment & Plan   Mrs. Joelle Freeman is a 89-year-old female with complex past medical history of hypertension, dyslipidemia, dementia, severe aortic stenosis, bilateral hygromas versus chronic subdural hematomas, history of recurrent UTIs, history of NSTEMI/demand ischemia, hypothyroidism, constipation and anemia, who was admitted to [facility] on 8/3/20 after she had 2 unwitnessed falls in her assisted living facility and worsening confusion.       Recurrent falls.   Confusion.   Advanced dementia.   The patient lives in an assisted living facility.  She does have baseline dementia.  Apparently, she had 2 unwitnessed fall earlier today.  Fortunately, CT of the head and CT of the C-spine did not show any fractures or bleeding or dislocation.  Also, x-ray of the pelvis and x-ray of the right-hand did show any fractures.  She does have mildly elevated troponin of 0.355.  Her troponins were chronically elevated in the past, so I do not think that this is a culprit for her recurrent falls.  Besides her baseline dementia, reportedly, her son felt that she is more confused than usual, which usually happens with her recurrent urinary tract infection.  She does have mildly elevated white blood cells of 11.4.   - Registered to observation  - Fall precautions   - Wait for a UA/urine culture before starting any antibiotics.   - Neurosurgery consulted given hx  shunt and new confusion, no need for any setting changes. She will f/u in clinic as previously scheduled, they have signed off.   - Monitor VS, afebrile  - SLP consult as patient coughed with pills this morning 8/3  - Could consider brain MRI however likely would not be able to tolerate well to get adequate images and with hygromas vs SDHs she would not be sam to be on antiplatelet or anticoagulation currently    ADDENDUM 1530  UA resulted Large LE, 61 WBC, Neg  Nitrite. Grew E coli last visit and also neg nitrite. Noted 80 ketones. Start low rate MIVF (has mild component of diastolic CHF) and ceftriaxone. Follow up UCx.     New LBBB  Chronic troponin elevation  Elevated troponins of 0.355.  Of note, she has chronically elevated troponins.  During her recent prior hospitalization her troponins were 0.46, 0.47.  At that time she had an echocardiogram which showed a preserved EF and no wall motion abnormalities.  She does seem to have a new left bundle branch block on EKG.  She denies any chest pain, no shortness of breath.   - Telemetry.    - Troponins flat  - Family is not willing to proceed with more invasive cardiac interventions at this time given advanced age and advanced dementia  - Continue PTA  BB and ACEi   - Hold PTA statin given observational status, resume at discharge  - PTA aspirin was recently stopped given her CT head findings of bilateral subdural hygromas versus subdural hematomas.     History of severe aortic stenosis  Her most echocardiogram on 07/20/2020 showed aortic valve area of 0.5 square cm as per prior notes.  She was considered to not be a surgical candidate.     History of normal pressure hydrocephalus, status post  shunt.    Recent bilateral hygromas versus chronic subdural hematomas.    A recent CT of the head on 07/19 showed bilateral fluid collections suggestive of a hygromas versus chronic subdural hematomas.  She was seen by Neurosurgery at that time.  Her aspirin was stopped at that time, repeat a CT of the head, the next day on 07/20 showed stable findings and she was supposed to follow-up with Neurosurgery in 4-6 weeks from that date of the discharge with a repeat CT head at that time.   - Neurosurgery evaluated and signed off as above    History of hypothyroidism during her prior hospitalization.    Her TSH was low at 0.22 and free T4 was high at 53.  Her Synthroid was reduced from 88 to 75 mcg daily, which will be continued at this  time and she should have repeat TSH in 4 weeks.     Depression/anxiety.  Continue PTA Lexapro.     Dementia:   - Continue supportive care at this time.  - High risk for increased confusion/agitation/delirium.     History of hypertension:    BPs elevated on admission SBP 140s-150s. Though not unreasonable given advanced age  - Continue her prior to admission Toprol-XL 25 mg p.o. daily and lisinopril 10 mg p.o. daily.     History of dyslipidemia.  Holding PTA statin given observational status.     COVID-19 status  She was tested asymptomatic patient for COVID.  She does not seem to have any symptoms related to COVID infection, so she is a low suspicion for COVID-19.   Negative for COVID-19: 5/20, 6/22, 6/29, 7/6, 7/19, 7/22    Diet: Regular Diet Adult    DVT Prophylaxis: Pneumatic Compression Devices  Benítez Catheter: not present  Code Status: DNR/DNI    Disposition Plan   Expected discharge: 1-2 days, recommended to transitional care unit once mental status at baseline and safe disposition plan/ TCU bed available.  Entered: Latanya Diaz PA-C 08/03/2020, 1:40 PM       The patient's care was discussed with the Attending Physician, Dr. Palmer, Bedside Nurse, Patient and Patient's Family.    Latanya Diaz ([place])YESI  Hospitalist Service  [facility]    ______________________________________________________________________    Interval History   Seen and examined. Unable to provide history or tell me how she is doing do to advanced dementia. No c/o. Follows commands intermittently and minimally. No N/V. Exam benign. Awaiting urine sample. Troponins flat. Coughed once during my visit, admission covid19 screen pending. Does not appear to have frequent cough or sputum. D/w son Osman, feels cognitive changes are brand new and she has able to speak appropriately and follow commands last week on the phone. He is concerned about UTI or worsening dementia. Would not want cardiac cath and agrees no  cardiology consultation.    Data reviewed today: I reviewed all medications, new labs and imaging results over the last 24 hours. I personally reviewed no images or EKG's today.    Physical Exam   Vital Signs: Temp: 98.3  F (36.8  C) Temp src: Axillary BP: 131/60 Pulse: 71 Heart Rate: 75 Resp: 18 SpO2: 95 % O2 Device: None (Room air)    Weight: 119 lbs 0 oz    General: Awake and alert to self. Elderly woman who appears stated age. Looks comfortable laying in bed. No acute distress.  HEENT: Normocephalic, atraumatic. Extraocular movements intact.   Respiratory: Clear to auscultation bilaterally, no rales, wheezing, or rhonchi to anterolateral fields.   Cardiovascular: Regular rate and rhythm, +S1 and S2, systolic murmur auscultated loudest at LUSB. No peripheral edema.   Gastrointestinal: Soft, non-tender, non-distended. Bowel sounds present.  Skin: Warm, dry. No obvious rashes or lesions on exposed skin. Dorsalis pedis pulses palpable bilaterally.  Musculoskeletal: No joint swelling, erythema or tenderness. Moves all extremities equally.  Neurologic: AAO x1. Unable to formally assess. Does not follow most commands.   Psychiatric: No obvious anxiety or depression.    Data   Recent Labs   Lab 08/03/20  0610 08/02/20  2350 08/02/20  1912   WBC  --   --  11.4*   HGB  --   --  14.1   MCV  --   --  94   PLT  --   --  203   NA  --   --  137   POTASSIUM  --   --  3.5   CHLORIDE  --   --  106   CO2  --   --  23   BUN  --   --  13   CR  --   --  0.70   ANIONGAP  --   --  8   JAZLYN  --   --  9.4   GLC  --   --  117*   ALBUMIN  --   --  3.9   PROTTOTAL  --   --  7.7   BILITOTAL  --   --  1.0   ALKPHOS  --   --  88   ALT  --   --  14   AST  --   --  15   LIPASE  --   --  168   TROPI 0.369* 0.355* 0.367*     Recent Results (from the past 24 hour(s))   Head CT w/o contrast    Narrative    CT SCAN OF THE HEAD WITHOUT CONTRAST   8/2/2020 7:31 PM     HISTORY: fall, bruising to the right forehead, history of chronic  subdural vs  hygroma    TECHNIQUE:  Axial images of the head and coronal reformations without  IV contrast material. Radiation dose for this scan was reduced using  automated exposure control, adjustment of the mA and/or kV according  to patient size, or iterative reconstruction technique.    COMPARISON: Scan dated 7/20/2020    FINDINGS:     Intracranial contents: Again noted is a right occipital  ventriculostomy catheter. Patient is status post a right frontal  craniotomy. Calcifications are seen beneath craniotomy flap. Small low  dense subdural fluid collections are seen over both convexities. These  are unchanged in size or appearance when compared to 7/20/2020. No  acute hemorrhage is identified. There is diffuse parenchymal volume  loss.  White matter changes are present in the cerebral hemispheres  that are consistent with small vessel ischemic disease in this age  patient. There is no evidence of intracranial hemorrhage, mass, acute  infarct or anomaly.    Visualized orbits/sinuses/mastoids:  The visualized portions of the  sinuses and mastoids appear normal.    Osseous structures/soft tissues:  There is no evidence of trauma.      Impression    IMPRESSION: Stable. No acute bleed identified. No skull fractures are  seen.      ADITYA FERREIRA MD   CT Cervical Spine w/o Contrast    Narrative    CT CERVICAL SPINE WITHOUT CONTRAST   8/2/2020 7:32 PM     HISTORY: fall     TECHNIQUE: Axial images of the cervical spine were obtained without  intravenous contrast. Multiplanar reformations were performed.   Radiation dose for this scan was reduced using automated exposure  control, adjustment of the mA and/or kV according to patient size, or  iterative reconstruction technique.    COMPARISON: Scan dated 2/29/2020    FINDINGS: There is no evidence of fracture. Alignment is normal. The  soft tissues surrounding the dural sac are prominent. This is  unchanged in appearance when compared to 2/29/2020. This may be due to  prominent  veins.    C1-C2: Calcified pannus is seen posterior to the odontoid. This can be  seen in patients with calcium pyrophosphate deposition disease.     C2-C3:  Severe degenerative change in the facet joints.     C3-C4:  Loss of disc space height. Degenerative change in the facet  joints.     C4-C5:  Loss of disc space height. Slight anterior subluxation of C4  on C5. Ankylosis of the left facet joint.     C5-C6:  Loss of disc space height. Central canal normal. Degenerative  change in the facet joints. Slight retrolisthesis of C5 on C6.      C6-C7:  Loss of disc space height. Central canal and neural foramen  are patent.      C7-T1:   Slight anterior subluxation of C7 on T1. Central canal  normal.      Impression    IMPRESSION:    1. No fractures are identified.  2. Multilevel degenerative changes.    ADITYA FERREIRA MD   XR Pelvis 1/2 Views    Narrative    EXAM: XR PELVIS 1/2 VW  LOCATION: [facility]  DATE/TIME: 8/2/2020 7:07 PM    INDICATION: Pain after fall  COMPARISON: None.      Impression    IMPRESSION: Osteopenia. Internal fixation hardware right hip and proximal femur. No evidence for acute fracture. Catheter overlying the left iliac bone. Vascular calcifications. Tiny opaque foreign bodies overlying the symphysis pubis.   XR Hand Right G/E 3 Views    Narrative    EXAM: XR HAND RT G/E 3 VW  LOCATION: [facility]  DATE/TIME: 8/2/2020 7:37 PM    INDICATION: 5th metacarpal bruising.  COMPARISON: None.      Impression    IMPRESSION:   1.  No evidence of acute fracture or subluxation. Diffuse osteopenia. Scattered at least moderate DIP degenerative changes. Mild widening of the scapholunate interval suggesting scapholunate ligament tear. Chondrocalcinosis of the triangular   fibrocartilage.   XR Chest 2 Views    Narrative    CHEST TWO VIEWS   8/2/2020 7:42 PM     HISTORY: Fall.    COMPARISON: Chest x-rays dated 6/29/2019.    Ventriculoperitoneal shunt tubing is projected over the right side  of  the neck, right chest and right upper abdomen, similar to the prior  study. Cardiac silhouette remains enlarged. Dense mitral valve annular  calcifications are again noted. There are thoracic aortic  calcifications which are also again seen. Slightly increased densities  project over the heart and posterior to the heart on the lateral view  which could represent a subtle infiltrate in the left base. This could  also be artifactual. No definite infiltrate is seen on the PA view.    No acute fracture, pneumothorax or significant pleural fluid  collection is identified.      Impression    IMPRESSION:   1. Stable mild cardiomegaly, mitral valve annular calcifications,  right chest wall, neck and upper abdominal peritoneal shunt tubing,  and thoracic aortic calcifications.  2. Question subtle infiltrate projected over the posterior bases on  the lateral view which is likely artifactual due to technique.  Recommend clinical correlation.  3. No other significant changes. No definite fracture or other acute  traumatic injury to the chest is identified.    TERRI GOODRICH MD     Medications       escitalopram  5 mg Oral Daily     levothyroxine  75 mcg Oral Daily     lisinopril  10 mg Oral Daily     metoprolol succinate ER  25 mg Oral Daily     senna-docusate  1 tablet Oral BID     sodium chloride (PF)  3 mL Intracatheter Q8H
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
## 2020-08-03 NOTE — PLAN OF CARE
VSS. Lethargic. Ability to respond to questions and follow commands is variable. Oriented to self and place this AM. Sleeps between cares. Tele SR with BBB. Some cough with po intake this AM; unable to give all of po meds. Speech consult ordered; avoiding po for now. Incontinent of urine; new purewick in place. Urine specimen sent for UA. Turn/repo q2h. Plan pending clinical progress. Continue to monitor.

## 2020-08-03 NOTE — PROGRESS NOTES
RECEIVING UNIT ED HANDOFF REVIEW    ED Nurse Handoff Report was reviewed by: Cassandra Fajardo RN on August 2, 2020 at 10:04 PM

## 2020-08-03 NOTE — UTILIZATION REVIEW
"  Admission Status; Secondary Review Determination         Under the authority of the Utilization Management Committee, the utilization review process indicated a secondary review on the above patient.  The review outcome is based on review of the medical records, discussions with staff, and applying clinical experience noted on the date of the review.        ()      Inpatient Status Appropriate - This patient's medical care is consistent with medical management for inpatient care and reasonable inpatient medical practice.      (x) Observation Status Appropriate - This patient does not meet hospital inpatient criteria and is placed in observation status. If this patient's primary payer is Medicare and was admitted as an inpatient, Condition Code 44 should be used and patient status changed to \"observation\".   () Admission Status NOT Appropriate - This patient's medical care is not consistent with medical management for Inpatient or Observation Status.          RATIONALE FOR DETERMINATION   The patient is an 82-year-old female with a complex past medical history of hypertension, dyslipidemia,  shunt.  She does have chronic dementia and was admitted with increased confusion and 2 unwitnessed falls in her assisted living facility.  She was noted to have a urinary tract infection.  Initial treatment was not started but likely will be shortly.  Neurosurgery was consulted and did not feel that there was any need for acute change in her  shunt status and she will follow-up with her [facility] providers.  She has chronic troponin elevation and her troponin 0.355 is typical for her.  Her family prefers not to have any interventional studies based on persisting and chronic dementia.  Suspect that discharge will be tomorrow as long as she has improved mental status and stability after startng abx.  Preference would be discharge to a TCU bed.  Based on current diagnosis and extent of evaluation and treatment, observation " status remains appropriate.    The severity of illness, intensity of service provided, expected LOS and risk for adverse outcome make the care complex, high risk and appropriate for hospital admission.        The information on this document is developed by the utilization review team in order for the business office to ensure compliance.  This only denotes the appropriateness of proper admission status and does not reflect the quality of care rendered.         The definitions of Inpatient Status and Observation Status used in making the determination above are those provided in the CMS Coverage Manual, Chapter 1 and Chapter 6, section 70.4.      Sincerely,     Homero Diaz MD  Physician Advisor  Utilization Review/ Case Management  [facility].

## 2020-08-03 NOTE — PLAN OF CARE
Patient arrived on station 88 at approximately 2230.  At the time of arrival, patient was alert to herself only; calm and cooperative, however throughout the shift she became more confused and agitated.  Denies pain and does not have any outward signs of distress or pain.  CT and radiology studies done (no acute brain bleeding or VSS except HTN, RA.  TELE: SR with BBB; troponin level elevated, trending down.  PIV SL.  Incontinent; Purewick in place, no BM this shift.  Bruising to R forehead/R hand, blanchable redness to coccyx and superficial sores to face from mask.   Per report, patient is able to ambulate with Assist x 1 GB and walker, however she did not get OOB this shift.  Discharge pending progress.

## 2020-08-03 NOTE — PHARMACY-ADMISSION MEDICATION HISTORY
Pharmacy Medication History  Admission medication history interview status for the 8/2/2020  admission is complete. See EPIC admission navigator for prior to admission medications     Medication history sources: MAR ([facility] [phone])  Medication history source reliability: Good  Adherence assessment: Good    Significant changes made to the medication list:  None      Additional medication history information:   Recent antimicrobials:  On 7/21/20, patient was prescribed cefuroxime for UTI with instructions of 250 mg BID x 5 days. Patient finished the course of antibiotics.     Medication reconciliation completed by provider prior to medication history? No    Time spent in this activity: 15 mins      Prior to Admission medications    Medication Sig Last Dose Taking? Auth Provider   acetaminophen (TYLENOL) 325 MG tablet Take 650 mg by mouth every 6 hours as needed for pain   at prn Yes Unknown, Entered By History   atorvastatin (LIPITOR) 10 MG tablet Take 1 tablet (10 mg) by mouth daily 8/2/2020 at 0800 Yes Patric Pinedo MD   cholecalciferol 1000 UNITS TABS Take 1,000 Units by mouth daily 8/2/2020 at 0800 Yes Patric Pinedo MD   escitalopram (LEXAPRO) 5 MG tablet Take 5 mg by mouth daily 8/2/2020 at 0800 Yes Unknown, Entered By History   folic acid (FOLVITE) 1 MG tablet Take 1 tablet (1 mg) by mouth daily 8/2/2020 at 0800 Yes Heather Odonnell MD   levothyroxine (SYNTHROID/LEVOTHROID) 75 MCG tablet Take 1 tablet (75 mcg) by mouth daily 8/2/2020 at 0800 Yes Heather Odonnell MD   lisinopril (PRINIVIL/ZESTRIL) 10 MG tablet Take 10 mg by mouth daily 8/2/2020 at 0800 Yes Unknown, Entered By History   melatonin 1 MG TABS tablet Take 1 mg by mouth nightly as needed for sleep   at prn Yes Unknown, Entered By History   metoprolol succinate ER (TOPROL-XL) 25 MG 24 hr tablet Take 25 mg by mouth daily 8/2/2020 at 0800 Yes Unknown, Entered By History   nystatin (MYCOSTATIN)  958295 UNIT/GM external powder Apply topically 2 times daily as needed (intertriginal dermatitis)  at prn Yes Unknown, Entered By History   senna-docusate (SENOKOT-S/PERICOLACE) 8.6-50 MG tablet Take 1 tablet by mouth 2 times daily 8/2/2020 at 0800 Yes Heather Odonnell MD   zinc oxide 16 % (BOUDREAUXS BUTT PASTE) PSTE paste Apply topically as needed for irritation (red buttocks)  at prn Yes Unknown, Entered By History

## 2020-08-03 NOTE — CONSULTS
[facility]    Neurosurgery Consultation     Date of Admission:  8/2/2020  Date of Consult (When I saw the patient): 08/03/20    Assessment & Plan   Joelle Freeman is a 89 year old female who was admitted on 8/2/2020. I was asked to see the patient for Worsening confusion, head CT seems stable, ? assess  shunt settings.    Active Problems:    Fall    Assessment: stable small low density SDH over bilateral convexities, no signs of hydrocephalus, stable  shunt last known setting 1.0; follows at [facility]    Plan:   -Agree with UTI workup  -No NS intervention  -Recommend follow up with [facility] for shunt evaluation, as they have been managing  -Follow up as outpatient in 4 weeks as previously scheduled for SDHs  -NS will sign off      I have discussed the following assessment and plan with Dr. Blair Beckford who is in agreement with initial plan and will follow up with further consultation recommendations.    Robyn Stevens PA-C  [facility] Neurosurgery  [facility]  [address] [place] [address]  [address] [place] [address]    Tel [phone]  Pager [phone]        Code Status    No CPR- Do NOT Intubate    Reason for Consult   Reason for consult: I was asked by Latanya Diaz PA-C to evaluate this patient for Worsening confusion, head CT seems stable, ? assess  shunt settings.    Primary Care Physician   EDNA CARCAMO    Chief Complaint   Confusion    History is obtained from the patient and electronic health record    History of Present Illness   Joelle Freeman is a 89 year old female who presents with known bilateral SDH and  shunt. Was evaluated by our team a few weeks ago. She is S/p  shunt, placed in 2014. She had known confusion at this time. Repeat CTs have been stable. Shunt last known setting 1.0 and follows at [facility]. Today, she is lying in bed. She has baseline dementia. She does move extremities and arouses to voice. She does not follow commands. Head CT without  acute changes or increased in ventricle size.     Past Medical History   I have reviewed this patient's medical history and updated it with pertinent information if needed.   Past Medical History:   Diagnosis Date     Asthma      CAD (coronary artery disease)     stent post RCA 2001     Cerebral ventriculomegaly      CVA (cerebral infarction) 2002     Dementia (H)      Dementia (H)      Depression      Diverticulosis      Dyslipidemia      Heterozygous factor V Leiden mutation (H)      HTN (hypertension)      Hyperlipidemia      Hypothyroidism      Normal pressure hydrocephalus (H)      Posterior reversible encephalopathy syndrome      Subarachnoid hemorrhage (H) 3/23/2013     Subdural hematoma (H)      TIA (transient ischaemic attack)     multiple     TIA (transient ischaemic attack)      Unspecified cerebral artery occlusion with cerebral infarction      Urinary incontinence      UTI (lower urinary tract infection)     recurrent       Past Surgical History   I have reviewed this patient's surgical history and updated it with pertinent information if needed.  Past Surgical History:   Procedure Laterality Date     APPENDECTOMY       BIOPSY      breast     CARDIAC SURGERY      stent to the right coronary artery in 2001     CHOLECYSTECTOMY       ENT SURGERY      tonsillectomy     GENITOURINARY SURGERY      Bladder suspension     GYN SURGERY      D & C     GYN SURGERY      laproscopy oophorectomy unilateral     HERNIA REPAIR       HYSTERECTOMY       LUMBAR PUNCTURE       OPEN REDUCTION INTERNAL FIXATION HIP NAILING  11/18/2013    Procedure: OPEN REDUCTION INTERNAL FIXATION HIP NAILING;  RIGHT HIP FRACTURE;  Surgeon: John Venegas MD;  Location:  OR     OPTICAL TRACKING SYSTEM IMPLANT SHUNT VENTRICULOPERITONEAL  7/16/2013    Procedure: OPTICAL TRACKING SYSTEM IMPLANT SHUNT VENTRICULOPERITONEAL;  RIGHT OCCIPITAL VENTRICULOPERITONEAL SHUNT, IMAGE GUIDANCE, NEUROENDOSCOPY, INTRATHECAL ANTIBIOTICS (STEALTH  AXIEM, STRATA II LEVEL 2.5, PATIENT IN SUPINE 30-80, GEL DONUT WITH HEAD TURNED TO LEFT)      ;  Surgeon: Tayo Conley MD;  Location:  OR     [facility]  2001    RCA     wisdom teeth extraction[         Prior to Admission Medications   Prior to Admission Medications   Prescriptions Last Dose Informant Patient Reported? Taking?   acetaminophen (TYLENOL) 325 MG tablet  at prn Nursing Home Yes Yes   Sig: Take 650 mg by mouth every 6 hours as needed for pain    atorvastatin (LIPITOR) 10 MG tablet 8/2/2020 at 0800 Nursing Home No Yes   Sig: Take 1 tablet (10 mg) by mouth daily   cholecalciferol 1000 UNITS TABS 8/2/2020 at 0800 Nursing Home No Yes   Sig: Take 1,000 Units by mouth daily   escitalopram (LEXAPRO) 5 MG tablet 8/2/2020 at 0800 Nursing Home Yes Yes   Sig: Take 5 mg by mouth daily   folic acid (FOLVITE) 1 MG tablet 8/2/2020 at 0800 Nursing Home No Yes   Sig: Take 1 tablet (1 mg) by mouth daily   levothyroxine (SYNTHROID/LEVOTHROID) 75 MCG tablet 8/2/2020 at 0800 Nursing Home No Yes   Sig: Take 1 tablet (75 mcg) by mouth daily   lisinopril (PRINIVIL/ZESTRIL) 10 MG tablet 8/2/2020 at 0800 Nursing Home Yes Yes   Sig: Take 10 mg by mouth daily   melatonin 1 MG TABS tablet  at prn Nursing Home Yes Yes   Sig: Take 1 mg by mouth nightly as needed for sleep    metoprolol succinate ER (TOPROL-XL) 25 MG 24 hr tablet 8/2/2020 at 0800 Nursing Home Yes Yes   Sig: Take 25 mg by mouth daily   nystatin (MYCOSTATIN) 777986 UNIT/GM external powder  at prn Nursing Home Yes Yes   Sig: Apply topically 2 times daily as needed (intertriginal dermatitis)   senna-docusate (SENOKOT-S/PERICOLACE) 8.6-50 MG tablet 8/2/2020 at 0800 Nursing Home No Yes   Sig: Take 1 tablet by mouth 2 times daily   zinc oxide 16 % (BOUDREAUXS BUTT PASTE) PSTE paste  at prn Nursing Home Yes Yes   Sig: Apply topically as needed for irritation (red buttocks)      Facility-Administered Medications: None     Allergies   Allergies   Allergen Reactions      Tramadol        Social History   I have reviewed this patient's social history and updated it with pertinent information if needed. Joelle Freeman  reports that she has never smoked. She has never used smokeless tobacco. She reports that she does not drink alcohol or use drugs.    Family History   I have reviewed this patient's family history and updated it with pertinent information if needed.   Family History   Problem Relation Age of Onset     Cerebrovascular Disease Mother      Cerebrovascular Disease Father      Blood Disease Son        Review of Systems   RONNY secondary to baseline dementia.    Physical Exam   Temp: 98.6  F (37  C) Temp src: Oral BP: 138/62 Pulse: 82 Heart Rate: 75 Resp: 18 SpO2: 93 % O2 Device: None (Room air)    Vital Signs with Ranges  Temp:  [98.4  F (36.9  C)-98.6  F (37  C)] 98.6  F (37  C)  Pulse:  [82] 82  Heart Rate:  [74-79] 75  Resp:  [18] 18  BP: (138-156)/(62-78) 138/62  SpO2:  [93 %-96 %] 93 %  119 lbs 0 oz    Heart Rate: 75, Blood pressure 138/62, pulse 82, temperature 98.6  F (37  C), temperature source Oral, resp. rate 18, weight 54 kg (119 lb), SpO2 93 %.  119 lbs 0 oz  HEENT:  Normocephalic, atraumatic.  PERRLA.  EOM s intact.   Neck:  Supple, non-tender, without lymphadenopathy.  Heart:  No peripheral edema  Lungs:  No SOB  Abdomen:  Soft, non-tender, non-distended.    Skin:  Warm and dry, good capillary refill.  Extremities:  Good radial and dorsalis pedis pulses bilaterally, no edema, cyanosis or clubbing.    NEUROLOGICAL EXAMINATION:   Mental status:  Drowsy, opens eyes to voice, mumbles.  Cranial nerves:  PERRL, face symmetic, tongue midline  Motor:  Moves all extremities  Sensation:  intact  Reflexes:   Negative Babinski.  Negative Clonus.    Coordination:  RONNY  Gait:  Deferred.    Data   CBC RESULTS:   Recent Labs   Lab Test 08/02/20 1912   WBC 11.4*   RBC 4.43   HGB 14.1   HCT 41.7   MCV 94   MCH 31.8   MCHC 33.8   RDW 13.8        Basic Metabolic Panel:  Lab  Results   Component Value Date     08/02/2020      Lab Results   Component Value Date    POTASSIUM 3.5 08/02/2020     Lab Results   Component Value Date    CHLORIDE 106 08/02/2020     Lab Results   Component Value Date    JAZLYN 9.4 08/02/2020     Lab Results   Component Value Date    CO2 23 08/02/2020     Lab Results   Component Value Date    BUN 13 08/02/2020     Lab Results   Component Value Date    CR 0.70 08/02/2020     Lab Results   Component Value Date     08/02/2020     INR:  Lab Results   Component Value Date    INR 1.07 05/31/2015    INR 1.01 01/20/2015    INR 1.01 11/17/2013    INR 0.98 06/27/2013

## 2020-08-03 NOTE — PROGRESS NOTES
[facility]      OUTPATIENT PHYSICAL THERAPY EVALUATION  PLAN OF TREATMENT FOR OUTPATIENT REHABILITATION  (COMPLETE FOR INITIAL CLAIMS ONLY)  Patient's Last Name, First Name, M.I.  YOB: 1928  Joelle Freeman                        Provider's Name  [facility] Medical Record No.  [id number]                               Onset Date:  08/02/20   Start of Care Date:  08/03/20      Type:     _X_PT   ___OT   ___SLP Medical Diagnosis:  Impaired mobility / falls.                         PT Diagnosis:  Impaired mobility   Visits from SOC:  1   _________________________________________________________________________________  Plan of Treatment/Functional Goals    Planned Interventions:  ,    balance training, bed mobility training, fine motor coordination training, gait training, motor coordination training, neuromuscular re-education, ROM, strengthening, stretching, transfer training, risk factor education, home program guidelines, progressive activity/exercise,       Goals: See Physical Therapy Goals on Care Plan in [employer] electronic health record.    Therapy Frequency: Daily  Predicted Duration of Therapy Intervention: 5 days  _________________________________________________________________________________    I CERTIFY THE NEED FOR THESE SERVICES FURNISHED UNDER        THIS PLAN OF TREATMENT AND WHILE UNDER MY CARE     (Physician co-signature of this document indicates review and certification of the therapy plan).                Certification date from: 08/03/20, Certification date to: 08/08/20    Referring Physician: Marian Dhaliwal MD             Initial Assessment        See Physical Therapy evaluation dated 08/03/20 in Epic electronic health record.

## 2020-08-03 NOTE — PLAN OF CARE
Discharge Planner PT   Patient admitted after 2 falls with mildly elevated troponin level and AMS, recent admit for fall from suspected vasovagal episode with elevated troponin - demand ischemia & UTI suspected.   Lives in [facility] with son reporting pt incontinence given insufficient cares; pt non-ambulatory beyond transferring between surfaces with Ax1.   Patient plan for discharge: TBD  Current status: Supine to sit with maximum assist with rail & head of bed up with PT needing to initiate movement, sitting balance standby assist with right lean, 2 attempts to stand - total assist with poor pt safety strategies. Weak, unsteady, fear-avoidant.   Barriers to return to prior living situation: Unsafe: needed more support at baseline and now she's moving worse.   Recommendations for discharge: TCU then SNF with home PT.   Rationale for recommendations: Pt is not finding success with living in [facility], would benefit from a more supportive living environment, first with TCU for a physical tune-up.       Entered by: Brittany Dressler 08/03/2020 11:13 AM

## 2020-08-04 ENCOUNTER — APPOINTMENT (OUTPATIENT)
Dept: SPEECH THERAPY | Facility: CLINIC | Age: 85
End: 2020-08-04
Attending: PHYSICIAN ASSISTANT
Payer: MEDICARE

## 2020-08-04 ENCOUNTER — APPOINTMENT (OUTPATIENT)
Dept: PHYSICAL THERAPY | Facility: CLINIC | Age: 85
End: 2020-08-04
Payer: MEDICARE

## 2020-08-04 LAB
ANION GAP SERPL CALCULATED.3IONS-SCNC: 7 MMOL/L (ref 3–14)
BUN SERPL-MCNC: 10 MG/DL (ref 7–30)
CALCIUM SERPL-MCNC: 8.5 MG/DL (ref 8.5–10.1)
CHLORIDE SERPL-SCNC: 110 MMOL/L (ref 94–109)
CO2 SERPL-SCNC: 24 MMOL/L (ref 20–32)
CREAT SERPL-MCNC: 0.68 MG/DL (ref 0.52–1.04)
ERYTHROCYTE [DISTWIDTH] IN BLOOD BY AUTOMATED COUNT: 14.1 % (ref 10–15)
GFR SERPL CREATININE-BSD FRML MDRD: 76 ML/MIN/{1.73_M2}
GLUCOSE SERPL-MCNC: 72 MG/DL (ref 70–99)
HCT VFR BLD AUTO: 39 % (ref 35–47)
HGB BLD-MCNC: 13.3 G/DL (ref 11.7–15.7)
MAGNESIUM SERPL-MCNC: 2.2 MG/DL (ref 1.6–2.3)
MCH RBC QN AUTO: 32.2 PG (ref 26.5–33)
MCHC RBC AUTO-ENTMCNC: 34.1 G/DL (ref 31.5–36.5)
MCV RBC AUTO: 94 FL (ref 78–100)
PHOSPHATE SERPL-MCNC: 2.6 MG/DL (ref 2.5–4.5)
PLATELET # BLD AUTO: 173 10E9/L (ref 150–450)
POTASSIUM SERPL-SCNC: 3.1 MMOL/L (ref 3.4–5.3)
RBC # BLD AUTO: 4.13 10E12/L (ref 3.8–5.2)
SODIUM SERPL-SCNC: 141 MMOL/L (ref 133–144)
WBC # BLD AUTO: 7.9 10E9/L (ref 4–11)

## 2020-08-04 PROCEDURE — 83735 ASSAY OF MAGNESIUM: CPT | Performed by: PHYSICIAN ASSISTANT

## 2020-08-04 PROCEDURE — 99207 ZZC CDG-CODE CATEGORY CHANGED: CPT | Performed by: PHYSICIAN ASSISTANT

## 2020-08-04 PROCEDURE — 97530 THERAPEUTIC ACTIVITIES: CPT | Mod: GP

## 2020-08-04 PROCEDURE — 25000132 ZZH RX MED GY IP 250 OP 250 PS 637: Mod: GY | Performed by: PHYSICIAN ASSISTANT

## 2020-08-04 PROCEDURE — 97116 GAIT TRAINING THERAPY: CPT | Mod: GP

## 2020-08-04 PROCEDURE — 99225 ZZC SUBSEQUENT OBSERVATION CARE,LEVEL II: CPT | Performed by: PHYSICIAN ASSISTANT

## 2020-08-04 PROCEDURE — 99217 ZZC OBSERVATION CARE DISCHARGE: CPT | Performed by: PHYSICIAN ASSISTANT

## 2020-08-04 PROCEDURE — 36415 COLL VENOUS BLD VENIPUNCTURE: CPT | Performed by: PHYSICIAN ASSISTANT

## 2020-08-04 PROCEDURE — 84132 ASSAY OF SERUM POTASSIUM: CPT | Performed by: INTERNAL MEDICINE

## 2020-08-04 PROCEDURE — 25000128 H RX IP 250 OP 636: Performed by: PHYSICIAN ASSISTANT

## 2020-08-04 PROCEDURE — 92610 EVALUATE SWALLOWING FUNCTION: CPT | Mod: GN | Performed by: SPEECH-LANGUAGE PATHOLOGIST

## 2020-08-04 PROCEDURE — 36415 COLL VENOUS BLD VENIPUNCTURE: CPT | Performed by: INTERNAL MEDICINE

## 2020-08-04 PROCEDURE — 96375 TX/PRO/DX INJ NEW DRUG ADDON: CPT

## 2020-08-04 PROCEDURE — 96376 TX/PRO/DX INJ SAME DRUG ADON: CPT

## 2020-08-04 PROCEDURE — G0378 HOSPITAL OBSERVATION PER HR: HCPCS

## 2020-08-04 PROCEDURE — 85027 COMPLETE CBC AUTOMATED: CPT | Performed by: PHYSICIAN ASSISTANT

## 2020-08-04 PROCEDURE — 84100 ASSAY OF PHOSPHORUS: CPT | Performed by: PHYSICIAN ASSISTANT

## 2020-08-04 PROCEDURE — 25000132 ZZH RX MED GY IP 250 OP 250 PS 637: Mod: GY | Performed by: INTERNAL MEDICINE

## 2020-08-04 PROCEDURE — 80048 BASIC METABOLIC PNL TOTAL CA: CPT | Performed by: PHYSICIAN ASSISTANT

## 2020-08-04 RX ORDER — MICONAZOLE NITRATE 20 MG/G
CREAM TOPICAL
Status: DISCONTINUED | OUTPATIENT
Start: 2020-08-04 | End: 2020-08-05 | Stop reason: HOSPADM

## 2020-08-04 RX ORDER — POTASSIUM CHLORIDE 29.8 MG/ML
20 INJECTION INTRAVENOUS
Status: DISCONTINUED | OUTPATIENT
Start: 2020-08-04 | End: 2020-08-05 | Stop reason: HOSPADM

## 2020-08-04 RX ORDER — MAGNESIUM SULFATE HEPTAHYDRATE 40 MG/ML
4 INJECTION, SOLUTION INTRAVENOUS EVERY 4 HOURS PRN
Status: DISCONTINUED | OUTPATIENT
Start: 2020-08-04 | End: 2020-08-05 | Stop reason: HOSPADM

## 2020-08-04 RX ORDER — POTASSIUM CHLORIDE 1500 MG/1
20-40 TABLET, EXTENDED RELEASE ORAL
Status: DISCONTINUED | OUTPATIENT
Start: 2020-08-04 | End: 2020-08-05 | Stop reason: HOSPADM

## 2020-08-04 RX ORDER — POTASSIUM CHLORIDE 7.45 MG/ML
10 INJECTION INTRAVENOUS
Status: DISCONTINUED | OUTPATIENT
Start: 2020-08-04 | End: 2020-08-05 | Stop reason: HOSPADM

## 2020-08-04 RX ORDER — POTASSIUM CL/LIDO/0.9 % NACL 10MEQ/0.1L
10 INTRAVENOUS SOLUTION, PIGGYBACK (ML) INTRAVENOUS
Status: DISCONTINUED | OUTPATIENT
Start: 2020-08-04 | End: 2020-08-05 | Stop reason: HOSPADM

## 2020-08-04 RX ORDER — POTASSIUM CHLORIDE 1.5 G/1.58G
20-40 POWDER, FOR SOLUTION ORAL
Status: DISCONTINUED | OUTPATIENT
Start: 2020-08-04 | End: 2020-08-05 | Stop reason: HOSPADM

## 2020-08-04 RX ADMIN — CEFTRIAXONE SODIUM 1 G: 1 INJECTION, POWDER, FOR SOLUTION INTRAMUSCULAR; INTRAVENOUS at 16:04

## 2020-08-04 RX ADMIN — ESCITALOPRAM 5 MG: 5 TABLET, FILM COATED ORAL at 10:07

## 2020-08-04 RX ADMIN — Medication 10 MEQ: at 18:04

## 2020-08-04 RX ADMIN — DOCUSATE SODIUM 50 MG AND SENNOSIDES 8.6 MG 1 TABLET: 8.6; 5 TABLET, FILM COATED ORAL at 10:53

## 2020-08-04 RX ADMIN — Medication 10 MEQ: at 19:33

## 2020-08-04 RX ADMIN — LISINOPRIL 10 MG: 10 TABLET ORAL at 10:07

## 2020-08-04 RX ADMIN — DOCUSATE SODIUM 50 MG AND SENNOSIDES 8.6 MG 1 TABLET: 8.6; 5 TABLET, FILM COATED ORAL at 20:45

## 2020-08-04 RX ADMIN — Medication 10 MEQ: at 12:30

## 2020-08-04 RX ADMIN — Medication 10 MEQ: at 10:59

## 2020-08-04 RX ADMIN — METOPROLOL SUCCINATE 25 MG: 25 TABLET, EXTENDED RELEASE ORAL at 10:07

## 2020-08-04 RX ADMIN — MICONAZOLE NITRATE: 20 CREAM TOPICAL at 16:05

## 2020-08-04 RX ADMIN — LEVOTHYROXINE SODIUM 75 MCG: 75 TABLET ORAL at 10:07

## 2020-08-04 NOTE — PROGRESS NOTES
08/04/20 0927   General Information   Onset Date 08/02/20   Start of Care Date 08/04/20   Referring Physician Latanya Diaz   Patient Profile Review/OT: Additional Occupational Profile Info See Profile for full history and prior level of function   Patient/Family Goals Statement Patient did not state.    Swallowing Evaluation Bedside swallow evaluation   Behaviorial Observations Alert   Mode of current nutrition Oral diet   Type of oral diet Regular;Thin liquid   Respiratory Status Room air   Comments Joelle Freeman is a 89 year old female, accompanied by her son, with a history of dementia, CAD, hypertension, TIA, and CVA, among others, who presents via EMS with injuries sustained from two unwitnessed falls today. Per staff at her care facility, she was found prone after one fall and supine after the other. They noted bruising on her right forehead and right hand. Upon evaluation the patient denied any headache, dysuria, or pain. Her son stated Joelle has been going to physical therapy and can walk with a walker but has fallen before. He believes she is a bit more confused than her baseline although he hasn't seen her in a few days. Patient last seen in 2015 and video completed. She demonstrated penetration of thin liquids and pyriform residue that cleared with an additional swallow, a DDL 1 with thin was recommended.    Clinical Swallow Evaluation   Oral Musculature unable to assess due to poor participation/comprehension   Structural Abnormalities none present   Dentition present and adequate   Mucosal Quality dry   Swallow Eval   Feeding Assistance frequent cues/help required   Clinical Swallow Eval: Thin Liquid Texture Trial   Mode of Presentation, Thin Liquids cup;self-fed;spoon;fed by clinician   Volume of Liquid or Food Presented 5 swallows of water   Oral Phase of Swallow Premature pharyngeal entry   Pharyngeal Phase of Swallow impaired;reduction in laryngeal movement;repeated swallows    Diagnostic Statement Premature entry and can not rule out penetration given delay.    Clinical Swallow Eval: Nectar Thick Liquid Texture Trial   Mode of Presentation, Nectar cup;self-fed   Volume of Nectar Presented 2 oz of juice   Oral Phase, Nectar Premature pharyngeal entry   Pharyngeal Phase, Nectar impaired;reduction in laryngeal movement;repeated swallows   Diagnostic Statement Improved timing and no Sx of aspiration.    Clinical Swallow Eval: Puree Solid Texture Trial   Mode of Presentation, Puree spoon;fed by clinician   Volume of Puree Presented 5 1/2 teaspoons amounts   Oral Phase, Puree Poor AP movement;Premature pharyngeal entry   Pharyngeal Phase, Puree impaired;reduction in laryngeal movement;repeated swallows   Diagnostic Statement Poor AP movement with delayed swallow.   Clinical Swallow Eval: Semisolid Texture Trial   Mode of Presentation, Semisolid spoon;fed by clinician   Volume of Semisolid Food Presented 1 teaspoon   Oral Phase, Semisolid Poor AP movement;Residue in oral cavity;Premature pharyngeal entry   Oral Residue, Semisolid mid posterior tongue   Pharyngeal Phase, Semisolid impaired;reduction in laryngeal movement;repeated swallows   Diagnostic Statement Insufficient mastication and oral residue.    Swallow Compensations   Swallow Compensations Alternate viscosity of consistencies;Pacing;Reduce amounts;Multiple swallow   Results Suspect silent aspiration;Oral difficulties only   Esophageal Phase of Swallow   Patient reports or presents with symptoms of esophageal dysphagia Yes   General Therapy Interventions   Planned Therapy Interventions Dysphagia Treatment   Dysphagia treatment Modified diet education;Instruction of safe swallow strategies   Swallow Eval: Clinical Impressions   Skilled Criteria for Therapy Intervention Skilled criteria met.  Treatment indicated.   Functional Assessment Scale (FAS) 3   Treatment Diagnosis Moderate dysphagia   Diet texture recommendations Dysphagia diet  level 1;Nectar thick liquids   Recommended Feeding/Eating Techniques alternate between small bites and sips of food/liquid;check mouth frequently for oral residue/pocketing;maintain upright posture during/after eating for 30 mins;no straws;small sips/bites   Therapy Frequency 3x/week   Predicted Duration of Therapy Intervention (days/wks) 1 week   Anticipated Discharge Disposition inpatient rehabilitation facility   Risks and Benefits of Treatment have been explained. Yes   Patient, family and/or staff in agreement with Plan of Care Yes   Clinical Impression Comments Patient presents with moderate oral and pharyngeal dysphagia at bedside. Deficits include; premature entry of thin liquids via the cup without a cough response, but increased risk given delay. She had improved timing with nectar thick liquids via the cup and 2-3 swallows to clear. Reduced bouls coordination and oral transport posteriorly and mild delay with pureed textures. Insufficient mastication of soft solids. Recommend: 1. Downgrade diet to DDL 1 with nectar thick liquids. 2. Upright, fully alert, assistance, slow pace of feeding and alternate liquids/solids, no straws and crush medications as able and place in apple sauce.    Therapy Certification   Certification date from 08/04/20   Certification date to 08/11/20   Medical Diagnosis Dysphagia   Certification I certify the need for these services furnished under this plan of treatment and while under my care.  (Physician co-signature of this document indicates review and certification of the therapy plan).   Total Evaluation Time   Total Evaluation Time (Minutes) 20

## 2020-08-04 NOTE — PROGRESS NOTES
SYED  D: PAS-RR    D: Per DHS regulation, SW completed and submitted PAS-RR to [place] [employer] Direct Connect via the Senior LinkAge Line.  PAS-RR confirmation # is :[id number]     I: SYED spoke with patient and family and they are aware a PAS-RR has been submitted.  SYED reviewed with patient and family  that they may be contacted for a follow up appointment within 10 days of hospital discharge if their SNF stay is < 30 days.  Contact information for [facility] LinkAge Line was also provided.    A: patient and family  verbalized understanding.    P: Further questions may be directed to [facility] LinkAge Line at #[phone], option #4 for PAS-RR staff.      SUSANNE Riley     [facility]

## 2020-08-04 NOTE — CONSULTS
CLINICAL NUTRITION SERVICES  -  ASSESSMENT NOTE      Recommendations Ordered by Registered Dietitian (RD): Magic Cup with meals    Malnutrition: % Weight Loss:  > 2% in 1 week (severe malnutrition)  % Intake:  Unable to determine   Subcutaneous Fat Loss:  Unable to determine   Muscle Loss:  Unable to determine   Fluid Retention:  None noted    Malnutrition Diagnosis: Unable to determine due to inability to interview patient or perform a nutrition focused physical exam         REASON FOR ASSESSMENT  Joelle Freeman is a 89 year old female seen by Registered Dietitian for Provider Order - Dysphagia, dementia, poor oral intake           NUTRITION HISTORY  - Information obtained from [facility] and AL records as therapy working with patient (also patient with dementia and likely not a good historian).  - Patient was seen by our services in 2015 and the following history was obtained:  - Information obtained from Patient and chart review.              - Patient is on a regular  diet at home.  - Food allergies / intolerances none stated.  - Typical food/fluid intake is 3 meals per day. Pt resides in an assisted living facility and reports eats 3 meals per day in facility dining room.  She states her appetite has been decreased for some time and estimates eats about half of her meals.    - Patient is consuming the following supplements at home-occasional supplement use-pt unable to specify.    - Per Assisted Living records, patient was on a regular diet at the facility and meals are provided to her.  No record of patient receiving an oral nutritional supplement at the facility.      CURRENT NUTRITION ORDERS  Diet Order:     DD1, nectar thick liquid     Current Intake/Tolerance:  Patient has only ordered one meal since admit (breakfast yesterday) and per flowsheets did not eat any of it      NUTRITION FOCUSED PHYSICAL ASSESSMENT FOR DIAGNOSING MALNUTRITION)  No:  Patient not available                Observed:    Unable  "    Obtained from Chart/Interdisciplinary Team:  None     ANTHROPOMETRICS  Height: 5'4\"  Weight: 54 kg (119#)(8/3)  Body mass index is 20.43 kg/m   Weight Status:  Normal BMI  IBW: 54.5 kg   % IBW: 99%  Weight History:   Wt Readings from Last 10 Encounters:   08/03/20 54 kg (119 lb)   07/20/20 57.1 kg (125 lb 12.8 oz)   06/29/19 56.7 kg (125 lb)   07/26/18 56.7 kg (125 lb)   01/19/16 51.7 kg (114 lb)   12/15/15 53.5 kg (118 lb)   12/07/15 54.3 kg (119 lb 9.6 oz)   11/30/15 55.2 kg (121 lb 9.6 oz)   11/26/15 57.3 kg (126 lb 5.2 oz)   11/17/15 54.3 kg (119 lb 11.4 oz)     Down 7# or 5.5% over the last two weeks     LABS  Labs reviewed    MEDICATIONS  Medications reviewed      ASSESSED NUTRITION NEEDS PER APPROVED PRACTICE GUIDELINES:    Dosing Weight 54 kg   Estimated Energy Needs: 5399-5195 kcals (25-30 Kcal/Kg)  Justification: maintenance  Estimated Protein Needs: 65-80 grams protein (1.2-1.5 g pro/Kg)  Justification: hypercatabolism with acute illness  Estimated Fluid Needs: 9436-6194 mL (1 mL/Kcal)  Justification: maintenance    MALNUTRITION:  % Weight Loss:  > 2% in 1 week (severe malnutrition)  % Intake:  Unable to determine   Subcutaneous Fat Loss:  Unable to determine   Muscle Loss:  Unable to determine   Fluid Retention:  None noted    Malnutrition Diagnosis: Unable to determine due to inability to interview patient or perform a nutrition focused physical exam     NUTRITION DIAGNOSIS:  Inadequate oral intake related to presumed poor appetite and dysphagia as evidenced by no oral intake thus far this admit       NUTRITION INTERVENTIONS  Recommendations / Nutrition Prescription  Continue DD1, nectar per SLP   Magic Cup with meals     Implementation  Nutrition education: Not appropriate at this time due to patient condition  Medical Food Supplement:  Ordered as above     Nutrition Goals  Patient will consume at least 50% of meals and supplements     MONITORING AND EVALUATION:  Progress towards goals will be " monitored and evaluated per protocol and Practice Guidelines    Kisha Gilbert RD, LD, CNSC   Clinical Dietitian - [facility]

## 2020-08-04 NOTE — PLAN OF CARE
Shift Note: HTN, other VSS, appears comfortable. Very [employer] and orientation variable-can be a/ox2-3, and then very lethargic and confused. Abrasions to bilateral cheeks and dark navy/purple bruise to (R)temple forehead. T/Rq2h, labial redness, almost dry appearing, miconazole cream applied. Purewick in place, K+ replacement finishing, will discharge to TCU tomorrow AM.

## 2020-08-04 NOTE — PROGRESS NOTES
[facility]    Medicine Progress Note - Hospitalist Service       Date of Admission:  8/2/2020  Assessment & Plan   Mrs. Joelle Freeman is a 89-year-old female with complex past medical history of hypertension, dyslipidemia, dementia, severe aortic stenosis, bilateral hygromas versus chronic subdural hematomas, history of recurrent UTIs, history of NSTEMI/demand ischemia, hypothyroidism, constipation and anemia, who was admitted to [facility] on 8/3/20 after she had 2 unwitnessed falls in her assisted living facility and worsening confusion.        Confusion, improving  Recurrent falls   Advanced dementia  The patient lives in an assisted living facility.  She does have baseline dementia.  Apparently, she had 2 unwitnessed fall earlier today.  Fortunately, CT of the head and CT of the C-spine did not show any fractures or bleeding or dislocation.  Also, x-ray of the pelvis and x-ray of the right-hand did show any fractures.  She does have mildly elevated troponin of 0.355.  Her troponins were chronically elevated in the past, so I do not think that this is a culprit for her recurrent falls.  Besides her baseline dementia, reportedly, her son felt that she is more confused than usual, which usually happens with her recurrent urinary tract infection.  She does have mildly elevated white blood cells of 11.4.   - Observation status, reviewed by UR  - Fall precautions   - Neurosurgery consulted given hx  shunt and new confusion, no need for any setting changes. She will f/u in clinic as previously scheduled, they have signed off.   - Monitor VS, afebrile     Probable acute UTI vs incomplete treatment of prior UTI  UA resulted Large LE, 61 WBC, Neg Nitrite. Grew E coli last visit and also neg nitrite. Noted 80 ketones.   - Started low rate MIVF (has mild component of diastolic CHF)   - Ceftriaxone 1gm Q 24 hours, discharge to TCU tomorrow with 12 days of Augment (received 2 doses ceftriaxone at that point for  total 14 days of treatment)  - Follow up UCx and BCx (NGTD)     Moderate oropharyngeal dysphagia  Inadequate oral intake  - SLP and RD on board, will need SLP and RD to follow at rehab  - Downgraded to DDL 1 with NTL  - Magic cups with meals to increase available kcals/prot    Mild hypokalemia: Check and replete lytes PRN     New LBBB  Chronic troponin elevation  Elevated troponins of 0.355.  Of note, she has chronically elevated troponins.  During her recent prior hospitalization her troponins were 0.46, 0.47.  At that time she had an echocardiogram which showed a preserved EF and no wall motion abnormalities.  She does seem to have a new left bundle branch block on EKG.  She denies any chest pain, no shortness of breath.   - Telemetry.    - Troponins flat  - Family is not willing to proceed with more invasive cardiac interventions at this time given advanced age and advanced dementia  - Continue PTA  BB and ACEi   - Hold PTA statin given observational status, resume at discharge  - PTA aspirin was recently stopped given her CT head findings of bilateral subdural hygromas versus subdural hematomas.      History of severe aortic stenosis  Her most echocardiogram on 07/20/2020 showed aortic valve area of 0.5 square cm as per prior notes.  She was considered to not be a surgical candidate.      History of normal pressure hydrocephalus, status post  shunt.    Recent bilateral hygromas versus chronic subdural hematomas.    A recent CT of the head on 07/19 showed bilateral fluid collections suggestive of a hygromas versus chronic subdural hematomas.  She was seen by Neurosurgery at that time.  Her aspirin was stopped at that time, repeat a CT of the head, the next day on 07/20 showed stable findings and she was supposed to follow-up with Neurosurgery in 4-6 weeks from that date of the discharge with a repeat CT head at that time.   - Neurosurgery evaluated and signed off as above     History of hypothyroidism during  "her prior hospitalization.    Her TSH was low at 0.22 and free T4 was high at 53.  Her Synthroid was reduced from 88 to 75 mcg daily, which will be continued at this time and she should have repeat TSH in 4 weeks.      Depression/anxiety.  Continue PTA Lexapro.      Dementia:   - Continue supportive care at this time.  - High risk for increased confusion/agitation/delirium.      History of hypertension:    BPs elevated on admission SBP 140s-150s. Though not unreasonable given advanced age  - Continue her prior to admission Toprol-XL 25 mg p.o. daily and lisinopril 10 mg p.o. daily.      History of dyslipidemia.  Holding PTA statin given observational status.      Asymptomatic COVID-19 screening status: Negative on 5/20, 6/22, 6/29, 7/6, 7/19, 7/22, 8/2    Diet: Room Service  Combination Diet Dysphagia Diet Level 1: Pureed; Nectar Thickened Liquids (pre-thickened or use instant food thickener) (No straws)  Room Service  Advance Diet as Tolerated    DVT Prophylaxis: Pneumatic Compression Devices  Benítez Catheter: not present  Code Status: DNR/DNI    Disposition Plan   Expected discharge: Tomorrow, recommended to transitional care unit once safe disposition plan/ TCU bed available and after 2nd dose of ceftriaxone today.  Entered: Latanya Diaz PA-C 08/04/2020, 2:19 PM       The patient's care was discussed with the Attending Physician, Dr. Palmer, Bedside Nurse, Care Coordinator/, Patient and Patient's Family.    Latanya Diaz PA-C ([place])  Hospitalist Service  [facility]    ______________________________________________________________________    Interval History   Examined.  Patient is much more alert and oriented today and able to have a conversation.  I wrote her son's phone number on her white board and she was able to read this off to me easily.  Called and updated son who is very happy with her progress and saw her earlier today.  Son states \"she is like a new person\".  " Patient has no complaints to today.  Afebrile, vital signs stable.  Potassium being replaced.    Data reviewed today: I reviewed all medications, new labs and imaging results over the last 24 hours. I personally reviewed no images or EKG's today.    Physical Exam   Vital Signs: Temp: 96.7  F (35.9  C) Temp src: Axillary BP: (!) 146/64 Pulse: 62 Heart Rate: 63 Resp: 16 SpO2: 97 % O2 Device: None (Room air)    Weight: 119 lbs 0 oz    General: Awake, alert, elderly woman who appears stated age. Looks comfortable sittingup in bed. No acute distress.  HEENT: Normocephalic, atraumatic. Extraocular movements intact.   Respiratory: Clear to auscultation bilaterally, no rales, wheezing, or rhonchi.  Cardiovascular: Regular rate and rhythm, +S1 and S2, loud systolic murmur auscultated, loudest at RUSB. No peripheral edema.   Gastrointestinal: Soft, non-tender, non-distended. Bowel sounds present.  Skin: Warm, dry. No obvious rashes or lesions on exposed skin. Dorsalis pedis pulses palpable bilaterally.  Musculoskeletal: No joint swelling, erythema or tenderness. Moves all extremities equally.  Neurologic: AAO x2-3. Cranial nerves 2-12 grossly intact, normal strength and sensation.  Psychiatric: Appropriate mood and affect. No obvious anxiety or depression.    Data   Recent Labs   Lab 08/04/20  0716 08/03/20  1320 08/03/20  0610 08/02/20  2350 08/02/20  1912   WBC 7.9 8.6  --   --  11.4*   HGB 13.3  --   --   --  14.1   MCV 94  --   --   --  94     --   --   --  203     --   --   --  137   POTASSIUM 3.1*  --   --   --  3.5   CHLORIDE 110*  --   --   --  106   CO2 24  --   --   --  23   BUN 10  --   --   --  13   CR 0.68  --   --   --  0.70   ANIONGAP 7  --   --   --  8   JAZLYN 8.5  --   --   --  9.4   GLC 72  --   --   --  117*   ALBUMIN  --   --   --   --  3.9   PROTTOTAL  --   --   --   --  7.7   BILITOTAL  --   --   --   --  1.0   ALKPHOS  --   --   --   --  88   ALT  --   --   --   --  14   AST  --   --   --    --  15   LIPASE  --   --   --   --  168   TROPI  --   --  0.369* 0.355* 0.367*     No results found for this or any previous visit (from the past 24 hour(s)).  Medications     sodium chloride 50 mL/hr at 08/03/20 1634       cefTRIAXone  1 g Intravenous Q24H     escitalopram  5 mg Oral Daily     levothyroxine  75 mcg Oral Daily     lisinopril  10 mg Oral Daily     metoprolol succinate ER  25 mg Oral Daily     senna-docusate  1 tablet Oral BID     sodium chloride (PF)  3 mL Intracatheter Q8H

## 2020-08-04 NOTE — DISCHARGE SUMMARY
[facility]  Hospitalist Discharge Summary      Date of Admission:  8/2/2020  Date of Discharge:  8/5/2020  Discharging Provider: Sneha Boss PA-C      Discharge Diagnoses   Confusion, improving  Recurrent falls   Advanced dementia  Urinary tract infection   Moderate oropharyngeal dysphagia  Inadequate oral intake  Mild hypokalemia  New LBBB  Chronic troponin elevation  Recent bilateral hygromas vs chronic subdural hematomas    Chronic stable diagnoses addressed this visit  History of severe aortic stenosis  History of normal pressure hydrocephalus status post  shunt  Hypothyroidism  Depression and anxiety  Hypertension    Follow-ups Needed After Discharge   Follow-up Appointments     Follow Up and recommended labs and tests      Follow up with [facility] physician.  The following labs/tests are   recommended: follow up results of urine culture.    Follow up with Neurosurgery in 4 weeks as previously scheduled.             Unresulted Labs Ordered in the Past 30 Days of this Admission     Date and Time Order Name Status Description    8/3/2020 1535 Blood culture Preliminary     8/3/2020 1440 Urine Culture Aerobic Bacterial Preliminary       These results will be followed up by TCU provider.    Discharge Disposition   Discharged to rehabilitation facility  Condition at discharge: Stable    Hospital Course   Mrs. Joelle Freeman is a 89-year-old female with complex past medical history of hypertension, dyslipidemia, dementia, severe aortic stenosis, bilateral hygromas versus chronic subdural hematomas, history of recurrent UTIs, history of NSTEMI/demand ischemia, hypothyroidism, constipation and anemia, who was registered to observation at [facility] on 8/3/20 after she had 2 unwitnessed falls in her assisted living facility and worsening confusion.  She was found to have a urinary tract infection which was treated and mental status improved. For full HPI please see admission H&P from Dr. Dhaliwal  dated 8/3/2020.       Confusion, improving  Recurrent falls   Advanced dementia  From  [facility] and has baseline advanced dementia. Apparently, she had 2 unwitnessed fall earlier day of admit. Fortunately, CT of the head and CT of the C-spine did not show any fractures or bleeding or dislocation. XR of pelvis and right-hand did not show any fractures. Chronically mild elevated troponin which was 0.355 day of admit and remained flat. No accompanied chest pain. Son felt she much more confused than usual and worried of a UTI which she was recently treated for. Mild leukocytosis on admission 11.4 which normalized.  She was registered to observation with fall precautions in place.  UA was infectious appearing as below and she was started on antibiotics. Neurosurgery consulted given hx  shunt and new confusion, no need for any setting changes. She will f/u in clinic as previously scheduled, they have signed off.  Day of discharge patient is much more alert and able to have short conversations.   She was seen by PT and SLP and recommended transitional care unit on discharge which was arranged by . Son feels she is much improved from time of admission but still a bit more confused than baseline as she is referencing parents who she typically knows have passed away.      Urinary tract infection   Recently treated for UTI during admission 7/19-7/21, treated with ceftriaxone and discharged on 5 day course of Ceftin. UA resulted Large LE, 61 WBC, Neg Nitrite. Grew E coli last visit and also neg nitrite. Noted 80 ketones. Started low rate MIVF (has mild component of diastolic CHF) and tolerated well. Started on Ceftriaxone 1gm Q 24 x2 doses, will discharge with 5 additional days of amoxicillin to complete 7 day course. Culture growing 50-100k colonies Enterococcus faecalis with susceptibilities pending. THESE WILL NEED TO BE FOLLOWED UP AT TCU     Moderate oropharyngeal dysphagia  Inadequate oral intake  SLP and RD on  board, will need SLP and RD to follow at rehab. Downgraded diet to DDL 1 with NTL. Magic cups with meals to increase available kcals/prot.  Should receive ongoing SLP at TCU.     Mild hypokalemia: Check and replete lytes PRN     New LBBB  Chronic troponin elevation  Elevated troponins of 0.355. Of note, she has chronically elevated troponins. During her recent prior hospitalization her troponins were 0.46, 0.47. At that time she had an echocardiogram which showed a preserved EF and no wall motion abnormalities.  She does seem to have a new left bundle branch block on EKG.  She denies any chest pain, no shortness of breath. Monitored telemetry. Troponins flat. Family is not willing to proceed with more invasive cardiac interventions at this time given advanced age and advanced dementia, Continue PTA  BB and ACEi. Hold PTA statin given observational status, resume at discharge. PTA aspirin was recently stopped given her CT head findings of bilateral subdural hygromas versus subdural hematomas.      History of severe aortic stenosis  Her most echocardiogram on 07/20/2020 showed aortic valve area of 0.5 square cm as per prior notes. She was considered to not be a surgical candidate.      History of normal pressure hydrocephalus, status post  shunt.    Recent bilateral hygromas versus chronic subdural hematomas.    A recent CT of the head on 07/19 showed bilateral fluid collections suggestive of a hygromas versus chronic subdural hematomas.  She was seen by Neurosurgery at that time. Her aspirin was stopped at that time, repeat a CT of the head, the next day on 07/20 showed stable findings and she was supposed to follow-up with Neurosurgery in 4-6 weeks from that date of the discharge with a repeat CT head at that time. Neurosurgery evaluated and signed off as above     History of hypothyroidism during her prior hospitalization.    Her TSH was low at 0.22 and free T4 was high at 53.  Her Synthroid was reduced from 88  to 75 mcg daily, which will be continued at this time and she should have repeat TSH in 4 weeks.      Depression/anxiety.  Continue PTA Lexapro.      History of hypertension:    BPs elevated on admission SBP 140s-150s. Though not unreasonable given advanced age. Continued PTA Toprol-XL 25 mg p.o. daily and lisinopril 10 mg p.o. daily.      History of dyslipidemia.  Holding PTA statin given observational status.      Asymptomatic COVID-19 screening status: Negative on 5/20, 6/22, 6/29, 7/6, 7/19, 7/22, 8/2    Consultations This Hospital Stay   SOCIAL WORK IP CONSULT  PHYSICAL THERAPY ADULT IP CONSULT  SPEECH LANGUAGE PATH ADULT IP CONSULT  NEUROSURGERY IP CONSULT  NUTRITION SERVICES ADULT IP CONSULT  NUTRITION SERVICES ADULT IP CONSULT  PHYSICAL THERAPY ADULT IP CONSULT  OCCUPATIONAL THERAPY ADULT IP CONSULT  SPEECH LANGUAGE PATH ADULT IP CONSULT    Code Status   No CPR- Do NOT Intubate    Time Spent on this Encounter   I, Latanya Diaz PA-C, personally saw the patient today and spent greater than 30 minutes discharging this patient.       Sneha Boss PA-C  [facility]  ______________________________________________________________________    Physical Exam   Vital Signs: Temp: 96  F (35.6  C) Temp src: Oral BP: (!) 162/72 Pulse: 72 Heart Rate: 66 Resp: 18 SpO2: 96 % O2 Device: None (Room air)    Weight: 120 lbs 12.8 oz    Temp: 96  F (35.6  C) Temp src: Oral BP: (!) 162/72 Pulse: 72 Heart Rate: 66 Resp: 18 SpO2: 96 % O2 Device: None (Room air)    Vitals:    08/03/20 0646 08/05/20 0611   Weight: 54 kg (119 lb) 54.8 kg (120 lb 12.8 oz)     Vital Signs with Ranges  Temp:  [96  F (35.6  C)-98.2  F (36.8  C)] 96  F (35.6  C)  Pulse:  [72] 72  Heart Rate:  [66-68] 66  Resp:  [18] 18  BP: (143-162)/(66-72) 162/72  SpO2:  [95 %-96 %] 96 %  I/O last 3 completed shifts:  In: 50 [P.O.:50]  Out: 1300 [Urine:1300]    Constitutional: Awake and alert. Oriented to self, son, hospital. Disoriented to  situation, date. Follows commands. Very hard of hearing   ENT: normal cephalic, moist mucous membranes  Eyes:  Sclera anicteric, EOMI  Respiratory: Lungs clear to auscultation bilaterally, no increased work of breathing or wheezing   Cardiovascular: Regular rate and rhythm, +systolic murmur  GI: positive bowel sounds, abdomen soft, non-tender  Skin/Integumen: ecchymosis face  Neuro:  Face symmetric. Tongue midline. Speech is clear. Follows commands.   MSK:  Moves all four extremities.  strength +5/5 and equal.            Primary Care Physician   EDNA CARCAMO    Discharge Orders      General info for SNF    Length of Stay Estimate: Short Term Care: Estimated # of Days <30  Condition at Discharge: Stable  Level of care:skilled   Rehabilitation Potential: Fair  Admission H&P remains valid and up-to-date: Yes  Recent Chemotherapy: N/A  Use Nursing Home Standing Orders: Yes     Mantoux instructions    Give two-step Mantoux (PPD) Per Facility Policy Yes     Reason for your hospital stay    You were admitted with confusion and found to have a UTI.     Intake and output    Every shift     Activity - Up with nursing assistance     Encourage PO fluids     Follow Up and recommended labs and tests    Follow up with detention physician.  The following labs/tests are recommended: follow up results of urine culture.    Follow up with Neurosurgery in 4 weeks as previously scheduled.     No CPR- Do NOT Intubate     Nutrition Services Adult IP Consult    Reason:  Dysphagia, malnutrition, poor PO intake     Physical Therapy Adult Consult    Evaluate and treat as clinically indicated.    Reason:  deconditioning     Occupational Therapy Adult Consult    Evaluate and treat as clinically indicated.    Reason:  deconditioning     Speech Language Path Adult Consult    Evaluate and treat as clinically indicated.    Reason:  dysphagia     Fall precautions     Advance Diet as Tolerated    Follow this diet upon discharge: Combination  Diet Dysphagia Diet Level 1: Pureed; Nectar Thickened Liquids (pre-thickened or use instant food thickener) (No straws)       Significant Results and Procedures   Results for orders placed or performed during the hospital encounter of 08/02/20   XR Chest 2 Views    Narrative    CHEST TWO VIEWS   8/2/2020 7:42 PM     HISTORY: Fall.    COMPARISON: Chest x-rays dated 6/29/2019.    Ventriculoperitoneal shunt tubing is projected over the right side of  the neck, right chest and right upper abdomen, similar to the prior  study. Cardiac silhouette remains enlarged. Dense mitral valve annular  calcifications are again noted. There are thoracic aortic  calcifications which are also again seen. Slightly increased densities  project over the heart and posterior to the heart on the lateral view  which could represent a subtle infiltrate in the left base. This could  also be artifactual. No definite infiltrate is seen on the PA view.    No acute fracture, pneumothorax or significant pleural fluid  collection is identified.      Impression    IMPRESSION:   1. Stable mild cardiomegaly, mitral valve annular calcifications,  right chest wall, neck and upper abdominal peritoneal shunt tubing,  and thoracic aortic calcifications.  2. Question subtle infiltrate projected over the posterior bases on  the lateral view which is likely artifactual due to technique.  Recommend clinical correlation.  3. No other significant changes. No definite fracture or other acute  traumatic injury to the chest is identified.    TERRI GOODRICH MD   XR Pelvis 1/2 Views    Narrative    EXAM: XR PELVIS 1/2 VW  LOCATION: [facility]  DATE/TIME: 8/2/2020 7:07 PM    INDICATION: Pain after fall  COMPARISON: None.      Impression    IMPRESSION: Osteopenia. Internal fixation hardware right hip and proximal femur. No evidence for acute fracture. Catheter overlying the left iliac bone. Vascular calcifications. Tiny opaque foreign bodies overlying the  symphysis pubis.   Head CT w/o contrast    Narrative    CT SCAN OF THE HEAD WITHOUT CONTRAST   8/2/2020 7:31 PM     HISTORY: fall, bruising to the right forehead, history of chronic  subdural vs hygroma    TECHNIQUE:  Axial images of the head and coronal reformations without  IV contrast material. Radiation dose for this scan was reduced using  automated exposure control, adjustment of the mA and/or kV according  to patient size, or iterative reconstruction technique.    COMPARISON: Scan dated 7/20/2020    FINDINGS:     Intracranial contents: Again noted is a right occipital  ventriculostomy catheter. Patient is status post a right frontal  craniotomy. Calcifications are seen beneath craniotomy flap. Small low  dense subdural fluid collections are seen over both convexities. These  are unchanged in size or appearance when compared to 7/20/2020. No  acute hemorrhage is identified. There is diffuse parenchymal volume  loss.  White matter changes are present in the cerebral hemispheres  that are consistent with small vessel ischemic disease in this age  patient. There is no evidence of intracranial hemorrhage, mass, acute  infarct or anomaly.    Visualized orbits/sinuses/mastoids:  The visualized portions of the  sinuses and mastoids appear normal.    Osseous structures/soft tissues:  There is no evidence of trauma.      Impression    IMPRESSION: Stable. No acute bleed identified. No skull fractures are  seen.      ADITYA FERREIRA MD   CT Cervical Spine w/o Contrast    Narrative    CT CERVICAL SPINE WITHOUT CONTRAST   8/2/2020 7:32 PM     HISTORY: fall     TECHNIQUE: Axial images of the cervical spine were obtained without  intravenous contrast. Multiplanar reformations were performed.   Radiation dose for this scan was reduced using automated exposure  control, adjustment of the mA and/or kV according to patient size, or  iterative reconstruction technique.    COMPARISON: Scan dated 2/29/2020    FINDINGS: There is no  evidence of fracture. Alignment is normal. The  soft tissues surrounding the dural sac are prominent. This is  unchanged in appearance when compared to 2/29/2020. This may be due to  prominent veins.    C1-C2: Calcified pannus is seen posterior to the odontoid. This can be  seen in patients with calcium pyrophosphate deposition disease.     C2-C3:  Severe degenerative change in the facet joints.     C3-C4:  Loss of disc space height. Degenerative change in the facet  joints.     C4-C5:  Loss of disc space height. Slight anterior subluxation of C4  on C5. Ankylosis of the left facet joint.     C5-C6:  Loss of disc space height. Central canal normal. Degenerative  change in the facet joints. Slight retrolisthesis of C5 on C6.      C6-C7:  Loss of disc space height. Central canal and neural foramen  are patent.      C7-T1:   Slight anterior subluxation of C7 on T1. Central canal  normal.      Impression    IMPRESSION:    1. No fractures are identified.  2. Multilevel degenerative changes.    ADITYA FERREIRA MD   XR Hand Right G/E 3 Views    Narrative    EXAM: XR HAND RT G/E 3 VW  LOCATION: [facility]  DATE/TIME: 8/2/2020 7:37 PM    INDICATION: 5th metacarpal bruising.  COMPARISON: None.      Impression    IMPRESSION:   1.  No evidence of acute fracture or subluxation. Diffuse osteopenia. Scattered at least moderate DIP degenerative changes. Mild widening of the scapholunate interval suggesting scapholunate ligament tear. Chondrocalcinosis of the triangular   fibrocartilage.       Discharge Medications   Discharge Medication List as of 8/5/2020  9:31 AM      START taking these medications    Details   amoxicillin (AMOXIL) 500 MG capsule Take 1 capsule (500 mg) by mouth every 8 hours for 5 days, Transitional         CONTINUE these medications which have NOT CHANGED    Details   acetaminophen (TYLENOL) 325 MG tablet Take 650 mg by mouth every 6 hours as needed for pain , Historical      atorvastatin (LIPITOR) 10  MG tablet Take 1 tablet (10 mg) by mouth daily, Disp-30 tablet, R-1, E-Prescribe      cholecalciferol 1000 UNITS TABS Take 1,000 Units by mouth daily, Disp-30 tablet, R-1, E-Prescribe      escitalopram (LEXAPRO) 5 MG tablet Take 5 mg by mouth daily, Historical      folic acid (FOLVITE) 1 MG tablet Take 1 tablet (1 mg) by mouth daily, Disp-30 tablet,R-0, E-Prescribe      levothyroxine (SYNTHROID/LEVOTHROID) 75 MCG tablet Take 1 tablet (75 mcg) by mouth daily, Disp-30 tablet,R-0, E-PrescribeFuture refills by PCP Dr. EDNA CARCAMO with phone number [phone].      lisinopril (PRINIVIL/ZESTRIL) 10 MG tablet Take 10 mg by mouth daily, Historical      melatonin 1 MG TABS tablet Take 1 mg by mouth nightly as needed for sleep , Historical      metoprolol succinate ER (TOPROL-XL) 25 MG 24 hr tablet Take 25 mg by mouth daily, Historical      nystatin (MYCOSTATIN) 655524 UNIT/GM external powder Apply topically 2 times daily as needed (intertriginal dermatitis)Historical      senna-docusate (SENOKOT-S/PERICOLACE) 8.6-50 MG tablet Take 1 tablet by mouth 2 times daily, Disp-60 tablet,R-0, E-Prescribe      zinc oxide 16 % (BOUDREAUXS BUTT PASTE) PSTE paste Apply topically as needed for irritation (red buttocks)Historical           Allergies   Allergies   Allergen Reactions     Tramadol

## 2020-08-04 NOTE — PLAN OF CARE
Discharge Planner SLP   Patient plan for discharge: Not addressed.   Current status: Bedside swallow evaluation completed per MD orders. Patient presents with moderate oral and pharyngeal dysphagia at bedside. Deficits include; premature entry of thin liquids via the cup without a cough response, but increased risk given delay. She had improved timing with nectar thick liquids via the cup and 2-3 swallows to clear. Reduced bouls coordination and oral transport posteriorly and mild delay with pureed textures. Insufficient mastication of soft solids.   Recommend: 1. Downgrade diet to DDL 1 with nectar thick liquids. 2. Upright, fully alert, assistance, slow pace of feeding and alternate liquids/solids, no straws and crush medications as able and place in apple sauce. 3. SLP will follow up for diet tolerance and advancement.   Barriers to return to prior living situation: dysphagia  Recommendations for discharge: TCU  Rationale for recommendations: Patient will need continued ST needs for dysphagia for diet tolerance and advancement. Patient is below her baseline.        Entered by: Latanya Millan 08/04/2020 9:43 AM

## 2020-08-04 NOTE — PLAN OF CARE
"Discharge Planner PT   Patient admitted after 2 falls with mildly elevated troponin level and AMS, recent admit for fall from suspected vasovagal episode with elevated troponin - demand ischemia & UTI suspected.   Lives in [facility] with son reporting pt incontinence given insufficient cares; pt non-ambulatory beyond transferring between surfaces with Ax1.   Patient plan for discharge: TBD  Current status: Pt recognizing son today and having intelligible communication in context with situation - better. Supine to sit with standby assist with rail & head of bed up with PT only prompting with cues today, sitting balance standby assist with mild right lean, 4 stands with moderate assist with power assist and correcting for posterior lean and crouching tendency some better with facilitation & cues, 8' walk three times - rolling walker and moderate assist with improving with step-by-step sequencing and cues to step \"big.\" Weak, unsteady, with some fear-avoidance yet. Finished with pt up in chair with foot rest up, tray & call light close with safety education, alarm armed.  Barriers to return to prior living situation: Unsafe: needed more support at baseline and now she's moving worse.   Recommendations for discharge: TCU then SNF with home PT.   Rationale for recommendations: Pt is not finding success with living in [facility], would benefit from a more supportive living environment, first with TCU for a physical tune-up.       Entered by: Brittany Dressler 08/04/2020 12:13 PM      "

## 2020-08-04 NOTE — PLAN OF CARE
A&Ox1-2, orientation fluctuates, Lethargic overnight. VSS on RA. Denies pain, displaying no nonverbal signs. Bruising to R temple, w/ abrasions to cheeks bilat. A2 w/ lift, T/R q2h, skin intact. Poor UOP during day, bladder scanned at 240mL, but pt later urinated into purewick. Regular appetite but potential risk of aspiration, speech to see in AM. PIV SL. Tele: SR w/ BBB. Slept between cares. Discharge pending, TCU recommended.

## 2020-08-04 NOTE — CONSULTS
SW Consult Note:    Care Transition Initial Assessment - SW     Met with: Patient and Family (Son Osman)   Active Problems:    Fall       DATA  Lives With: facility resident   Living Arrangements: assisted living  Quality of Family Relationships: helpful, involved, supportive  Description of Support System: Supportive, Involved  Who is your support system?: Children, Facility resident(s)/Staff  Support Assessment: Adequate family and caregiver support, Adequate social supports.   Identified issues/concerns regarding health management: Discharge Planning.    Quality of Family Relationships: helpful, involved, supportive  Transportation Anticipated: (TBD)    ASSESSMENT  Cognitive Status:  alert and oriented x 1-2  Concerns to be addressed: Patient is a 89 year old female that was admitted to the hospital on 8/2/20 under observation status. Tentative date for discharge is 8/4 or 8/5.  SW met with patient and son to discuss discharge planning for patient.  Patient resides at [facility].  Discussed recommendations of TCU placement and son requesting that referrals be sent to [facility], [facility] and [facility].  SW sent referrals via [facility].      SW received call back from [facility] and they would be able to accept patient at their facility today if ready for discharge.      PLAN  Financial costs for the patient includes: Amenity Fee waived for first 14 days, then $36.00/day.  Patient given options and choices for discharge: Yes  Patient/family is agreeable to the plan? Yes  Transportation/person available to transport on day of discharge: TBD  Patient Goals and Preferences: TBD  Patient anticipates discharging to: TCU    Addendum: SW spoke with patient's son Osman to update about acceptance with [facility] and he is in agreement.  Discussed private pay amenity fee with son and he is in understanding.  Son requesting transportation be arranged for patient. Discussed that stretcher ride will be needed for supervision due to diagnosis of dementia,  medical reason for stretcher ride will be submitted but we can not guarantee insurance coverage for transport.  Son in understanding. SW placed call to [facility] for stretcher ride at 10:30 on 8/5/20.  Updated facility.  Updated son.     GUY Landry, [facility]  [phone]  [facility]

## 2020-08-04 NOTE — PLAN OF CARE
[facility]NY orientation, lethargic and somnolent this shift. VSS on RA. Bruise to R temple, bilateral cheek abrasions. Ax2/ lift; T/R Q 2 hrs. Skin intact on coccyx/buttock. Purewick in place, no output. PIV infusing NS @ 50mL/hr w/ int abx. Regular diet, no appetite. CT results completed, see notes. Speech consult ordered d/t swallowing difficulty of meds this AM. Tele:  w/ BBB. Continue to monitor.

## 2020-08-04 NOTE — PROGRESS NOTES
[facility]      OUTPATIENT SPEECH LANGUAGE PATHOLOGY EVALUATION  PLAN OF TREATMENT FOR OUTPATIENT REHABILITATION  (COMPLETE FOR INITIAL CLAIMS ONLY)  Patient's Last Name, First Name, M.I.  YOB: 1928  Joelle Freeman                        Provider's Name  [facility] Medical Record No.  [id number]                               Onset Date:  08/02/20       Start of Care Date: 08/04/20         Type:     ___PT   ___OT   _X_SLP Medical Diagnosis: Dysphagia                     SLP Diagnosis:  Moderate dysphagia        Visits from SOC:  1   ________________________________________________________________  Plan of Treatment/Functional Goals    Planned Interventions:   Dysphagia Treatment Modified diet education, Instruction of safe swallow strategies                                                               Goals: See Speech Language Pathology Goals on Care Plan in [facility] electronic health record.    Therapy Frequency:3x/week        Predicted Duration of Therapy Intervention: 1 week         ________________________________________________________________________________    I CERTIFY THE NEED FOR THESE SERVICES FURNISHED UNDER        THIS PLAN OF TREATMENT AND WHILE UNDER MY CARE     (Physician co-signature of this document indicates review and certification of the therapy plan).                Certification date from: 08/04/20 Certification date to: 08/11/20                   Referring Physician: Latanya Diaz                     Initial Assessment        See Speech Language Pathology documentation in Epic electronic health record, evaluation dated  08/04/20

## 2020-08-05 ENCOUNTER — APPOINTMENT (OUTPATIENT)
Dept: SPEECH THERAPY | Facility: CLINIC | Age: 85
End: 2020-08-05
Payer: MEDICARE

## 2020-08-05 VITALS
SYSTOLIC BLOOD PRESSURE: 162 MMHG | WEIGHT: 120.8 LBS | TEMPERATURE: 96 F | RESPIRATION RATE: 18 BRPM | DIASTOLIC BLOOD PRESSURE: 72 MMHG | BODY MASS INDEX: 20.74 KG/M2 | HEART RATE: 72 BPM | OXYGEN SATURATION: 96 %

## 2020-08-05 LAB
ANION GAP SERPL CALCULATED.3IONS-SCNC: 6 MMOL/L (ref 3–14)
BUN SERPL-MCNC: 9 MG/DL (ref 7–30)
CALCIUM SERPL-MCNC: 8.8 MG/DL (ref 8.5–10.1)
CHLORIDE SERPL-SCNC: 111 MMOL/L (ref 94–109)
CO2 SERPL-SCNC: 25 MMOL/L (ref 20–32)
CREAT SERPL-MCNC: 0.59 MG/DL (ref 0.52–1.04)
GFR SERPL CREATININE-BSD FRML MDRD: 79 ML/MIN/{1.73_M2}
GLUCOSE SERPL-MCNC: 86 MG/DL (ref 70–99)
POTASSIUM SERPL-SCNC: 3.6 MMOL/L (ref 3.4–5.3)
POTASSIUM SERPL-SCNC: 3.6 MMOL/L (ref 3.4–5.3)
SODIUM SERPL-SCNC: 142 MMOL/L (ref 133–144)

## 2020-08-05 PROCEDURE — 25000132 ZZH RX MED GY IP 250 OP 250 PS 637: Mod: GY | Performed by: PHYSICIAN ASSISTANT

## 2020-08-05 PROCEDURE — 25000132 ZZH RX MED GY IP 250 OP 250 PS 637: Mod: GY | Performed by: INTERNAL MEDICINE

## 2020-08-05 PROCEDURE — 80048 BASIC METABOLIC PNL TOTAL CA: CPT | Performed by: PHYSICIAN ASSISTANT

## 2020-08-05 PROCEDURE — 92526 ORAL FUNCTION THERAPY: CPT | Mod: GN | Performed by: SPEECH-LANGUAGE PATHOLOGIST

## 2020-08-05 PROCEDURE — 36415 COLL VENOUS BLD VENIPUNCTURE: CPT | Performed by: PHYSICIAN ASSISTANT

## 2020-08-05 PROCEDURE — G0378 HOSPITAL OBSERVATION PER HR: HCPCS

## 2020-08-05 RX ORDER — AMOXICILLIN 500 MG/1
500 CAPSULE ORAL EVERY 8 HOURS SCHEDULED
Status: DISCONTINUED | OUTPATIENT
Start: 2020-08-05 | End: 2020-08-05 | Stop reason: HOSPADM

## 2020-08-05 RX ORDER — AMOXICILLIN 500 MG/1
500 CAPSULE ORAL EVERY 8 HOURS
Status: ON HOLD | DISCHARGE
Start: 2020-08-05 | End: 2020-08-20

## 2020-08-05 RX ADMIN — ESCITALOPRAM 5 MG: 5 TABLET, FILM COATED ORAL at 09:00

## 2020-08-05 RX ADMIN — LISINOPRIL 10 MG: 10 TABLET ORAL at 08:59

## 2020-08-05 RX ADMIN — LEVOTHYROXINE SODIUM 75 MCG: 75 TABLET ORAL at 09:00

## 2020-08-05 RX ADMIN — METOPROLOL SUCCINATE 25 MG: 25 TABLET, EXTENDED RELEASE ORAL at 09:00

## 2020-08-05 RX ADMIN — AMOXICILLIN 500 MG: 500 CAPSULE ORAL at 08:59

## 2020-08-05 RX ADMIN — DOCUSATE SODIUM 50 MG AND SENNOSIDES 8.6 MG 1 TABLET: 8.6; 5 TABLET, FILM COATED ORAL at 09:00

## 2020-08-05 NOTE — PLAN OF CARE
Discharge Planner SLP   Patient plan for discharge: TCU this am.   Current status: Patient seen for swallowing treatment.  Patient safely tolerating dysphagia diet level 1 with nectar thick liquids, judged to be least restrictive diet at this time.  Recommend continue dysphagia 1 with nectar thick liquids, encouragement and moderate assistance needed during meals and medication intake.   Barriers to return to prior living situation: Weakness, confusion.   Recommendations for discharge: TCU  Rationale for recommendations: Recommend SLP follow up for safe return to least restrictive diet.       Entered by: Shiloh Ross 08/05/2020 9:09 AM

## 2020-08-05 NOTE — PLAN OF CARE
A&Ox1-2 (self and occasionally place).  VSS except HTN.  Denies pain.  Abrasions to bilateral cheeks and bruising to R forehead.  Turned and repositioned throughout the shift.  Purewick in place with good output noted.  K+ replaced; now 3.6.  Plan to discharge to TCU today.

## 2020-08-05 NOTE — PROGRESS NOTES
"Physical Therapy Discharge Summary    Reason for therapy discharge:    Discharged to transitional care facility.    Progress towards therapy goal(s). See goals on Care Plan in [facility] electronic health record for goal details.  Goals not met.  Barriers to achieving goals:   discharge from facility.    Therapy recommendation(s):    Continued therapy is recommended.  Rationale/Recommendations:   .     Last PT rx:   Discharge Planner PT   Patient admitted after 2 falls with mildly elevated troponin level and AMS, recent admit for fall from suspected vasovagal episode with elevated troponin - demand ischemia & UTI suspected.   Lives in [facility] with son reporting pt incontinence given insufficient cares; pt non-ambulatory beyond transferring between surfaces with Ax1.   Patient plan for discharge: TBD  Current status: Pt recognizing son today and having intelligible communication in context with situation - better. Supine to sit with standby assist with rail & head of bed up with PT only prompting with cues today, sitting balance standby assist with mild right lean, 4 stands with moderate assist with power assist and correcting for posterior lean and crouching tendency some better with facilitation & cues, 8' walk three times - rolling walker and moderate assist with improving with step-by-step sequencing and cues to step \"big.\" Weak, unsteady, with some fear-avoidance yet. Finished with pt up in chair with foot rest up, tray & call light close with safety education, alarm armed.  Barriers to return to prior living situation: Unsafe: needed more support at baseline and now she's moving worse.   Recommendations for discharge: TCU then SNF with home PT.   Rationale for recommendations: Pt is not finding success with living in [facility], would benefit from a more supportive living environment, first with TCU for a physical tune-up.      "

## 2020-08-05 NOTE — PROGRESS NOTES
" [facility]  Joelle Freeman is being evaluated via a billable video visit due to the restrictions of the Covid-19 pandemic.   The patient has been notified of following:  \"This video visit will be conducted via a call between you and your provider. We have found that certain health care needs can be provided without the need for an in-person physical exam.  This service lets us provide the care you need with a video conversation. If during the course of the call the provider feels a video visit is not appropriate, you will not be charged for this service.\"   The provider has received verbal consent for a Video Visit from the patient and or first contact? Yes  Patient/facility staff would like the video invitation sent by: N/A   Video Start Time: 1423  Which Facility the Patient is at during the time of visit: [facility]  Received verbal consent to use Care Everywhere in order to access labs, documents, histories, and all other needed information to provide care at current facility.    PRIMARY CARE PROVIDER AND CLINIC:  EDNA CARCAMO[address] [facility] / [facility] 55*  Chief Complaint   Patient presents with     [facility] Medical Record Number:  [id number]  Joelle Freeman  is a 89 year old  (5/25/1928), admitted to the above facility from  [facility]. Hospital stay 8/2/20 through 8/5/20..  Admitted to this facility for  rehab, medical management and nursing care.  HPI:    HPI information obtained from: facility chart records, facility staff, patient report, [facility] chart review and Care Everywhere [facility] chart review.   Brief Summary of Hospital Course:   Patient hospitalized during above noted dates following 2 unwitnessed falls in her [facility] with worsening of confusion; she was diagnosed with UTI upon arrival. Trauma workup following falls showing no acute MSK concerns - son concerned upon admission for " severe worsening of confusion - does have PMHx positive for  shunt - NS consulted inpatient with cognitive changes - no need for setting changes per their assessment - CT of head Patient was noted to have fluid collections on 7/19 CT suggestive of hygromas vs chronic SDH - NS stopped ASA and follow-up CT showed stable CT thus they signed off; day of discharge the patient is more alert and able to have short conversations. She was tx with Ceftriaxone inpatient and discharged on a 5 day course of Ceftin for her UTI. She does have chronic dysphagia and was assessed by SLP inpatient with recommendation for DD1 diet with nectar thickened liquids, along with magic cups with her meals for increased caloric intake. TSH noted low with elevated T4 inpatient - Synthroid adjusted down.    Updates on Status Since Skilled nursing Admission:   Patient seen today for admission visit to TCU. 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 She is seen today virtually with the assistance of staff. She is awake intermittently during our visit with occasional responses to questions - no ability to hold ongoing conversation. This appears to be different than her status at discharge. Staff note minimal PO intake, requiring total assistance - its hard to imagine she lived at an [facility] prior to this hospitalization as she appears to require total care at this time. Denies CP, palpitations, fatigue, nausea, vomiting, increased SOB/GARNER, fever, chills, and/or b/b concerns today.    CODE STATUS/ADVANCE DIRECTIVES DISCUSSION:   No CPR- Do NOT Intubate  Patient's living condition: lives alone  ALLERGIES: Tramadol  PAST MEDICAL HISTORY:  has a past medical history of Asthma, CAD (coronary artery disease), Cerebral  ventriculomegaly, CVA (cerebral infarction) (2002), Dementia (H), Dementia (H), Depression, Diverticulosis, Dyslipidemia, Heterozygous factor V Leiden mutation (H), HTN (hypertension), Hyperlipidemia, Hypothyroidism, Normal pressure hydrocephalus (H), Posterior reversible encephalopathy syndrome, Subarachnoid hemorrhage (H) (3/23/2013), Subdural hematoma (H), TIA (transient ischaemic attack), TIA (transient ischaemic attack), Unspecified cerebral artery occlusion with cerebral infarction, Urinary incontinence, and UTI (lower urinary tract infection).  PAST SURGICAL HISTORY:   has a past surgical history that includes Cholecystectomy; hernia repair; Hysterectomy; Stent (2001); lumbar puncture; Cardiac surgery; Abdomen surgery; appendectomy; biopsy; ENT surgery; wisdom teeth extraction[; GYN surgery; GYN surgery; Optical tracking system implant shunt ventriculoperitoneal (7/16/2013); and Open reduction internal fixation hip nailing (11/18/2013).  FAMILY HISTORY: family history includes Blood Disease in her son; Cerebrovascular Disease in her father and mother.  SOCIAL HISTORY:   reports that she has never smoked. She has never used smokeless tobacco. She reports that she does not drink alcohol or use drugs.  Current Outpatient Medications   Medication Sig Dispense Refill     acetaminophen (TYLENOL) 325 MG tablet Take 650 mg by mouth every 6 hours as needed for pain        amoxicillin (AMOXIL) 500 MG capsule Take 1 capsule (500 mg) by mouth every 8 hours for 5 days       atorvastatin (LIPITOR) 10 MG tablet Take 1 tablet (10 mg) by mouth daily 30 tablet 1     cholecalciferol 1000 UNITS TABS Take 1,000 Units by mouth daily 30 tablet 1     escitalopram (LEXAPRO) 5 MG tablet Take 5 mg by mouth daily       folic acid (FOLVITE) 1 MG tablet Take 1 tablet (1 mg) by mouth daily 30 tablet 0     levothyroxine (SYNTHROID/LEVOTHROID) 75 MCG tablet Take 1 tablet (75 mcg) by mouth daily 30 tablet 0     lisinopril (PRINIVIL/ZESTRIL)  10 MG tablet Take 10 mg by mouth daily       melatonin 1 MG TABS tablet Take 1 mg by mouth nightly as needed for sleep        metoprolol succinate ER (TOPROL-XL) 25 MG 24 hr tablet Take 25 mg by mouth daily       nystatin (MYCOSTATIN) 551172 UNIT/GM external powder Apply topically 2 times daily as needed (intertriginal dermatitis)       senna-docusate (SENOKOT-S/PERICOLACE) 8.6-50 MG tablet Take 1 tablet by mouth 2 times daily 60 tablet 0     zinc oxide 16 % (BOUDREAUXS BUTT PASTE) PSTE paste Apply topically as needed for irritation (red buttocks)        ROS: Limited secondary to cognitive impairment but today pt reports she's okay  Vitals:/69   Pulse 70   Temp 97  F (36.1  C)   Resp 18   SpO2 95%    Limited Visit Exam done given COVID-19 precautions:  GENERAL APPEARANCE:  in no distress, thin, somnolent, minimally cooperatve elderly woman semi-clarke's in bed in room  ENT:  [place]  EYES:  EOM, conjunctivae, lids, pupils and irises normal, - from what's able to be assessed, she spends most of the visit with eyes closed  NECK:  N/assessed  RESP:  no respiratory distress, cough - none  CV:  no edema  ABDOMEN:  no distension  M/S:   Gait and station abnormal - RONNY 2.2 patient being in bed  Digits and nails abnormal - arthritic changes present  SKIN:  visible skin thin and dry  NEURO:   RONNY w/video visit  PSYCH:  oriented to self at times, insight and judgement impaired, memory impaired , affect abnormal - withdrawn    Lab/Diagnostic data:  Recent labs in [facility] reviewed by me today.     ASSESSMENT/PLAN:  (R41.0) Confusion  (primary encounter diagnosis)  (F03.90) Advanced dementia (H)  Comment: Chronic; progressive; continues to require 24-hr supportive cares provided by? Patient prior resident on snf - but at this time requires total care - minimal ability to have conversation today. Concern re:shunt as noted below. She remains very confused at this time and would require a higher level of care than [facility] at  discharge if in current state. No active tx regimen. No noted behaviors.  Plan: Monitor. OT and SW to follow in TCU - suspect underlying cause of severe change in status? CBC & BMP 8.7.2020    (R29.6) Recurrent falls  Comment: 2/2 above noted dx  Plan: PT eval/tx - adv per their recommendations; SW to assist with discharge planning ongoing    (N39.0) UTI (urinary tract infection)  Comment: Noted upon admission - continues on regimen of Amoxicillin  Plan: Continue medications as ordered. Monitor. CBC as noted above    (R13.12) Oropharyngeal dysphagia  Comment: Chronic - minimal PO intake per staff - requiring full feeding  Plan: SLP eval/tx - adv per their recommedations    (R63.8) Inadequate oral intake  Comment: 2/2/ noted diagnoses  Plan: Continue to assist with PO intake - in-house dietician to follow    (E87.6) Hypokalemia  Comment: Noted inpatient - no active tx regimen  Plan: BMP as noted abvoe    (I44.7) LBBB (left bundle branch block)  (R79.89) Troponin level elevated  Comment: Noted - no active tx regimen  Plan: Monitor follow-up as needed. VS per facility protocol.    (D18.1) Hygroma  Comment: Noted on CT as above - previously recommended follow-up with NS - they signed off. I have requested reassessment given patient severe decline in status  Plan: Update NS with status for concern re:severe change from patient PTA baseline. Labs as noted above.    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 Susan Arnett, APRN, DNP, A/GNP-[facility]  [facility] [place] [address] [address]  [place] [address]     Cell: [phone]  Fax: [phone]  Email: [email or web address]         Video-Visit  Details  Type of service:  Video Visit  Video End Time (time video stopped): 4932  Distant Location (provider location):  [facility]

## 2020-08-06 ENCOUNTER — VIRTUAL VISIT (OUTPATIENT)
Dept: GERIATRICS | Facility: CLINIC | Age: 85
End: 2020-08-06
Payer: MEDICARE

## 2020-08-06 ENCOUNTER — RECORDS - HEALTHEAST (OUTPATIENT)
Dept: LAB | Facility: CLINIC | Age: 85
End: 2020-08-06

## 2020-08-06 VITALS
HEART RATE: 70 BPM | DIASTOLIC BLOOD PRESSURE: 69 MMHG | OXYGEN SATURATION: 95 % | RESPIRATION RATE: 18 BRPM | SYSTOLIC BLOOD PRESSURE: 130 MMHG | TEMPERATURE: 97 F

## 2020-08-06 DIAGNOSIS — F03.C0 ADVANCED DEMENTIA (H): ICD-10-CM

## 2020-08-06 DIAGNOSIS — I44.7 LBBB (LEFT BUNDLE BRANCH BLOCK): ICD-10-CM

## 2020-08-06 DIAGNOSIS — E87.6 HYPOKALEMIA: ICD-10-CM

## 2020-08-06 DIAGNOSIS — I62.03 CHRONIC SUBDURAL HEMATOMA (H): ICD-10-CM

## 2020-08-06 DIAGNOSIS — R79.89 TROPONIN LEVEL ELEVATED: ICD-10-CM

## 2020-08-06 DIAGNOSIS — R41.0 CONFUSION: Primary | ICD-10-CM

## 2020-08-06 DIAGNOSIS — R63.8 INADEQUATE ORAL INTAKE: ICD-10-CM

## 2020-08-06 DIAGNOSIS — D18.1 HYGROMA: ICD-10-CM

## 2020-08-06 DIAGNOSIS — R29.6 RECURRENT FALLS: ICD-10-CM

## 2020-08-06 DIAGNOSIS — R13.12 OROPHARYNGEAL DYSPHAGIA: ICD-10-CM

## 2020-08-06 DIAGNOSIS — R53.81 PHYSICAL DECONDITIONING: ICD-10-CM

## 2020-08-06 DIAGNOSIS — N30.00 ACUTE CYSTITIS WITHOUT HEMATURIA: ICD-10-CM

## 2020-08-06 PROCEDURE — 99310 SBSQ NF CARE HIGH MDM 45: CPT | Mod: 95 | Performed by: NURSE PRACTITIONER

## 2020-08-06 NOTE — PATIENT INSTRUCTIONS
Orders from today's visit:    Neuro checks qShift x5d - Dx. Normal Pressure Hydrocephalus    Update Neurosurgery clinic regarding patient's level of conciousness and neurological status - She has an appt with Evangelina Barr PA-C on 8/31/2020 for follow-up on abnormal CT findings inpatient - The office number for [facility] Neurology clinic is [phone]     CBC & BMP 8/7/2020 - Dx. Anemia & HTN    Dr. Susan Arnett, APRN, DNP, A/GNP-[facility]  [address] [place] [address]   [address]  [place] [address]     Cell: [phone]  Fax: [phone]  Email: [email or web address]

## 2020-08-06 NOTE — PLAN OF CARE
Speech Language Therapy Discharge Summary    Reason for therapy discharge:    Discharged to transitional care facility.    Progress towards therapy goal(s). See goals on Care Plan in Epic electronic health record for goal details.  Goals not met.  Barriers to achieving goals:   discharge from facility.    Therapy recommendation(s):    Continued therapy is recommended.  Rationale/Recommendations:  Continue ST needs for dysphagia management.     Patient safely tolerating dysphagia diet level 1 with nectar thick liquids, judged to be least restrictive diet at this time.  Recommend continue dysphagia 1 with nectar thick liquids, encouragement and moderate assistance needed during meals and medication intake.

## 2020-08-07 ENCOUNTER — TELEPHONE (OUTPATIENT)
Dept: GERIATRICS | Facility: CLINIC | Age: 85
End: 2020-08-07

## 2020-08-07 ENCOUNTER — TRANSFERRED RECORDS (OUTPATIENT)
Dept: HEALTH INFORMATION MANAGEMENT | Facility: CLINIC | Age: 85
End: 2020-08-07

## 2020-08-07 LAB
ANION GAP SERPL CALCULATED.3IONS-SCNC: 8 MMOL/L (ref 5–18)
ANION GAP SERPL CALCULATED.3IONS-SCNC: 8 MMOL/L (ref 5–18)
BACTERIA SPEC CULT: ABNORMAL
BASOPHILS # BLD AUTO: 0.1 THOU/UL (ref 0–0.2)
BASOPHILS NFR BLD AUTO: 1 % (ref 0–2)
BUN SERPL-MCNC: 8 MG/DL (ref 8–28)
BUN SERPL-MCNC: 8 MG/DL (ref 8–28)
CALCIUM SERPL-MCNC: 9.4 MG/DL (ref 8.5–10.5)
CALCIUM SERPL-MCNC: 9.4 MG/DL (ref 8.5–10.5)
CHLORIDE BLD-SCNC: 108 MMOL/L (ref 98–107)
CHLORIDE SERPLBLD-SCNC: 108 MMOL/L (ref 98–107)
CO2 SERPL-SCNC: 25 MMOL/L (ref 22–31)
CO2 SERPL-SCNC: 25 MMOL/L (ref 22–31)
CREAT SERPL-MCNC: 0.67 MG/DL (ref 0.6–1.1)
CREAT SERPL-MCNC: 0.67 MG/DL (ref 0.6–1.1)
DIFFERENTIAL: NORMAL
EOSINOPHIL # BLD AUTO: 0.2 THOU/UL (ref 0–0.4)
EOSINOPHIL NFR BLD AUTO: 3 % (ref 0–6)
ERYTHROCYTE [DISTWIDTH] IN BLOOD BY AUTOMATED COUNT: 14 % (ref 11–14.5)
ERYTHROCYTE [DISTWIDTH] IN BLOOD BY AUTOMATED COUNT: 14 % (ref 11–14.5)
GFR SERPL CREATININE-BSD FRML MDRD: >60 ML/MIN/1.73M2
GFR SERPL CREATININE-BSD FRML MDRD: >60 ML/MIN/1.73M2
GLUCOSE BLD-MCNC: 88 MG/DL (ref 70–125)
GLUCOSE SERPL-MCNC: 88 MG/DL (ref 70–125)
HCT VFR BLD AUTO: 39.5 % (ref 35–47)
HCT VFR BLD AUTO: 39.5 % (ref 35–47)
HEMOGLOBIN: 13.2 G/DL (ref 12–16)
HGB BLD-MCNC: 13.2 G/DL (ref 12–16)
LYMPHOCYTES # BLD AUTO: 2.3 THOU/UL (ref 0.8–4.4)
LYMPHOCYTES NFR BLD AUTO: 33 % (ref 20–40)
Lab: ABNORMAL
MCH RBC QN AUTO: 31.8 PG (ref 27–34)
MCH RBC QN AUTO: 31.8 PG (ref 27–34)
MCHC RBC AUTO-ENTMCNC: 33.4 G/DL (ref 32–36)
MCHC RBC AUTO-ENTMCNC: 33.4 G/DL (ref 32–36)
MCV RBC AUTO: 92 FL (ref 80–100)
MCV RBC AUTO: 95 FL (ref 80–100)
MONOCYTES # BLD AUTO: 0.7 THOU/UL (ref 0–0.9)
MONOCYTES NFR BLD AUTO: 10 % (ref 2–10)
NEUTROPHILS # BLD AUTO: 3.8 THOU/UL (ref 2–7.7)
NEUTROPHILS NFR BLD AUTO: 54 % (ref 50–70)
PLATELET # BLD AUTO: 175 MILL/UL (ref 140–440)
PLATELET # BLD AUTO: 175 THOU/UL (ref 140–440)
PMV BLD AUTO: 12.2 FL (ref 8.5–12.5)
POTASSIUM BLD-SCNC: 3.2 MMOL/L (ref 3.5–5)
POTASSIUM SERPL-SCNC: 3.2 MMOL/L (ref 3.5–5)
RBC # BLD AUTO: 4.15 MILL/UL (ref 3.8–5.4)
RBC # BLD AUTO: 4.15 MILL/UL (ref 3.8–5.4)
SODIUM SERPL-SCNC: 141 MMOL/L (ref 136–145)
SODIUM SERPL-SCNC: 141 MMOL/L (ref 136–145)
SPECIMEN SOURCE: ABNORMAL
WBC # BLD AUTO: 7 THOU/UL (ref 4–11)
WBC: 7 THOU/UL (ref 4–11)

## 2020-08-07 NOTE — TELEPHONE ENCOUNTER
BMP and CBC w/Diff results from today.    CBC abnormals:    WNL    BMP    K 3.2    Cl 108    Staff unaware if NS was updated yesterday re:patient's status - they will follow-up on this today as patient remains very sleepy    ORERS  -KCl 20 mEq x1 dose  -BMP on 8/10/2020    Dr. Susan Arnett, APRN, DNP, A/GNP-[facility]  [address] [facility]   [address]  [place] [address]     Cell: [phone]  Fax: [phone]  Email: [email or web address]

## 2020-08-09 ENCOUNTER — HOSPITAL ENCOUNTER (INPATIENT)
Facility: CLINIC | Age: 85
LOS: 11 days | Discharge: NURSING, REHAB OR LONG-TERM CARE | DRG: 070 | End: 2020-08-20
Attending: EMERGENCY MEDICINE | Admitting: HOSPITALIST
Payer: MEDICARE

## 2020-08-09 ENCOUNTER — APPOINTMENT (OUTPATIENT)
Dept: CT IMAGING | Facility: CLINIC | Age: 85
DRG: 070 | End: 2020-08-09
Attending: EMERGENCY MEDICINE
Payer: MEDICARE

## 2020-08-09 ENCOUNTER — TELEPHONE (OUTPATIENT)
Dept: GERIATRICS | Facility: CLINIC | Age: 85
End: 2020-08-09

## 2020-08-09 DIAGNOSIS — R40.2422 GLASGOW COMA SCALE TOTAL SCORE 9-12, AT ARRIVAL TO EMERGENCY DEPARTMENT: ICD-10-CM

## 2020-08-09 DIAGNOSIS — G40.909 SEIZURE DISORDER (H): Primary | ICD-10-CM

## 2020-08-09 DIAGNOSIS — I26.99 ACUTE PULMONARY EMBOLISM, UNSPECIFIED PULMONARY EMBOLISM TYPE, UNSPECIFIED WHETHER ACUTE COR PULMONALE PRESENT (H): ICD-10-CM

## 2020-08-09 DIAGNOSIS — Z98.2 PRESENCE OF PROGRAMMABLE VENTRICULOPERITONEAL SHUNT: ICD-10-CM

## 2020-08-09 PROBLEM — R41.82 ALTERED MENTAL STATUS: Status: ACTIVE | Noted: 2020-08-09

## 2020-08-09 LAB
ALBUMIN UR-MCNC: 10 MG/DL
ANION GAP SERPL CALCULATED.3IONS-SCNC: 2 MMOL/L (ref 3–14)
APPEARANCE UR: ABNORMAL
APTT PPP: 30 SEC (ref 22–37)
BACTERIA SPEC CULT: NO GROWTH
BASE EXCESS BLDV CALC-SCNC: 2 MMOL/L
BASOPHILS # BLD AUTO: 0 10E9/L (ref 0–0.2)
BASOPHILS NFR BLD AUTO: 0.1 %
BILIRUB UR QL STRIP: NEGATIVE
BUN SERPL-MCNC: 15 MG/DL (ref 7–30)
CALCIUM SERPL-MCNC: 9.3 MG/DL (ref 8.5–10.1)
CHLORIDE SERPL-SCNC: 111 MMOL/L (ref 94–109)
CO2 SERPL-SCNC: 28 MMOL/L (ref 20–32)
COLOR UR AUTO: YELLOW
CREAT SERPL-MCNC: 0.88 MG/DL (ref 0.52–1.04)
DIFFERENTIAL METHOD BLD: ABNORMAL
EOSINOPHIL # BLD AUTO: 0.1 10E9/L (ref 0–0.7)
EOSINOPHIL NFR BLD AUTO: 0.8 %
ERYTHROCYTE [DISTWIDTH] IN BLOOD BY AUTOMATED COUNT: 14.3 % (ref 10–15)
ERYTHROCYTE [DISTWIDTH] IN BLOOD BY AUTOMATED COUNT: 14.3 % (ref 10–15)
GFR SERPL CREATININE-BSD FRML MDRD: 57 ML/MIN/{1.73_M2}
GLUCOSE SERPL-MCNC: 104 MG/DL (ref 70–99)
GLUCOSE UR STRIP-MCNC: NEGATIVE MG/DL
HCO3 BLDV-SCNC: 27 MMOL/L (ref 21–28)
HCT VFR BLD AUTO: 39.9 % (ref 35–47)
HCT VFR BLD AUTO: 41.3 % (ref 35–47)
HGB BLD-MCNC: 13.3 G/DL (ref 11.7–15.7)
HGB BLD-MCNC: 13.9 G/DL (ref 11.7–15.7)
HGB UR QL STRIP: ABNORMAL
IMM GRANULOCYTES # BLD: 0 10E9/L (ref 0–0.4)
IMM GRANULOCYTES NFR BLD: 0.1 %
INR PPP: 1.13 (ref 0.86–1.14)
KETONES UR STRIP-MCNC: 5 MG/DL
LEUKOCYTE ESTERASE UR QL STRIP: ABNORMAL
LMWH PPP CHRO-ACNC: 0.75 IU/ML
LYMPHOCYTES # BLD AUTO: 2.8 10E9/L (ref 0.8–5.3)
LYMPHOCYTES NFR BLD AUTO: 33.3 %
MCH RBC QN AUTO: 32 PG (ref 26.5–33)
MCH RBC QN AUTO: 32.1 PG (ref 26.5–33)
MCHC RBC AUTO-ENTMCNC: 33.3 G/DL (ref 31.5–36.5)
MCHC RBC AUTO-ENTMCNC: 33.7 G/DL (ref 31.5–36.5)
MCV RBC AUTO: 95 FL (ref 78–100)
MCV RBC AUTO: 96 FL (ref 78–100)
MONOCYTES # BLD AUTO: 0.8 10E9/L (ref 0–1.3)
MONOCYTES NFR BLD AUTO: 9.7 %
MUCOUS THREADS #/AREA URNS LPF: PRESENT /LPF
NEUTROPHILS # BLD AUTO: 4.6 10E9/L (ref 1.6–8.3)
NEUTROPHILS NFR BLD AUTO: 56 %
NITRATE UR QL: NEGATIVE
NRBC # BLD AUTO: 0 10*3/UL
NRBC BLD AUTO-RTO: 0 /100
NT-PROBNP SERPL-MCNC: 1420 PG/ML (ref 0–1800)
OXYHGB MFR BLDV: 43 %
PCO2 BLDV: 42 MM HG (ref 40–50)
PH BLDV: 7.41 PH (ref 7.32–7.43)
PH UR STRIP: 6.5 PH (ref 5–7)
PLATELET # BLD AUTO: 141 10E9/L (ref 150–450)
PLATELET # BLD AUTO: 148 10E9/L (ref 150–450)
PO2 BLDV: 24 MM HG (ref 25–47)
POTASSIUM SERPL-SCNC: 3.8 MMOL/L (ref 3.4–5.3)
RBC # BLD AUTO: 4.15 10E12/L (ref 3.8–5.2)
RBC # BLD AUTO: 4.33 10E12/L (ref 3.8–5.2)
RBC #/AREA URNS AUTO: 53 /HPF (ref 0–2)
SODIUM SERPL-SCNC: 141 MMOL/L (ref 133–144)
SOURCE: ABNORMAL
SP GR UR STRIP: >1.035 (ref 1–1.03)
SPECIMEN SOURCE: NORMAL
SQUAMOUS #/AREA URNS AUTO: 2 /HPF (ref 0–1)
TROPONIN I SERPL-MCNC: 0.36 UG/L (ref 0–0.04)
UROBILINOGEN UR STRIP-MCNC: NORMAL MG/DL (ref 0–2)
WBC # BLD AUTO: 8.3 10E9/L (ref 4–11)
WBC # BLD AUTO: 9.7 10E9/L (ref 4–11)
WBC #/AREA URNS AUTO: >182 /HPF (ref 0–5)
WBC CLUMPS #/AREA URNS HPF: PRESENT /HPF
YEAST #/AREA URNS HPF: ABNORMAL /HPF

## 2020-08-09 PROCEDURE — 70450 CT HEAD/BRAIN W/O DYE: CPT

## 2020-08-09 PROCEDURE — 93005 ELECTROCARDIOGRAM TRACING: CPT

## 2020-08-09 PROCEDURE — 70498 CT ANGIOGRAPHY NECK: CPT

## 2020-08-09 PROCEDURE — 99223 1ST HOSP IP/OBS HIGH 75: CPT | Performed by: PHYSICIAN ASSISTANT

## 2020-08-09 PROCEDURE — 25000128 H RX IP 250 OP 636: Performed by: HOSPITALIST

## 2020-08-09 PROCEDURE — 87106 FUNGI IDENTIFICATION YEAST: CPT | Performed by: HOSPITALIST

## 2020-08-09 PROCEDURE — 85520 HEPARIN ASSAY: CPT | Performed by: HOSPITALIST

## 2020-08-09 PROCEDURE — 36415 COLL VENOUS BLD VENIPUNCTURE: CPT | Performed by: EMERGENCY MEDICINE

## 2020-08-09 PROCEDURE — 85730 THROMBOPLASTIN TIME PARTIAL: CPT | Performed by: EMERGENCY MEDICINE

## 2020-08-09 PROCEDURE — 85025 COMPLETE CBC W/AUTO DIFF WBC: CPT | Performed by: EMERGENCY MEDICINE

## 2020-08-09 PROCEDURE — 85027 COMPLETE CBC AUTOMATED: CPT | Performed by: EMERGENCY MEDICINE

## 2020-08-09 PROCEDURE — 99223 1ST HOSP IP/OBS HIGH 75: CPT | Mod: GC | Performed by: PSYCHIATRY & NEUROLOGY

## 2020-08-09 PROCEDURE — U0003 INFECTIOUS AGENT DETECTION BY NUCLEIC ACID (DNA OR RNA); SEVERE ACUTE RESPIRATORY SYNDROME CORONAVIRUS 2 (SARS-COV-2) (CORONAVIRUS DISEASE [COVID-19]), AMPLIFIED PROBE TECHNIQUE, MAKING USE OF HIGH THROUGHPUT TECHNOLOGIES AS DESCRIBED BY CMS-2020-01-R: HCPCS | Performed by: EMERGENCY MEDICINE

## 2020-08-09 PROCEDURE — 80048 BASIC METABOLIC PNL TOTAL CA: CPT | Performed by: EMERGENCY MEDICINE

## 2020-08-09 PROCEDURE — 72125 CT NECK SPINE W/O DYE: CPT

## 2020-08-09 PROCEDURE — 83880 ASSAY OF NATRIURETIC PEPTIDE: CPT | Performed by: EMERGENCY MEDICINE

## 2020-08-09 PROCEDURE — 25000125 ZZHC RX 250: Performed by: HOSPITALIST

## 2020-08-09 PROCEDURE — 85610 PROTHROMBIN TIME: CPT | Performed by: EMERGENCY MEDICINE

## 2020-08-09 PROCEDURE — 96365 THER/PROPH/DIAG IV INF INIT: CPT | Mod: 59

## 2020-08-09 PROCEDURE — 25000125 ZZHC RX 250: Performed by: EMERGENCY MEDICINE

## 2020-08-09 PROCEDURE — 36415 COLL VENOUS BLD VENIPUNCTURE: CPT | Performed by: HOSPITALIST

## 2020-08-09 PROCEDURE — 99223 1ST HOSP IP/OBS HIGH 75: CPT | Mod: AI | Performed by: HOSPITALIST

## 2020-08-09 PROCEDURE — 82805 BLOOD GASES W/O2 SATURATION: CPT | Performed by: EMERGENCY MEDICINE

## 2020-08-09 PROCEDURE — 12000000 ZZH R&B MED SURG/OB

## 2020-08-09 PROCEDURE — 25800030 ZZH RX IP 258 OP 636: Performed by: HOSPITALIST

## 2020-08-09 PROCEDURE — C9803 HOPD COVID-19 SPEC COLLECT: HCPCS

## 2020-08-09 PROCEDURE — 99285 EMERGENCY DEPT VISIT HI MDM: CPT | Mod: 25

## 2020-08-09 PROCEDURE — 0042T CT HEAD PERFUSION WITH CONTRAST: CPT

## 2020-08-09 PROCEDURE — 81001 URINALYSIS AUTO W/SCOPE: CPT | Performed by: HOSPITALIST

## 2020-08-09 PROCEDURE — 25000128 H RX IP 250 OP 636: Performed by: EMERGENCY MEDICINE

## 2020-08-09 PROCEDURE — 87086 URINE CULTURE/COLONY COUNT: CPT | Performed by: HOSPITALIST

## 2020-08-09 PROCEDURE — 84484 ASSAY OF TROPONIN QUANT: CPT | Performed by: EMERGENCY MEDICINE

## 2020-08-09 RX ORDER — ONDANSETRON 4 MG/1
4 TABLET, ORALLY DISINTEGRATING ORAL EVERY 6 HOURS PRN
Status: DISCONTINUED | OUTPATIENT
Start: 2020-08-09 | End: 2020-08-20 | Stop reason: HOSPADM

## 2020-08-09 RX ORDER — METOPROLOL TARTRATE 25 MG/1
12.5 TABLET, FILM COATED ORAL 2 TIMES DAILY
Status: ON HOLD | COMMUNITY
End: 2020-08-09

## 2020-08-09 RX ORDER — ONDANSETRON 2 MG/ML
4 INJECTION INTRAMUSCULAR; INTRAVENOUS EVERY 6 HOURS PRN
Status: DISCONTINUED | OUTPATIENT
Start: 2020-08-09 | End: 2020-08-20 | Stop reason: HOSPADM

## 2020-08-09 RX ORDER — LEVETIRACETAM 5 MG/ML
500 INJECTION INTRAVASCULAR ONCE
Status: COMPLETED | OUTPATIENT
Start: 2020-08-09 | End: 2020-08-09

## 2020-08-09 RX ORDER — AMOXICILLIN 500 MG/1
500 CAPSULE ORAL EVERY 8 HOURS
Status: DISCONTINUED | OUTPATIENT
Start: 2020-08-09 | End: 2020-08-09

## 2020-08-09 RX ORDER — HEPARIN SODIUM 10000 [USP'U]/100ML
650 INJECTION, SOLUTION INTRAVENOUS CONTINUOUS
Status: DISCONTINUED | OUTPATIENT
Start: 2020-08-09 | End: 2020-08-12

## 2020-08-09 RX ORDER — ACETAMINOPHEN 325 MG/1
650 TABLET ORAL EVERY 4 HOURS PRN
Status: DISCONTINUED | OUTPATIENT
Start: 2020-08-09 | End: 2020-08-20 | Stop reason: HOSPADM

## 2020-08-09 RX ORDER — LIDOCAINE 40 MG/G
CREAM TOPICAL
Status: DISCONTINUED | OUTPATIENT
Start: 2020-08-09 | End: 2020-08-20 | Stop reason: HOSPADM

## 2020-08-09 RX ORDER — POTASSIUM CHLORIDE 1500 MG/1
20 TABLET, EXTENDED RELEASE ORAL ONCE
COMMUNITY
End: 2021-01-01

## 2020-08-09 RX ORDER — NALOXONE HYDROCHLORIDE 0.4 MG/ML
.1-.4 INJECTION, SOLUTION INTRAMUSCULAR; INTRAVENOUS; SUBCUTANEOUS
Status: DISCONTINUED | OUTPATIENT
Start: 2020-08-09 | End: 2020-08-20 | Stop reason: HOSPADM

## 2020-08-09 RX ORDER — IOPAMIDOL 755 MG/ML
120 INJECTION, SOLUTION INTRAVASCULAR ONCE
Status: COMPLETED | OUTPATIENT
Start: 2020-08-09 | End: 2020-08-09

## 2020-08-09 RX ORDER — HEPARIN SODIUM 10000 [USP'U]/100ML
1000 INJECTION, SOLUTION INTRAVENOUS CONTINUOUS
Status: DISCONTINUED | OUTPATIENT
Start: 2020-08-09 | End: 2020-08-09

## 2020-08-09 RX ORDER — LEVOTHYROXINE SODIUM 75 UG/1
75 TABLET ORAL DAILY
Status: DISCONTINUED | OUTPATIENT
Start: 2020-08-10 | End: 2020-08-20 | Stop reason: HOSPADM

## 2020-08-09 RX ORDER — CEFTRIAXONE 1 G/1
1 INJECTION, POWDER, FOR SOLUTION INTRAMUSCULAR; INTRAVENOUS EVERY 24 HOURS
Status: DISCONTINUED | OUTPATIENT
Start: 2020-08-09 | End: 2020-08-10

## 2020-08-09 RX ORDER — METOPROLOL TARTRATE 1 MG/ML
2.5 INJECTION, SOLUTION INTRAVENOUS EVERY 6 HOURS
Status: DISCONTINUED | OUTPATIENT
Start: 2020-08-09 | End: 2020-08-12

## 2020-08-09 RX ORDER — SODIUM CHLORIDE 9 MG/ML
INJECTION, SOLUTION INTRAVENOUS CONTINUOUS
Status: ACTIVE | OUTPATIENT
Start: 2020-08-09 | End: 2020-08-10

## 2020-08-09 RX ORDER — ACETAMINOPHEN 650 MG/1
650 SUPPOSITORY RECTAL EVERY 4 HOURS PRN
Status: DISCONTINUED | OUTPATIENT
Start: 2020-08-09 | End: 2020-08-20 | Stop reason: HOSPADM

## 2020-08-09 RX ORDER — LISINOPRIL 10 MG/1
10 TABLET ORAL DAILY
Status: DISCONTINUED | OUTPATIENT
Start: 2020-08-10 | End: 2020-08-12

## 2020-08-09 RX ADMIN — SODIUM CHLORIDE: 9 INJECTION, SOLUTION INTRAVENOUS at 18:07

## 2020-08-09 RX ADMIN — METOPROLOL TARTRATE 2.5 MG: 5 INJECTION INTRAVENOUS at 22:21

## 2020-08-09 RX ADMIN — SODIUM CHLORIDE 100 ML: 9 INJECTION, SOLUTION INTRAVENOUS at 14:22

## 2020-08-09 RX ADMIN — CEFTRIAXONE SODIUM 1 G: 1 INJECTION, POWDER, FOR SOLUTION INTRAMUSCULAR; INTRAVENOUS at 21:11

## 2020-08-09 RX ADMIN — LEVETIRACETAM 500 MG: 5 INJECTION INTRAVENOUS at 16:57

## 2020-08-09 RX ADMIN — IOPAMIDOL 120 ML: 755 INJECTION, SOLUTION INTRAVENOUS at 14:22

## 2020-08-09 RX ADMIN — HEPARIN SODIUM 1000 UNITS/HR: 10000 INJECTION, SOLUTION INTRAVENOUS at 16:58

## 2020-08-09 ASSESSMENT — ACTIVITIES OF DAILY LIVING (ADL): ADLS_ACUITY_SCORE: 35

## 2020-08-09 ASSESSMENT — ENCOUNTER SYMPTOMS
CONFUSION: 1
WEAKNESS: 1

## 2020-08-09 NOTE — ED NOTES
Bed: ST02  Expected date:   Expected time:   Means of arrival:   Comments:  adryan  89 F ams/brain 8pm last noc/unknown covid  1355

## 2020-08-09 NOTE — H&P
[facility]    History and Physical - Hospitalist Service       Date of Admission:  8/9/2020    Assessment & Plan   Joelle Freeman is a 89 year old female with complex past medical history including dementia, prior stroke, bilateral hygromas and shunt placement among others who was admitted from TCU on 8/9/2020 with altered mental status.  Code stroke was initiated in the ED with essentially unremarkable imaging for acute pathology besides bilateral pulmonary emboli noted on CTA head and neck.  Neurology as well as neurosurgery have been contacted and will continue to follow.  ED MD discussed with neurosurgery who was okay with therapeutic heparin infusion without boluses.  Additionally neurology has started IV Keppra.  Patient will be admitted for further monitoring and evaluation.    Acute toxic metabolic encephalopathy / altered mental status at TCU - improving  Code stroke in ED - no new stroke  Recent admission for AMS/confusion secondary to UTI 8/2 - 8/5  History of recurrent falls   Advanced dementia  History of normal pressure hydrocephalus, status post  shunt   Recent bilateral hygromas versus chronic subdural hematomas    Reportedly not eating or drinking much in the past 2 days at TCU and this morning was only responsive to sternal rub.  In ED patient was initially not responding or following commands and was only withdrawing to noxious stimuli.  A little later upon my interview patient is more awake interacting with her sons and following commands.  Code stroke was called in the ED with CT head perfusion essentially unremarkable, no new stroke found, essentially incidental bilateral PEs found as below.  Neurology and neurosurgery was contacted by ED MD and will be consulted.  - Gentle IV fluids at 75 given continued somnolence and recent decreased p.o. intake  - Neurology has started IV Keppra which we will continue 500 twice daily  - Neuro checks every 4 hours for now   -  "Monitor telemetry  - Check UA  - PT/OT consultations requested    Acute bilateral pulmonary emboli - \"incidentally\" found on CTA H/N   History of Heterozygous FVL mutation  Neurosurgery discussed with the ED MD and reportedly are okay with IV heparin infusion without boluses.  Patient is currently saturating 100% on 2 L nasal cannula can titrate to O2 saturation 92% or above.  - Incentive spirometry  - Continue heparin infusion    Moderate oropharyngeal dysphagia  Inadequate oral intake  Recently seen by SLP.  Downgraded diet to DDL 1 with NTL.   - Magic cups with meals to increase available kcals/prot.  Should receive ongoing SLP at TCU.     Recently noted LBBB  Chronic troponin elevation  Elevated troponin of 0.364. Of note, she has chronically elevated troponins. During her recent prior hospitalization her troponins were 0.46, 0.47. At that time she had an echocardiogram which showed a preserved EF and no wall motion abnormalities.  She does seem to have a new left bundle branch block on EKG.  She denies any chest pain, no shortness of breath. Monitored telemetry. Troponins flat. Family previously not interested in any potential cardiac interventions given advanced age and advanced dementia.  - will recheck one troponin later this evening although it will almost certainly not  at all since she is already on therapeutic heparin  - Continue PTA  BB and ACEi.   - Hold PTA statin for now, resume at discharge.   - PTA aspirin was recently stopped given her CT head findings of bilateral subdural hygromas versus subdural hematomas.      History of recurrent UTI  Recently discharged from [facility] 8/5 on antibiotics for UTI. No urinary symptoms reported on admission.   - will check UA to ensure that isn't playing a part in recent change in alertness     History of hypothyroidism during her prior hospitalization    Recently, her TSH was low at 0.22 and free T4 was high at 53.    - Her Synthroid was reduced from " 88 to 75 mcg daily, which will be continued at this time and she should have repeat TSH in ~3 weeks.      Depression/anxiety  Continue PTA Lexapro when able to taking in oral     History of hypertension   CAD - RCA stent 2001  BPs WNL in ED.    - Continued PTA Toprol-XL 25 mg p.o. daily and lisinopril 10 mg p.o. daily when confirmed  - Previously was on ASA but stopped per neurosurgery in the recent past     History of dyslipidemia  - PTA statin to continue in the next day or two    History of severe aortic stenosis  Echocardiogram on 07/20/2020 showed aortic valve area of 0.5 square cm as per prior notes.   - She was considered to not be a surgical candidate.      Asymptomatic COVID-19 screening status  Negative on 5/20, 6/22, 6/29, 7/6, 7/19, 7/22, 8/2,   - felt to be low risk, 8/9 pending - swab obtained in ED       Diet: npo until able to check dysphagia screen with RN, ST to be consulted if necessary  DVT Prophylaxis: Heparin gtt  Benítez Catheter: not present  Code Status: DNR/DNI-discussed in detail with patient and primarily her sons in the ED and confirmed upon admission         Disposition Plan   Expected discharge: likely at least 2 days pending clinical course and neuro/neurosurg recommendations  Entered: Daren Hernandez MD 08/09/2020, 4:17 PM     The patient's care was discussed with the Bedside Nurse, Patient, Patient's Family and ED MD.    Daren Hernandez MD  [facility]    ______________________________________________________________________    Chief Complaint   Altered mental status    History is obtained from the patient's family, chart review, and ED MD (patient unable to provide history)    History of Present Illness   Joelle Freeman is a 89 year old female with history of prior stroke, dementia, bilateral hygroma status post shunting, hypertension, hyperlipidemia, severe aortic stenosis, CAD with RCA stenting 2001, hypothyroidism among others who presented to the ED from TCU  with altered mental status and decreased oral intake for the past 2 days.  Of note patient was recently hospitalized here at [facility] from 8/2 through 8/5 after having unwitnessed falls and increased confusion largely attributed to recurrence of UTI.  She was discharged to TCU with antibiotics continued.  Almost all the history is provided from her sons are at the bedside and reports that she has not been eating or drinking much for the last couple days and at this morning per staff, she was only arousable to sternal rub.  Initially in the ED she was unable to follow commands, not speaking, but noted to be withdrawing from noxious stimuli when IV was placed. As I see her now she is more awake at times and able to follow command such as squeezing both hands.  She denies any recent fevers, chills, coughing, shortness of breath or new chest pain.  Per 1 of her sons she does have some chronic chest discomfort she intermittently reports.  She also denies abdominal pain, nausea, vomiting or diarrhea.  Her sons report that she has not been ambulatory for at least the last few months.    In the ED she was seen by Dr. Garibay with whom I discussed the case.  Patient is afebrile, hemodynamically stable, and saturating 100% on 2 L nasal cannula.  She does not use oxygen at baseline per report.  BMP and CBC are fairly unremarkable.  BNP is within normal limits.  She is known to have a chronic troponin leak around 0.3-0.4-today it is 0.364.  CT a head and neck CT perfusion-see below for full details.  Hygromas and shunt noted, no evidence of worsening hydrocephalus.  Slight interval increase in thickness of hypodense subdural collections along the cerebral convexities now up to 0.8 cm on the right 0.5 cm in the left-these may be related to the  shunt catheter.  CT C-spine was also completed and showed no appreciable change from previous study.  CT head perfusion was unremarkable.  CTA head neck showed large pulmonary emboli  at distal ends of the main pulmonary arteries bilaterally.  Neurology is following and recommended starting Keppra.  Neurosurgery was reportedly okay with anticoagulation without boluses.  Patient will be admitted for further management.  Her sons confirm she is DNR/DNI.    Review of Systems    The 10 point Review of Systems is negative other than noted in the HPI or here.     Past Medical History    I have reviewed this patient's medical history and updated it with pertinent information if needed.   Past Medical History:   Diagnosis Date     Asthma      CAD (coronary artery disease)     stent post RCA 2001     Cerebral ventriculomegaly      CVA (cerebral infarction) 2002     Dementia (H)      Dementia (H)      Depression      Diverticulosis      Dyslipidemia      Heterozygous factor V Leiden mutation (H)      HTN (hypertension)      Hyperlipidemia      Hypothyroidism      Normal pressure hydrocephalus (H)      Posterior reversible encephalopathy syndrome      Subarachnoid hemorrhage (H) 3/23/2013     Subdural hematoma (H)      TIA (transient ischaemic attack)     multiple     TIA (transient ischaemic attack)      Unspecified cerebral artery occlusion with cerebral infarction      Urinary incontinence      UTI (lower urinary tract infection)     recurrent       Past Surgical History   I have reviewed this patient's surgical history and updated it with pertinent information if needed.  Past Surgical History:   Procedure Laterality Date     APPENDECTOMY       BIOPSY      breast     CARDIAC SURGERY      stent to the right coronary artery in 2001     CHOLECYSTECTOMY       ENT SURGERY      tonsillectomy     GENITOURINARY SURGERY      Bladder suspension     GYN SURGERY      D & C     GYN SURGERY      laproscopy oophorectomy unilateral     HERNIA REPAIR       HYSTERECTOMY       LUMBAR PUNCTURE       OPEN REDUCTION INTERNAL FIXATION HIP NAILING  11/18/2013    Procedure: OPEN REDUCTION INTERNAL FIXATION HIP NAILING;  RIGHT  HIP FRACTURE;  Surgeon: John Venegas MD;  Location:  OR     OPTICAL TRACKING SYSTEM IMPLANT SHUNT VENTRICULOPERITONEAL  7/16/2013    Procedure: OPTICAL TRACKING SYSTEM IMPLANT SHUNT VENTRICULOPERITONEAL;  RIGHT OCCIPITAL VENTRICULOPERITONEAL SHUNT, IMAGE GUIDANCE, NEUROENDOSCOPY, INTRATHECAL ANTIBIOTICS (STEALTH AXIEM, STRATA II LEVEL 2.5, PATIENT IN SUPINE 30-80, GEL DONUT WITH HEAD TURNED TO LEFT)      ;  Surgeon: Tayo Conley MD;  Location:  OR     STENT  2001    RCA     wisdom teeth extraction[         Social History   I have reviewed this patient's social history and updated it with pertinent information if needed.  Social History     Tobacco Use     Smoking status: Never Smoker     Smokeless tobacco: Never Used   Substance Use Topics     Alcohol use: No     Drug use: No       Family History   I have reviewed this patient's family history and updated it with pertinent information if needed.  Family History   Problem Relation Age of Onset     Cerebrovascular Disease Mother      Cerebrovascular Disease Father      Blood Disease Son        Prior to Admission Medications   Prior to Admission Medications   Prescriptions Last Dose Informant Patient Reported? Taking?   acetaminophen (TYLENOL) 325 MG tablet  Nursing Home Yes No   Sig: Take 650 mg by mouth every 6 hours as needed for pain    amoxicillin (AMOXIL) 500 MG capsule   No No   Sig: Take 1 capsule (500 mg) by mouth every 8 hours for 5 days   atorvastatin (LIPITOR) 10 MG tablet  Nursing Home No No   Sig: Take 1 tablet (10 mg) by mouth daily   cholecalciferol 1000 UNITS TABS  Nursing Home No No   Sig: Take 1,000 Units by mouth daily   escitalopram (LEXAPRO) 5 MG tablet  Nursing Home Yes No   Sig: Take 5 mg by mouth daily   folic acid (FOLVITE) 1 MG tablet  Nursing Home No No   Sig: Take 1 tablet (1 mg) by mouth daily   levothyroxine (SYNTHROID/LEVOTHROID) 75 MCG tablet  Nursing Home No No   Sig: Take 1 tablet (75 mcg) by mouth daily    lisinopril (PRINIVIL/ZESTRIL) 10 MG tablet  Nursing Home Yes No   Sig: Take 10 mg by mouth daily   melatonin 1 MG TABS tablet  Nursing Home Yes No   Sig: Take 1 mg by mouth nightly as needed for sleep    metoprolol succinate ER (TOPROL-XL) 25 MG 24 hr tablet  Nursing Home Yes No   Sig: Take 25 mg by mouth daily   nystatin (MYCOSTATIN) 622990 UNIT/GM external powder  Nursing Home Yes No   Sig: Apply topically 2 times daily as needed (intertriginal dermatitis)   senna-docusate (SENOKOT-S/PERICOLACE) 8.6-50 MG tablet  Nursing Home No No   Sig: Take 1 tablet by mouth 2 times daily   zinc oxide 16 % (BOUDREAUXS BUTT PASTE) PSTE paste  Nursing Home Yes No   Sig: Apply topically as needed for irritation (red buttocks)      Facility-Administered Medications: None     Allergies   Allergies   Allergen Reactions     Tramadol        Physical Exam   Vital Signs: Temp: 97.4  F (36.3  C) Temp src: Temporal BP: 125/82 Pulse: 77 Heart Rate: 77 Resp: 12 SpO2: 96 % O2 Device: None (Room air)    Weight: 119 lbs .77 oz    Gen: NAD, pleasant, somnolent but waking up more and interacting fairly appropriately  HEENT: Normocephalic, EOMI, MMM  Resp: no crackles,  no wheezes, no increased work of resp  CV: S1S2 heard, reg rhythm, reg rate, no pedal edema  Abdo: soft, nontender, nondistended, bowel sounds present  Ext: calves nontender, well perfused  Neuro: AAOxself and sons (baseline dementia), CN grossly intact, no facial asymmetry, able to verbally respond appropriately to simple questions and squeeze my hands on command      Data   Data reviewed today: I reviewed all medications, new labs and imaging results over the last 24 hours. I personally reviewed no images or EKG's today.    Recent Labs   Lab 08/09/20  1402 08/05/20  0728 08/04/20  2342 08/04/20  0716 08/03/20  1320 08/03/20  0610 08/02/20  2350 08/02/20  1912   WBC 8.3  --   --  7.9 8.6  --   --  11.4*   HGB 13.9  --   --  13.3  --   --   --  14.1   MCV 95  --   --  94  --   --    --  94   *  --   --  173  --   --   --  203   INR 1.13  --   --   --   --   --   --   --     142  --  141  --   --   --  137   POTASSIUM 3.8 3.6 3.6 3.1*  --   --   --  3.5   CHLORIDE 111* 111*  --  110*  --   --   --  106   CO2 28 25  --  24  --   --   --  23   BUN 15 9  --  10  --   --   --  13   CR 0.88 0.59  --  0.68  --   --   --  0.70   ANIONGAP 2* 6  --  7  --   --   --  8   JAZLYN 9.3 8.8  --  8.5  --   --   --  9.4   * 86  --  72  --   --   --  117*   ALBUMIN  --   --   --   --   --   --   --  3.9   PROTTOTAL  --   --   --   --   --   --   --  7.7   BILITOTAL  --   --   --   --   --   --   --  1.0   ALKPHOS  --   --   --   --   --   --   --  88   ALT  --   --   --   --   --   --   --  14   AST  --   --   --   --   --   --   --  15   LIPASE  --   --   --   --   --   --   --  168   TROPI 0.364*  --   --   --   --  0.369* 0.355* 0.367*     Recent Results (from the past 24 hour(s))   CT Head w/o Contrast    Narrative    CT OF THE HEAD WITHOUT CONTRAST 8/9/2020 2:18 PM     COMPARISON: Head CT 8/2/2020    HISTORY: Code stroke.    TECHNIQUE: 5 mm thick axial CT images of the head were acquired  without IV contrast material.    FINDINGS: A  shunt catheter placed through a right parietal approach  with its tip in the mid body of the left lateral ventricle is again  noted without change in position. The lateral and third ventricles are  unchanged in size. There are hypodense subdural collections along the  cerebral convexities on both sides measuring up to 0.8 cm maximum  thickness on the right and 0.5 cm maximum thickness on the left; these  collections have increased in size from 0.5 cm maximum thickness on  the right and 0.4 cm maximum thickness on the left since the  comparison study. The subdural collections may be related to the  patient's  shunt.     There is moderate diffuse cerebral volume loss. There are subtle  patchy areas of decreased density in the cerebral white  matter  bilaterally that are consistent with sequela of chronic small vessel  ischemic disease. The ventricles and basal cisterns are within normal  limits in configuration given the degree of cerebral volume loss.  There is no midline shift.    No intracranial hemorrhage, mass or recent infarct.    The visualized paranasal sinuses are well-aerated. There is no  mastoiditis. There are no fractures of the visualized bones.      Impression    IMPRESSION:   1.  shunt catheter again noted. No evidence for worsening  hydrocephalus.  2. Slight interval increase in thickness of hypodense subdural  collections along the cerebral convexities bilaterally now measuring  up to 0.8 cm maximum thickness on the right and 0.5 cm maximum  thickness on the left. These collections may be related to the  patient's  shunt catheter.  3. Diffuse cerebral volume loss and cerebral white matter changes  consistent with chronic small vessel ischemic disease. No evidence for  acute intracranial pathology.      Radiation dose for this scan was reduced using automated exposure  control, adjustment of the mA and/or kV according to patient size, or  iterative reconstruction technique.   CTA Head Neck with Contrast    Narrative    CT ANGIOGRAM OF THE HEAD AND NECK WITHOUT AND WITH CONTRAST  8/9/2020  2:30 PM     COMPARISON: None    HISTORY: Altered mental status.    TECHNIQUE:  Precontrast localizing scans were followed by CT  angiography with an injection of 70 mL Isovue-370 nonionic intravenous  contrast material with scans through the head and neck.  Images were  transferred to a separate 3-D workstation where multiplanar  reformations and 3-D images were created.  Estimates of carotid  stenoses are made relative to the distal internal carotid artery  diameters except as noted.      FINDINGS:   Incidental note is made of a large pulmonary emboli at the distal ends  of the main pulmonary arteries bilaterally.    Neck CTA: The common carotid  arteries bilaterally are tortuous but are  patent without vascular narrowing. There is calcified atherosclerotic  plaque at the origins of the internal carotid arteries on both sides  and results in mild narrowing (less than 50% luminal diameter  stenosis) at the origin of the right internal carotid artery but no  measurable stenosis at the origin of the left internal carotid artery.  The cervical internal carotid arteries bilaterally are otherwise  patent and unremarkable. There is minimal atherosclerotic narrowing at  the origin of the right vertebral artery. The vertebral arteries  bilaterally are otherwise patent and unremarkable.    Head CTA: There is heavily calcified atherosclerotic plaque of the  intracranial distal internal carotid arteries on both sides that does  not result in any significant vascular narrowing on either side. The  basilar, bilateral intracranial distal internal carotid, bilateral  middle cerebral and bilateral posterior cerebral arteries are patent  and unremarkable. The anterior communicating artery is patent.      Impression    IMPRESSION:  1. Large pulmonary emboli at the distal ends of the main pulmonary  arteries bilaterally.  2. Atherosclerotic plaque of the proximal and distal internal carotid  arteries on both sides that does not result in any significant  vascular narrowing in any of these locations.  3. Otherwise, normal neck and head CTA.    This imaging study, including the abnormal finding of bilateral  pulmonary emboli and slight interval increase in thickness of  bilateral subdural effusions, was discussed with the ordering  physician, Dr. Garibay, by Dr. Jama on 8/9/2020 2:45 PM.      Radiation dose for this scan was reduced using automated exposure  control, adjustment of the mA and/or kV according to patient size, or  iterative reconstruction technique   CT Head Perfusion w Contrast    Narrative    CT BRAIN PERFUSION 8/9/2020 2:41 PM    COMPARISON: None    HISTORY:  Altered mental status.    TECHNIQUE: Time sequential axial CT images of the head were acquired  during the administration of intravenous contrast (50 mL Isovue-370).  CTA images of the [place] as well as color perfusion maps of  the brain were created from this time sequential axial source data.    FINDINGS: There are no focal or regional perfusion defects in the  brain.      Impression    IMPRESSION: Normal CT perfusion of the brain.      Radiation dose for this scan was reduced using automated exposure  control, adjustment of the mA and/or kV according to patient size, or  iterative reconstruction technique.   Cervical spine CT w/o contrast    Narrative    CT OF THE CERVICAL SPINE WITHOUT CONTRAST   8/9/2020 2:46 PM     COMPARISON: CT cervical spine 8/2/2020    HISTORY: Recurrent falls, bilateral arm weakness.     TECHNIQUE: Axial images of the cervical spine were acquired without  intravenous contrast. Multiplanar reformations were created.        Impression    IMPRESSION: Mild degenerative anterolisthesis of C4 upon C5, mild  degenerative retrolisthesis of C5 upon C6 and mild degenerative  anterolisthesis of C7 upon T1 again noted. Alignment of the cervical  vertebrae is otherwise normal. Vertebral body heights of the cervical  spine are normal. Craniocervical alignment is normal. No evidence for  fracture of the cervical spine. Moderate-severe facet arthropathy  throughout the cervical spine. Moderate degenerative spinal canal  narrowing from the C4-C5 level down to the C7 level again noted.  Overall, no appreciable change from the comparison study.      Radiation dose for this scan was reduced using automated exposure  control, adjustment of the mA and/or kV according to patient size, or  iterative reconstruction technique.

## 2020-08-09 NOTE — TELEPHONE ENCOUNTER
Nursing called out of concern for Joelle today as she has had very little intake with each shift of about 100-150cc and that is with begging her to drink.   Little participation with therapies.  Will only open eyes with talking loudly in her ear or deep sternal rub.    If labs done today it would be a while.  Son was worried yesterday.    Orders:  Ok to send in to the ED for evaluation of lethargy but risk of dehydration.  If family does not want to send her in then call this NP back    Electronically signed by Christi Jones RN, CNP

## 2020-08-09 NOTE — ED NOTES
[facility]  ED Nurse Handoff Report    ED Chief complaint: Altered Mental Status      ED Diagnosis:   Final diagnoses:   None       Code Status: Full Code    Allergies:   Allergies   Allergen Reactions     Tramadol        Patient Story: Patient was found to be AMS this AM and was brought to the ER for evaluation.  A code stroke was called LKN would be 8PM. Patient comes in with her son who says that his mother will be put on comfort cares.  See CT and neuro consult for details of scan and shunt previously placed.  Patient recently fell and has multiple wounds and bruises.    Focused Assessment:  Code stroke    Treatments and/or interventions provided: CT labs and comfort cares  Patient's response to treatments and/or interventions: fair    To be done/followed up on inpatient unit:  comfort cares    Does this patient have any cognitive concerns?: Alzheimer's    Activity level - Baseline/Home:  Total Care  Activity Level - Current:   Total Care    Patient's Preferred language: English   Needed?: No    Isolation: None  Infection: Not Applicable  Bariatric?: No    Vital Signs:   Vitals:    08/09/20 1359 08/09/20 1430 08/09/20 1440 08/09/20 1500   BP: 138/74 (!) 144/72  125/82   Pulse: 69 80  77   Resp: 18  18 12   Temp: 97.4  F (36.3  C)      TempSrc: Temporal      SpO2: 95%  100% 96%   Weight: 54 kg (119 lb 0.8 oz)          Cardiac Rhythm:     Was the PSS-3 completed:   Yes  What interventions are required if any?               Family Comments: Sons are at the bedside  OBS brochure/video discussed/provided to patient/family: Yes              Name of person given brochure if not patient: NA              Relationship to patient: NA    For the majority of the shift this patient's behavior was Green.   Behavioral interventions performed were Na.    ED NURSE PHONE NUMBER: [id number]

## 2020-08-09 NOTE — ED TRIAGE NOTES
Pt LKW last night at 8 pm, this morning, not responding, not following commands. Staring but not talking. Pts son states that she got home from hospital the other day and fell right away again at NH. Son states that she has been like this since yesterday.

## 2020-08-09 NOTE — ED PROVIDER NOTES
History   Chief Complaint:  Altered Mental Status       The history is provided by the EMS personnel and a relative.      Joelle Freeman is a 89 year old female with history of complex past medical history of hypertension, dyslipidemia, dementia, severe aortic stenosis, bilateral hygromas versus chronic subdural hematomas, history of recurrent UTIs, history of NSTEMI/demand ischemia, hypothyroidism, constipation and anemia, DNR/DNI who was registered to observation at [facility] on 8/3/20 after she had 2 unwitnessed falls in her assisted living facility and worsening confusion.  She was found to have a urinary tract infection which was treated and mental status improved, who presents for evaluation of altered mental status. EMS notes that the patient went to bed normal last night around 0800 and that she typically wakes up at 1000 and was only arousable with a sternal rub. She has had decreased responsiveness. The patient was seen here last Tuesday after a fall which she has been having more frequent falls recently.       Allergies:  Tramadol    Medications:   Lipitor  Ceftin  Levothyroxine  Senna-docusate  Lexapro  Lisinopril  Toprol    Past Medical History:    Asthma  Anxiety  Macular puckering of retina  Tear film insufficiency  Hypothyroidism  Ataxia   Leukocytosis  Anemia   CAD  Cerebral ventriculomegaly   CVA  Dementia   Depression  Diverticulitis  Dyslipidemia  Heterozygous factor V Leiden mutation  Hypertension  Subdural hematoma  Subarachnoid hemorrhage  TIA  Urinary incontinence  UTI  Posterior reversible encephalopathy syndrome      Past Surgical History:    Appendectomy  Stent placement  Cholecystectomy   Tonsillectomy  Bladder suspension  D & C  Oophorectomy   Hernia repair  Hysterectomy  Lumbar puncture  Reduction internal fixation hip nailing  Implant shunt ventriculoperitoneal  [place] teeth extraction      Family History:    Cerebrovascular disease - mother, father  Blood disease - son    Social  History:  Smoking status: No  Alcohol use: No  Drug use: no  PCP: OSMAN BEBE CARCAMO  Presents to the ED with sonOsman  Up to date on immunization   Marital Status:   [5]     Review of Systems   Neurological: Positive for weakness.   Psychiatric/Behavioral: Positive for confusion.   All other systems reviewed and are negative.        Physical Exam     Patient Vitals for the past 24 hrs:   BP Temp Temp src Pulse Heart Rate Resp SpO2 Weight   08/09/20 1500 125/82 -- -- 77 77 12 96 % --   08/09/20 1440 -- -- -- -- 74 18 100 % --   08/09/20 1430 (!) 144/72 -- -- 80 -- -- -- --   08/09/20 1359 138/74 97.4  F (36.3  C) Temporal 69 -- 18 95 % 54 kg (119 lb 0.8 oz)       Physical Exam  Vitals signs and nursing note reviewed.   Constitutional:       Comments: Altered 89-year-old female staring   HENT:      Head:      Comments: Mild ecchymosis to the right forehead.  No laceration  Eyes:      Pupils: Pupils are equal, round, and reactive to light.      Comments: Patient is confused staring moves her extremities to command confused verbal response   Neck:      Musculoskeletal: Normal range of motion.   Cardiovascular:      Rate and Rhythm: Normal rate and regular rhythm.   Pulmonary:      Effort: Pulmonary effort is normal.   Abdominal:      Palpations: Abdomen is soft.   Musculoskeletal: Normal range of motion.   Skin:     General: Skin is warm.      Capillary Refill: Capillary refill takes less than 2 seconds.   Neurological:      Mental Status: She is lethargic, disoriented and confused.      GCS: GCS eye subscore is 3. GCS verbal subscore is 3. GCS motor subscore is 5.   Psychiatric:         Mood and Affect: Mood normal.           Emergency Department Course     ECG:  ECG taken at 1434, ECG read at 1440  Sinus rhythm with 1st degree AV block with premature atrial complexes  Left axis deviation   Left bundle branch block   Abnormal ECG  No change compared to EKG dated to prior on 08/2/2020  Rate 78 bpm. TN interval 214  ms. QRS duration 152 ms. QT/QTc 446/508 ms. P-R-T axes 77 -63 128.    Imaging:  Radiology findings were communicated with the patient who voiced understanding of the findings.    Head Perfusion CT:  Normal CT perfusion of the brain.     Reading per radiology     Head Neck CT:  1. Large pulmonary emboli at the distal ends of the main pulmonary   arteries bilaterally.   2. Atherosclerotic plaque of the proximal and distal internal carotid   arteries on both sides that does not result in any significant   vascular narrowing in any of these locations.   3. Otherwise, normal neck and head CTA.     This imaging study, including the abnormal finding of bilateral   pulmonary emboli and slight interval increase in thickness of   bilateral subdural effusions, was discussed with the ordering   physician, Dr. Garibay, by Dr. Jama on 8/9/2020 2:45 PM.     Reading per radiology     Head CT:  1.  shunt catheter again noted. No evidence for worsening   hydrocephalus.   2. Slight interval increase in thickness of hypodense subdural   collections along the cerebral convexities bilaterally now measuring   up to 0.8 cm maximum thickness on the right and 0.5 cm maximum   thickness on the left. These collections may be related to the   patient's  shunt catheter.   3. Diffuse cerebral volume loss and cerebral white matter changes   consistent with chronic small vessel ischemic disease. No evidence for   acute intracranial pathology.     Reading per radiology     Cervical spine CT:  Mild degenerative anterolisthesis of C4 upon C5, mild   degenerative retrolisthesis of C5 upon C6 and mild degenerative   anterolisthesis of C7 upon T1 again noted. Alignment of the cervical   vertebrae is otherwise normal. Vertebral body heights of the cervical   spine are normal. Craniocervical alignment is normal. No evidence for   fracture of the cervical spine. Moderate-severe facet arthropathy   throughout the cervical spine. Moderate degenerative  spinal canal   narrowing from the C4-C5 level down to the C7 level again noted.   Overall, no appreciable change from the comparison study.     Reading per radiology     Laboratory:  Laboratory findings were communicated with the patient who voiced understanding of the findings.    CBC: WBC 8.3, HGB 13.9, (L)  BMP: Chloride 111(H), Anion GAP 2(L), Glucose 104(H), GFR 57 (L) o/w WNL (Creatinine 0.88)  INR: 1.13   PTT: 30  BNP: 1,420   Troponin (Collected 1508): 0.364(H)   VBG: pH: 7.41, PCO2: 42, PO2: 24(L), Bicarbonate: 27, Oxyhgb: 43, Base excess: 2.0     COVID-19 virus Swab PCR: pending      Interventions:  1657 Keppra, 500 mg, IV infusion  1658 Heparin, 25,000, IV infusion    Emergency Department Course:   Nursing notes and vitals reviewed.    1358  Dr. Acevedo spoke with the stroke Neurologist Dr. Yarely Romano.    1406 I performed an exam of the patient as documented above. I spoke with the patient's son and code status was updated.    1414 IV was inserted and blood was drawn for laboratory testing, results above.     1418 The patient was sent for a Head CT, CTA, Cervical spine, Head perfusion while in the emergency department, results above.      1449 I spoke with the Radiologist regarding the patient's findings and updated the patient's son Osman. Osman notes that she may have had a hemorrhaged stroke in the past and that she was on a blood thinner.     1458 I spoke with the stroke neurologist, Dr. Yarely Romano.    1500 I spoke with LETI Almanza of the Neurosurgery service regarding patient's presentation, findings, and plan of care.      1512 I spoke with the stroke neurologist, Dr. Yarely Romano again regarding plan of care.     1518 I personally reviewed the results with the patient and answered all related questions prior to admission.    1537 I spoke with LETI Almanza of the Neurosurgery service regarding patient's presentation, findings, and plan of care here in the ED.      1614  I spoke to   David of the hospitalist service who accepts the patient for admission.        Impression & Plan      Medical Decision Making:  Joelle Freeman is a 89 year old female who presents to the emergency department today for evaluation of altered mental status.  Patient was seen prior to my arrival and sent for stroke code CT.  Patient speech is somewhat absent.  Patient is delirious and staring.  Patient moves extremities to command but is nonverbal.  Due to age and recent signs of head trauma repeat CT is performed that shows increased hygroma bilaterally left greater than right patient has a shunt in no acute stroke clearly identified.  However in the CT angiography there is identification of bilateral pulmonary embolism.  Patient has been on anticoagulation but stopped due to history of prior subdural.  Patient is here with both sons is confirmed to be DO NOT INTUBATE DO NOT RESUSCITATE.  Had a long discussion with the patient and her is their sons about treatment for now we will initiate heparin without a bolus dose recommend admission condition is guarded due to age and change in mentation.  Do not think patient is a candidate for neurosurgery patient likely has over drain shunt and shunt adjustment and treatment of hygromas will be left up to neurosurgery and a discussion with them and their son.  For now we will initiate heparin due to concerns for bilateral PE.  Patient's care was discussed with neurosurgery and agree that heparin can be initiated while observed in the hospital.    Diagnosis:    ICD-10-CM    1. Acute pulmonary embolism, unspecified pulmonary embolism type, unspecified whether acute cor pulmonale present (H)  I26.99 UA with Microscopic reflex to Culture   2. [place] coma scale total score 9-12, at arrival to emergency department  R40.4526        Disposition:   The patient is admitted into the care of Dr. Hernandez.     Scribe Disclosure:  Christine ALONSO, am serving as a scribe at 2:01 PM on  8/9/2020 to document services personally performed by Dmitriy Garibay MD based on my observations and the provider's statements to me.  [facility] EMERGENCY DEPARTMENT         Dmitriy Garibay MD  08/09/20 4209

## 2020-08-09 NOTE — PROGRESS NOTES
RECEIVING UNIT ED HANDOFF REVIEW    Just need 10-15 minutes to get room setup. Thank you!     ED Nurse Handoff Report was reviewed by: Lorena Abarca RN on August 9, 2020 at 4:55 PM

## 2020-08-09 NOTE — CONSULTS
Consult Date:  08/09/2020      ASSESSMENT:     1.  A 89-year-old female with small bilateral subdural hematomas with  shunt.   2.  Acute pulmonary embolism.      PLAN:  From the neurosurgical standpoint, okay to anticoagulate with heparin drip without bolus.  Would recommend a repeat head CT tomorrow for further evaluation.  No change in her  shunt setting is indicated.  A long discussion occurred with the son at bedside, informing him that there are risks associated with the use of anticoagulation with these small subdural hematomas.  He stated his mother would not be interested in any operative neurosurgical intervention if the subdural hematomas should increase in size.       TYPE OF VISIT:  The Neurosurgical Service was consulted to see Mrs. Freeman for bilateral subdural hematomas.      HISTORY OF PRESENT ILLNESS:  Mrs. Freeman is a 89-year-old female with a past medical history of subdural hematoma, subarachnoid hemorrhage,  shunt, CVA, dementia, depression, hypothyroidism, ataxia, anxiety, asthma, hypothyroidism, urinary tract infections, posterior reversible encephalopathy syndrome, TIA, who presented to the emergency room for altered mental status.  The patient unable to give history.  History is obtained from chart review and the patient's son at bedside.  The patient lives in a nursing home facility.  Apparently, nursing staff noted her to have a decreased level of consciousness since yesterday.  Family was called, EMS was contacted, and she presented to [facility] Emergency Room for further evaluation.  Chart review indicates recent Neurosurgery evaluation 08/03/2020 for admission to the hospital after a fall, as well as 07/19/2020 when she presented with a fall and small bilateral subdural hematomas.  At both times Neurosurgery recommended continued observation.  Today, she comes in again with altered mental status and imaging ultimately revealed a slight increase in her subdural fluid  collections as well as large bilateral pulmonary embolisms and Neurosurgery was consulted to comment on use of anticoagulation as well as the  shunt.      PAST MEDICAL HISTORY:    1.   shunt placed with Dr. Soto, which was a [employer] Strata valve set at 1.5, placed 11/18/2012.  She followed up with Dr. Soto 01/23/2014 at which time her shunt was at 1.0; however, she was not doing well, so it was increased back to 1.5.   2.  Recent UTI, currently on antibiotics.   3.  Posterior reversible encephalopathy syndrome, subarachnoid hemorrhage, subdural hematoma, hypertension, factor V, depression, dementia, CVA, ataxia, coronary artery disease, hypothyroidism, anxiety and asthma.      PAST SURGICAL HISTORY:   shunt placed with Dr. Soto 11/2012 with current setting 1.5, appendectomy, stent placement, colonoscopy, tonsillectomy, bladder suspension, hernia repair, hysterectomy, internal fixation of the hip.      FAMILY HISTORY:  Mother and father had cardiovascular disease.  Two children with history of anticoagulation use.      MEDICATIONS:  Reviewed per the electronic medical record, not including any anticoagulation.  Currently on amoxicillin for UTI.      ALLERGIES:  TRAMADOL.      PHYSICAL EXAMINATION:   VITAL SIGNS:  Temperature is 97.4.  Respiratory rate is 12, heart rate 77, blood pressure is 125/82.   GENERAL:  She is awake, but drowsy.  She will say a few words, but is pleasantly confused.  She does not follow commands on questioning, but she will hold her arms up in the air without drift and she moves both legs equally spontaneously .     NEUROLOGIC:  [place] coma scale of 4 for eye, 5 for motor and 3 for verbal, for a total of 12.  Otherwise, pupils are equal, round and reactive.      IMAGING STUDIES:  She had a head CT performed in the emergency room this evening.  She does have bilateral fluid collections and when the head CT today is compared to the head CT from 08/07, the right subdural  hematoma has increased from 5 mm to 8 mm, left subdural slightly increased from 4 mm to 5 mm.  Also,  shunt present.  CTA shows large pulmonary embolism in the distal ends of the main pulmonary arteries bilaterally.      LABORATORY DATA:  Reveal a basic metabolic panel with a sodium of 141.  CBC with a white count of 8.3 and platelets of 148.  Her INR is 1.13.  Troponin 0.364.  COVID pending.      Total time spent with the patient, 70 minutes, including 50 minutes of counseling and coordination of care.       Discussed with Dr. Rockwell.       As dictated by LETI WEATHERS            D: 2020   T: 2020   MT: KERRY      Name:     RENA YING   MRN:      [id number]        Account:       [id number]   :      1928           Consult Date:  2020      Document: [id number]

## 2020-08-09 NOTE — CONSULTS
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 She was last at her baseline level of function the night prior at 2000 and reportedly is having minimal verbal output today but is wide awake and tracking. Later, her son Osman relates that she has been having spells like this off and on and more frequently in the past 7-10 days. He confirms that she would not want aggressive measures taken with relation to her health care now, and that she is in fact DNR/DNI. It does not appear that she is currently enrolled in hospice. She has never taken a seizure medication previously and no seizure has ever been proven, but the question has been raised on a number of occasions in the past.    TPA Treatment   Not given due to history of intracranial hemorrhage.    Endovascular Treatment  Not initiated due to absence of proximal vessel occlusion    Impression  1. Transient spells of aphasia- baseline cognition makes this difficult to discern but she did have mild improvement in verbal output in the 20 minutes she was being examined, so the question of focal seizure is raised, particularly due to the interval increase in size of bilateral subdural fluid collections. Low suspicion for stroke or TIA, and she is not elligible for any acute stroke intervention at this time.     She was also found to have bilateral pulmonary emboli. ED to discuss SD fluid collections and anticoagulation. If Hospice is pursued, no need for further  "seizure evaluation, but okay to keep keppra on as it can contribute to quality of life. If she is admitted for further restorative treatments, consider EEG to rule out non-convulsive status epilepticus in the am.    Recommendations  Keppra 1 g IV load now  Keppra 500 mg BID  Neurosurgery consultation for evaluation of shunt, hygromas, need for anticoagulation  Consider EEG in the morning pending    Patient Follow-up    - final recommendation pending work-up    Thank you for this consult. We will continue to follow.     The Stroke Staff is Dr. Weeks.    Susu Romano MD  Vascular Neurology Fellow  To page me or covering stroke neurology team member, click here: [email or web address]   Choose \"On Call\" tab at top, then search dropdown box for \"Neurology Adult\", select location, press Enter, then look for stroke/neuro ICU/telestroke.    ______________________________________________________    Past Medical History   Past Medical History:   Diagnosis Date     Asthma      CAD (coronary artery disease)     stent post RCA 2001     Cerebral ventriculomegaly      CVA (cerebral infarction) 2002     Dementia (H)      Dementia (H)      Depression      Diverticulosis      Dyslipidemia      Heterozygous factor V Leiden mutation (H)      HTN (hypertension)      Hyperlipidemia      Hypothyroidism      Normal pressure hydrocephalus (H)      Posterior reversible encephalopathy syndrome      Subarachnoid hemorrhage (H) 3/23/2013     Subdural hematoma (H)      TIA (transient ischaemic attack)     multiple     TIA (transient ischaemic attack)      Unspecified cerebral artery occlusion with cerebral infarction      Urinary incontinence      UTI (lower urinary tract infection)     recurrent     Past Surgical History   Past Surgical History:   Procedure Laterality Date     APPENDECTOMY       BIOPSY      breast     CARDIAC SURGERY      stent to the right coronary artery in 2001     CHOLECYSTECTOMY       ENT SURGERY      tonsillectomy     GENITOURINARY " SURGERY      Bladder suspension     GYN SURGERY      D & C     GYN SURGERY      laproscopy oophorectomy unilateral     HERNIA REPAIR       HYSTERECTOMY       LUMBAR PUNCTURE       OPEN REDUCTION INTERNAL FIXATION HIP NAILING  11/18/2013    Procedure: OPEN REDUCTION INTERNAL FIXATION HIP NAILING;  RIGHT HIP FRACTURE;  Surgeon: John Venegas MD;  Location:  OR     OPTICAL TRACKING SYSTEM IMPLANT SHUNT VENTRICULOPERITONEAL  7/16/2013    Procedure: OPTICAL TRACKING SYSTEM IMPLANT SHUNT VENTRICULOPERITONEAL;  RIGHT OCCIPITAL VENTRICULOPERITONEAL SHUNT, IMAGE GUIDANCE, NEUROENDOSCOPY, INTRATHECAL ANTIBIOTICS (STEALTH AXIEM, STRATA II LEVEL 2.5, PATIENT IN SUPINE 30-80, GEL DONUT WITH HEAD TURNED TO LEFT)      ;  Surgeon: Tayo Conley MD;  Location:  OR     STENT  2001    RCA     wisdom teeth extraction[       Medications   Home Meds  Prior to Admission medications    Medication Sig Start Date End Date Taking? Authorizing Provider   acetaminophen (TYLENOL) 325 MG tablet Take 650 mg by mouth every 6 hours as needed for pain     Unknown, Entered By History   amoxicillin (AMOXIL) 500 MG capsule Take 1 capsule (500 mg) by mouth every 8 hours for 5 days 8/5/20 8/10/20  Sneha Boss PA-C   atorvastatin (LIPITOR) 10 MG tablet Take 1 tablet (10 mg) by mouth daily 7/6/15   Patric Pinedo MD   cholecalciferol 1000 UNITS TABS Take 1,000 Units by mouth daily 7/6/15   Patric Pinedo MD   escitalopram (LEXAPRO) 5 MG tablet Take 5 mg by mouth daily    Unknown, Entered By History   folic acid (FOLVITE) 1 MG tablet Take 1 tablet (1 mg) by mouth daily 7/22/20   Heather Odonnell MD   levothyroxine (SYNTHROID/LEVOTHROID) 75 MCG tablet Take 1 tablet (75 mcg) by mouth daily 7/22/20   Heather Odonnell MD   lisinopril (PRINIVIL/ZESTRIL) 10 MG tablet Take 10 mg by mouth daily    Unknown, Entered By History   melatonin 1 MG TABS tablet Take 1 mg by mouth nightly as  needed for sleep     Unknown, Entered By History   metoprolol succinate ER (TOPROL-XL) 25 MG 24 hr tablet Take 25 mg by mouth daily    Unknown, Entered By History   nystatin (MYCOSTATIN) 184820 UNIT/GM external powder Apply topically 2 times daily as needed (intertriginal dermatitis)    Unknown, Entered By History   senna-docusate (SENOKOT-S/PERICOLACE) 8.6-50 MG tablet Take 1 tablet by mouth 2 times daily 7/21/20   Heather Odonnell MD   zinc oxide 16 % (BOUDREAUXS BUTT PASTE) PSTE paste Apply topically as needed for irritation (red buttocks)    Unknown, Entered By History       Scheduled Meds    levETIRAcetam  500 mg Intravenous Once       Infusion Meds      PRN Meds      Allergies   Allergies   Allergen Reactions     Tramadol      Family History   Family History   Problem Relation Age of Onset     Cerebrovascular Disease Mother      Cerebrovascular Disease Father      Blood Disease Son      Social History   Social History     Tobacco Use     Smoking status: Never Smoker     Smokeless tobacco: Never Used   Substance Use Topics     Alcohol use: No     Drug use: No       Review of Systems   Review of systems not obtained due to patient factors - confusion       PHYSICAL EXAMINATION  Temp:  [97.4  F (36.3  C)] 97.4  F (36.3  C)  Pulse:  [69-80] 77  Heart Rate:  [74-77] 77  Resp:  [12-18] 12  BP: (125-144)/(72-82) 125/82  SpO2:  [95 %-100 %] 96 %     Neurologic  Mental Status:  Awake, intermittently alert and attentive, does not answer orientation questions, but responds to name and identifies her son correctly. Language is nearly absent and output is largely unintelligible, but she does follow some simple commands and state her son's name.  Cranial Nerves:  Blinks to threat on L but not on R, conjugate gaze and intact horizontal versions, face symmetric, unable to assess dysarthria  Motor:  no abnormal movements, able to move all limbs antigravity spontaneously with no signs of hemiparesis observed, no pronator  drift  Reflexes:  unable to test (telestroke)  Sensory:  light touch sensation intact and symmetric throughout upper and lower extremities (assessed by nurse)  Coordination:  no ataxia of observable movements  Station/Gait:  unable to test due to telestroke      Dysphagia Screen  Per Nursing    Stroke Scales    NIHSS  Interval     Interval Comments     1a. Level of Consciousness 1-->Not alert, but arousable by minor stimulation to obey, answer, or respond   1b. LOC Questions 1-->Answers one question correctly   1c. LOC Commands 1-->Performs one task correctly   2.   Best Gaze 0-->Normal   3.   Visual 1-->Partial hemianopia   4.   Facial Palsy 0-->Normal symmetrical movements   5a. Motor Arm, Left 0-->No drift, limb holds 90 (or 45) degrees for full 10 secs   5b. Motor Arm, Right 0-->No drift, limb holds 90 (or 45) degrees for full 10 secs   6a. Motor Leg, Left 0-->No drift, leg holds 30 degree position for full 5 secs   6b. Motor Leg, right 0-->No drift, leg holds 30 degree position for full 5 secs   7.   Limb Ataxia 0-->Absent   8.   Sensory 0-->Normal, no sensory loss   9.   Best Language 2-->Severe aphasia, all communication is through fragmentary expression, great need for inference, questioning, and guessing by the listener. Range of information that can be exchanged is limited, listener carries burden of. . . (see row details)   10. Dysarthria 1-->Mild-to-moderate dysarthria, patient slurs at least some words and, at worst, can be understood with some difficulty   11. Extinction and Inattention  0-->No abnormality   Total 7 (08/09/20 1617)       Imaging  I personally reviewed all imaging; relevant findings per HPI.     Lab Results Data   CBC  Recent Labs   Lab 08/09/20  1402 08/04/20  0716 08/03/20  1320 08/02/20  1912   WBC 8.3 7.9 8.6 11.4*   RBC 4.33 4.13  --  4.43   HGB 13.9 13.3  --  14.1   HCT 41.3 39.0  --  41.7   * 173  --  203     Basic Metabolic Panel    Recent Labs   Lab 08/09/20  1402  08/05/20  0728 08/04/20  2342 08/04/20  0716    142  --  141   POTASSIUM 3.8 3.6 3.6 3.1*   CHLORIDE 111* 111*  --  110*   CO2 28 25  --  24   BUN 15 9  --  10   CR 0.88 0.59  --  0.68   * 86  --  72   JAZLYN 9.3 8.8  --  8.5     Liver Panel  Recent Labs   Lab 08/02/20  1912   PROTTOTAL 7.7   ALBUMIN 3.9   BILITOTAL 1.0   ALKPHOS 88   AST 15   ALT 14     INR    Recent Labs   Lab Test 08/09/20  1402 05/31/15  1613 01/20/15  1600   INR 1.13 1.07 1.01      Lipid Profile    Recent Labs   Lab Test 06/21/15  0935 06/28/13  0703   CHOL 125 177   HDL 35* 33*   LDL 62 117   TRIG 141 135   CHOLHDLRATIO 3.6 5.4*     A1C  No lab results found.  Troponin I    Recent Labs   Lab 08/09/20  1402 08/03/20  0610 08/02/20  2350   TROPI 0.364* 0.369* 0.355*          Stroke Code / Stroke Consult Data Data   Stroke Code Data  (for stroke code with tele)  Stroke code activated 08/09/20   1357   First stroke provider response 08/09/20   1357   Video start time 08/09/20   1437   Video end time 08/09/20   1510   Last known normal 08/08/20   2000   Time of discovery  (or onset of symptoms)  08/09/20   0800   Head CT read by me 08/09/20   1422   Was stroke code de-escalated? Yes 08/09/20 1510(requested at 1510, page not sent, re-requested at 1615)  presence of contraindications for both intravenous and intra-arterial stroke treatments        Telestroke Service Details    Video start time 08/09/20 at 1437   Video end time 08/09/20 at 1510   Type of service telemedicine diagnostic assessment of acute neurological changes   Reason telemedicine is appropriate patient requires assessment with a specialist for diagnosis and treatment of neurological symptoms   Mode of transmission secure interactive audio and video communication per Mathew   Originating site (patient location) [facility]    Distant site (provider location) [facility] (provider office)   Telemedicine used today to minimize in-person  interactions and PPE use due to COVID-19.

## 2020-08-09 NOTE — PHARMACY-ADMISSION MEDICATION HISTORY
Admission medication history interview status for the 8/9/2020  admission is complete. See EPIC admission navigator for prior to admission medications     Medication history source reliability:Good    Actions taken by pharmacist (provider contacted, etc): Spoke to sonOsman, who reported that the patient is a resident at [facility] in [place]. MAR was sent from nursing home and used to create medication list.      Additional medication history information not noted on PTA med list:        Deleted: Lexapro, Zinc Oxide 16%, Metoprolol succinate ER tablet ( changed to Metoprolol tartrate)     Added: Potassium Chloride 20 mEq ER (one time dose on 08/7/20 1836),    Metoprolol tartrate    On 08/05/2020, the patient was started on Amoxicillin 500 mg capsules for UTI.  (08/05/2020-08/10/2020)     Medication reconciliation/reorder completed by provider prior to medication history? No    Time spent in this activity: 40 minutes     Prior to Admission medications    Medication Sig Last Dose Taking? Auth Provider   acetaminophen (TYLENOL) 325 MG tablet Take 650 mg by mouth every 6 hours as needed for pain  PRN at PRN Yes Unknown, Entered By History   amoxicillin (AMOXIL) 500 MG capsule Take 1 capsule (500 mg) by mouth every 8 hours for 5 days  Patient taking differently: Take 500 mg by mouth every 8 hours Start date 08/05/2020-08/10/2020 ( 5 days) 8/8/2020 at 2130 Yes Sneha Boss PA-C   atorvastatin (LIPITOR) 10 MG tablet Take 1 tablet (10 mg) by mouth daily 8/8/2020 at PM  Yes Patric Pinedo MD   cholecalciferol 1000 UNITS TABS Take 1,000 Units by mouth daily 8/8/2020 at AM Yes Patric Pinedo MD   folic acid (FOLVITE) 1 MG tablet Take 1 tablet (1 mg) by mouth daily 8/8/2020 at AM Yes Heather Odonnell MD   levothyroxine (SYNTHROID/LEVOTHROID) 75 MCG tablet Take 1 tablet (75 mcg) by mouth daily 8/9/2020 at 0530 Yes Heather Odonnell MD   lisinopril (PRINIVIL/ZESTRIL)  10 MG tablet Take 10 mg by mouth daily 8/8/2020 at AM Yes Unknown, Entered By History   melatonin 1 MG TABS tablet Take 1 mg by mouth nightly as needed for sleep  PRN at PRN Yes Unknown, Entered By History   metoprolol tartrate (LOPRESSOR) 12.5 mg TABS half-tab Take 12.5 mg by mouth 2 times daily 8/8/2020 at PM Yes Unknown, Entered By History   nystatin (MYCOSTATIN) 142692 UNIT/GM external powder Apply topically 2 times daily as needed (intertriginal dermatitis) PRN at PRN Yes Unknown, Entered By History   potassium chloride ER (KLOR-CON M) 20 MEQ CR tablet Take 20 mEq by mouth once with dinner. 08/07/2020 at 1836 Yes Unknown, Entered By History   senna-docusate (SENOKOT-S/PERICOLACE) 8.6-50 MG tablet Take 1 tablet by mouth 2 times daily 8/8/2020 at PM Yes Heather Odonnell MD

## 2020-08-09 NOTE — ED NOTES
DATE:  8/9/2020   TIME OF RECEIPT FROM LAB:  1674  LAB TEST:  Troponin   LAB VALUE:  0.364  RESULTS GIVEN WITH READ-BACK TO (PROVIDER):  Dr. vidal  TIME LAB VALUE REPORTED TO PROVIDER:   7015

## 2020-08-10 ENCOUNTER — APPOINTMENT (OUTPATIENT)
Dept: CT IMAGING | Facility: CLINIC | Age: 85
DRG: 070 | End: 2020-08-10
Attending: PHYSICIAN ASSISTANT
Payer: MEDICARE

## 2020-08-10 LAB
ANION GAP SERPL CALCULATED.3IONS-SCNC: 4 MMOL/L (ref 3–14)
BUN SERPL-MCNC: 14 MG/DL (ref 7–30)
CALCIUM SERPL-MCNC: 8.2 MG/DL (ref 8.5–10.1)
CHLORIDE SERPL-SCNC: 114 MMOL/L (ref 94–109)
CO2 SERPL-SCNC: 25 MMOL/L (ref 20–32)
CREAT SERPL-MCNC: 0.79 MG/DL (ref 0.52–1.04)
ERYTHROCYTE [DISTWIDTH] IN BLOOD BY AUTOMATED COUNT: 14.6 % (ref 10–15)
GFR SERPL CREATININE-BSD FRML MDRD: 65 ML/MIN/{1.73_M2}
GLUCOSE SERPL-MCNC: 94 MG/DL (ref 70–99)
HCT VFR BLD AUTO: 38.3 % (ref 35–47)
HGB BLD-MCNC: 12.5 G/DL (ref 11.7–15.7)
LMWH PPP CHRO-ACNC: 0.8 IU/ML
LMWH PPP CHRO-ACNC: 1.13 IU/ML
LMWH PPP CHRO-ACNC: 1.16 IU/ML
MCH RBC QN AUTO: 31.6 PG (ref 26.5–33)
MCHC RBC AUTO-ENTMCNC: 32.6 G/DL (ref 31.5–36.5)
MCV RBC AUTO: 97 FL (ref 78–100)
PLATELET # BLD AUTO: 144 10E9/L (ref 150–450)
POTASSIUM SERPL-SCNC: 3.6 MMOL/L (ref 3.4–5.3)
RBC # BLD AUTO: 3.96 10E12/L (ref 3.8–5.2)
SARS-COV-2 RNA SPEC QL NAA+PROBE: NOT DETECTED
SODIUM SERPL-SCNC: 143 MMOL/L (ref 133–144)
SPECIMEN SOURCE: NORMAL
TROPONIN I SERPL-MCNC: 0.34 UG/L (ref 0–0.04)
TSH SERPL DL<=0.005 MIU/L-ACNC: 1.36 MU/L (ref 0.4–4)
WBC # BLD AUTO: 7.8 10E9/L (ref 4–11)

## 2020-08-10 PROCEDURE — 70450 CT HEAD/BRAIN W/O DYE: CPT

## 2020-08-10 PROCEDURE — 85520 HEPARIN ASSAY: CPT | Performed by: HOSPITALIST

## 2020-08-10 PROCEDURE — 99233 SBSQ HOSP IP/OBS HIGH 50: CPT | Performed by: HOSPITALIST

## 2020-08-10 PROCEDURE — 84443 ASSAY THYROID STIM HORMONE: CPT | Performed by: HOSPITALIST

## 2020-08-10 PROCEDURE — 85027 COMPLETE CBC AUTOMATED: CPT | Performed by: HOSPITALIST

## 2020-08-10 PROCEDURE — 36415 COLL VENOUS BLD VENIPUNCTURE: CPT | Performed by: HOSPITALIST

## 2020-08-10 PROCEDURE — 84484 ASSAY OF TROPONIN QUANT: CPT | Performed by: HOSPITALIST

## 2020-08-10 PROCEDURE — 80048 BASIC METABOLIC PNL TOTAL CA: CPT | Performed by: HOSPITALIST

## 2020-08-10 PROCEDURE — 12000000 ZZH R&B MED SURG/OB

## 2020-08-10 PROCEDURE — 99207 ZZC CONSULT E&M CHANGED TO INITIAL LEVEL: CPT | Performed by: PSYCHIATRY & NEUROLOGY

## 2020-08-10 PROCEDURE — 25000125 ZZHC RX 250: Performed by: HOSPITALIST

## 2020-08-10 PROCEDURE — 25000128 H RX IP 250 OP 636: Performed by: HOSPITALIST

## 2020-08-10 PROCEDURE — 99222 1ST HOSP IP/OBS MODERATE 55: CPT | Mod: GC | Performed by: PSYCHIATRY & NEUROLOGY

## 2020-08-10 PROCEDURE — 25800030 ZZH RX IP 258 OP 636: Performed by: HOSPITALIST

## 2020-08-10 RX ORDER — LEVETIRACETAM 5 MG/ML
500 INJECTION INTRAVASCULAR EVERY 12 HOURS
Status: DISCONTINUED | OUTPATIENT
Start: 2020-08-10 | End: 2020-08-14

## 2020-08-10 RX ORDER — SODIUM CHLORIDE 9 MG/ML
INJECTION, SOLUTION INTRAVENOUS CONTINUOUS
Status: ACTIVE | OUTPATIENT
Start: 2020-08-10 | End: 2020-08-11

## 2020-08-10 RX ADMIN — METOPROLOL TARTRATE 2.5 MG: 5 INJECTION INTRAVENOUS at 10:14

## 2020-08-10 RX ADMIN — LEVETIRACETAM 500 MG: 5 INJECTION INTRAVENOUS at 10:19

## 2020-08-10 RX ADMIN — METOPROLOL TARTRATE 2.5 MG: 5 INJECTION INTRAVENOUS at 16:52

## 2020-08-10 RX ADMIN — LEVETIRACETAM 500 MG: 5 INJECTION INTRAVENOUS at 19:31

## 2020-08-10 RX ADMIN — SODIUM CHLORIDE: 9 INJECTION, SOLUTION INTRAVENOUS at 16:53

## 2020-08-10 RX ADMIN — HEPARIN SODIUM 850 UNITS/HR: 10000 INJECTION, SOLUTION INTRAVENOUS at 21:13

## 2020-08-10 RX ADMIN — METOPROLOL TARTRATE 2.5 MG: 5 INJECTION INTRAVENOUS at 22:12

## 2020-08-10 ASSESSMENT — ACTIVITIES OF DAILY LIVING (ADL)
ADLS_ACUITY_SCORE: 35

## 2020-08-10 NOTE — PLAN OF CARE
DATE & TIME: 8/9 2300-0730   Cognitive Concerns/ Orientation : disoriented x4  BEHAVIOR & AGGRESSION TOOL COLOR: green  CIWA SCORE: na  ABNL VS/O2: VSS on 2L O2  MOBILITY: assist of 2, lift  PAIN MANAGMENT: no s/s of pain  DIET: NPO  BOWEL/BLADDER: incontinent  ABNL LAB/BG: platelets 141. Trop 0.364  DRAIN/DEVICES: IV infusing heparin  TELEMETRY RHYTHM: NSR w/BBB  SKIN: intact, blanchable redness, q2h turn and repo  TESTS/PROCEDURES: NA  D/C DAY/GOALS/PLACE: pending progress  OTHER IMPORTANT INFO: q4h neuros

## 2020-08-10 NOTE — PLAN OF CARE
Summary: Altered mental status    DATE & TIME: 8/9/2020; 3575-7123   Cognitive Concerns/ Orientation: lethargic and disoriented x 4; calm   BEHAVIOR & AGGRESSION TOOL COLOR: Green   CIWA SCORE: N/A    ABNL VS/O2: VSS on 2 L NC (O2=96-99%)   MOBILITY: total care; turn and repo q2h   PAIN MANAGMENT: absence of non-verbal indicators of pain; resting comfortable  DIET: NPO except meds  BOWEL/BLADDER: BS active x 4; incontinent; no BM this evening  ABNL LAB/BG: Troponin=0.364; Platelets= 141; Chloride= 111   DRAIN/DEVICES: R. PIV infusing NS @ 75 mL/hr; R. PIV infusing heparin at 10 mL/hr (Hep10a ordered at 2300) (checked and verified with second RN)  TELEMETRY RHYTHM: NSR  SKIN: Scattered bruising UE and LE bilaterally; redness in groin area (powder applied); scabs/abrasions on cheek bones near O2 tubing (dime size)   TESTS/PROCEDURES: N/A  D/C DAY/GOALS/PLACE: pending discharge  OTHER IMPORTANT INFO: Lungs clear but diminished; congested cough; Neuros q4h; (pt. Unable to participate in neuro checks, lethargic/weak); IV metroprolol and rocephin; family updated on plan of care this evening.

## 2020-08-10 NOTE — PROGRESS NOTES
Admission    Patient arrives to room 618-01 via cart from ED.  Care plan note: Lethargic disoriented x 4; Calm and cooperative; VSS on 2 L NC; BS hypoactive x 4; 2 PIV infusing NS @75 mL and Heparin 10 mL/hr. Family updated on plan of care. Nursing will continue to monitor and assess.     Inpatient nursing criteria listed below were met:    PCD's Documented: Yes  Skin issues/needs documented :Yes  Isolation education started/completed Yes  Patient allergies verified with patient: NA- pt. Unable to respond to questions   Verified completion of [place] Risk Assessment Tool:  Yes  Verified completion of Guardianship screening tool: Yes  Fall Prevention: Care plan updated, Education given and documented Yes  Care Plan initiated: Yes  Home medications documented in belongings flowsheet: Yes  Patient belongings documented in belongings flowsheet: Yes  Reminder note (belongings/ medications) placed in discharge instructions:Yes  Admission profile/ required documentation complete: Yes  Bedside Report Letter given and explained to patient Yes

## 2020-08-10 NOTE — PROGRESS NOTES
MD Notification    Notified Person: MD    Notified Person Name: Dr. Hernandez     Notification Date/Time: 8/9/2020; 3445-2211     Notification Interaction: web-based paging     Purpose of Notification: Hello; pt. too lethargic to take oral medications this evening. Any alternative routes for meds? Thank you!    Orders Received: Metroprolol switched to IV; IV antibiotic rocephin started this evening    Comments:

## 2020-08-10 NOTE — CONSULTS
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 No acute stroke intervention was pursued. She was found to have enlargement of her bilateral subdural fluid collections as well as bilateral pulmonary emboli. After discussion with Neurosurgery, she was started on a heparin infusion. Repeat head CT this morning demonstrates stable size of the subdural fluid collections, no acute blood, and no new areas of hypodensity suggestive of infarct. She denies complaints or pain this morning but is minimally able to provide any further history.    Impression  1. Fluctuations in language fluency- etiology unclear, unlikely secondary to presence of hygromas alone, but could represent hygroma related osmotic fluid shifts, seizure, or fluctuations in baseline dementia. No obvious stroke on repeat CT head but this modality can miss small infarcts. This will be difficult to clarify. If keppra is tolerated without side effects it is a low risk intervention. After discussion with son and his clear statement that her wishes would to be seek treatment which is targeted at comfort and improving quality of life, he is open to EEG recording for about a day to rule out non-convulsive status epilepticus. Due to multiple comorbidities and low functional status at baseline, Palliative Care consultation would be helpful.    Recommendations  Continue empiric keppra 500 mg BID, seizures have not been confirmed, if this causes irritability  "or other side effects it can be discontinued gradually  Video EEG (will get connected tomorrow)  Palliative Care consult  No need for MRI brain at this time, can be pursued if family requests    Patient Follow-up    - final recommendation pending work-up    Thank you for this consult. We will continue to follow.     The Stroke Staff is Dr. Robles.    Susu Romano MD  Vascular Neurology Fellow  To page me or covering stroke neurology team member, click here: [email or web address]   Choose \"On Call\" tab at top, then search dropdown box for \"Neurology Adult\", select location, press Enter, then look for stroke/neuro ICU/telestroke.    _____________________________________________________    Past Medical History   Past Medical History:   Diagnosis Date     Asthma      CAD (coronary artery disease)     stent post RCA 2001     Cerebral ventriculomegaly      CVA (cerebral infarction) 2002     Dementia (H)      Dementia (H)      Depression      Diverticulosis      Dyslipidemia      Heterozygous factor V Leiden mutation (H)      HTN (hypertension)      Hyperlipidemia      Hypothyroidism      Normal pressure hydrocephalus (H)      Posterior reversible encephalopathy syndrome      Subarachnoid hemorrhage (H) 3/23/2013     Subdural hematoma (H)      TIA (transient ischaemic attack)     multiple     TIA (transient ischaemic attack)      Unspecified cerebral artery occlusion with cerebral infarction      Urinary incontinence      UTI (lower urinary tract infection)     recurrent     Past Surgical History   Past Surgical History:   Procedure Laterality Date     APPENDECTOMY       BIOPSY      breast     CARDIAC SURGERY      stent to the right coronary artery in 2001     CHOLECYSTECTOMY       ENT SURGERY      tonsillectomy     GENITOURINARY SURGERY      Bladder suspension     GYN SURGERY      D & C     GYN SURGERY      laproscopy oophorectomy unilateral     HERNIA REPAIR       HYSTERECTOMY       LUMBAR PUNCTURE       OPEN REDUCTION INTERNAL " FIXATION HIP NAILING  11/18/2013    Procedure: OPEN REDUCTION INTERNAL FIXATION HIP NAILING;  RIGHT HIP FRACTURE;  Surgeon: John Venegas MD;  Location:  OR     OPTICAL TRACKING SYSTEM IMPLANT SHUNT VENTRICULOPERITONEAL  7/16/2013    Procedure: OPTICAL TRACKING SYSTEM IMPLANT SHUNT VENTRICULOPERITONEAL;  RIGHT OCCIPITAL VENTRICULOPERITONEAL SHUNT, IMAGE GUIDANCE, NEUROENDOSCOPY, INTRATHECAL ANTIBIOTICS (STEALTH AXIEM, STRATA II LEVEL 2.5, PATIENT IN SUPINE 30-80, GEL DONUT WITH HEAD TURNED TO LEFT)      ;  Surgeon: Tayo Conley MD;  Location:  OR     STENT  2001    RCA     wisdom teeth extraction[       Medications   Home Meds  Prior to Admission medications    Medication Sig Start Date End Date Taking? Authorizing Provider   acetaminophen (TYLENOL) 325 MG tablet Take 650 mg by mouth every 6 hours as needed for pain    Yes Unknown, Entered By History   amoxicillin (AMOXIL) 500 MG capsule Take 1 capsule (500 mg) by mouth every 8 hours for 5 days  Patient taking differently: Take 500 mg by mouth every 8 hours Start date 08/05/2020-08/10/2020 ( 5 days) 8/5/20 8/10/20 Yes Sneha Boss PA-C   atorvastatin (LIPITOR) 10 MG tablet Take 1 tablet (10 mg) by mouth daily 7/6/15  Yes Patric Pinedo MD   cholecalciferol 1000 UNITS TABS Take 1,000 Units by mouth daily 7/6/15  Yes Patric Pinedo MD   folic acid (FOLVITE) 1 MG tablet Take 1 tablet (1 mg) by mouth daily 7/22/20  Yes Heather Odonnell MD   levothyroxine (SYNTHROID/LEVOTHROID) 75 MCG tablet Take 1 tablet (75 mcg) by mouth daily 7/22/20  Yes Heather Odonnell MD   lisinopril (PRINIVIL/ZESTRIL) 10 MG tablet Take 10 mg by mouth daily   Yes Unknown, Entered By History   melatonin 1 MG TABS tablet Take 1 mg by mouth nightly as needed for sleep    Yes Unknown, Entered By History   metoprolol tartrate (LOPRESSOR) 12.5 mg TABS half-tab Take 12.5 mg by mouth 2 times daily   Yes Unknown, Entered By  History   nystatin (MYCOSTATIN) 461872 UNIT/GM external powder Apply topically 2 times daily as needed (intertriginal dermatitis)   Yes Unknown, Entered By History   potassium chloride ER (KLOR-CON M) 20 MEQ CR tablet Take 20 mEq by mouth once with dinner.   Yes Unknown, Entered By History   senna-docusate (SENOKOT-S/PERICOLACE) 8.6-50 MG tablet Take 1 tablet by mouth 2 times daily 7/21/20  Yes Heather Odonnell MD       Scheduled Meds    cefTRIAXone  1 g Intravenous Q24H     levETIRAcetam  500 mg Intravenous Q12H     levothyroxine  75 mcg Oral Daily     lisinopril  10 mg Oral Daily     metoprolol  2.5 mg Intravenous Q6H     sodium chloride (PF)  3 mL Intracatheter Q8H       Infusion Meds    HEParin 900 Units/hr (08/10/20 0614)     - MEDICATION INSTRUCTIONS -         PRN Meds  acetaminophen, acetaminophen, lidocaine 4%, lidocaine (buffered or not buffered), melatonin, miconazole, naloxone, ondansetron **OR** ondansetron, - MEDICATION INSTRUCTIONS -, sodium chloride (PF)    Allergies   Allergies   Allergen Reactions     Tramadol      Family History   Family History   Problem Relation Age of Onset     Cerebrovascular Disease Mother      Cerebrovascular Disease Father      Blood Disease Son      Social History   Social History     Tobacco Use     Smoking status: Never Smoker     Smokeless tobacco: Never Used   Substance Use Topics     Alcohol use: No     Drug use: No       Review of Systems   Review of systems not obtained due to patient factors - confusion, denies pain     PHYSICAL EXAMINATION   Temp:  [97.4  F (36.3  C)-97.6  F (36.4  C)] 97.6  F (36.4  C)  Pulse:  [65-80] 65  Heart Rate:  [68-77] 68  Resp:  [12-18] 14  BP: (101-149)/(52-82) 101/52  SpO2:  [92 %-100 %] 96 %    General:  patient lying in bed without any acute distress    HEENT:  R frontal crani incision well healed, old bruise R temple  Cardio:  RRR  Pulmonary:  no respiratory distress  Abdomen:  soft, non-tender, non-distended  Extremities:  no  edema, peripheral pulses palpable  Skin:  intact, warm/dry     Neurologic  Mental Status:  Eyes closed, opens to voice, does not maintain alertness. Oriented to self but not time, place, or circumstance. Language is sparse and minimal output is fluent and coherent, with intact comprehension of simple commands, unable to name or repeat.  Cranial Nerves:  blinks to threat bilaterally, PERRL, horizontal gaze intact, face symmetric, tongue midline, mild dysarthria  Motor:  normal muscle tone and bulk, no abnormal movements, able to move all limbs spontaneously, no pronator drift, wiggles toes bilaterally  Reflexes:  toes down-going  Sensory:  detects sensation in 4/4 extremities  Coordination:  no ataxia of observed movements  Station/Gait:  not tested    Dysphagia Screen  Dysarthria or facial droop present - Maintain NPO, consult SLP    Stroke Scales    NIHSS  Interval transfer (08/10/20 1628)   Interval Comments     1a. Level of Consciousness 1-->Not alert, but arousable by minor stimulation to obey, answer, or respond   1b. LOC Questions 2-->Answers neither question correctly   1c. LOC Commands 0-->Performs both tasks correctly   2.   Best Gaze 0-->Normal   3.   Visual 0-->No visual loss   4.   Facial Palsy 0-->Normal symmetrical movements   5a. Motor Arm, Left 0-->No drift, limb holds 90 (or 45) degrees for full 10 secs   5b. Motor Arm, Right 0-->No drift, limb holds 90 (or 45) degrees for full 10 secs   6a. Motor Leg, Left 2-->Some effort against gravity, leg falls to bed by 5 secs, but has some effort against gravity   6b. Motor Leg, right 2-->Some effort against gravity, leg falls to bed by 5 secs, but has some effort against gravity   7.   Limb Ataxia 0-->Absent   8.   Sensory 0-->Normal, no sensory loss   9.   Best Language 1-->Mild-to-moderate aphasia, some obvious loss of fluency or facility of comprehension, without significant limitation on ideas expressed or form of expression. Reduction of speech and/or  comprehension, however, makes conversation. . . (see row details)   10. Dysarthria 1-->Mild-to-moderate dysarthria, patient slurs at least some words and, at worst, can be understood with some difficulty   11. Extinction and Inattention  0-->No abnormality   Total 9 (08/10/20 1628)       Imaging  I personally reviewed all imaging; relevant findings per HPI.    Labs Data   CBC  Recent Labs   Lab 08/10/20  0607 08/09/20  1821 08/09/20  1402   WBC 7.8 9.7 8.3   RBC 3.96 4.15 4.33   HGB 12.5 13.3 13.9   HCT 38.3 39.9 41.3   * 141* 148*     Basic Metabolic Panel   Recent Labs   Lab 08/10/20  0607 08/09/20  1402 08/07/20    141 141   POTASSIUM 3.6 3.8 3.2*   CHLORIDE 114* 111* 108*   CO2 25 28 25   BUN 14 15 8   CR 0.79 0.88 0.67   GLC 94 104* 88   JAZLYN 8.2* 9.3 9.4     Liver Panel  No results for input(s): PROTTOTAL, ALBUMIN, BILITOTAL, ALKPHOS, AST, ALT, BILIDIRECT in the last 168 hours.  INR    Recent Labs   Lab Test 08/09/20  1402 05/31/15  1613 01/20/15  1600   INR 1.13 1.07 1.01      Lipid Profile    Recent Labs   Lab Test 06/21/15  0935 06/28/13  0703   CHOL 125 177   HDL 35* 33*   LDL 62 117   TRIG 141 135   CHOLHDLRATIO 3.6 5.4*     A1C  No lab results found.  Troponin I    Recent Labs   Lab 08/10/20  0607 08/09/20  1402   TROPI 0.340* 0.364*          Stroke Code / Stroke Consult Data Data This was a non-emergent, non-tele stroke consult.

## 2020-08-10 NOTE — PROGRESS NOTES
89 year old female with past medical history of subdural hematoma, subarachnoid hemorrhage,  shunt, CVA, and dementia who presented to the ED for altered mental status. Imaging with slight interval increase in subdural fluid collections. Due to large bilateral pulmonary embolisms, anticoagulation was initiated. A repeat head CT was recommended and was stable.     Head CT   IMPRESSION:   1.  shunt catheter again noted. No evidence for worsening  hydrocephalus.  2. Slight interval increase in thickness of hypodense subdural  collections along the cerebral convexities bilaterally now measuring  up to 0.8 cm maximum thickness on the right and 0.5 cm maximum  thickness on the left. These collections may be related to the  patient's  shunt catheter.  3. Diffuse cerebral volume loss and cerebral white matter changes  consistent with chronic small vessel ischemic disease. No evidence for  acute intracranial pathology.    Recommendations  -Ok to continue anticoagulation  -Will continue routine outpatient follow up  -Neurosurgery will sign off    Latanya Angel [facility]  [address] [place] [address]  [address] [place] [id number]  Tel [phone]  Fax [phone]

## 2020-08-10 NOTE — PROGRESS NOTES
[facility]    Hospitalist Progress Note    Date of Admission:  8/9/2020    Assessment & Plan     Joelle Freeman is a 89 year old female with complex past medical history including dementia, prior stroke, bilateral hygromas and shunt placement among others who was admitted from TCU on 8/9/2020 with altered mental status.  Code stroke was initiated in the ED with essentially unremarkable imaging for acute pathology besides incidental finding of bilateral pulmonary emboli noted on CTA head and neck.  Neurology as well as neurosurgery consulted.  ED MD discussed with neurosurgery who was okay with therapeutic heparin infusion without boluses.  Additionally neurology has started IV Keppra.  Patient admitted for further monitoring and evaluation.     Acute toxic metabolic encephalopathy  at TCU: Waxing and waning,?  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  In ED patient was initially not responding or following commands and was only withdrawing to noxious stimuli.  A little later  patient is more awake interacting with her sons and following commands.  Code stroke was called in the ED with CT head perfusion essentially unremarkable, no new stroke found, essentially incidental bilateral PEs found as below.  Neurology and neurosurgery was contacted by ED MD and will be consulted.  - Gentle IV fluids at 75 given continued somnolence and recent decreased p.o. intake  - Neurology has started IV Keppra which we will continue 500 twice daily.   "Await further recommendations from neurology.  -Patient was seen by neurosurgery.  Okayed for heparin drip without boluses for PE.  See below.  Head CT repeated on 8/10 and was reported as stable.  Neurosurgery has signed off.  - PT/OT consultations requested  -Patient continues to be encephalopathic on 8/10.  Venous blood gas showed no CO2 retention on admission.  No other obvious etiology at this time.  She has been treated with 2 courses of antibiotics for UTI over last 3 weeks.  Received ceftriaxone again at admission.  Have discontinued antibiotic at this time.  Will await urine culture.  -Pending neurology recommendations, progress over next 24 to 48 hours, may need to have further goals of care conversation with family given patient's advanced age and frailty.     Acute bilateral pulmonary emboli - \"incidentally\" found on CTA H/N   History of Heterozygous FVL mutation  Neurosurgery discussed with the ED MD and reportedly are okay with IV heparin infusion without boluses.  Patient is currently saturating 100% on 2 L nasal cannula can titrate to O2 saturation 92% or above.  - Incentive spirometry  - Continue heparin infusion at this time till she is more awake and able to take pills.  -Repeat head CT on 8/10 was stable.  Neurosurgery has signed off.     Moderate oropharyngeal dysphagia  Inadequate oral intake  Recently seen by SLP.  Downgraded diet to DDL 1 with NTL.   - Magic cups with meals to increase available kcals/prot.  Should receive ongoing SLP at TCU.     Recently noted LBBB  Chronic troponin elevation  Elevated troponin of 0.364. Of note, she has chronically elevated troponins. During her recent prior hospitalization her troponins were 0.46, 0.47. At that time she had an echocardiogram which showed a preserved EF and no wall motion abnormalities.  She does seem to have a new left bundle branch block on EKG.  She denies any chest pain, no shortness of breath. Monitored telemetry. Troponins " flat. Family previously not interested in any potential cardiac interventions given advanced age and advanced dementia.  - Continue PTA  BB and ACEi.   - Continue PTA statin when able to take p.o.  - PTA aspirin was recently stopped given her CT head findings of bilateral subdural hygromas versus subdural hematomas.      History of recurrent UTI  Recently discharged from [facility] 8/5 on antibiotics for UTI. No urinary symptoms reported on admission.   -Recheck UA showed more than 182 WBC, 53 RBC, negative nitrite, no bacteria.  Await urine culture.  Hold on antibiotics at this time.     History of hypothyroidism during her prior hospitalization    Recently, her TSH was low at 0.22 and free T4 was high at 53.    - Her Synthroid was reduced from 88 to 75 mcg daily.  Recheck TSH at 1.36.     Depression/anxiety  Continue PTA Lexapro when able to taking in oral     History of hypertension   CAD - RCA stent 2001  BPs WNL in ED.    - Continued PTA Toprol-XL 25 mg p.o. daily and lisinopril 10 mg p.o. daily when able to take p.o.  -Currently on scheduled 2.5 mg IV metoprolol every 6 hours  - Previously was on ASA but stopped per neurosurgery in the recent past     History of dyslipidemia  - PTA statin to continue when able to take p.o.     History of severe aortic stenosis  Echocardiogram on 07/20/2020 showed aortic valve area of 0.5 square cm as per prior notes.   - She was considered to not be a surgical candidate.      Asymptomatic COVID-19 screening status  Negative on 5/20, 6/22, 6/29, 7/6, 7/19, 7/22, 8/2,   - felt to be low risk, 8/9 negative        Diet: npo until able to be more awake to tolerate p.o. intake/passes bedside swallow screen  DVT Prophylaxis: Heparin gtt  Benítez Catheter: not present  Code Status: DNR/DNI-discussed in detail with patient and primarily her sons in the ED and confirmed upon admission          Disposition Plan     Expected discharge: likely at least 2 days pending clinical course and  neuro/neurosurg recommendations          Chloe Trejo MD  Text Page (7am - 6pm, M-F)    Interval History   This morning patient was very lethargic, laying in bed.  Eyes partially open.  Did not respond to verbal stimuli.  Seem to spontaneously move extremities.  Per RN report, her son was at bedside earlier in the morning and patient was much more alert and responding to questions, knew where she was and recognize son.  Per chart review, she appeared like this at presentation but seemed much more alert and interactive when family at bedside.    -Data reviewed today: I reviewed all new labs and imaging results over the last 24 hours. I personally reviewed CT scan with result as noted above    Physical Exam   Temp: 97.9  F (36.6  C) Temp src: Axillary BP: 118/64 Pulse: 67 Heart Rate: 68 Resp: 16 SpO2: 97 % O2 Device: Nasal cannula Oxygen Delivery: 2 LPM  Vitals:    08/09/20 1359 08/09/20 1814 08/10/20 0708   Weight: 54 kg (119 lb 0.8 oz) 51.5 kg (113 lb 8.6 oz) 54.2 kg (119 lb 7.8 oz)     Vital Signs with Ranges  Temp:  [97.6  F (36.4  C)-97.9  F (36.6  C)] 97.9  F (36.6  C)  Pulse:  [65-74] 67  Heart Rate:  [68-69] 68  Resp:  [14-16] 16  BP: (101-149)/(52-75) 118/64  SpO2:  [92 %-99 %] 97 %  No intake/output data recorded.    Constitutional: Somnolent, eyes partially open, not responding to verbal stimulus  Respiratory: Non labored breathing,  Cardiovascular: Heart sounds regular rate and rhythm,  no leg edema  GI: Abdomen is soft, non distended  Skin/Integumen: no rashes obvious  Neuro: Very somnolent and lethargic, eyes partially open, no obvious facial asymmetry, could not do much more exam given somnolence  Psych: Calm    Medications     HEParin 850 Units/hr (08/10/20 1507)     - MEDICATION INSTRUCTIONS -         levETIRAcetam  500 mg Intravenous Q12H     [Held by provider] levothyroxine  75 mcg Oral Daily     [Held by provider] lisinopril  10 mg Oral Daily     metoprolol  2.5 mg Intravenous Q6H     sodium  chloride (PF)  3 mL Intracatheter Q8H       Data   Recent Labs   Lab 08/10/20  0607 08/09/20  1821 08/09/20  1402 08/07/20   WBC 7.8 9.7 8.3 7.0   HGB 12.5 13.3 13.9 13.2   MCV 97 96 95 92   * 141* 148* 175   INR  --   --  1.13  --      --  141 141   POTASSIUM 3.6  --  3.8 3.2*   CHLORIDE 114*  --  111* 108*   CO2 25  --  28 25   BUN 14  --  15 8   CR 0.79  --  0.88 0.67   ANIONGAP 4  --  2* 8   JAZLYN 8.2*  --  9.3 9.4   GLC 94  --  104* 88   TROPI 0.340*  --  0.364*  --        Imaging  Recent Results (from the past 24 hour(s))   CT Head w/o Contrast    Narrative    CT SCAN OF THE HEAD WITHOUT CONTRAST   8/10/2020 8:19 AM     HISTORY: Intracranial hemorrhage, known, follow up.    TECHNIQUE:  Axial images of the head and coronal reformations without  IV contrast material. Radiation dose for this scan was reduced using  automated exposure control, adjustment of the mA and/or kV according  to patient size, or iterative reconstruction technique.    COMPARISON: CT head dated 8/9/2020.    FINDINGS: Again seen is a right occipital approach ventricular shunt  catheter extending through the right lateral ventricle and crossing  midline across the septum pellucidum, with the tip terminating in the  anterior body of the left lateral ventricle. The size and  configuration of the ventricular system is unchanged, and there are no  findings of hydrocephalus. There has been no significant change in the  size or configuration of small bilateral holohemispheric cerebral  convexity subdural hematomas measuring up to 9 mm in thickness on the  right and 6 mm on the left. These again extend along the superior  aspect of the tentorial leaflets. Findings of previous right  frontotemporal craniotomy with prominent dural/extra-axial  mineralization underlying the craniotomy flap, unchanged.    There is a background of mild-to-moderate global brain parenchymal  volume loss and mild presumed chronic small vessel ischemic disease  in  the cerebral white matter. No definite new intracranial abnormality.  Scattered intracranial atherosclerotic calcifications are again  present.    Right lens replacement. Orbits otherwise within normal limits. The  left sphenoid sinus is nearly completely opacified, unchanged,  presumably related to inflammatory disease/mucosal thickening.  Otherwise, the paranasal sinuses and mastoids are clear.      Impression    IMPRESSION:  1. No significant change in the size or configuration of bilateral  small holohemispheric cerebral convexity subdural hematomas. Unchanged  mild associated mass effect on the cerebral hemispheres without  midline shift/herniation. No new intracranial hemorrhage seen.  2. Stable size and configuration of the ventricular system with right  occipital approach ventricular shunt catheter in place.  3. Otherwise stable exam, as detailed.    ZAYRA LACY MD

## 2020-08-10 NOTE — PROVIDER NOTIFICATION
MD Notification    Notified Person: MD    Notified Person Name: Dr. Trejo    Notification Date/Time: 8/10/2020 0917    Notification Interaction: web page    Purpose of Notification: Patient too lethargic to take oral meds this morning. Any alternatives for the oral meds? Thanks!    Orders Received:    Comments:

## 2020-08-10 NOTE — PLAN OF CARE
DATE & TIME: 8/10/2020 0729-5946        Cognitive Concerns/ Orientation : oriented to self at times, especially when family is here. Otherwise disoriented to place, time & situation.  BEHAVIOR & AGGRESSION TOOL COLOR: green  CIWA SCORE: na  ABNL VS/O2: VSS on 2L O2  MOBILITY: assist of 2, lift, T/R Q2  PAIN MANAGMENT: denies pain, and no s/s of pain  DIET: NPO  BOWEL/BLADDER: incontinent  ABNL LAB/BG: Platelets 144. Trop 0.340, trending down, MD aware.   DRAIN/DEVICES: IV infusing heparin  TELEMETRY RHYTHM: NSR w/BBB  SKIN: intact, scattered bruising, blanchable redness to coccyx.  TESTS/PROCEDURES: CT completed today.   D/C DAY/GOALS/PLACE: pending neurology consult  OTHER IMPORTANT INFO: q8h neuros. Neurosurgery signed off. Patient too lethargic for PT/OT today. Son at bedside this morning.     MD/RN ROUNDING SIGNED OFF D/E SHIFT: Yes  COMMIT TO SIT DONE AND SIGNED OFF Yes

## 2020-08-10 NOTE — PHARMACY-RX INSURANCE COVERAGE
Anticoagulation coverage check.  Patient has UMR through a former employer.    Xarelto:  $103/mo. Upon receipt of RX [facility] can provide copay savings card to reduce this to $10/mo.  Eliquis:  $103/mo. Upon receipt of RX [facility] can provide copay savings card to reduce this to $10/mo.    Enoxaparin 60mg x 10 syringes:  $10    Jantoven (warfarin): $10/mo      -HELIO Buchanan, Pharmacy Technician/Liaison, Discharge Pharmacy [phone]

## 2020-08-10 NOTE — PLAN OF CARE
OT:  Patient lethargic, eyes half open and with name slightly opened eyes wider but did not respond any further.  Not appropriate for OT at this time.

## 2020-08-11 ENCOUNTER — HOSPITAL ENCOUNTER (OUTPATIENT)
Dept: NEUROLOGY | Facility: CLINIC | Age: 85
DRG: 070 | End: 2020-08-11
Attending: STUDENT IN AN ORGANIZED HEALTH CARE EDUCATION/TRAINING PROGRAM
Payer: MEDICARE

## 2020-08-11 LAB
ALBUMIN SERPL-MCNC: 2.9 G/DL (ref 3.4–5)
ALP SERPL-CCNC: 68 U/L (ref 40–150)
ALT SERPL W P-5'-P-CCNC: 10 U/L (ref 0–50)
ANION GAP SERPL CALCULATED.3IONS-SCNC: 5 MMOL/L (ref 3–14)
AST SERPL W P-5'-P-CCNC: 10 U/L (ref 0–45)
BILIRUB SERPL-MCNC: 0.7 MG/DL (ref 0.2–1.3)
BUN SERPL-MCNC: 14 MG/DL (ref 7–30)
CALCIUM SERPL-MCNC: 8.5 MG/DL (ref 8.5–10.1)
CHLORIDE SERPL-SCNC: 114 MMOL/L (ref 94–109)
CO2 SERPL-SCNC: 25 MMOL/L (ref 20–32)
CREAT SERPL-MCNC: 0.67 MG/DL (ref 0.52–1.04)
ERYTHROCYTE [DISTWIDTH] IN BLOOD BY AUTOMATED COUNT: 14.2 % (ref 10–15)
GFR SERPL CREATININE-BSD FRML MDRD: 76 ML/MIN/{1.73_M2}
GLUCOSE SERPL-MCNC: 76 MG/DL (ref 70–99)
HCT VFR BLD AUTO: 36.7 % (ref 35–47)
HGB BLD-MCNC: 12.2 G/DL (ref 11.7–15.7)
LMWH PPP CHRO-ACNC: 0.53 IU/ML
LMWH PPP CHRO-ACNC: 0.89 IU/ML
LMWH PPP CHRO-ACNC: 0.98 IU/ML
LMWH PPP CHRO-ACNC: 1.05 IU/ML
MCH RBC QN AUTO: 32.3 PG (ref 26.5–33)
MCHC RBC AUTO-ENTMCNC: 33.2 G/DL (ref 31.5–36.5)
MCV RBC AUTO: 97 FL (ref 78–100)
PLATELET # BLD AUTO: 127 10E9/L (ref 150–450)
POTASSIUM SERPL-SCNC: 3.5 MMOL/L (ref 3.4–5.3)
PROT SERPL-MCNC: 5.9 G/DL (ref 6.8–8.8)
RBC # BLD AUTO: 3.78 10E12/L (ref 3.8–5.2)
SODIUM SERPL-SCNC: 144 MMOL/L (ref 133–144)
WBC # BLD AUTO: 6.9 10E9/L (ref 4–11)

## 2020-08-11 PROCEDURE — 95714 VEEG EA 12-26 HR UNMNTR: CPT

## 2020-08-11 PROCEDURE — 25000128 H RX IP 250 OP 636: Performed by: HOSPITALIST

## 2020-08-11 PROCEDURE — 85027 COMPLETE CBC AUTOMATED: CPT | Performed by: EMERGENCY MEDICINE

## 2020-08-11 PROCEDURE — 36415 COLL VENOUS BLD VENIPUNCTURE: CPT | Performed by: HOSPITALIST

## 2020-08-11 PROCEDURE — 99232 SBSQ HOSP IP/OBS MODERATE 35: CPT | Mod: GC | Performed by: PSYCHIATRY & NEUROLOGY

## 2020-08-11 PROCEDURE — 25800030 ZZH RX IP 258 OP 636: Performed by: HOSPITALIST

## 2020-08-11 PROCEDURE — 80053 COMPREHEN METABOLIC PANEL: CPT | Performed by: EMERGENCY MEDICINE

## 2020-08-11 PROCEDURE — 85520 HEPARIN ASSAY: CPT | Performed by: EMERGENCY MEDICINE

## 2020-08-11 PROCEDURE — 85520 HEPARIN ASSAY: CPT | Performed by: HOSPITALIST

## 2020-08-11 PROCEDURE — 99232 SBSQ HOSP IP/OBS MODERATE 35: CPT | Performed by: HOSPITALIST

## 2020-08-11 PROCEDURE — 25000125 ZZHC RX 250: Performed by: HOSPITALIST

## 2020-08-11 PROCEDURE — 12000000 ZZH R&B MED SURG/OB

## 2020-08-11 PROCEDURE — 36415 COLL VENOUS BLD VENIPUNCTURE: CPT | Performed by: EMERGENCY MEDICINE

## 2020-08-11 RX ORDER — SODIUM CHLORIDE 9 MG/ML
INJECTION, SOLUTION INTRAVENOUS CONTINUOUS
Status: DISCONTINUED | OUTPATIENT
Start: 2020-08-11 | End: 2020-08-13

## 2020-08-11 RX ADMIN — LEVETIRACETAM 500 MG: 5 INJECTION INTRAVENOUS at 21:29

## 2020-08-11 RX ADMIN — METOPROLOL TARTRATE 2.5 MG: 5 INJECTION INTRAVENOUS at 21:29

## 2020-08-11 RX ADMIN — LEVETIRACETAM 500 MG: 5 INJECTION INTRAVENOUS at 08:05

## 2020-08-11 RX ADMIN — METOPROLOL TARTRATE 2.5 MG: 5 INJECTION INTRAVENOUS at 04:10

## 2020-08-11 RX ADMIN — METOPROLOL TARTRATE 2.5 MG: 5 INJECTION INTRAVENOUS at 16:04

## 2020-08-11 RX ADMIN — SODIUM CHLORIDE: 9 INJECTION, SOLUTION INTRAVENOUS at 09:47

## 2020-08-11 ASSESSMENT — ACTIVITIES OF DAILY LIVING (ADL)
ADLS_ACUITY_SCORE: 33
ADLS_ACUITY_SCORE: 37
ADLS_ACUITY_SCORE: 33
ADLS_ACUITY_SCORE: 33
ADLS_ACUITY_SCORE: 37
ADLS_ACUITY_SCORE: 35
SWALLOWING: 2-->DIFFICULTY SWALLOWING FOODS

## 2020-08-11 NOTE — PROGRESS NOTES
Hep10A 1.13. Called Pharmacy, stopped Heparin. Waiting for further instruction.   New heparin order to stop heparin for 30 minutes then restart at 750units/hr and recheck Hep10A orders placed for 6 hours, so 0600.

## 2020-08-11 NOTE — PROGRESS NOTES
"  [facility]    Stroke Progress Note    Interval Events  Much more awake today, does not remember events of prior days, but denies complaints today.    Impression  1. Encephalopathy- likely multifactorial secondary to remote L frontal lobe infarct, bilateral mildly enlarged hygromas, hx of traumatic ICH, severe aortic stenosis, and potentially seizure    2. Aphasia- recurrent stroke less likely due to recovery with time. Favor dementia spell vs hygroma related L cortical dysfunction versus seizure    Plan  -EEG to be hooked up today, will review preliminary recording when it is made available.  -continue empiric keppra 500 mg BID  -appreciate Palliative Care consultation    The Stroke Staff is Dr. Robles.    Susu Romano MD  Vascular Neurology Fellow  To page me or covering stroke neurology team member, click here: AMCOM   Choose \"On Call\" tab at top, then search dropdown box for \"Neurology Adult\", select location, press Enter, then look for stroke/neuro ICU/telestroke.    ______________________________________________________    Medications   Home Meds  Prior to Admission medications    Medication Sig Start Date End Date Taking? Authorizing Provider   acetaminophen (TYLENOL) 325 MG tablet Take 650 mg by mouth every 6 hours as needed for pain    Yes Unknown, Entered By History   amoxicillin (AMOXIL) 500 MG capsule Take 1 capsule (500 mg) by mouth every 8 hours for 5 days  Patient taking differently: Take 500 mg by mouth every 8 hours Start date 08/05/2020-08/10/2020 ( 5 days) 8/5/20 8/10/20 Yes Sneha Boss PA-C   atorvastatin (LIPITOR) 10 MG tablet Take 1 tablet (10 mg) by mouth daily 7/6/15  Yes Patric Pinedo MD   cholecalciferol 1000 UNITS TABS Take 1,000 Units by mouth daily 7/6/15  Yes Patric Pinedo MD   folic acid (FOLVITE) 1 MG tablet Take 1 tablet (1 mg) by mouth daily 7/22/20  Yes Heather Odonnell MD   levothyroxine (SYNTHROID/LEVOTHROID) " 75 MCG tablet Take 1 tablet (75 mcg) by mouth daily 7/22/20  Yes Heather Odonnell MD   lisinopril (PRINIVIL/ZESTRIL) 10 MG tablet Take 10 mg by mouth daily   Yes Unknown, Entered By History   melatonin 1 MG TABS tablet Take 1 mg by mouth nightly as needed for sleep    Yes Unknown, Entered By History   metoprolol tartrate (LOPRESSOR) 12.5 mg TABS half-tab Take 12.5 mg by mouth 2 times daily   Yes Unknown, Entered By History   nystatin (MYCOSTATIN) 811945 UNIT/GM external powder Apply topically 2 times daily as needed (intertriginal dermatitis)   Yes Unknown, Entered By History   potassium chloride ER (KLOR-CON M) 20 MEQ CR tablet Take 20 mEq by mouth once with dinner.   Yes Unknown, Entered By History   senna-docusate (SENOKOT-S/PERICOLACE) 8.6-50 MG tablet Take 1 tablet by mouth 2 times daily 7/21/20  Yes Heather Odonnell MD       Scheduled Meds    levETIRAcetam  500 mg Intravenous Q12H     [Held by provider] levothyroxine  75 mcg Oral Daily     [Held by provider] lisinopril  10 mg Oral Daily     metoprolol  2.5 mg Intravenous Q6H     sodium chloride (PF)  3 mL Intracatheter Q8H       Infusion Meds    HEParin 750 Units/hr (08/11/20 0647)     - MEDICATION INSTRUCTIONS -         PRN Meds  acetaminophen, acetaminophen, lidocaine 4%, lidocaine (buffered or not buffered), melatonin, miconazole, naloxone, ondansetron **OR** ondansetron, - MEDICATION INSTRUCTIONS -, sodium chloride (PF)       PHYSICAL EXAMINATION  Temp:  [97.4  F (36.3  C)-98  F (36.7  C)] 97.4  F (36.3  C)  Pulse:  [64-70] 70  Heart Rate:  [62-68] 64  Resp:  [16-20] 18  BP: (118-147)/(58-68) 133/68  SpO2:  [98 %-100 %] 98 %     General:  patient lying in bed without any acute distress    HEENT:  R frontal crani incision well healed, EEG leads in place  Cardio:  RRR  Pulmonary:  no respiratory distress  Abdomen:  soft, non-tender, non-distended  Extremities:  no edema, peripheral pulses palpable  Skin:  intact, warm/dry     Neurologic  Mental  "Status:  Awake, alert, attentive, oriented to self, age, city, state, \"hospital\", month but not day of the week, date, year, city, which facility, or circumstances of hospitalization. Language is slow and delayed but fluent and entirely coherent. Intact comprehension of simple and complex commands, naming, repetition.  Cranial Nerves:  visual fields intact, PERRL, EOMI with normal smooth pursuit, facial sensation intact and symmetric, facial movements symmetric, hearing not formally tested but intact to conversation, palate elevation symmetric and uvula midline, shoulder shrug strong bilaterally, tongue protrusion midline, mild dysarthria  Motor:  normal muscle tone and bulk, no abnormal movements, able to move all limbs spontaneously, no pronator drift, 4+/5 BUEs, 4/5 BLEs  Reflexes:  no clonus, toes down-going  Sensory:  light touch sensation intact and symmetric throughout upper and lower extremities, no extinction on double simultaneous stimulation   Coordination:  normal finger-to-nose and heel-to-shin bilaterally without dysmetria, rapid alternating movements symmetric  Station/Gait:  deferred     Imaging  I personally reviewed all imaging; relevant findings per HPI.     Lab Results Data   CBC  Recent Labs   Lab 08/11/20  0618 08/10/20  0607 08/09/20  1821   WBC 6.9 7.8 9.7   RBC 3.78* 3.96 4.15   HGB 12.2 12.5 13.3   HCT 36.7 38.3 39.9   * 144* 141*     Basic Metabolic Panel    Recent Labs   Lab 08/11/20  0618 08/10/20  0607 08/09/20  1402    143 141   POTASSIUM 3.5 3.6 3.8   CHLORIDE 114* 114* 111*   CO2 25 25 28   BUN 14 14 15   CR 0.67 0.79 0.88   GLC 76 94 104*   JAZLYN 8.5 8.2* 9.3     Liver Panel  Recent Labs   Lab 08/11/20  0618   PROTTOTAL 5.9*   ALBUMIN 2.9*   BILITOTAL 0.7   ALKPHOS 68   AST 10   ALT 10     INR    Recent Labs   Lab Test 08/09/20  1402 05/31/15  1613 01/20/15  1600   INR 1.13 1.07 1.01      Lipid Profile    Recent Labs   Lab Test 06/21/15  0935 06/28/13  0703   CHOL 125 177 "   HDL 35* 33*   LDL 62 117   TRIG 141 135   CHOLHDLRATIO 3.6 5.4*     A1C  No lab results found.  Troponin I    Recent Labs   Lab 08/10/20  0607 08/09/20  1402   TROPI 0.340* 0.364*

## 2020-08-11 NOTE — PROGRESS NOTES
SPIRITUAL HEALTH SERVICES  SPIRITUAL ASSESSMENT Progress Note  [facility]     REFERRAL SOURCE: Admission Request    Reviewed documentation. Reflective conversation shared with Joelle which integrated elements of illness and family narratives.     Joelle talked about her saima coming from being a  and a . 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  She grew up at [facility] in [place], [place].  Joelle welcomed prayer and expressed a desire to have the sacrament of the sick with Fr. Gurrola.    I offered spiritual and emotional support through reflective listening that affirmed emotions, experience, and meaning. Offered assurance through prayer which incorporated conversational themes.    PLAN: Will communicate request for anointing with Fr Gurrola. I will let the unit  know that Joelle appreciates [facility] support.    Rebecca Chapman  Associate   Pager [phone]  Phone [phone]

## 2020-08-11 NOTE — PLAN OF CARE
Lethargic, however more alert today per pt son. VSS on 2LPM NC. Facial abrasions covered with foam. Oriented to self. Inconsistent with following commands. RONNY many neuro assessments. Heparin infusing. NS infusing. Straight cath'd at 1500 for 400mL. DD1 diet with nectar thick liquids. EEG monitoring; mitts placed to keep pt from removing leads.

## 2020-08-11 NOTE — PROGRESS NOTES
EEG CLINICAL NEUROPHYSIOLOGY PRELIMINARY REPORT    First three hours of video-EEG reviewed. Mild diffuse slowing. Independent bitemporal delta. Occasional right temporal sharp waves. No seizures.    Study consistent with mild diffuse encephalopathy. There is additional indication of focal cerebral dysfunction over both hemispheres consistent with known structural disease in these areas. Also right temporal epileptiform sharp waves recorded. These indicate focal cortical irritability in these regions. Epileptiform sharp waves are highly associated with clinical diagnosis of seizures. Overt seizures; however have not been recorded thus far.    Will continue video-EEG monitoring. Full report to follow.    Francois Blanco MD  Pager [phone]

## 2020-08-11 NOTE — PLAN OF CARE
PT pt attempted to see for PT eval,  per RN pt on continuous EEG, Not appropriate for therapy today

## 2020-08-11 NOTE — PROVIDER NOTIFICATION
MD Notification    Notified Person: MD    Notified Person Name: Joseph    Notification Date/Time: 8/11/20 0424    Notification Interaction: Amcom page    Purpose of Notification:  Pt bladder scanned 485, no straight cath orders, pt normally incont.     Orders Received: received orders for intermittent catheterization    Comments: No documented UOP since 8/10 1000, has been receiving IVF

## 2020-08-11 NOTE — PROGRESS NOTES
Transferred from [facility]. Report received from CECIL Carrillo. Pt jazmine, CONCEPCION.

## 2020-08-11 NOTE — PLAN OF CARE
OT: Attempted to see pt for OT eval per MD order.  Pt awake but not responding to verbal cues, undergoing continuous EEG recording.  Not appropriate for therapy today, confirmed with nursing.

## 2020-08-11 NOTE — PLAN OF CARE
DATE & TIME: 2300-0730     Cognitive Concerns/ Orientation : RONNY this shift. Speech is garbled, illogical. Does not answer questions, just talks about surroundings.    BEHAVIOR & AGGRESSION TOOL COLOR: Green  CIWA SCORE: na  ABNL VS/O2: VSS on 2L O2  MOBILITY: Ax2, total, T/R, lift  PAIN MANAGMENT: Abscence of nonverbal indicators of pain. FLACC 0  DIET: NPO, ice chips, meds. Most meds IV because hard to get pt to follow commands.   BOWEL/BLADDER: Incontinent. Bladder scanned for 485, got straight cath orders and straight cath'd for 600 output. Urine [place] with sediment.  ABNL LAB/BG: Platelets 144, Trop 0.340, trending down, MD aware. Hep 10A 1.16 and redraw was 1.13 - pharmacy aware  DRAIN/DEVICES: PIV x2, one infusing heparin at 750units/hr and other IV was infusing NS at 75 ml/hr, dc'd at 0400  TELEMETRY RHYTHM: NSR with BBB  SKIN: intact, scattered bruising, blanchable redness to coccyx.  TESTS/PROCEDURES: CT completed 8/10. Plan for EEG 8/11 - will go to 73 for continuous EEG monitoring.   D/C DAY/GOALS/PLACE: Pending neurology work-up. Neurosurgery signed off. Palliative consult. Pt transferred to 73 for EEG monitoring. Will update son.  OTHER IMPORTANT INFO: Q8h neuros - pt unable to follow commands. Patient too lethargic/wouldn't fall commands for PT/OT 8/10.

## 2020-08-11 NOTE — PROGRESS NOTES
Transfer    S- Transfer to [facility] from 18-1. Left voicemail update for azra Brewer.    B- Patient in with AMS.  CT head showed no acute changes.  A2, turn/repo, lift.     A- Brief systems assessment: RONNY to orientation, pt not answering questions. LS dim. BS hypoactive. Unable to complete neuro checks d/t pt not following commands. Incont both, straight cath x1.    R- Transfer to [facility] per physician orders. Continue to monitor pt and update physician as needed.      Code status: DNR / DNI  Skin: Blanchable redness to coccyx, scab to bilat cheeks where O2 tubing sits, bruised forehead  Fall Risk: Yes- Department fall risk interventions implemented.  Isolation: None  Patient belongings: Bilat hearing aids in place.  Medication drips upon transfer: Heparin infusing at 750 units per hour

## 2020-08-11 NOTE — PROGRESS NOTES
[facility]    Hospitalist Progress Note    Date of Admission:  8/9/2020    Assessment & Plan     Joelle Freeman is a 89 year old female with complex past medical history including dementia, prior stroke, bilateral hygromas and shunt placement among others who was admitted from TCU on 8/9/2020 with altered mental status.  Code stroke was initiated in the ED with essentially unremarkable imaging for acute pathology besides incidental finding of bilateral pulmonary emboli noted on CTA head and neck.  Neurology as well as neurosurgery consulted.  ED MD discussed with neurosurgery who was okay with therapeutic heparin infusion without boluses.  Additionally neurology has started IV Keppra.  Patient admitted for further monitoring and evaluation.     Acute toxic metabolic encephalopathy  at TCU: Waxing and waning,?  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  In ED patient was initially not responding or following commands and was only withdrawing to noxious stimuli.  A little later  patient is more awake interacting with her sons and following commands.  Code stroke was called in the ED with CT head perfusion essentially unremarkable, no new stroke found, essentially incidental bilateral PEs found as below.  Neurology and neurosurgery was contacted by ED MD and will be consulted.  - Gentle IV fluids at 75 given continued somnolence and recent decreased p.o. intake  - Neurology has started IV Keppra which we will continue 500 twice daily.   "Continues EEG monitoring on 8/11 per neurology.  -Patient was seen by neurosurgery.  Okayed for heparin drip without boluses for PE.  See below.  Head CT repeated on 8/10 and was reported as stable.  Neurosurgery has signed off.  - PT/OT consultations requested  -On 8/10 patient was quite lethargic for most of the day.  On 8/11 when seen in the morning she was awake and following simple commands and answer simple yes/no questions.  Getting video EEG monitoring.     Acute bilateral pulmonary emboli - \"incidentally\" found on CTA H/N   History of Heterozygous FVL mutation  Neurosurgery discussed with the ED MD and reportedly are okay with IV heparin infusion without boluses.  Patient is currently saturating 100% on 2 L nasal cannula can titrate to O2 saturation 92% or above.  - Incentive spirometry  - Continue heparin infusion at this time till she is more awake and able to take pills.  On 8/11 will switch from heparin to Eliquis.  -Repeat head CT on 8/10 was stable.  Neurosurgery has signed off.     Moderate oropharyngeal dysphagia  Inadequate oral intake  Recently seen by SLP.  Downgraded diet to DDL 1 with NTL.   - Magic cups with meals to increase available kcals/prot.  Should receive ongoing SLP at TCU.     Recently noted LBBB  Chronic troponin elevation  Elevated troponin of 0.364. Of note, she has chronically elevated troponins. During her recent prior hospitalization her troponins were 0.46, 0.47. At that time she had an echocardiogram which showed a preserved EF and no wall motion abnormalities.  She does seem to have a new left bundle branch block on EKG.  She denies any chest pain, no shortness of breath. Monitored telemetry. Troponins flat. Family previously not interested in any potential cardiac interventions given advanced age and advanced dementia.  - Continue PTA  BB and ACEi.   - Continue PTA statin when able to take p.o.  - PTA aspirin was recently stopped given her CT head findings of bilateral " subdural hygromas versus subdural hematomas.      History of recurrent UTI  Recently discharged from [facility] 8/5 on antibiotics for UTI. No urinary symptoms reported on admission.  Has done 2 courses of antibiotics over last few weeks for UTI.  -Recheck UA showed more than 182 WBC, 53 RBC, negative nitrite, no bacteria.  Await urine culture.  Hold on antibiotics at this time.     History of hypothyroidism during her prior hospitalization    Recently, her TSH was low at 0.22 and free T4 was high at 53.    - Her Synthroid was reduced from 88 to 75 mcg daily.  Recheck TSH at 1.36.     Depression/anxiety  Continue PTA Lexapro when able to taking in oral     History of hypertension   CAD - RCA stent 2001  BPs WNL in ED.    - Continued PTA Toprol-XL 25 mg p.o. daily and lisinopril 10 mg p.o. daily when able to take p.o.  -Currently on scheduled 2.5 mg IV metoprolol every 6 hours  - Previously was on ASA but stopped per neurosurgery in the recent past     History of dyslipidemia  - PTA statin to continue when able to take p.o.     History of severe aortic stenosis  Echocardiogram on 07/20/2020 showed aortic valve area of 0.5 square cm as per prior notes.   - She was considered to not be a surgical candidate.      Asymptomatic COVID-19 screening status  Negative on 5/20, 6/22, 6/29, 7/6, 7/19, 7/22, 8/2,   - felt to be low risk, 8/9 negative        Diet:  DD 1 diet with nectar thickened liquids  DVT Prophylaxis: Heparin gtt switched to Eliquis  Benítez Catheter: not present  Code Status: DNR/DNI-discussed in detail with patient and primarily her sons in the ED and confirmed upon admission          Disposition Plan     Expected discharge: likely at least 2 days pending clinical course and neuro/neurosurg recommendations          Chloe Trejo MD  Text Page (7am - 6pm, M-F)    Interval History   This morning patient was alert and laying in bed.  She made good eye contact and answer some simple questions though her speech is  difficult to understand and she is confused at baseline given dementia.  She did follow some simple commands and answer simple yes/no questions.  Denied any pain.    -Data reviewed today: I reviewed all new labs and imaging results over the last 24 hours. I personally reviewed CT scan with result as noted above    Physical Exam   Temp: 97.8  F (36.6  C) Temp src: Oral BP: 131/60 Pulse: 70 Heart Rate: 72 Resp: 18 SpO2: 100 % O2 Device: Nasal cannula Oxygen Delivery: 2 LPM  Vitals:    08/09/20 1814 08/10/20 0708 08/11/20 0459   Weight: 51.5 kg (113 lb 8.6 oz) 54.2 kg (119 lb 7.8 oz) 54.5 kg (120 lb 2.4 oz)     Vital Signs with Ranges  Temp:  [97.4  F (36.3  C)-98  F (36.7  C)] 97.8  F (36.6  C)  Pulse:  [64-70] 70  Heart Rate:  [56-72] 72  Resp:  [16-20] 18  BP: (121-147)/(58-68) 131/60  SpO2:  [98 %-100 %] 100 %  I/O last 3 completed shifts:  In: -   Out: 600 [Urine:600]    Constitutional: Alert, no acute distress, pleasant  Respiratory: Non labored breathing,  Cardiovascular: Heart sounds regular rate and rhythm,  no leg edema  GI: Abdomen is soft, non distended  Skin/Integumen: no rashes obvious  Neuro: Alert, confused at baseline, moving all extremities [known right-sided weakness], speech is difficult to understand  Psych: Calm    Medications     HEParin 700 Units/hr (08/11/20 0804)     - MEDICATION INSTRUCTIONS -       sodium chloride 75 mL/hr at 08/11/20 0947       levETIRAcetam  500 mg Intravenous Q12H     [Held by provider] levothyroxine  75 mcg Oral Daily     [Held by provider] lisinopril  10 mg Oral Daily     metoprolol  2.5 mg Intravenous Q6H     sodium chloride (PF)  3 mL Intracatheter Q8H       Data   Recent Labs   Lab 08/11/20  0618 08/10/20  0607 08/09/20  1821 08/09/20  1402   WBC 6.9 7.8 9.7 8.3   HGB 12.2 12.5 13.3 13.9   MCV 97 97 96 95   * 144* 141* 148*   INR  --   --   --  1.13    143  --  141   POTASSIUM 3.5 3.6  --  3.8   CHLORIDE 114* 114*  --  111*   CO2 25 25  --  28   BUN 14  14  --  15   CR 0.67 0.79  --  0.88   ANIONGAP 5 4  --  2*   JAZLYN 8.5 8.2*  --  9.3   GLC 76 94  --  104*   ALBUMIN 2.9*  --   --   --    PROTTOTAL 5.9*  --   --   --    BILITOTAL 0.7  --   --   --    ALKPHOS 68  --   --   --    ALT 10  --   --   --    AST 10  --   --   --    TROPI  --  0.340*  --  0.364*       Imaging  No results found for this or any previous visit (from the past 24 hour(s)).

## 2020-08-11 NOTE — PLAN OF CARE
DATE & TIME: 8/10/2020 8315-6682       Cognitive Concerns/ Orientation : Oriented to self at times, otherwise disoriented to place, time & situation.  BEHAVIOR & AGGRESSION TOOL COLOR: Green  CIWA SCORE: na  ABNL VS/O2: VSS on 2L O2  MOBILITY: Ax2, total, T/R Q2, lift  PAIN MANAGMENT: Denies, no nonverbal indicators of pain.   DIET: NPO, ice chips, meds. Most meds IV because hard to get Pt to follow commands.   BOWEL/BLADDER: Incontinent, due to void.   ABNL LAB/BG: Platelets 144, Trop 0.340, trending down, MD aware. Hep 10A 1.16, notified pharmacy, they would like to re-draw to make sure level is accurate.  DRAIN/DEVICES: PIV x2, one infusing heparin and other NS @ 75 ml/hr  TELEMETRY RHYTHM: NSR   SKIN: intact, scattered bruising, blanchable redness to coccyx.  TESTS/PROCEDURES: CT completed today. Plan for EEG tomorrow.   D/C DAY/GOALS/PLACE: Pending neurology consult  OTHER IMPORTANT INFO: Q8h neuros. Neurosurgery signed off. Patient too lethargic for PT/OT today.   COMMIT TO SIT DONE AND SIGNED OFF: Yes

## 2020-08-12 ENCOUNTER — APPOINTMENT (OUTPATIENT)
Dept: PHYSICAL THERAPY | Facility: CLINIC | Age: 85
DRG: 070 | End: 2020-08-12
Attending: HOSPITALIST
Payer: MEDICARE

## 2020-08-12 LAB
BACTERIA SPEC CULT: ABNORMAL
BACTERIA SPEC CULT: ABNORMAL
ERYTHROCYTE [DISTWIDTH] IN BLOOD BY AUTOMATED COUNT: 14.3 % (ref 10–15)
HCT VFR BLD AUTO: 33.6 % (ref 35–47)
HGB BLD-MCNC: 11.2 G/DL (ref 11.7–15.7)
LMWH PPP CHRO-ACNC: 0.39 IU/ML
LMWH PPP CHRO-ACNC: 0.49 IU/ML
Lab: ABNORMAL
MCH RBC QN AUTO: 32.1 PG (ref 26.5–33)
MCHC RBC AUTO-ENTMCNC: 33.3 G/DL (ref 31.5–36.5)
MCV RBC AUTO: 96 FL (ref 78–100)
PLATELET # BLD AUTO: 127 10E9/L (ref 150–450)
RBC # BLD AUTO: 3.49 10E12/L (ref 3.8–5.2)
SPECIMEN SOURCE: ABNORMAL
WBC # BLD AUTO: 6.3 10E9/L (ref 4–11)

## 2020-08-12 PROCEDURE — 85520 HEPARIN ASSAY: CPT | Performed by: HOSPITALIST

## 2020-08-12 PROCEDURE — 25000125 ZZHC RX 250: Performed by: HOSPITALIST

## 2020-08-12 PROCEDURE — 85027 COMPLETE CBC AUTOMATED: CPT | Performed by: EMERGENCY MEDICINE

## 2020-08-12 PROCEDURE — 12000000 ZZH R&B MED SURG/OB

## 2020-08-12 PROCEDURE — 99233 SBSQ HOSP IP/OBS HIGH 50: CPT | Performed by: HOSPITALIST

## 2020-08-12 PROCEDURE — 25800030 ZZH RX IP 258 OP 636: Performed by: HOSPITALIST

## 2020-08-12 PROCEDURE — 25000128 H RX IP 250 OP 636: Performed by: HOSPITALIST

## 2020-08-12 PROCEDURE — 97161 PT EVAL LOW COMPLEX 20 MIN: CPT | Mod: GP

## 2020-08-12 PROCEDURE — 36415 COLL VENOUS BLD VENIPUNCTURE: CPT | Performed by: HOSPITALIST

## 2020-08-12 PROCEDURE — 85520 HEPARIN ASSAY: CPT | Performed by: EMERGENCY MEDICINE

## 2020-08-12 PROCEDURE — 97112 NEUROMUSCULAR REEDUCATION: CPT | Mod: GP

## 2020-08-12 PROCEDURE — 36415 COLL VENOUS BLD VENIPUNCTURE: CPT | Performed by: EMERGENCY MEDICINE

## 2020-08-12 PROCEDURE — 99232 SBSQ HOSP IP/OBS MODERATE 35: CPT | Performed by: PSYCHIATRY & NEUROLOGY

## 2020-08-12 RX ORDER — HEPARIN SODIUM 10000 [USP'U]/100ML
650 INJECTION, SOLUTION INTRAVENOUS CONTINUOUS
Status: DISCONTINUED | OUTPATIENT
Start: 2020-08-12 | End: 2020-08-13

## 2020-08-12 RX ORDER — FLUCONAZOLE 2 MG/ML
200 INJECTION, SOLUTION INTRAVENOUS EVERY 24 HOURS
Status: DISCONTINUED | OUTPATIENT
Start: 2020-08-12 | End: 2020-08-14

## 2020-08-12 RX ADMIN — HEPARIN SODIUM 650 UNITS/HR: 10000 INJECTION, SOLUTION INTRAVENOUS at 12:45

## 2020-08-12 RX ADMIN — LEVETIRACETAM 500 MG: 5 INJECTION INTRAVENOUS at 20:03

## 2020-08-12 RX ADMIN — LEVETIRACETAM 500 MG: 5 INJECTION INTRAVENOUS at 07:56

## 2020-08-12 RX ADMIN — FLUCONAZOLE 200 MG: 2 INJECTION, SOLUTION INTRAVENOUS at 17:57

## 2020-08-12 RX ADMIN — SODIUM CHLORIDE: 9 INJECTION, SOLUTION INTRAVENOUS at 00:39

## 2020-08-12 RX ADMIN — METOPROLOL TARTRATE 2.5 MG: 5 INJECTION INTRAVENOUS at 05:10

## 2020-08-12 RX ADMIN — SODIUM CHLORIDE: 9 INJECTION, SOLUTION INTRAVENOUS at 14:05

## 2020-08-12 RX ADMIN — HEPARIN SODIUM 650 UNITS/HR: 10000 INJECTION, SOLUTION INTRAVENOUS at 07:56

## 2020-08-12 ASSESSMENT — ACTIVITIES OF DAILY LIVING (ADL)
ADLS_ACUITY_SCORE: 35
ADLS_ACUITY_SCORE: 37

## 2020-08-12 NOTE — PLAN OF CARE
Neuro- oriented to self only  Most Recent Vitals- Temp: 97.5  F (36.4  C) Temp src: Oral BP: 114/54 Pulse: 68 Heart Rate: 82 Resp: 18 SpO2: 98 % O2 Device: Nasal cannula Oxygen Delivery: 2 LPM  Tele/Cardiac- WNL  Resp- LS diminished on 2 LPM  Activity- bedrest, lift for transfers  Pain- absence of non verbal indicators of pain  Drips- heparin  Drains/Tubes- na  Skin- wnl  GI/- voiding adequately  Aggression Color- Green  Plan- continue workup  Misc-     Norma Echevarria, RN

## 2020-08-12 NOTE — PROVIDER NOTIFICATION
"MD Notification    Notified Person: MD    Notified Person Name: Maya    Notification Date/Time: 08/12/20 10:40 AM     Notification Interaction: text page    Purpose of Notification: \"Pt very lethargic and mostly unresponsive this AM. Can PO meds be switched back to other routes? Pt will be unable to swallow eliquis and metoprolol. Pt son at bedside. Please advise.\"    Orders Received:    Comments:      "

## 2020-08-12 NOTE — PLAN OF CARE
"Discharge Planner PT   Patient re-admitted with recurrent encephalopathy - per chart EEG didn't record a seizure but had high association with clinical diagnosis of seizures. Recurrent falls, demand ischemia, UTIs. Dementia, NPH with shunt, CVA + SAH + SDH, left BBB, dep& Anx, CAD, dyslipidemia. A few days ago here pt was doing bed mobility & sitting balance with standby assist, 4 stands with moderate assist, 8' walk 3x with moderate assist with \"big\" cues.   Patient plan for discharge: TBD  Current status: Total assist bed mobility, Min-maximum assist sitting balance - quick to fatigue. Pt confused with unintelligible speech, eyes mostly closed.   Son was present initially, continues to pour his heart out for his mother; when asked if he's taking care of himself he said no, PT encouraged him to rest and self-care.   Note: PT following palliative consult and will adjust POC as indicated.  Barriers to return to prior living situation: Unsafe given severe decline and heavy 24hr care needs.   Recommendations for discharge: TCU then SNF for long-term care  Rationale for recommendations: Son reports insufficient care at her [facility], PT in agreement that pt would be better served in a more supportive setting after a TCU tune-up.       Entered by: Brittany Dressler 08/12/2020 11:04 AM      "

## 2020-08-12 NOTE — PROGRESS NOTES
"  [facility]    Stroke Consult Note    Reason for Consult:  Global aphasia    Chief Complaint: Altered Mental Status       Interval Events: no acute neurological events overnight. Prelim vEEG shows epileptiform discharges over right anterior/mid temporal region with focal cerebral dysfunction bilaterally. No seizures seen.       Impression  #Fluctuations in language fluency  --etiology: may be related to focal cerebral dysfunction vs seizure due to abnormal EEG findings superimpose on baseline of dementia.   --EEG findings as above     Recommendations  --Continue empiric keppra 500 mg BID, if this causes irritability, may consider switching to Depakote   --Will unhook from vEEG, no seizure seen.  --Thank you for consult, will sign off at this time.      No need for MRI brain at this time, can be pursued if family requests    Patient Follow-up    - final recommendation pending work-up    Thank you for this consult. We will continue to follow.     The Stroke Staff is Dr. Robles.    Fransisco Quick MD  Vascular Neurology Fellow  To page me or covering stroke neurology team member, click here: AMCOM   Choose \"On Call\" tab at top, then search dropdown box for \"Neurology Adult\", select location, press Enter, then look for stroke/neuro ICU/telestroke.      _____________________________________________________    Past Medical History   Past Medical History:   Diagnosis Date     Asthma      CAD (coronary artery disease)     stent post RCA 2001     Cerebral ventriculomegaly      CVA (cerebral infarction) 2002     Dementia (H)      Dementia (H)      Depression      Diverticulosis      Dyslipidemia      Heterozygous factor V Leiden mutation (H)      HTN (hypertension)      Hyperlipidemia      Hypothyroidism      Normal pressure hydrocephalus (H)      Posterior reversible encephalopathy syndrome      Subarachnoid hemorrhage (H) 3/23/2013     Subdural hematoma (H)      TIA (transient ischaemic attack)     multiple     " TIA (transient ischaemic attack)      Unspecified cerebral artery occlusion with cerebral infarction      Urinary incontinence      UTI (lower urinary tract infection)     recurrent     Past Surgical History   Past Surgical History:   Procedure Laterality Date     APPENDECTOMY       BIOPSY      breast     CARDIAC SURGERY      stent to the right coronary artery in 2001     CHOLECYSTECTOMY       ENT SURGERY      tonsillectomy     GENITOURINARY SURGERY      Bladder suspension     GYN SURGERY      D & C     GYN SURGERY      laproscopy oophorectomy unilateral     HERNIA REPAIR       HYSTERECTOMY       LUMBAR PUNCTURE       OPEN REDUCTION INTERNAL FIXATION HIP NAILING  11/18/2013    Procedure: OPEN REDUCTION INTERNAL FIXATION HIP NAILING;  RIGHT HIP FRACTURE;  Surgeon: John Venegas MD;  Location:  OR     OPTICAL TRACKING SYSTEM IMPLANT SHUNT VENTRICULOPERITONEAL  7/16/2013    Procedure: OPTICAL TRACKING SYSTEM IMPLANT SHUNT VENTRICULOPERITONEAL;  RIGHT OCCIPITAL VENTRICULOPERITONEAL SHUNT, IMAGE GUIDANCE, NEUROENDOSCOPY, INTRATHECAL ANTIBIOTICS (STEALTH AXIEM, STRATA II LEVEL 2.5, PATIENT IN SUPINE 30-80, GEL DONUT WITH HEAD TURNED TO LEFT)      ;  Surgeon: Tayo Conley MD;  Location:  OR     STENT  2001    RCA     wisdom teeth extraction[       Medications   Home Meds  Prior to Admission medications    Medication Sig Start Date End Date Taking? Authorizing Provider   acetaminophen (TYLENOL) 325 MG tablet Take 650 mg by mouth every 6 hours as needed for pain    Yes Unknown, Entered By History   amoxicillin (AMOXIL) 500 MG capsule Take 1 capsule (500 mg) by mouth every 8 hours for 5 days  Patient taking differently: Take 500 mg by mouth every 8 hours Start date 08/05/2020-08/10/2020 ( 5 days) 8/5/20 8/10/20 Yes Sneha Boss PA-C   atorvastatin (LIPITOR) 10 MG tablet Take 1 tablet (10 mg) by mouth daily 7/6/15  Yes Patric Pinedo MD   cholecalciferol 1000 UNITS TABS  Take 1,000 Units by mouth daily 7/6/15  Yes Patric Pinedo MD   folic acid (FOLVITE) 1 MG tablet Take 1 tablet (1 mg) by mouth daily 7/22/20  Yes Heather Odonnell MD   levothyroxine (SYNTHROID/LEVOTHROID) 75 MCG tablet Take 1 tablet (75 mcg) by mouth daily 7/22/20  Yes Heather Odonnell MD   lisinopril (PRINIVIL/ZESTRIL) 10 MG tablet Take 10 mg by mouth daily   Yes Unknown, Entered By History   melatonin 1 MG TABS tablet Take 1 mg by mouth nightly as needed for sleep    Yes Unknown, Entered By History   metoprolol tartrate (LOPRESSOR) 12.5 mg TABS half-tab Take 12.5 mg by mouth 2 times daily   Yes Unknown, Entered By History   nystatin (MYCOSTATIN) 376102 UNIT/GM external powder Apply topically 2 times daily as needed (intertriginal dermatitis)   Yes Unknown, Entered By History   potassium chloride ER (KLOR-CON M) 20 MEQ CR tablet Take 20 mEq by mouth once with dinner.   Yes Unknown, Entered By History   senna-docusate (SENOKOT-S/PERICOLACE) 8.6-50 MG tablet Take 1 tablet by mouth 2 times daily 7/21/20  Yes Heather Odonnell MD       Scheduled Meds    levETIRAcetam  500 mg Intravenous Q12H     [Held by provider] levothyroxine  75 mcg Oral Daily     [Held by provider] lisinopril  10 mg Oral Daily     metoprolol  2.5 mg Intravenous Q6H     sodium chloride (PF)  3 mL Intracatheter Q8H       Infusion Meds    HEParin 650 Units/hr (08/12/20 0756)     - MEDICATION INSTRUCTIONS -       sodium chloride 75 mL/hr at 08/12/20 0757       PRN Meds  acetaminophen, acetaminophen, lidocaine 4%, lidocaine (buffered or not buffered), melatonin, miconazole, naloxone, ondansetron **OR** ondansetron, - MEDICATION INSTRUCTIONS -, sodium chloride (PF)    Allergies   Allergies   Allergen Reactions     Tramadol      Family History   Family History   Problem Relation Age of Onset     Cerebrovascular Disease Mother      Cerebrovascular Disease Father      Blood Disease Son      Social History   Social History      Tobacco Use     Smoking status: Never Smoker     Smokeless tobacco: Never Used   Substance Use Topics     Alcohol use: No     Drug use: No       Review of Systems   Review of systems not obtained due to patient factors - confusion, denies pain     PHYSICAL EXAMINATION   Temp:  [97.5  F (36.4  C)-98.6  F (37  C)] 98.4  F (36.9  C)  Pulse:  [68] 68  Heart Rate:  [56-82] 57  Resp:  [18-20] 18  BP: (108-138)/(54-62) 126/62  SpO2:  [96 %-100 %] 98 %    General:  patient lying in bed without any acute distress    HEENT:  R frontal crani incision well healed, old bruise R temple  Cardio:  RRR  Pulmonary:  no respiratory distress  Abdomen:  soft, non-tender, non-distended  Extremities:  no edema, peripheral pulses palpable  Skin:  intact, warm/dry     Neurologic  Mental Status:  Eyes closed, opens to voice, does not maintain alertness. Oriented to self but not time, place, or circumstance. Language is sparse and minimal output is fluent and coherent, with intact comprehension of simple commands, unable to name or repeat.  Cranial Nerves:  blinks to threat bilaterally, PERRL, horizontal gaze intact, face symmetric, tongue midline, mild dysarthria  Motor:  normal muscle tone and bulk, no abnormal movements, able to move all limbs spontaneously, no pronator drift, wiggles toes bilaterally  Reflexes:  toes down-going  Sensory:  detects sensation in 4/4 extremities  Coordination:  no ataxia of observed movements  Station/Gait:  not tested    Dysphagia Screen  Dysarthria or facial droop present - Maintain NPO, consult SLP    Stroke Scales    NIHSS  Interval transfer (08/10/20 1628)   Interval Comments     1a. Level of Consciousness 1-->Not alert, but arousable by minor stimulation to obey, answer, or respond   1b. LOC Questions 2-->Answers neither question correctly   1c. LOC Commands 0-->Performs both tasks correctly   2.   Best Gaze 0-->Normal   3.   Visual 0-->No visual loss   4.   Facial Palsy 0-->Normal symmetrical  movements   5a. Motor Arm, Left 0-->No drift, limb holds 90 (or 45) degrees for full 10 secs   5b. Motor Arm, Right 0-->No drift, limb holds 90 (or 45) degrees for full 10 secs   6a. Motor Leg, Left 2-->Some effort against gravity, leg falls to bed by 5 secs, but has some effort against gravity   6b. Motor Leg, right 2-->Some effort against gravity, leg falls to bed by 5 secs, but has some effort against gravity   7.   Limb Ataxia 0-->Absent   8.   Sensory 0-->Normal, no sensory loss   9.   Best Language 1-->Mild-to-moderate aphasia, some obvious loss of fluency or facility of comprehension, without significant limitation on ideas expressed or form of expression. Reduction of speech and/or comprehension, however, makes conversation. . . (see row details)   10. Dysarthria 1-->Mild-to-moderate dysarthria, patient slurs at least some words and, at worst, can be understood with some difficulty   11. Extinction and Inattention  0-->No abnormality   Total 9 (08/10/20 1628)       Imaging  I personally reviewed all imaging; relevant findings per HPI.    Labs Data   CBC  Recent Labs   Lab 08/12/20  0549 08/11/20  0618 08/10/20  0607   WBC 6.3 6.9 7.8   RBC 3.49* 3.78* 3.96   HGB 11.2* 12.2 12.5   HCT 33.6* 36.7 38.3   * 127* 144*     Basic Metabolic Panel   Recent Labs   Lab 08/11/20  0618 08/10/20  0607 08/09/20  1402    143 141   POTASSIUM 3.5 3.6 3.8   CHLORIDE 114* 114* 111*   CO2 25 25 28   BUN 14 14 15   CR 0.67 0.79 0.88   GLC 76 94 104*   JAZLYN 8.5 8.2* 9.3     Liver Panel  Recent Labs   Lab 08/11/20 0618   PROTTOTAL 5.9*   ALBUMIN 2.9*   BILITOTAL 0.7   ALKPHOS 68   AST 10   ALT 10     INR    Recent Labs   Lab Test 08/09/20  1402 05/31/15  1613 01/20/15  1600   INR 1.13 1.07 1.01      Lipid Profile    Recent Labs   Lab Test 06/21/15  0935 06/28/13  0703   CHOL 125 177   HDL 35* 33*   LDL 62 117   TRIG 141 135   CHOLHDLRATIO 3.6 5.4*     A1C  No lab results found.  Troponin I    Recent Labs   Lab  08/10/20  0607 08/09/20  1402   TROPI 0.340* 0.364*          Stroke Code / Stroke Consult Data Data This was a non-emergent, non-tele stroke consult.
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
## 2020-08-12 NOTE — PROGRESS NOTES
EEG CLINICAL NEUROPHYSIOLOGY PRELIMINARY REPORT    Video-EEG through 0700 AM today reviewed. Continues with evidence of mild, at times mild to moderate diffuse encephalopathy. Additional indication of focal cerebral dysfunctin over both hemispheres consistent with known structural abnormalities in this area. Continues with right anterior to mid temporal sharp waves, at times frequent (five or more per minute) but this is not sustained. These indicate persistent focal irritabiltiy in this area. Epileptiform sharp waves are highly associated with clinical diagnosis of seizures; however no seizures have been recorded thus far. Full report to follow.    Francois Blanco MD  Pager [phone]

## 2020-08-12 NOTE — PLAN OF CARE
OT - pt not responding to voice/name, still on continuous EEG.  Per nursing EEG to be discontinued this AM, asking therapy to return later.

## 2020-08-12 NOTE — PLAN OF CARE
Somnolent/lethargic. RONNY orientation. Minimal verbal attempts. Occasionally nods head yes/no when questioned. VSS on 2LPM NC. Facial abrasions covered with foam.  Not following commands. RONNY neuro. Heparin infusing. NS infusing. Straight cath'd at 1500 for 500mL. DD1 diet with nectar thick liquid; staff did not attempt to feed pt d/t lethargy. Absent of nonverbal indicators of pain.

## 2020-08-12 NOTE — PLAN OF CARE
Date/Time: 8/11 1900-2300  Diagnosis: AMS  POD#: NA  Mental Status: AOx0-1  Activity/dangle: A2 + lift  Diet: DD1 + nectar thick, total feed  Pain: Denies, appears comfortable  Benítez/Voiding: Incontinent - intermittent cath needed  Tele/Restraints/Iso: NA  02/LDA: 2L O2 NC (baseline), R IV heparin @ 6.5ml/hr (recheck 2015), R IV NS @ 75ml/hr  D/C Date: Pending  Other Info: Currently getting a 24hr EEG, mitts in place for pulling at cords

## 2020-08-12 NOTE — PROGRESS NOTES
Palliative consult received for goals of care. Reviewed case with Dr. Trejo throughout the day. No clear need for our services at this time. Consult canceled for now. We are happy to get involved in the coming days if needed. Thanks.    Keke SMALLWOOD, [facility]  Palliative Medicine   Pager: [phone]

## 2020-08-13 ENCOUNTER — APPOINTMENT (OUTPATIENT)
Dept: OCCUPATIONAL THERAPY | Facility: CLINIC | Age: 85
DRG: 070 | End: 2020-08-13
Attending: HOSPITALIST
Payer: MEDICARE

## 2020-08-13 LAB
ALBUMIN SERPL-MCNC: 3 G/DL (ref 3.4–5)
ALP SERPL-CCNC: 68 U/L (ref 40–150)
ALT SERPL W P-5'-P-CCNC: 9 U/L (ref 0–50)
ANION GAP SERPL CALCULATED.3IONS-SCNC: 8 MMOL/L (ref 3–14)
AST SERPL W P-5'-P-CCNC: 10 U/L (ref 0–45)
BILIRUB SERPL-MCNC: 1.3 MG/DL (ref 0.2–1.3)
BUN SERPL-MCNC: 8 MG/DL (ref 7–30)
CALCIUM SERPL-MCNC: 8.6 MG/DL (ref 8.5–10.1)
CHLORIDE SERPL-SCNC: 113 MMOL/L (ref 94–109)
CO2 SERPL-SCNC: 20 MMOL/L (ref 20–32)
CREAT SERPL-MCNC: 0.43 MG/DL (ref 0.52–1.04)
GFR SERPL CREATININE-BSD FRML MDRD: 88 ML/MIN/{1.73_M2}
GLUCOSE SERPL-MCNC: 82 MG/DL (ref 70–99)
LMWH PPP CHRO-ACNC: 0.43 IU/ML
POTASSIUM SERPL-SCNC: 3.3 MMOL/L (ref 3.4–5.3)
PROT SERPL-MCNC: 6.1 G/DL (ref 6.8–8.8)
SODIUM SERPL-SCNC: 141 MMOL/L (ref 133–144)

## 2020-08-13 PROCEDURE — 25800025 ZZH RX 258: Performed by: HOSPITALIST

## 2020-08-13 PROCEDURE — 36415 COLL VENOUS BLD VENIPUNCTURE: CPT | Performed by: HOSPITALIST

## 2020-08-13 PROCEDURE — 25000128 H RX IP 250 OP 636: Performed by: HOSPITALIST

## 2020-08-13 PROCEDURE — 85520 HEPARIN ASSAY: CPT | Performed by: HOSPITALIST

## 2020-08-13 PROCEDURE — 80053 COMPREHEN METABOLIC PANEL: CPT | Performed by: HOSPITALIST

## 2020-08-13 PROCEDURE — 12000000 ZZH R&B MED SURG/OB

## 2020-08-13 PROCEDURE — 97535 SELF CARE MNGMENT TRAINING: CPT | Mod: GO

## 2020-08-13 PROCEDURE — 97165 OT EVAL LOW COMPLEX 30 MIN: CPT | Mod: GO

## 2020-08-13 PROCEDURE — 99233 SBSQ HOSP IP/OBS HIGH 50: CPT | Performed by: HOSPITALIST

## 2020-08-13 RX ORDER — HEPARIN SODIUM 10000 [USP'U]/100ML
750 INJECTION, SOLUTION INTRAVENOUS CONTINUOUS
Status: DISPENSED | OUTPATIENT
Start: 2020-08-13 | End: 2020-08-14

## 2020-08-13 RX ADMIN — POTASSIUM CHLORIDE: 2 INJECTION, SOLUTION, CONCENTRATE INTRAVENOUS at 12:42

## 2020-08-13 RX ADMIN — LEVETIRACETAM 500 MG: 5 INJECTION INTRAVENOUS at 08:17

## 2020-08-13 RX ADMIN — LEVETIRACETAM 500 MG: 5 INJECTION INTRAVENOUS at 22:05

## 2020-08-13 RX ADMIN — FLUCONAZOLE 200 MG: 2 INJECTION, SOLUTION INTRAVENOUS at 18:23

## 2020-08-13 ASSESSMENT — ACTIVITIES OF DAILY LIVING (ADL)
ADLS_ACUITY_SCORE: 37
ADLS_ACUITY_SCORE: 35
ADLS_ACUITY_SCORE: 37
ADLS_ACUITY_SCORE: 37

## 2020-08-13 NOTE — PROGRESS NOTES
Paged MD: Patient's potassium is low. Will you order potassium protocol or let me know and I will put in order.
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
## 2020-08-13 NOTE — PLAN OF CARE
Pt is confused, will occasionally pick at lines and had already pulled out one peripheral IV. Speech is slow and sometimes logical. Pt is unable to follow most commands. VS stable. Turned and repositioned. Purewick in place for incontinence.

## 2020-08-13 NOTE — PLAN OF CARE
"Patient re-admitted with recurrent encephalopathy - per chart EEG didn't record a seizure but had high association with clinical diagnosis of seizures. Recurrent falls, demand ischemia, UTIs. Dementia, NPH with shunt, CVA + SAH + SDH, left BBB, dep& Anx, CAD, dyslipidemia. On 8/4/202 at [facility] pt was doing bed mobility & sitting balance with standby assist, 4 stands with moderate assist, 8' walk 3x with moderate assist with \"big\" cues.    Discharge Planner OT   Patient plan for discharge: Pt did not state. Per EMR, TCU  Current status: Pt [place] despite hearing aids. Pt talkative. Disoriented to time, place, month, year. Pt impulsive. Supine to sit with modA of 1 and max cues using bedrail. Sat EOB with CGA for balance safety. Combed tangled hair and washed face with SBA. Pt reporting need to use bathroom. Pivot transfer to/from commode with modA of 2 and max cues. Total assist for clothing management. Sit to supine with modA. Pt able to identify nurse call button. Pt left with bed alarm on zone 2.   Barriers to return to prior living situation: fall risk; cognitive impairment; level of assist for ADLs and IADLs  Recommendations for discharge: TCU then SNF for long-term care  Rationale for recommendations: Pt would be better served in a more supportive setting after a TCU tune-up.       Entered by: Christine Moses 08/13/2020 9:23 AM       "

## 2020-08-13 NOTE — PLAN OF CARE
Pt here with altered mental status. A&O to self only. Neuros include generalized weakness. VSS. DD1 diet, nectar thick liquids. Poor PO intake - needs encouragement. Takes meds crushed in applesauce. Up with A2 + Lift. Denies pain. Pt scoring yellow on the Aggression Stop Light Tool for confusion and disorientation. Continue to monitor and follow POC.

## 2020-08-14 ENCOUNTER — APPOINTMENT (OUTPATIENT)
Dept: OCCUPATIONAL THERAPY | Facility: CLINIC | Age: 85
DRG: 070 | End: 2020-08-14
Payer: MEDICARE

## 2020-08-14 LAB
ANION GAP SERPL CALCULATED.3IONS-SCNC: 4 MMOL/L (ref 3–14)
BUN SERPL-MCNC: 4 MG/DL (ref 7–30)
CALCIUM SERPL-MCNC: 8.5 MG/DL (ref 8.5–10.1)
CHLORIDE SERPL-SCNC: 112 MMOL/L (ref 94–109)
CO2 SERPL-SCNC: 26 MMOL/L (ref 20–32)
CREAT SERPL-MCNC: 0.42 MG/DL (ref 0.52–1.04)
GFR SERPL CREATININE-BSD FRML MDRD: 89 ML/MIN/{1.73_M2}
GLUCOSE SERPL-MCNC: 107 MG/DL (ref 70–99)
LMWH PPP CHRO-ACNC: 0.24 IU/ML
LMWH PPP CHRO-ACNC: 0.48 IU/ML
POTASSIUM SERPL-SCNC: 3.3 MMOL/L (ref 3.4–5.3)
POTASSIUM SERPL-SCNC: 3.9 MMOL/L (ref 3.4–5.3)
SODIUM SERPL-SCNC: 142 MMOL/L (ref 133–144)

## 2020-08-14 PROCEDURE — 25000132 ZZH RX MED GY IP 250 OP 250 PS 637: Mod: GY | Performed by: HOSPITALIST

## 2020-08-14 PROCEDURE — 80048 BASIC METABOLIC PNL TOTAL CA: CPT | Performed by: HOSPITALIST

## 2020-08-14 PROCEDURE — 84132 ASSAY OF SERUM POTASSIUM: CPT | Performed by: HOSPITALIST

## 2020-08-14 PROCEDURE — 99233 SBSQ HOSP IP/OBS HIGH 50: CPT | Performed by: HOSPITALIST

## 2020-08-14 PROCEDURE — 36415 COLL VENOUS BLD VENIPUNCTURE: CPT | Performed by: HOSPITALIST

## 2020-08-14 PROCEDURE — 25000128 H RX IP 250 OP 636: Performed by: HOSPITALIST

## 2020-08-14 PROCEDURE — 25800025 ZZH RX 258: Performed by: HOSPITALIST

## 2020-08-14 PROCEDURE — 85520 HEPARIN ASSAY: CPT | Performed by: HOSPITALIST

## 2020-08-14 PROCEDURE — 97535 SELF CARE MNGMENT TRAINING: CPT | Mod: GO | Performed by: OCCUPATIONAL THERAPIST

## 2020-08-14 PROCEDURE — 25000125 ZZHC RX 250: Performed by: HOSPITALIST

## 2020-08-14 PROCEDURE — 12000000 ZZH R&B MED SURG/OB

## 2020-08-14 RX ORDER — POTASSIUM CHLORIDE 29.8 MG/ML
20 INJECTION INTRAVENOUS
Status: DISCONTINUED | OUTPATIENT
Start: 2020-08-14 | End: 2020-08-14 | Stop reason: CLARIF

## 2020-08-14 RX ORDER — LEVETIRACETAM 100 MG/ML
500 SOLUTION ORAL 2 TIMES DAILY
Status: DISCONTINUED | OUTPATIENT
Start: 2020-08-14 | End: 2020-08-16

## 2020-08-14 RX ORDER — POTASSIUM CHLORIDE 1.5 G/1.58G
20-40 POWDER, FOR SOLUTION ORAL
Status: DISCONTINUED | OUTPATIENT
Start: 2020-08-14 | End: 2020-08-20 | Stop reason: HOSPADM

## 2020-08-14 RX ORDER — LEVETIRACETAM 500 MG/1
500 TABLET ORAL 2 TIMES DAILY
Status: DISCONTINUED | OUTPATIENT
Start: 2020-08-14 | End: 2020-08-14

## 2020-08-14 RX ORDER — POTASSIUM CHLORIDE 1500 MG/1
20-40 TABLET, EXTENDED RELEASE ORAL
Status: DISCONTINUED | OUTPATIENT
Start: 2020-08-14 | End: 2020-08-20 | Stop reason: HOSPADM

## 2020-08-14 RX ORDER — GINSENG 100 MG
CAPSULE ORAL
Status: DISCONTINUED | OUTPATIENT
Start: 2020-08-14 | End: 2020-08-20 | Stop reason: HOSPADM

## 2020-08-14 RX ORDER — FLUCONAZOLE 200 MG/1
200 TABLET ORAL DAILY
Status: DISCONTINUED | OUTPATIENT
Start: 2020-08-14 | End: 2020-08-20 | Stop reason: HOSPADM

## 2020-08-14 RX ORDER — POTASSIUM CL/LIDO/0.9 % NACL 10MEQ/0.1L
10 INTRAVENOUS SOLUTION, PIGGYBACK (ML) INTRAVENOUS
Status: DISCONTINUED | OUTPATIENT
Start: 2020-08-14 | End: 2020-08-20 | Stop reason: HOSPADM

## 2020-08-14 RX ORDER — POTASSIUM CHLORIDE 7.45 MG/ML
10 INJECTION INTRAVENOUS
Status: DISCONTINUED | OUTPATIENT
Start: 2020-08-14 | End: 2020-08-14 | Stop reason: CLARIF

## 2020-08-14 RX ADMIN — POTASSIUM CHLORIDE: 2 INJECTION, SOLUTION, CONCENTRATE INTRAVENOUS at 03:40

## 2020-08-14 RX ADMIN — POTASSIUM CHLORIDE 40 MEQ: 1.5 POWDER, FOR SOLUTION ORAL at 11:18

## 2020-08-14 RX ADMIN — LEVETIRACETAM 500 MG: 100 SOLUTION ORAL at 22:07

## 2020-08-14 RX ADMIN — POTASSIUM CHLORIDE: 2 INJECTION, SOLUTION, CONCENTRATE INTRAVENOUS at 18:54

## 2020-08-14 RX ADMIN — METOPROLOL TARTRATE 12.5 MG: 25 TABLET, FILM COATED ORAL at 21:11

## 2020-08-14 RX ADMIN — FLUCONAZOLE 200 MG: 200 TABLET ORAL at 15:44

## 2020-08-14 RX ADMIN — APIXABAN 10 MG: 5 TABLET, FILM COATED ORAL at 21:11

## 2020-08-14 RX ADMIN — LEVETIRACETAM 500 MG: 5 INJECTION INTRAVENOUS at 08:53

## 2020-08-14 RX ADMIN — HEPARIN SODIUM 650 UNITS/HR: 10000 INJECTION, SOLUTION INTRAVENOUS at 03:17

## 2020-08-14 RX ADMIN — POTASSIUM CHLORIDE 20 MEQ: 1.5 POWDER, FOR SOLUTION ORAL at 13:37

## 2020-08-14 RX ADMIN — BACITRACIN: 500 OINTMENT TOPICAL at 13:36

## 2020-08-14 ASSESSMENT — ACTIVITIES OF DAILY LIVING (ADL)
ADLS_ACUITY_SCORE: 35
ADLS_ACUITY_SCORE: 30
ADLS_ACUITY_SCORE: 31
ADLS_ACUITY_SCORE: 30

## 2020-08-14 NOTE — PLAN OF CARE
Alert to self, speech is slow but logical. Opens eyes spontaneously or arouses to voice/light touch. Difficulty following some commands. Up with 2, mechanical lift. T/R q2h/prn. No s/sx of pain. VSS, on room air. Purewick in place, also voids in bedpan at times. K+ 3.3, replaced per protocol, recheck at 1730 and in AM. Heparin gtt at 7.5ml/hr, recheck at 1900. DD1, nectar liquids, requires feeding assistance. Meds crushed in puree. Scattered bruising. Scabs on bilateral cheekbones, bacitracin in use. TCU recommended at discharge. [id number]: No changes. Sat up in chair this afternoon.

## 2020-08-14 NOTE — PLAN OF CARE
Pt opens eyes on her own. Responds to her name. Speech is garbled, slow. Unable to follow most commands. She can move her upper extremities. VS stable. Purewick in place for incontinence. Turned and repositioned.

## 2020-08-14 NOTE — PLAN OF CARE
Care Plan Nursing Note    Patient Information  Name: Joelle Freeman  Age: 89 year old    Patient Assessment   DATE & TIME: 08/13/20,    Cognitive Concerns/ Orientation : alert to self only  BEHAVIOR & AGGRESSION TOOL COLOR: green  ABNL VS/O2: vitals stable. 2 liters NC WDL  MOBILITY: assist of two repositioned in bed  PAIN MANAGMENT: NO Non-verbal pain noted.   DIET: DD3 with thick liquid. Refused to eat  BOWEL/BLADDER: External catheter. Good UO  ABNL LAB/BG: K+ 3.3, per MD note tx with IVF, and she has a BNP in AM  DRAIN/DEVICES: PIV times two. External cath.  SKIN: bruises and blanchable redness  TESTS/PROCEDURES: Lab in AM  D/C DAY/GOALS/PLACE: pending. Possibly to TCU  OTHER IMPORTANT INFO:

## 2020-08-14 NOTE — PLAN OF CARE
PT- Attempted to see pt this AM. Pt sleeping at arrival and unable to wake up with tactile cues. Following extended time pt able to open eyes for a short time, did not answer questions and went back to sleep. RN notified.

## 2020-08-14 NOTE — PROGRESS NOTES
[facility]    Hospitalist Progress Note    Date of Admission:  8/9/2020    Assessment & Plan     Joelle Freeman is a 89 year old female with complex past medical history including dementia, prior stroke, bilateral hygromas and shunt placement among others who was admitted from TCU on 8/9/2020 with altered mental status.  Code stroke was initiated in the ED with essentially unremarkable imaging for acute pathology besides incidental finding of bilateral pulmonary emboli noted on CTA head and neck.  Neurology as well as neurosurgery consulted.  ED MD discussed with neurosurgery who was okay with therapeutic heparin infusion without boluses.  Additionally neurology started IV Keppra.  Patient admitted for further monitoring and evaluation.     Acute toxic metabolic encephalopathy  at TCU: Waxing and waning,?  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  In ED patient was initially not responding or following commands and was only withdrawing to noxious stimuli.  A little later  patient is more awake interacting with her sons and following commands.  Code stroke was called in the ED with CT head perfusion essentially unremarkable, no new stroke found, essentially incidental bilateral PEs found as below.  Neurology and neurosurgery was contacted by ED MD and will be consulted.  - Gentle IV fluids at 75 given continued somnolence and recent decreased p.o. intake  - Neurology has started IV Keppra which we will continue 500 twice daily.   "Continues EEG monitoring on 8/11 per neurology.  -Patient was seen by neurosurgery.  Okayed for heparin drip without boluses for PE.  See below.  Head CT repeated on 8/10 and was reported as stable.  Neurosurgery has signed off.  - PT/OT consultations requested  -Patient has continued to have waxing and waning alertness.  Video EEG monitoring done on 8/11.  This showed epileptiform discharges were right anterior/mid temporal region with focal cerebral dysfunction bilaterally.  No clinical seizures seen.  Neurology recommended continuing on empiric Keppra 500 mg twice daily.  If this causes irritability, may consider switching to Depakote.  They have signed off.  -8/13: Patient remains overall lethargic with minimal oral intake, at times is able to be more alert and a bit more responsive.  Discussed goals of care again with her son, Osman.  Would like to observe over the next 48 hours.  If she does not perk up and start having more oral intake would need to likely pursue comfort focus cares.  He states that Joelle would not want a feeding tube.  Also does not want to pursue MRI at this time as he agrees that it would not be of much benefit.  -8/14: Patient was alert and being fed food.  She looks the best of seen her.  Son at bedside.  He wants to see how she does over the weekend and if she remains stable, plan for discharge to TCU early next week.  Discussed goals of care again and brought up the option of hospice.  He is not ready for this yet.  He desires continued treatments with IV fluids, blood thinners and would want rehospitalization for infection/dehydration/etc at this time.  But does understand that if she continues to decline with repeat hospitalizations, he will consider hospice at that point.     Acute bilateral pulmonary emboli - \"incidentally\" found on CTA H/N   History of Heterozygous FVL mutation  Neurosurgery discussed with the ED MD and reportedly are okay with IV heparin infusion without boluses. "  Patient is currently saturating 100% on 2 L nasal cannula can titrate to O2 saturation 92% or above.  - Incentive spirometry  - Continue heparin infusion at this time till she is more awake and able to take pills at which time will switch from heparin to Eliquis.  Anticipate switching to Eliquis today.  -Repeat head CT on 8/10 was stable.  Neurosurgery has signed off.     Moderate oropharyngeal dysphagia  Inadequate oral intake  Recently seen by SLP.  Downgraded diet to DDL 1 with NTL.   - Magic cups with meals to increase available kcals/prot.  Should receive ongoing SLP at TCU.    Hypokalemia  Changed IV fluids to D5 half-normal saline with 20 of K.     Recently noted LBBB  Chronic troponin elevation  Elevated troponin of 0.364. Of note, she has chronically elevated troponins. During her recent prior hospitalization her troponins were 0.46, 0.47. At that time she had an echocardiogram which showed a preserved EF and no wall motion abnormalities.  She does seem to have a new left bundle branch block on EKG.  She denies any chest pain, no shortness of breath. Monitored telemetry. Troponins flat. Family previously not interested in any potential cardiac interventions given advanced age and advanced dementia.  - PTA BB and ACE inhibitor on hold as unable to take p.o. due to encephalopathy.  Blood pressures have been okay.  - Continue PTA statin when able to take p.o.  - PTA aspirin was recently stopped given her CT head findings of bilateral subdural hygromas versus subdural hematomas.      History of recurrent UTI  Recently discharged from [facility] 8/5 on antibiotics for UTI. No urinary symptoms reported on admission.  Has done 2 courses of antibiotics over last few weeks for UTI.  -Recheck UA showed more than 182 WBC, 53 RBC, negative nitrite, no bacteria.  Urine cultures growing Candida.  Fluconazole for 1 week.     History of hypothyroidism during her prior hospitalization    Recently, her TSH was low at 0.22 and free  T4 was high at 53.    - Her Synthroid was reduced from 88 to 75 mcg daily.  Recheck TSH at 1.36.     Depression/anxiety  Continue PTA Lexapro when able to taking in oral     History of hypertension   CAD - RCA stent 2001  BPs WNL in ED.    - Continued PTA Toprol-XL 25 mg p.o. daily and lisinopril 10 mg p.o. daily when able to take p.o.  -Currently PTA meds on hold as unable to take p.o.  - Previously was on ASA but stopped per neurosurgery in the recent past     History of dyslipidemia  - PTA statin to continue when able to take p.o.     History of severe aortic stenosis  Echocardiogram on 07/20/2020 showed aortic valve area of 0.5 square cm as per prior notes.   - She was considered to not be a surgical candidate.      Asymptomatic COVID-19 screening status  Negative on 5/20, 6/22, 6/29, 7/6, 7/19, 7/22, 8/2,   - felt to be low risk, 8/9 negative        Diet:  DD 1 diet with nectar thickened liquids if able to take p.o.  DVT Prophylaxis: Heparin gtt   Benítez Catheter: not present  Code Status: DNR/DNI-discussed in detail with patient and primarily her sons in the ED and confirmed upon admission    Goals of care discussion: I discussed goals of care at length with her son, Osman.  He agrees that Joelle would want to focus on comfort and would not want aggressive treatments.  However he does feel that it is appropriate to continue on seizure medication as well as blood thinners at this time despite understanding the risk of intracranial bleed given her history of recurrent falls.  He would like to see how the next 48 hours go and will further discuss goals of care pending progress.          Disposition Plan     Expected discharge:  Will continue to monitor over the weekend.  Needs TCU at discharge.  Anticipate discharging to TCU early next week.    Total time spent in patient encounter: 35 minutes  Greater than 50% time spent in family counseling and coordination of care regarding goals of care/hospice  tisha.      Chloe Trejo MD  Text Page (7am - 6pm, M-F)    Interval History   Stable overnight.  This morning was reclined in bed, alert and being fed breakfast. She appears the best I have seen her so far.  Able to follow simple commands but remains pleasantly confused.  -Data reviewed today: I reviewed all new labs and imaging results over the last 24 hours. I personally reviewed CT scan with result as noted above    Physical Exam   Temp: 97.6  F (36.4  C) Temp src: Axillary BP: (!) 159/68 Pulse: 54 Heart Rate: 56 Resp: 16 SpO2: 97 % O2 Device: None (Room air) Oxygen Delivery: 2 LPM  Vitals:    08/09/20 1814 08/10/20 0708 08/11/20 0459   Weight: 51.5 kg (113 lb 8.6 oz) 54.2 kg (119 lb 7.8 oz) 54.5 kg (120 lb 2.4 oz)     Vital Signs with Ranges  Temp:  [97.4  F (36.3  C)-98.6  F (37  C)] 97.6  F (36.4  C)  Pulse:  [54] 54  Heart Rate:  [49-57] 56  Resp:  [16-18] 16  BP: (130-159)/(50-68) 159/68  SpO2:  [97 %-100 %] 97 %  I/O last 3 completed shifts:  In: 0   Out: 1300 [Urine:1300]    Constitutional: Alert, being fed breakfast, follow simple commands  Respiratory: Non labored breathing,  Cardiovascular: Heart sounds regular rate and rhythm,  no leg edema  GI: Abdomen is soft, non distended  Skin/Integumen: no rashes obvious  Neuro: Lethargic and sleepy, no obvious facial asymmetry  psych: Calm, pleasantly confused    Medications     IV infusion builder WITH additives 75 mL/hr at 08/14/20 0340     HEParin 650 Units/hr (08/14/20 0317)     - MEDICATION INSTRUCTIONS -         fluconazole  200 mg Intravenous Q24H     levETIRAcetam  500 mg Intravenous Q12H     [Held by provider] levothyroxine  75 mcg Oral Daily     [Held by provider] metoprolol tartrate  12.5 mg Oral BID     sodium chloride (PF)  3 mL Intracatheter Q8H       Data   Recent Labs   Lab 08/14/20  0856 08/13/20  0929 08/12/20  0549 08/11/20  0618 08/10/20  0607  08/09/20  1402   WBC  --   --  6.3 6.9 7.8   < > 8.3   HGB  --   --  11.2* 12.2 12.5   < > 13.9    MCV  --   --  96 97 97   < > 95   PLT  --   --  127* 127* 144*   < > 148*   INR  --   --   --   --   --   --  1.13    141  --  144 143  --  141   POTASSIUM 3.3* 3.3*  --  3.5 3.6  --  3.8   CHLORIDE 112* 113*  --  114* 114*  --  111*   CO2 26 20  --  25 25  --  28   BUN 4* 8  --  14 14  --  15   CR 0.42* 0.43*  --  0.67 0.79  --  0.88   ANIONGAP 4 8  --  5 4  --  2*   JAZLYN 8.5 8.6  --  8.5 8.2*  --  9.3   * 82  --  76 94  --  104*   ALBUMIN  --  3.0*  --  2.9*  --   --   --    PROTTOTAL  --  6.1*  --  5.9*  --   --   --    BILITOTAL  --  1.3  --  0.7  --   --   --    ALKPHOS  --  68  --  68  --   --   --    ALT  --  9  --  10  --   --   --    AST  --  10  --  10  --   --   --    TROPI  --   --   --   --  0.340*  --  0.364*    < > = values in this interval not displayed.       Imaging  No results found for this or any previous visit (from the past 24 hour(s)).

## 2020-08-14 NOTE — PLAN OF CARE
Discharge Planner OT   Patient plan for discharge: Not discussed  Current status: Pt in bed, able to follow about 75% of directions with some repetition, not oriented to date but knows she is in the hospital. Very pleasant and cooperative. Rolled L and R to place sling with cues and Min A. Used lift/sling and assist of 2 for bed to chair. Assisted to position with pillows up in recliner with chair alarm in place. Pt needed Mod A for oral cares. Noted impairments with motor planning, initiation and cessation of activity. Applied vaseline to dry, cracked lips. Updated RN.  Barriers to return to prior living situation: current level of assist, impaired cognition, high falls risk  Recommendations for discharge: TCU  Rationale for recommendations: Pt will benefit form continued therapies in TCU setting to maximize (I) with ADL and mobility. She may be best served in a more supportive living environment after TCU stay.       Entered by: Treva Mckeon 08/14/2020 12:41 PM

## 2020-08-15 ENCOUNTER — APPOINTMENT (OUTPATIENT)
Dept: OCCUPATIONAL THERAPY | Facility: CLINIC | Age: 85
DRG: 070 | End: 2020-08-15
Payer: MEDICARE

## 2020-08-15 LAB
ERYTHROCYTE [DISTWIDTH] IN BLOOD BY AUTOMATED COUNT: 13.8 % (ref 10–15)
HCT VFR BLD AUTO: 38.9 % (ref 35–47)
HGB BLD-MCNC: 13.3 G/DL (ref 11.7–15.7)
MCH RBC QN AUTO: 31.9 PG (ref 26.5–33)
MCHC RBC AUTO-ENTMCNC: 34.2 G/DL (ref 31.5–36.5)
MCV RBC AUTO: 93 FL (ref 78–100)
PLATELET # BLD AUTO: 158 10E9/L (ref 150–450)
POTASSIUM SERPL-SCNC: 3.2 MMOL/L (ref 3.4–5.3)
POTASSIUM SERPL-SCNC: 3.9 MMOL/L (ref 3.4–5.3)
RBC # BLD AUTO: 4.17 10E12/L (ref 3.8–5.2)
WBC # BLD AUTO: 6.8 10E9/L (ref 4–11)

## 2020-08-15 PROCEDURE — 85027 COMPLETE CBC AUTOMATED: CPT | Performed by: EMERGENCY MEDICINE

## 2020-08-15 PROCEDURE — 25800030 ZZH RX IP 258 OP 636: Performed by: HOSPITALIST

## 2020-08-15 PROCEDURE — 99233 SBSQ HOSP IP/OBS HIGH 50: CPT | Performed by: INTERNAL MEDICINE

## 2020-08-15 PROCEDURE — 84132 ASSAY OF SERUM POTASSIUM: CPT | Performed by: EMERGENCY MEDICINE

## 2020-08-15 PROCEDURE — 12000000 ZZH R&B MED SURG/OB

## 2020-08-15 PROCEDURE — 25000132 ZZH RX MED GY IP 250 OP 250 PS 637: Mod: GY | Performed by: HOSPITALIST

## 2020-08-15 PROCEDURE — 84132 ASSAY OF SERUM POTASSIUM: CPT | Performed by: INTERNAL MEDICINE

## 2020-08-15 PROCEDURE — 97535 SELF CARE MNGMENT TRAINING: CPT | Mod: GO

## 2020-08-15 PROCEDURE — 36415 COLL VENOUS BLD VENIPUNCTURE: CPT | Performed by: INTERNAL MEDICINE

## 2020-08-15 PROCEDURE — 36415 COLL VENOUS BLD VENIPUNCTURE: CPT | Performed by: EMERGENCY MEDICINE

## 2020-08-15 RX ORDER — DEXTROSE MONOHYDRATE, SODIUM CHLORIDE, AND POTASSIUM CHLORIDE 50; 1.49; 4.5 G/1000ML; G/1000ML; G/1000ML
INJECTION, SOLUTION INTRAVENOUS CONTINUOUS
Status: DISCONTINUED | OUTPATIENT
Start: 2020-08-15 | End: 2020-08-20

## 2020-08-15 RX ADMIN — BACITRACIN: 500 OINTMENT TOPICAL at 08:22

## 2020-08-15 RX ADMIN — APIXABAN 10 MG: 5 TABLET, FILM COATED ORAL at 20:19

## 2020-08-15 RX ADMIN — LEVETIRACETAM 500 MG: 100 SOLUTION ORAL at 08:22

## 2020-08-15 RX ADMIN — FLUCONAZOLE 200 MG: 200 TABLET ORAL at 08:22

## 2020-08-15 RX ADMIN — Medication 1 MG: at 22:15

## 2020-08-15 RX ADMIN — POTASSIUM CHLORIDE 20 MEQ: 1.5 POWDER, FOR SOLUTION ORAL at 16:26

## 2020-08-15 RX ADMIN — POTASSIUM CHLORIDE 40 MEQ: 1.5 POWDER, FOR SOLUTION ORAL at 08:35

## 2020-08-15 RX ADMIN — ACETAMINOPHEN 650 MG: 325 TABLET ORAL at 22:15

## 2020-08-15 RX ADMIN — LEVETIRACETAM 500 MG: 100 SOLUTION ORAL at 20:19

## 2020-08-15 RX ADMIN — METOPROLOL TARTRATE 12.5 MG: 25 TABLET, FILM COATED ORAL at 20:19

## 2020-08-15 RX ADMIN — METOPROLOL TARTRATE 12.5 MG: 25 TABLET, FILM COATED ORAL at 08:22

## 2020-08-15 RX ADMIN — POTASSIUM CHLORIDE, DEXTROSE MONOHYDRATE AND SODIUM CHLORIDE: 150; 5; 450 INJECTION, SOLUTION INTRAVENOUS at 09:36

## 2020-08-15 RX ADMIN — LEVOTHYROXINE SODIUM 75 MCG: 75 TABLET ORAL at 06:09

## 2020-08-15 RX ADMIN — APIXABAN 10 MG: 5 TABLET, FILM COATED ORAL at 08:22

## 2020-08-15 ASSESSMENT — ACTIVITIES OF DAILY LIVING (ADL)
ADLS_ACUITY_SCORE: 35
ADLS_ACUITY_SCORE: 37
ADLS_ACUITY_SCORE: 35
ADLS_ACUITY_SCORE: 37
ADLS_ACUITY_SCORE: 35
ADLS_ACUITY_SCORE: 37

## 2020-08-15 NOTE — PLAN OF CARE
Pt here with AMS and bilateral emboli. A&O to self. Neuros with 4/5 generalized weakness. Slow to respond. pleasant. VSS on RA. Tele NSR. DD1 diet, nectar thick liquids. Takes pills crushed in applesauce. Up with A2 lift. Turn and reposition every 2 hours. Pure wick in place- redness to divina area- barrier cream applied. Denies pain. Heparin stopped at 1930- eliquis started at 2100. Scabs to cheeks- vilma. IVF infusing at 75 mL/hr. Pt scoring yellow on the Aggression Stop Light Tool due to confusion. Plan for TCU at discharge.

## 2020-08-15 NOTE — PROGRESS NOTES
[facility]    Internal Medicine Hospitalist Progress Note  08/15/2020  I evaluated patient on the above date.    Jake Crook Jr., MD  [phone] (p)  Text Page        Assessment & Plan New actions/orders today (08/15/2020) are underlined.    Joelle Freeman is a 89 year old female with past medical history including HTN; CAD; CVI with right sided weakness; severe AS; NPH s/p  shunt; factor V Leiden (heterozygous); falls; and recent hospitalizations (7/19-7/21 for syncope and bilateral chronic subdural hygromas vs chronic SDH, and 8/2-8/5 for AMS and UTI).     She presented from TCU on 8/9/2020 with altered mental status.  Code stroke was initiated in the ED with essentially unremarkable imaging for acute intracranial pathology but was found incidentally with bilateral pulmonary emboli noted on CTA head and neck.  Neurology and Neurosurgery consulted.  ED MD discussed with Neurosurgery who was okay with therapeutic heparin infusion without boluses. Patient admitted for further management.    Acute encephalopathy, suspect multifactorial (metabolic and infectious) including possible seizures, UTI and bilateral PE on top of underlying advanced dementia, bilateral chronic subdural hygromas/hematomas, prior stroke and NPH.   * Recently hospitalization 7/19-7/21 for syncope and found with bilateral chronic subdural hygromas vs chronic SDH. Hospitalized again 8/2-8/5 for AMS and UTI.   * Reportedly not eating or drinking much in the 2 days PTA at TCU. On the morning of presentation 8/9, was only responsive to sternal rub.  In ED 8/9, patient was initially not responding or following commands and was only withdrawing to noxious stimuli.  A little later  patient was more awake interacting with her sons and following commands  Code stroke was called in the ED. Head CT 8/9 showed no acute intracranial pathology, there was some slight interval increase in thickness of hypodense subdural collections  "along the cerebral convexities bilaterally now measuring up to 0.8 cm maximum thickness on the right and 0.5 cm maximum thickness on the left. CT head perfusion 8/9 negative. CTA head and neck 8/9 no major head or neck findings, but did show large pulmonary emboli at the distal ends of the main pulmonary arteries bilaterally (see below).  * Neurology and Neurosurgery consulted. Empiric Keppra started on admit. Video EEG 8/11-8/12 showed epileptiform discharges right anterior/mid temporal region with focal cerebral dysfunction bilaterally; no clinical seizures seen.  * On 8/13, pt remained overall lethargic with minimal oral intake,at times is able to be more alert and a bit more responsive. Dr. Trejo  discussed goals of care again with her son, Osman and noted that pt would not want feeding tube; did not want to pursue MRI; he wanted to observe for 48 hours and if no improvement would consider palliative cares.   * On 8/14, pt much improved and was being fed food; son was not ready for hospice and wanted continued restorative cares, but understood that if she continued to decline with repeat hospitalizations, he would consider hospice at that point.  - Continue Keppra 500 mg BID.  - Continue PT, OT, SLP.  - Management of other issues as below.  - Re-orient as needed.  - Maintain normal day/night, sleep/wake cycles.  - Minimize sedating medications as able.    Acute bilateral pulmonary emboli, \"incidentally\" found on CTA of head and neck.  History of Heterozygous FVL mutation.  CTA head and neck 8/9 no major head or neck findings, but did show large pulmonary emboli at the distal ends of the main pulmonary arteries bilaterally. Neurosurgery discussed with the ED MD and were okay with IV heparin infusion without boluses. Head CT 8/10 stable. Transitioned to apixaban 8/14.  - Continue apixaban.    FEN.  Moderate oropharyngeal dysphagia.  Inadequate oral intake.  Issues with encephalopathy as below. Seen by SLP.  " Downgraded diet to DDL 1 with NTL.   - Continue DD1 with nectar thickened liquids.  - Continue nutritional supplements.  - Continue D5 1/2 NS with 20 meq KCl.     Recently noted LBBB.  Chronic troponin elevation.  * During her recent prior hospitalization her troponins were 0.46, 0.47. Noted with LBBB at that time, not noted previously. At that time she had an echocardiogram which showed a preserved EF and no wall motion abnormalities.  * Trop 0.364 on admit. No chest pain or dyspnea on admit. Trop subsequently flat. Family previously not interested in any potential cardiac interventions given advanced age and advanced dementia.  - Monitor clinically.    Possible candidal UTI vs colonization.  H/o recurrent UTI.  * Had completed 2 courses of antibiotics over last few weeks for UTI.  [facility] 8/5 on antibiotics for UTI.   * No urinary symptoms reported on admission. Recheck UA on admit showed > 182 WBC, 53 RBC, negative nitrite, no bacteria.  Urine cultures growing Candida. Fluconazole started 8 /14.  - Continue fluconazole (started 8/14) - stop after 8/20.    Hypertension (benign essential).  CAD - RCA stent 2001.  Severe aortic stenosis.  [PTA BP meds: lisinopril 10 mg daily; metoprolol 12.5 mg BID.]  * Recent admission for syncope 7/2020 as above. Echocardiogram on 07/20/2020 showed aortic valve area of 0.5 square cm as per prior notes. Not considered a surgical candidate.   - Continue metoprolol.  - Restart atorvastatin, lisinopril in the next few days as oral intake improves.  - No ASA given bilateral chronic subdural hygromas/hematomas.  - Monitor i/o's, daily wts.    Recently discovered bilateral chronic subdural hygromas vs hematomas.  * Noted 7/2020. ASA stopped at that time.  * Head CT imaging this stay as above. Seen by Neurology and Neurosurgery this stay as above. Started on anticoagulation this hospitalization as above.  - Monitor closely while on anticoagulation.    CVI history  (1999) with chronic right sided weakness.  - Continue PT, OT.     Hypothyroidism.    * Recently, her TSH was low at 0.22 and free T4 was high at 53.    Her Synthroid was reduced from 88 to 75 mcg daily.  Recheck TSH at 1.36 on 8/10.  - Continue levothyroxine 75 mcg daily.    Hypokalemia.  - Continue PRN K replacement.     Depression/anxiety.  - Continue PTA Lexapro when able.     History of dyslipidemia  - PTA statin to continue when able to take p.o.       COVID-19 testing.  COVID-19 PCR Results    COVID-19 PCR Results 5/20/20 6/22/20 6/29/20 7/6/20 7/19/20 7/22/20 8/2/20 8/9/20   COVID-19 Virus PCR to [place] - Result  Undetected Undetected        COVID-19 Virus PCR to [facility] - Result Not Detected   Not Detected Not Detected Not Detected Not Detected Not Detected   COVID-19 Virus PCR to [facility] - Source Nasopharyngeal   Nasopharyngeal Nasopharyngeal Nasopharyngeal Nasopharyngeal Nasopharyngeal   COVID-19 Virus PCR Source  Nasopharynx Nasopharyngeal           Comments are available for some flowsheets but are not being displayed.         COVID-19 Antibody Results, Testing for Immunity    COVID-19 Antibody Results, Testing for Immunity   No data to display.             Diet: Dysphagia Diet Level 1 Pureed Nectar Thickened Liquids (pre-thickened or use instant food thickener)  Snacks/Supplements Adult: Magic Cup; With Meals  Room Service    Prophylaxis: PCD's, ambulation. On apixaban.  Benítez Catheter: not present  Code Status: No CPR- Do NOT Intubate    Disposition Plan   Expected discharge: 1-2d, recommended to transitional care unit if continues to improve.  Entered: Jake Crook MD 08/15/2020, 11:08 AM         Interval History   Doing OK.  Denies chest pain or dyspnea.    -Data reviewed today: I reviewed all new labs and imaging over the last 24 hours. I personally reviewed no images or EKG's today.    Physical Exam   Heart Rate: 62, Blood pressure (!) 144/72, pulse 62, temperature 98.5  F (36.9  C),  temperature source Oral, resp. rate 16, weight 54.3 kg (119 lb 12.1 oz), SpO2 98 %.  Vitals:    08/10/20 0708 08/11/20 0459 08/15/20 0700   Weight: 54.2 kg (119 lb 7.8 oz) 54.5 kg (120 lb 2.4 oz) 54.3 kg (119 lb 12.1 oz)     Vital Signs with Ranges  Temp:  [97.3  F (36.3  C)-98.7  F (37.1  C)] 98.5  F (36.9  C)  Pulse:  [62-69] 62  Heart Rate:  [62-68] 62  Resp:  [16] 16  BP: (131-158)/(63-79) 144/72  SpO2:  [96 %-98 %] 98 %  Patient Vitals for the past 24 hrs:   BP Temp Temp src Pulse Heart Rate Resp SpO2 Weight   08/15/20 0816 (!) 144/72 98.5  F (36.9  C) Oral -- 62 16 98 % --   08/15/20 0700 -- -- -- -- -- -- -- 54.3 kg (119 lb 12.1 oz)   08/15/20 0415 -- -- -- -- -- 16 -- --   08/15/20 0018 135/73 98.7  F (37.1  C) Oral 62 -- 16 96 % --   08/14/20 2111 (!) 158/79 -- -- 69 -- -- -- --   08/14/20 1947 131/63 98.3  F (36.8  C) Axillary -- 68 -- 96 % --   08/14/20 1630 (!) 153/69 97.3  F (36.3  C) Axillary -- 63 16 97 % --   08/14/20 1125 (!) 147/63 97.4  F (36.3  C) Oral -- 62 16 96 % --     I/O's Last 24 hours  I/O last 3 completed shifts:  In: 1346 [P.O.:520; I.V.:826]  Out: 1100 [Urine:1100]    Constitutional: Awake, alert, some delay in responses.  Respiratory: Diminished in bases. No crackles or wheezes.  Cardiovascular: RRR, +I/VI systolic m, no g/r.  GI: Soft, nt, nd, +BS.  Skin/Integumen:   Other:        Data   Recent Labs   Lab 08/15/20  0601 08/14/20  1821 08/14/20  0856 08/13/20  0929 08/12/20  0549 08/11/20  0618 08/10/20  0607  08/09/20  1402   WBC 6.8  --   --   --  6.3 6.9 7.8   < > 8.3   HGB 13.3  --   --   --  11.2* 12.2 12.5   < > 13.9   MCV 93  --   --   --  96 97 97   < > 95     --   --   --  127* 127* 144*   < > 148*   INR  --   --   --   --   --   --   --   --  1.13   NA  --   --  142 141  --  144 143  --  141   POTASSIUM 3.2* 3.9 3.3* 3.3*  --  3.5 3.6  --  3.8   CHLORIDE  --   --  112* 113*  --  114* 114*  --  111*   CO2  --   --  26 20  --  25 25  --  28   BUN  --   --  4* 8  --  14  14  --  15   CR  --   --  0.42* 0.43*  --  0.67 0.79  --  0.88   ANIONGAP  --   --  4 8  --  5 4  --  2*   JAZLYN  --   --  8.5 8.6  --  8.5 8.2*  --  9.3   GLC  --   --  107* 82  --  76 94  --  104*   ALBUMIN  --   --   --  3.0*  --  2.9*  --   --   --    PROTTOTAL  --   --   --  6.1*  --  5.9*  --   --   --    BILITOTAL  --   --   --  1.3  --  0.7  --   --   --    ALKPHOS  --   --   --  68  --  68  --   --   --    ALT  --   --   --  9  --  10  --   --   --    AST  --   --   --  10  --  10  --   --   --    TROPI  --   --   --   --   --   --  0.340*  --  0.364*    < > = values in this interval not displayed.     Recent Labs   Lab Test 08/14/20  0856 08/13/20  0929 08/11/20  0618 08/10/20  0607 08/09/20  1402  06/29/19  1231  06/22/15  0556 06/21/15  2358 06/21/15  2308 06/21/15  1805   * 82 76 94 104*   < >  --    < >  --   --   --   --    BGM  --   --   --   --   --   --  77  --  90 91 92 98    < > = values in this interval not displayed.         No results found for this or any previous visit (from the past 24 hour(s)).    Medications   All medications were reviewed.    dextrose 5% and 0.45% NaCl + KCl 20 mEq/L 75 mL/hr at 08/15/20 0936     - MEDICATION INSTRUCTIONS -         apixaban ANTICOAGULANT  10 mg Oral BID     fluconazole  200 mg Oral Daily     levETIRAcetam  500 mg Oral BID     levothyroxine  75 mcg Oral Daily     metoprolol tartrate  12.5 mg Oral BID     sodium chloride (PF)  3 mL Intracatheter Q8H

## 2020-08-15 NOTE — PLAN OF CARE
Alert to self, slow speech. [place], hearing aides in bilaterally. Strength 4/5. Difficulty following complex commands. Up with 2, mechanical lift. T/R q2h/prn. Denies pain. VSS, on room air. Incontinent of urine, purewick in place. K+ 3.2, replaced per protocol, recheck at 1830 and in AM. DD1, nectar liquids, requires feeding assistance. Meds crushed in puree. Scattered bruising. Scabs on bilateral cheekbones, bacitracin in use. PT/OT/SLP following. TCU recommended at discharge.

## 2020-08-15 NOTE — PLAN OF CARE
Discharge Planner OT   Patient plan for discharge: None stated    Current status: Pt required mod-max A x2 for stand>pivot transfer. Worked on seated balance at EOB with need for min-mod A. Pt required max A for LE dressing task.     Barriers to return to prior living situation: Current level of A required; Fall risk    Recommendations for discharge: Return to TCU    Rationale for recommendations: Pt limited by impaired cognition and safety, decreased balance and activity tolerance, and weakness. OT will continue with 5x/week POC.        Entered by: Alexandra Banks 08/15/2020 2:05 PM

## 2020-08-16 ENCOUNTER — APPOINTMENT (OUTPATIENT)
Dept: CT IMAGING | Facility: CLINIC | Age: 85
DRG: 070 | End: 2020-08-16
Attending: INTERNAL MEDICINE
Payer: MEDICARE

## 2020-08-16 ENCOUNTER — HOSPITAL ENCOUNTER (OUTPATIENT)
Dept: NEUROLOGY | Facility: CLINIC | Age: 85
DRG: 070 | End: 2020-08-16
Attending: INTERNAL MEDICINE
Payer: MEDICARE

## 2020-08-16 LAB — POTASSIUM SERPL-SCNC: 4 MMOL/L (ref 3.4–5.3)

## 2020-08-16 PROCEDURE — 12000000 ZZH R&B MED SURG/OB

## 2020-08-16 PROCEDURE — 25800030 ZZH RX IP 258 OP 636: Performed by: INTERNAL MEDICINE

## 2020-08-16 PROCEDURE — 25800030 ZZH RX IP 258 OP 636: Performed by: HOSPITALIST

## 2020-08-16 PROCEDURE — 95816 EEG AWAKE AND DROWSY: CPT

## 2020-08-16 PROCEDURE — 99233 SBSQ HOSP IP/OBS HIGH 50: CPT | Performed by: INTERNAL MEDICINE

## 2020-08-16 PROCEDURE — 36415 COLL VENOUS BLD VENIPUNCTURE: CPT | Performed by: INTERNAL MEDICINE

## 2020-08-16 PROCEDURE — 25000128 H RX IP 250 OP 636: Performed by: INTERNAL MEDICINE

## 2020-08-16 PROCEDURE — 70450 CT HEAD/BRAIN W/O DYE: CPT

## 2020-08-16 PROCEDURE — 25000132 ZZH RX MED GY IP 250 OP 250 PS 637: Mod: GY | Performed by: HOSPITALIST

## 2020-08-16 PROCEDURE — 84132 ASSAY OF SERUM POTASSIUM: CPT | Performed by: INTERNAL MEDICINE

## 2020-08-16 RX ADMIN — SODIUM CHLORIDE 250 ML: 9 INJECTION, SOLUTION INTRAVENOUS at 13:54

## 2020-08-16 RX ADMIN — APIXABAN 10 MG: 5 TABLET, FILM COATED ORAL at 20:16

## 2020-08-16 RX ADMIN — POTASSIUM CHLORIDE, DEXTROSE MONOHYDRATE AND SODIUM CHLORIDE: 150; 5; 450 INJECTION, SOLUTION INTRAVENOUS at 00:24

## 2020-08-16 RX ADMIN — POTASSIUM CHLORIDE, DEXTROSE MONOHYDRATE AND SODIUM CHLORIDE: 150; 5; 450 INJECTION, SOLUTION INTRAVENOUS at 17:34

## 2020-08-16 RX ADMIN — LEVETIRACETAM 750 MG: 100 INJECTION, SOLUTION INTRAVENOUS at 13:55

## 2020-08-16 RX ADMIN — METOPROLOL TARTRATE 12.5 MG: 25 TABLET, FILM COATED ORAL at 20:16

## 2020-08-16 ASSESSMENT — ACTIVITIES OF DAILY LIVING (ADL)
ADLS_ACUITY_SCORE: 35
ADLS_ACUITY_SCORE: 37
ADLS_ACUITY_SCORE: 35
ADLS_ACUITY_SCORE: 37
ADLS_ACUITY_SCORE: 35
ADLS_ACUITY_SCORE: 35

## 2020-08-16 NOTE — PROGRESS NOTES
EEG CLINICAL NEUROPHYSIOLOGY PRELIMINARY REPORT    Portable EEG performed earlier this afternoon reviewed. Study consistent with moderate diffuse encephalopathy. There has been some worsening of EEG measures of encephalopathy since study performed earlier this week. There was no indication of seizures or nonconvulsive status epilepticus during this 27 minute study.    Final report in chart.    Francois Blanco MD

## 2020-08-16 NOTE — CONSULTS
"  [facility]    Stroke Consult Note    Reason for Consult:  Global aphasia    Chief Complaint: Altered Mental Status       Interval Events: neurology re consulted due to AMS, by the time I saw the patient she had improved and was back to her previous state of being drowsy but easily arousable,       Impression  #AMS  --etiology: may be related to focal cerebral dysfunction vs seizure due to abnormal EEG findings superimpose on baseline of dementia. Maybe component of toxic metabolic and infectious given UTI   --Repeat CT shows stabledural fluid collection     Recommendations  --Increase keppra to 750 mg BID, if this causes irritability, may consider switching to Depakote   --Agree with general neurology consult for seizure management.   --If patient does not continue to improve as she is now, may consider repeating MRI brain.        Thank you for this consult. We will continue to follow. If MRI is done.     The Stroke Staff is Dr. Jones.    Fransisco Quick MD  Vascular Neurology Fellow  To page me or covering stroke neurology team member, click here: [facility]COM   Choose \"On Call\" tab at top, then search dropdown box for \"Neurology Adult\", select location, press Enter, then look for stroke/neuro ICU/telestroke.      _____________________________________________________    Past Medical History   Past Medical History:   Diagnosis Date     Asthma      CAD (coronary artery disease)     stent post RCA 2001     Cerebral ventriculomegaly      CVA (cerebral infarction) 2002     Dementia (H)      Dementia (H)      Depression      Diverticulosis      Dyslipidemia      Heterozygous factor V Leiden mutation (H)      HTN (hypertension)      Hyperlipidemia      Hypothyroidism      Normal pressure hydrocephalus (H)      Posterior reversible encephalopathy syndrome      Subarachnoid hemorrhage (H) 3/23/2013     Subdural hematoma (H)      TIA (transient ischaemic attack)     multiple     TIA (transient ischaemic attack)      " Unspecified cerebral artery occlusion with cerebral infarction      Urinary incontinence      UTI (lower urinary tract infection)     recurrent     Past Surgical History   Past Surgical History:   Procedure Laterality Date     APPENDECTOMY       BIOPSY      breast     CARDIAC SURGERY      stent to the right coronary artery in 2001     CHOLECYSTECTOMY       ENT SURGERY      tonsillectomy     GENITOURINARY SURGERY      Bladder suspension     GYN SURGERY      D & C     GYN SURGERY      laproscopy oophorectomy unilateral     HERNIA REPAIR       HYSTERECTOMY       LUMBAR PUNCTURE       OPEN REDUCTION INTERNAL FIXATION HIP NAILING  11/18/2013    Procedure: OPEN REDUCTION INTERNAL FIXATION HIP NAILING;  RIGHT HIP FRACTURE;  Surgeon: John Venegas MD;  Location:  OR     OPTICAL TRACKING SYSTEM IMPLANT SHUNT VENTRICULOPERITONEAL  7/16/2013    Procedure: OPTICAL TRACKING SYSTEM IMPLANT SHUNT VENTRICULOPERITONEAL;  RIGHT OCCIPITAL VENTRICULOPERITONEAL SHUNT, IMAGE GUIDANCE, NEUROENDOSCOPY, INTRATHECAL ANTIBIOTICS (STEALTH AXIEM, STRATA II LEVEL 2.5, PATIENT IN SUPINE 30-80, GEL DONUT WITH HEAD TURNED TO LEFT)      ;  Surgeon: Tayo Conley MD;  Location: [facility] OR     STENT  2001    RCA     wisdom teeth extraction[       Medications   Home Meds  Prior to Admission medications    Medication Sig Start Date End Date Taking? Authorizing Provider   acetaminophen (TYLENOL) 325 MG tablet Take 650 mg by mouth every 6 hours as needed for pain    Yes Unknown, Entered By History   amoxicillin (AMOXIL) 500 MG capsule Take 1 capsule (500 mg) by mouth every 8 hours for 5 days  Patient taking differently: Take 500 mg by mouth every 8 hours Start date 08/05/2020-08/10/2020 ( 5 days) 8/5/20 8/10/20 Yes Sneha Boss PA-C   atorvastatin (LIPITOR) 10 MG tablet Take 1 tablet (10 mg) by mouth daily 7/6/15  Yes Patric Pinedo MD   cholecalciferol 1000 UNITS TABS Take 1,000 Units by mouth daily 7/6/15   Yes Patric Pinedo MD   folic acid (FOLVITE) 1 MG tablet Take 1 tablet (1 mg) by mouth daily 7/22/20  Yes Heather Odonnell MD   levothyroxine (SYNTHROID/LEVOTHROID) 75 MCG tablet Take 1 tablet (75 mcg) by mouth daily 7/22/20  Yes Heather Odonnell MD   lisinopril (PRINIVIL/ZESTRIL) 10 MG tablet Take 10 mg by mouth daily   Yes Unknown, Entered By History   melatonin 1 MG TABS tablet Take 1 mg by mouth nightly as needed for sleep    Yes Unknown, Entered By History   metoprolol tartrate (LOPRESSOR) 12.5 mg TABS half-tab Take 12.5 mg by mouth 2 times daily   Yes Unknown, Entered By History   nystatin (MYCOSTATIN) 988838 UNIT/GM external powder Apply topically 2 times daily as needed (intertriginal dermatitis)   Yes Unknown, Entered By History   potassium chloride ER (KLOR-CON M) 20 MEQ CR tablet Take 20 mEq by mouth once with dinner.   Yes Unknown, Entered By History   senna-docusate (SENOKOT-S/PERICOLACE) 8.6-50 MG tablet Take 1 tablet by mouth 2 times daily 7/21/20  Yes Heather Odonnell MD       Scheduled Meds    apixaban ANTICOAGULANT  10 mg Oral BID     fluconazole  200 mg Oral Daily     levETIRAcetam  750 mg Intravenous Q12H     levothyroxine  75 mcg Oral Daily     metoprolol tartrate  12.5 mg Oral BID     sodium chloride (PF)  3 mL Intracatheter Q8H       Infusion Meds    dextrose 5% and 0.45% NaCl + KCl 20 mEq/L 75 mL/hr at 08/16/20 0024     - MEDICATION INSTRUCTIONS -         PRN Meds  acetaminophen, acetaminophen, bacitracin, lidocaine 4%, lidocaine (buffered or not buffered), melatonin, miconazole, naloxone, ondansetron **OR** ondansetron, - MEDICATION INSTRUCTIONS -, potassium chloride, potassium chloride with lidocaine, potassium chloride, sodium chloride (PF)    Allergies   Allergies   Allergen Reactions     Tramadol      Family History   Family History   Problem Relation Age of Onset     Cerebrovascular Disease Mother      Cerebrovascular Disease Father      Blood Disease Son       Social History   Social History     Tobacco Use     Smoking status: Never Smoker     Smokeless tobacco: Never Used   Substance Use Topics     Alcohol use: No     Drug use: No       Review of Systems   Review of systems not obtained due to patient factors - confusion, denies pain     PHYSICAL EXAMINATION   Temp:  [97.4  F (36.3  C)-98.6  F (37  C)] 97.5  F (36.4  C)  Heart Rate:  [57-73] 63  Resp:  [16] 16  BP: (127-151)/(72-82) 141/74  SpO2:  [96 %-98 %] 96 %    General:  patient lying in bed without any acute distress    HEENT:  R frontal crani incision well healed, old bruise R temple  Cardio:  RRR  Pulmonary:  no respiratory distress  Abdomen:  soft, non-tender, non-distended  Extremities:  no edema, peripheral pulses palpable  Skin:  intact, warm/dry     Neurologic  Mental Status:  Eyes closed, opens to voice, does not maintain alertness. Oriented to self but not time, place, or circumstance. Language is sparse and minimal output is fluent and coherent, with intact comprehension of simple commands, unable to name or repeat.  Cranial Nerves:  blinks to threat bilaterally, PERRL, horizontal gaze intact, face symmetric, tongue midline, mild dysarthria  Motor:  able to move all limbs on command, squeeze finger and move toes on command and able to give a thumbs up.  Reflexes:  toes down-going  Sensory:  detects sensation in 4/4 extremities  Coordination:  no ataxia of observed movements  Station/Gait:  not tested    Dysphagia Screen  Dysarthria or facial droop present - Maintain NPO, consult SLP    Stroke Scales    NIHSS  Interval transfer (08/10/20 1628)   Interval Comments     1a. Level of Consciousness 1-->Not alert, but arousable by minor stimulation to obey, answer, or respond   1b. LOC Questions 2-->Answers neither question correctly   1c. LOC Commands 0-->Performs both tasks correctly   2.   Best Gaze 0-->Normal   3.   Visual 0-->No visual loss   4.   Facial Palsy 0-->Normal symmetrical movements   5a.  Motor Arm, Left 0-->No drift, limb holds 90 (or 45) degrees for full 10 secs   5b. Motor Arm, Right 0-->No drift, limb holds 90 (or 45) degrees for full 10 secs   6a. Motor Leg, Left 2-->Some effort against gravity, leg falls to bed by 5 secs, but has some effort against gravity   6b. Motor Leg, right 2-->Some effort against gravity, leg falls to bed by 5 secs, but has some effort against gravity   7.   Limb Ataxia 0-->Absent   8.   Sensory 0-->Normal, no sensory loss   9.   Best Language 1-->Mild-to-moderate aphasia, some obvious loss of fluency or facility of comprehension, without significant limitation on ideas expressed or form of expression. Reduction of speech and/or comprehension, however, makes conversation. . . (see row details)   10. Dysarthria 1-->Mild-to-moderate dysarthria, patient slurs at least some words and, at worst, can be understood with some difficulty   11. Extinction and Inattention  0-->No abnormality   Total 9 (08/10/20 1628)       Imaging  I personally reviewed all imaging; relevant findings per HPI.    Labs Data   CBC  Recent Labs   Lab 08/15/20  0601 08/12/20  0549 08/11/20  0618   WBC 6.8 6.3 6.9   RBC 4.17 3.49* 3.78*   HGB 13.3 11.2* 12.2   HCT 38.9 33.6* 36.7    127* 127*     Basic Metabolic Panel   Recent Labs   Lab 08/16/20  0941 08/15/20  1908 08/15/20  0601  08/14/20  0856 08/13/20  0929 08/11/20  0618   NA  --   --   --   --  142 141 144   POTASSIUM 4.0 3.9 3.2*   < > 3.3* 3.3* 3.5   CHLORIDE  --   --   --   --  112* 113* 114*   CO2  --   --   --   --  26 20 25   BUN  --   --   --   --  4* 8 14   CR  --   --   --   --  0.42* 0.43* 0.67   GLC  --   --   --   --  107* 82 76   JAZLYN  --   --   --   --  8.5 8.6 8.5    < > = values in this interval not displayed.     Liver Panel  Recent Labs   Lab 08/13/20  0929 08/11/20  0618   PROTTOTAL 6.1* 5.9*   ALBUMIN 3.0* 2.9*   BILITOTAL 1.3 0.7   ALKPHOS 68 68   AST 10 10   ALT 9 10     INR    Recent Labs   Lab Test 08/09/20  1402  05/31/15  1613 01/20/15  1600   INR 1.13 1.07 1.01      Lipid Profile    Recent Labs   Lab Test 06/21/15  0935 06/28/13  0703   CHOL 125 177   HDL 35* 33*   LDL 62 117   TRIG 141 135   CHOLHDLRATIO 3.6 5.4*     A1C  No lab results found.  Troponin I    Recent Labs   Lab 08/10/20  0607   TROPI 0.340*          Stroke Code / Stroke Consult Data Data This was a non-emergent, non-tele stroke consult.

## 2020-08-16 NOTE — PLAN OF CARE
Pt here with altered mental status. Unable to assess orientation or neuros. Pt somnolent most of shift, MD notified. CT done, no change from previous. EEG also done, pending. Pt started to wake up more towards end of shift, able to squeeze hands, wiggle toes and nodded yes/no.  VSS on RA. Dd1 diet, nectar thick liquids. Takes pills crushed. Did not give oral medications today d/t lethargy. Up with lift. T/R q 2 hrs. Incontinent, purerwick in place with adequate output. Denies pain. Pt scoring green on the Aggression Stop Light Tool. Discharge pending progress. Continue to monitor.

## 2020-08-16 NOTE — PLAN OF CARE
SLP: Orders received and chart reviewed. Patient asleep RN and visitor at bedside, report difficulty arousing patient in deep sleep. SLP will check back later in day as able, continue to follow for swallow evaluation.     Addendum: Additional attempt this pm. Pt briefly opened eyes, however, did not stay awake despite cues/stim.

## 2020-08-16 NOTE — PROGRESS NOTES
[facility]    Internal Medicine Hospitalist Progress Note  08/16/2020  I evaluated patient on the above date.    Jake Crook Jr., MD  [phone] (p)  Text Page        Assessment & Plan New actions/orders today (08/16/2020) are underlined.    Joelle Freeman is a 89 year old female with past medical history including HTN; CAD; CVI with right sided weakness; severe AS; NPH s/p  shunt; factor V Leiden (heterozygous); falls; and recent hospitalizations (7/19-7/21 for syncope and bilateral chronic subdural hygromas vs chronic SDH, and 8/2-8/5 for AMS and UTI).     She presented from TCU on 8/9/2020 with altered mental status.  Code stroke was initiated in the ED with essentially unremarkable imaging for acute intracranial pathology but was found incidentally with bilateral pulmonary emboli noted on CTA head and neck.  Neurology and Neurosurgery consulted.  ED MD discussed with Neurosurgery who was okay with therapeutic heparin infusion without boluses. Patient admitted for further management.    Acute encephalopathy, suspect multifactorial (metabolic and infectious) including possible seizures, UTI and bilateral PE on top of underlying advanced dementia, bilateral chronic subdural hygromas/hematomas, prior stroke and NPH.   * Recently hospitalization 7/19-7/21 for syncope and found with bilateral chronic subdural hygromas vs chronic SDH. Hospitalized again 8/2-8/5 for AMS and UTI.   * Reportedly not eating or drinking much in the 2 days PTA at TCU. On the morning of presentation 8/9, was only responsive to sternal rub.  In ED 8/9, patient was initially not responding or following commands and was only withdrawing to noxious stimuli.  A little later  patient was more awake interacting with her sons and following commands  Code stroke was called in the ED. Head CT 8/9 showed no acute intracranial pathology, there was some slight interval increase in thickness of hypodense subdural collections  "along the cerebral convexities bilaterally now measuring up to 0.8 cm maximum thickness on the right and 0.5 cm maximum thickness on the left. CT head perfusion 8/9 negative. CTA head and neck 8/9 no major head or neck findings, but did show large pulmonary emboli at the distal ends of the main pulmonary arteries bilaterally (see below).  * Neurology and Neurosurgery consulted. Empiric Keppra started on admit. Video EEG 8/11-8/12 showed epileptiform discharges right anterior/mid temporal region with focal cerebral dysfunction bilaterally; no clinical seizures seen.  * On 8/13, pt remained overall lethargic with minimal oral intake,at times is able to be more alert and a bit more responsive. Dr. Trejo  discussed goals of care again with her son, Osman and noted that pt would not want feeding tube; did not want to pursue MRI; he wanted to observe for 48 hours and if no improvement would consider palliative cares.   * On 8/14, pt much improved and was being fed food; son was not ready for hospice and wanted continued restorative cares, but understood that if she continued to decline with repeat hospitalizations, he would consider hospice at that point. On 8/16, pt more somnolent/lethargic; head CT 8/16 stable, unclear etiology, ?seizure, less likely CVI as on AC, but still possible.  - Check EEG STAT.  - If EEG negative, consider brain MRI.  - Continue Keppra 500 mg BID.  - Continue PT, OT, SLP.  - Management of other issues as below.  - Re-orient as needed.  - Maintain normal day/night, sleep/wake cycles.  - Minimize sedating medications as able.    ADDENDUM 1337:  I d/w Stroke Neuro.  - For now, will increase Keppra to 750 mg BID (change to IV for now).  - EEG pending.  - Appreciate help.    Acute bilateral pulmonary emboli, \"incidentally\" found on CTA of head and neck.  History of heterozygous FVL mutation.  CTA head and neck 8/9 no major head or neck findings, but did show large pulmonary emboli at the distal ends " of the main pulmonary arteries bilaterally. Neurosurgery discussed with the ED MD and were okay with IV heparin infusion without boluses. Head CT 8/10 stable. Transitioned to apixaban 8/14.  - Continue apixaban.    FEN.  Moderate oropharyngeal dysphagia.  Inadequate oral intake.  Issues with encephalopathy as below. Seen by SLP.  Downgraded diet to DDL 1 with NTL.   - Continue DD1 with nectar thickened liquids.  - Continue nutritional supplements.  - Continue D5 1/2 NS with 20 meq KCl.     Recently noted LBBB.  Chronic troponin elevation.  * During her recent prior hospitalization her troponins were 0.46, 0.47. Noted with LBBB at that time, not noted previously. At that time she had an echocardiogram which showed a preserved EF and no wall motion abnormalities.  * Trop 0.364 on admit. No chest pain or dyspnea on admit. Trop subsequently flat. Family previously not interested in any potential cardiac interventions given advanced age and advanced dementia.  - Monitor clinically.    Possible candidal UTI vs colonization.  H/o recurrent UTI.  * Had completed 2 courses of antibiotics over last few weeks for UTI.  Recently discharged from [facility] 8/5 on antibiotics for UTI.   * No urinary symptoms reported on admission. Recheck UA on admit showed > 182 WBC, 53 RBC, negative nitrite, no bacteria.  Urine cultures growing Candida. Fluconazole started 8 /14.  - Continue fluconazole (started 8/14) - stop after 8/20.    Hypertension (benign essential).  CAD - RCA stent 2001.  Severe aortic stenosis.  [PTA BP meds: lisinopril 10 mg daily; metoprolol 12.5 mg BID.]  * Recent admission for syncope 7/2020 as above. Echocardiogram on 07/20/2020 showed aortic valve area of 0.5 square cm as per prior notes. Not considered a surgical candidate.   - Continue metoprolol.  - Restart atorvastatin, lisinopril in the next few days as oral intake improves.  - No ASA given bilateral chronic subdural hygromas/hematomas.  - Monitor i/o's, daily  wts.    Recently discovered bilateral chronic subdural hygromas vs hematomas.  * Noted 7/2020. ASA stopped at that time.  * Head CT imaging this stay as above. Seen by Neurology and Neurosurgery this stay as above. Started on anticoagulation this hospitalization as above.  - Monitor closely while on anticoagulation.    CVI history (1999) with chronic right sided weakness.  - Continue PT, OT.     Hypothyroidism.    * Recently, her TSH was low at 0.22 and free T4 was high at 53.    Her Synthroid was reduced from 88 to 75 mcg daily.  Recheck TSH at 1.36 on 8/10.  - Continue levothyroxine 75 mcg daily.    Hypokalemia.  - Continue PRN K replacement.     Depression/anxiety.  - Continue PTA Lexapro when able.     History of dyslipidemia  - PTA statin to continue when able to take p.o.       COVID-19 testing.  COVID-19 PCR Results    COVID-19 PCR Results 5/20/20 6/22/20 6/29/20 7/6/20 7/19/20 7/22/20 8/2/20 8/9/20   COVID-19 Virus PCR to [facility] - Result  Undetected Undetected        COVID-19 Virus PCR to [facility] - Result Not Detected   Not Detected Not Detected Not Detected Not Detected Not Detected   COVID-19 Virus PCR to [facility] - Source Nasopharyngeal   Nasopharyngeal Nasopharyngeal Nasopharyngeal Nasopharyngeal Nasopharyngeal   COVID-19 Virus PCR Source  Nasopharynx Nasopharyngeal           Comments are available for some flowsheets but are not being displayed.         COVID-19 Antibody Results, Testing for Immunity    COVID-19 Antibody Results, Testing for Immunity   No data to display.             Diet: Dysphagia Diet Level 1 Pureed Nectar Thickened Liquids (pre-thickened or use instant food thickener)  Snacks/Supplements Adult: Magic Cup; With Meals  Room Service    Prophylaxis: PCD's, ambulation. On apixaban.  Benítez Catheter: not present  Code Status: No CPR- Do NOT Intubate    Disposition Plan   Expected discharge: 1-2d, recommended to transitional care unit if continues to improve.  Entered: Jake Crook MD  08/16/2020, 12:47 PM       Communication.  - I d/w pt's son, Osman, 8/16.    Interval History   Pt somnolent/lethargic since this am. Was stable last night.  Opening eyes now to voice (earlier difficult to arouse even with sternal rub).  Not able to converse or answer questions.    -Data reviewed today: I reviewed all new labs and imaging over the last 24 hours. I personally reviewed no images or EKG's today.    Physical Exam   Heart Rate: 60, Blood pressure (!) 151/74, pulse 62, temperature 97.4  F (36.3  C), temperature source Oral, resp. rate 16, weight 52 kg (114 lb 9.6 oz), SpO2 98 %.  Vitals:    08/11/20 0459 08/15/20 0700 08/16/20 0202   Weight: 54.5 kg (120 lb 2.4 oz) 54.3 kg (119 lb 12.1 oz) 52 kg (114 lb 9.6 oz)     Vital Signs with Ranges  Temp:  [97.4  F (36.3  C)-98.6  F (37  C)] 97.4  F (36.3  C)  Heart Rate:  [57-90] 60  Resp:  [16] 16  BP: (127-151)/(72-82) 151/74  SpO2:  [96 %-99 %] 98 %  Patient Vitals for the past 24 hrs:   BP Temp Temp src Heart Rate Resp SpO2 Weight   08/16/20 1157 (!) 151/74 97.4  F (36.3  C) Oral 60 16 -- --   08/16/20 0822 (!) 146/82 98.6  F (37  C) Oral 57 16 -- --   08/16/20 0433 127/73 98  F (36.7  C) Oral 59 16 98 % --   08/16/20 0202 -- -- -- -- -- -- 52 kg (114 lb 9.6 oz)   08/16/20 0020 -- -- -- -- 16 -- --   08/15/20 2215 -- -- -- -- 16 -- --   08/15/20 2015 -- -- -- -- 16 -- --   08/15/20 2001 129/72 98.2  F (36.8  C) Oral 73 -- 96 % --   08/15/20 1613 132/76 97.9  F (36.6  C) Oral 90 16 99 % --     I/O's Last 24 hours  I/O last 3 completed shifts:  In: 2331 [P.O.:560; I.V.:1771]  Out: 860 [Urine:860]    Constitutional: Somnolent, able to open eyes briefly, not following commands, except was able to squeeze with her right hand briefly.  Respiratory: Diminished in bases. No crackles or wheezes.  Cardiovascular: RRR, +I/VI systolic m, no g/r.  GI: Soft, nt, nd, +BS.  Skin/Integumen:   Other:  Neuro - somnolent, opens eyes briefly, able to squeeze with right hand  briefly, otherwise, not following commands.      Data   Recent Labs   Lab 08/16/20  0941 08/15/20  1908 08/15/20  0601  08/14/20  0856 08/13/20  0929 08/12/20  0549 08/11/20  0618 08/10/20  0607 08/09/20  1402   WBC  --   --  6.8  --   --   --  6.3 6.9 7.8   < > 8.3   HGB  --   --  13.3  --   --   --  11.2* 12.2 12.5   < > 13.9   MCV  --   --  93  --   --   --  96 97 97   < > 95   PLT  --   --  158  --   --   --  127* 127* 144*   < > 148*   INR  --   --   --   --   --   --   --   --   --   --  1.13   NA  --   --   --   --  142 141  --  144 143  --  141   POTASSIUM 4.0 3.9 3.2*   < > 3.3* 3.3*  --  3.5 3.6  --  3.8   CHLORIDE  --   --   --   --  112* 113*  --  114* 114*  --  111*   CO2  --   --   --   --  26 20  --  25 25  --  28   BUN  --   --   --   --  4* 8  --  14 14  --  15   CR  --   --   --   --  0.42* 0.43*  --  0.67 0.79  --  0.88   ANIONGAP  --   --   --   --  4 8  --  5 4  --  2*   JAZLYN  --   --   --   --  8.5 8.6  --  8.5 8.2*  --  9.3   GLC  --   --   --   --  107* 82  --  76 94  --  104*   ALBUMIN  --   --   --   --   --  3.0*  --  2.9*  --   --   --    PROTTOTAL  --   --   --   --   --  6.1*  --  5.9*  --   --   --    BILITOTAL  --   --   --   --   --  1.3  --  0.7  --   --   --    ALKPHOS  --   --   --   --   --  68  --  68  --   --   --    ALT  --   --   --   --   --  9  --  10  --   --   --    AST  --   --   --   --   --  10  --  10  --   --   --    TROPI  --   --   --   --   --   --   --   --  0.340*  --  0.364*    < > = values in this interval not displayed.     Recent Labs   Lab Test 08/14/20  0856 08/13/20  0929 08/11/20  0618 08/10/20  0607 08/09/20  1402  06/29/19  1231  06/22/15  0556 06/21/15  2358 06/21/15  2308 06/21/15  1805   * 82 76 94 104*   < >  --    < >  --   --   --   --    BGM  --   --   --   --   --   --  77  --  90 91 92 98    < > = values in this interval not displayed.         Recent Results (from the past 24 hour(s))   CT Head w/o Contrast    Narrative    CT SCAN OF  THE HEAD WITHOUT CONTRAST   8/16/2020 11:58 AM     HISTORY: Bilateral chronic subdural hematoma versus hygromas. Now on  anticoagulation for pulmonary embolism and somnolent. Evaluate for  acute interval changes.    TECHNIQUE: Axial images of the head and coronal reformations without  IV contrast material. Radiation dose for this scan was reduced using  automated exposure control, adjustment of the mA and/or kV according  to patient size, or iterative reconstruction technique.    COMPARISON: CT head 8/10/2020.    FINDINGS: Again seen are changes of previous right  frontoparietotemporal craniotomy with prominent extra-axial  mineralization underlying the craniotomy flap. There is a small focus  of pneumocephalus overlying the superolateral right frontal lobe which  is unchanged. The size and morphology of the bilateral predominantly  hypodense subdural fluid collections overlying the cerebral  convexities and continuing along the superior aspect of both tentorial  leaflets is now significantly changed compared to the most recent  exam. There is again mild associated mass effect on the cerebral  hemispheres without midline shift/herniation. Unchanged appearance of  the right occipital approach ventricular shunt catheter, with tip  crossing midline and terminating in the anterior aspect of the left  lateral ventricle. No acute intracranial hemorrhage identified. Mild  to moderate global parenchymal volume loss and mild presumed chronic  small vessel ischemic disease. 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    Right lens replacement. Orbits otherwise normal. Near-complete  opacification of the left sphenoid sinus, as before, presumably  inflammatory. Mastoid and middle ear cavities are clear. No other  significant change.      Impression    IMPRESSION:  1. No significant change in the bilateral small holohemispheric  cerebral convexity subdural fluid collections with mild  associated  mass effect. No midline shift/herniation.  2. Stable, normal size of the ventricular system with right occipital  approach ventricular shunt catheter in place.  3. No acute intracranial abnormality. Other stable chronic findings,  as detailed.       Medications   All medications were reviewed.    dextrose 5% and 0.45% NaCl + KCl 20 mEq/L 75 mL/hr at 08/16/20 0024     - MEDICATION INSTRUCTIONS -         apixaban ANTICOAGULANT  10 mg Oral BID     fluconazole  200 mg Oral Daily     levETIRAcetam  500 mg Oral BID     levothyroxine  75 mcg Oral Daily     metoprolol tartrate  12.5 mg Oral BID     sodium chloride (PF)  3 mL Intracatheter Q8H

## 2020-08-16 NOTE — PLAN OF CARE
Pt here with AMS and bilateral PE and hygromas. A&O to self. Neuros with generalized weakness 4/5, slow speech. Lethargic at 0400 not following commands. [place]. VSS. DD1 diet, nectar thick liquids. Takes pills crushed in applesauce/pudding. Up with A2 lift- turn and reposition. Purewick in place- some redness (improving) no breakdown. Denies pain. Tylenol and melatonin given to promote sleep. Pt scoring yellow on the Aggression Stop Light Tool. Plan for discharge to TCU when medically stable.

## 2020-08-16 NOTE — PROGRESS NOTES
SYED    D: SW following for discharge needs. Patient admitted from [facility]. SW spoke with admission patient will considered a new admission as she did not hold bed. Patient's son Osman [phone] informed of this and he is in agreement with sending referral to [facility]. Sent referral to [facility] via DOD, to check bed availability.    P: Will continue to follow.

## 2020-08-16 NOTE — PROGRESS NOTES
EEG stat portable per Dr JENIFFER Crook ordering  mary lou Martínez, follows tech to open close eyes, responds to pain.  CC Neuro to follow

## 2020-08-16 NOTE — PLAN OF CARE
Pt here with change in mental status. Disoriented to all but self. Neuros difficult to assess, pt very somnolent in beginning of shift, more alert toward end. [place]. Not following commands. PERRLA. VSS on RA. DD1 diet with nectar liquids. Not alert enough today to take PO. Up with lift. Incontinent of bowel and bladder. Denies pain. Pt scoring green on the Aggression Stop Light Tool. Discharge pending progress.

## 2020-08-17 ENCOUNTER — APPOINTMENT (OUTPATIENT)
Dept: MRI IMAGING | Facility: CLINIC | Age: 85
DRG: 070 | End: 2020-08-17
Payer: MEDICARE

## 2020-08-17 ENCOUNTER — APPOINTMENT (OUTPATIENT)
Dept: GENERAL RADIOLOGY | Facility: CLINIC | Age: 85
DRG: 070 | End: 2020-08-17
Attending: RADIOLOGY
Payer: MEDICARE

## 2020-08-17 ENCOUNTER — APPOINTMENT (OUTPATIENT)
Dept: SPEECH THERAPY | Facility: CLINIC | Age: 85
DRG: 070 | End: 2020-08-17
Attending: INTERNAL MEDICINE
Payer: MEDICARE

## 2020-08-17 LAB
CREAT SERPL-MCNC: 0.54 MG/DL (ref 0.52–1.04)
GFR SERPL CREATININE-BSD FRML MDRD: 82 ML/MIN/{1.73_M2}

## 2020-08-17 PROCEDURE — 25000128 H RX IP 250 OP 636: Performed by: INTERNAL MEDICINE

## 2020-08-17 PROCEDURE — 82565 ASSAY OF CREATININE: CPT | Performed by: HOSPITALIST

## 2020-08-17 PROCEDURE — 40000985 XR SKULL 1/3 VW

## 2020-08-17 PROCEDURE — 25800030 ZZH RX IP 258 OP 636: Performed by: HOSPITALIST

## 2020-08-17 PROCEDURE — 36415 COLL VENOUS BLD VENIPUNCTURE: CPT | Performed by: HOSPITALIST

## 2020-08-17 PROCEDURE — 25000132 ZZH RX MED GY IP 250 OP 250 PS 637: Mod: GY | Performed by: INTERNAL MEDICINE

## 2020-08-17 PROCEDURE — 70551 MRI BRAIN STEM W/O DYE: CPT | Mod: 52

## 2020-08-17 PROCEDURE — 70250 X-RAY EXAM OF SKULL: CPT

## 2020-08-17 PROCEDURE — 25800030 ZZH RX IP 258 OP 636: Performed by: INTERNAL MEDICINE

## 2020-08-17 PROCEDURE — 25000132 ZZH RX MED GY IP 250 OP 250 PS 637: Mod: GY | Performed by: HOSPITALIST

## 2020-08-17 PROCEDURE — 99232 SBSQ HOSP IP/OBS MODERATE 35: CPT | Performed by: PSYCHIATRY & NEUROLOGY

## 2020-08-17 PROCEDURE — 99232 SBSQ HOSP IP/OBS MODERATE 35: CPT | Performed by: INTERNAL MEDICINE

## 2020-08-17 PROCEDURE — 12000000 ZZH R&B MED SURG/OB

## 2020-08-17 PROCEDURE — 92610 EVALUATE SWALLOWING FUNCTION: CPT | Mod: GN | Performed by: SPEECH-LANGUAGE PATHOLOGIST

## 2020-08-17 RX ORDER — GADOBUTROL 604.72 MG/ML
5 INJECTION INTRAVENOUS ONCE
Status: DISCONTINUED | OUTPATIENT
Start: 2020-08-17 | End: 2020-08-17 | Stop reason: CLARIF

## 2020-08-17 RX ORDER — LEVETIRACETAM 100 MG/ML
750 SOLUTION ORAL 2 TIMES DAILY
Status: DISCONTINUED | OUTPATIENT
Start: 2020-08-17 | End: 2020-08-20 | Stop reason: HOSPADM

## 2020-08-17 RX ORDER — NICOTINE POLACRILEX 4 MG
15-30 LOZENGE BUCCAL
Status: CANCELLED | OUTPATIENT
Start: 2020-08-17

## 2020-08-17 RX ORDER — DEXTROSE MONOHYDRATE 25 G/50ML
25-50 INJECTION, SOLUTION INTRAVENOUS
Status: CANCELLED | OUTPATIENT
Start: 2020-08-17

## 2020-08-17 RX ADMIN — LEVOTHYROXINE SODIUM 75 MCG: 75 TABLET ORAL at 08:24

## 2020-08-17 RX ADMIN — LEVETIRACETAM 750 MG: 100 SOLUTION ORAL at 21:26

## 2020-08-17 RX ADMIN — LEVETIRACETAM 750 MG: 100 INJECTION, SOLUTION INTRAVENOUS at 00:46

## 2020-08-17 RX ADMIN — LEVETIRACETAM 750 MG: 100 INJECTION, SOLUTION INTRAVENOUS at 12:42

## 2020-08-17 RX ADMIN — FLUCONAZOLE 200 MG: 200 TABLET ORAL at 08:24

## 2020-08-17 RX ADMIN — APIXABAN 10 MG: 5 TABLET, FILM COATED ORAL at 21:26

## 2020-08-17 RX ADMIN — POTASSIUM CHLORIDE, DEXTROSE MONOHYDRATE AND SODIUM CHLORIDE: 150; 5; 450 INJECTION, SOLUTION INTRAVENOUS at 21:43

## 2020-08-17 RX ADMIN — POTASSIUM CHLORIDE, DEXTROSE MONOHYDRATE AND SODIUM CHLORIDE: 150; 5; 450 INJECTION, SOLUTION INTRAVENOUS at 08:24

## 2020-08-17 RX ADMIN — METOPROLOL TARTRATE 12.5 MG: 25 TABLET, FILM COATED ORAL at 08:24

## 2020-08-17 RX ADMIN — METOPROLOL TARTRATE 12.5 MG: 25 TABLET, FILM COATED ORAL at 21:26

## 2020-08-17 RX ADMIN — APIXABAN 10 MG: 5 TABLET, FILM COATED ORAL at 08:24

## 2020-08-17 ASSESSMENT — ACTIVITIES OF DAILY LIVING (ADL)
ADLS_ACUITY_SCORE: 35
ADLS_ACUITY_SCORE: 39
ADLS_ACUITY_SCORE: 35
ADLS_ACUITY_SCORE: 39

## 2020-08-17 NOTE — PLAN OF CARE
Pt here with AMS and bilateral PE and hygromas. Oriented to self. Lethargic- improving thought night- will open eyes and follow minimal commands. Neuros with generalized weakness 4/5, slow speech. [place]. VSS. DD1 diet, nectar thick liquids. Takes pills crushed in applesauce/pudding. Up with A2 lift- turn and reposition. Purewick in place- some redness (improving) no breakdown. Denies pain. Slept on and off. Pt scoring yellow on the Aggression Stop Light Tool. Plan for discharge to TCU when medically stable.

## 2020-08-17 NOTE — PLAN OF CARE
Lethargic. RONNY orientation. Minimal verbal attempts. VSS on RA. Inconsistently follows commands. RONNY neuro. IV infusing. Purewick in place. DD1 diet with nectar thick liquid. Absent of nonverbal indicators of pain. Turn  and repo. MRI and skull Xray pending.

## 2020-08-17 NOTE — PROGRESS NOTES
[facility]    Internal Medicine Hospitalist Progress Note  08/17/2020  I evaluated patient on the above date.    Jake Crook Jr., MD  [phone] (p)  Text Page        Assessment & Plan New actions/orders today (08/17/2020) are underlined.    Joelle Freeman is a 89 year old female with past medical history including HTN; CAD; CVI with right sided weakness; severe AS; NPH s/p  shunt; factor V Leiden (heterozygous); falls; and recent hospitalizations (7/19-7/21 for syncope and bilateral chronic subdural hygromas vs chronic SDH, and 8/2-8/5 for AMS and UTI).     She presented from TCU on 8/9/2020 with altered mental status.  Code stroke was initiated in the ED with essentially unremarkable imaging for acute intracranial pathology but was found incidentally with bilateral pulmonary emboli noted on CTA head and neck.  Neurology and Neurosurgery consulted.  ED MD discussed with Neurosurgery who was okay with therapeutic heparin infusion without boluses. Patient admitted for further management.    Acute encephalopathy, suspect multifactorial (metabolic and infectious) including suspected seizures, UTI and bilateral PE on top of underlying advanced dementia, bilateral chronic subdural hygromas/hematomas, prior stroke and NPH.   * Recently hospitalization 7/19-7/21 for syncope and found with bilateral chronic subdural hygromas vs chronic SDH. Hospitalized again 8/2-8/5 for AMS and UTI.   * Reportedly not eating or drinking much in the 2 days PTA at TCU. On the morning of presentation 8/9, was only responsive to sternal rub.  In ED 8/9, patient was initially not responding or following commands and was only withdrawing to noxious stimuli.  A little later  patient was more awake interacting with her sons and following commands  Code stroke was called in the ED. Head CT 8/9 showed no acute intracranial pathology, there was some slight interval increase in thickness of hypodense subdural collections  "along the cerebral convexities bilaterally now measuring up to 0.8 cm maximum thickness on the right and 0.5 cm maximum thickness on the left. CT head perfusion 8/9 negative. CTA head and neck 8/9 no major head or neck findings, but did show large pulmonary emboli at the distal ends of the main pulmonary arteries bilaterally (see below).  * Neurology and Neurosurgery consulted. Empiric Keppra started on admit. Video EEG 8/11-8/12 showed epileptiform discharges right anterior/mid temporal region with focal cerebral dysfunction bilaterally; no clinical seizures seen.  * On 8/13, pt remained overall lethargic with minimal oral intake,at times is able to be more alert and a bit more responsive. Dr. Trejo  discussed goals of care again with her son, Osman and noted that pt would not want feeding tube; did not want to pursue MRI; he wanted to observe for 48 hours and if no improvement would consider palliative cares.   * On 8/14, pt much improved and was being fed food; son was not ready for hospice and wanted continued restorative cares, but understood that if she continued to decline with repeat hospitalizations, he would consider hospice at that point.   * On 8/16, pt more somnolent/lethargic; STAT head CT stable; STAT EEG did not show active seizures, though there were signs of diffuse encephalopathy; seen by Neurology again and suspected that pt suffered a seizure and Keppra increased and changed back to IV. Much improved and more conversant on 8/17.  - Continue Keppra 750 mg BID; change back to PO but add PRN IV Keppra if pt cannot take PO.  - Continue PT, OT, SLP.  - Management of other issues as below.  - Re-orient as needed.  - Maintain normal day/night, sleep/wake cycles.  - Minimize sedating medications as able.  - Appreciate Neurology help.    Acute bilateral pulmonary emboli, \"incidentally\" found on CTA of head and neck.  History of heterozygous FVL mutation.  CTA head and neck 8/9 no major head or neck " findings, but did show large pulmonary emboli at the distal ends of the main pulmonary arteries bilaterally. Neurosurgery discussed with the ED MD and were okay with IV heparin infusion without boluses. Head CT 8/10 stable. Transitioned to apixaban 8/14.  - Continue apixaban.    FEN.  Moderate oropharyngeal dysphagia.  Inadequate oral intake.  Issues with encephalopathy as below. Seen by SLP.  Downgraded diet to DDL 1 with NTL.   - Continue DD1 with nectar thickened liquids.  - Continue nutritional supplements.  - Continue D5 1/2 NS with 20 meq KCl.     Recently noted LBBB.  Chronic troponin elevation.  * During her recent prior hospitalization her troponins were 0.46, 0.47. Noted with LBBB at that time, not noted previously. At that time she had an echocardiogram which showed a preserved EF and no wall motion abnormalities.  * Trop 0.364 on admit. No chest pain or dyspnea on admit. Trop subsequently flat. Family previously not interested in any potential cardiac interventions given advanced age and advanced dementia.  - Monitor clinically.    Possible candidal UTI vs colonization.  H/o recurrent UTI.  * Had completed 2 courses of antibiotics over last few weeks for UTI.  Recently discharged from [facility] 8/5 on antibiotics for UTI.   * No urinary symptoms reported on admission. Recheck UA on admit showed > 182 WBC, 53 RBC, negative nitrite, no bacteria.  Urine cultures growing Candida. Fluconazole started 8 /14.  - Continue fluconazole (started 8/14) - stop after 8/20.    Hypertension (benign essential).  CAD - RCA stent 2001.  Severe aortic stenosis.  [PTA BP meds: lisinopril 10 mg daily; metoprolol 12.5 mg BID.]  * Recent admission for syncope 7/2020 as above. Echocardiogram on 07/20/2020 showed aortic valve area of 0.5 square cm as per prior notes. Not considered a surgical candidate.   - Continue metoprolol.  - Restart atorvastatin, lisinopril in the next few days as oral intake improves.  - No ASA given bilateral  chronic subdural hygromas/hematomas.  - Monitor i/o's, daily wts.    Recently discovered bilateral chronic subdural hygromas vs hematomas.  * Noted 7/2020. ASA stopped at that time.  * Head CT imaging this stay as above. Seen by Neurology and Neurosurgery this stay as above. Started on anticoagulation this hospitalization as above.  - Monitor closely while on anticoagulation.    CVI history (1999) with chronic right sided weakness.  - Continue PT, OT.     Hypothyroidism.    * Recently, her TSH was low at 0.22 and free T4 was high at 53.    Her Synthroid was reduced from 88 to 75 mcg daily.  Recheck TSH at 1.36 on 8/10.  - Continue levothyroxine 75 mcg daily.    Hypokalemia.  - Continue PRN K replacement.     Depression/anxiety.  - Continue PTA Lexapro when able.     History of dyslipidemia  - PTA statin to continue when able to take p.o.       COVID-19 testing.  COVID-19 PCR Results    COVID-19 PCR Results 5/20/20 6/22/20 6/29/20 7/6/20 7/19/20 7/22/20 8/2/20 8/9/20   COVID-19 Virus PCR to [place] - Result  Undetected Undetected        COVID-19 Virus PCR to [facility] - Result Not Detected   Not Detected Not Detected Not Detected Not Detected Not Detected   COVID-19 Virus PCR to [facility] - Source Nasopharyngeal   Nasopharyngeal Nasopharyngeal Nasopharyngeal Nasopharyngeal Nasopharyngeal   COVID-19 Virus PCR Source  Nasopharynx Nasopharyngeal           Comments are available for some flowsheets but are not being displayed.         COVID-19 Antibody Results, Testing for Immunity    COVID-19 Antibody Results, Testing for Immunity   No data to display.             Diet: Dysphagia Diet Level 1 Pureed Nectar Thickened Liquids (pre-thickened or use instant food thickener)  Snacks/Supplements Adult: Magic Cup; With Meals  Room Service    Prophylaxis: PCD's, ambulation. On apixaban.  Benítez Catheter: not present  Code Status: No CPR- Do NOT Intubate    Disposition Plan   Expected discharge: 1-2d, recommended to transitional care  "unit if continues to improve.  Entered: Jake Crook MD 08/17/2020, 1:38 PM       Communication.  - I d/w pt's son, Osman, 8/16.    Interval History   Doing much better today.  \"I'm OK\".  Much more conversant (relatively).  Denies pain.    -Data reviewed today: I reviewed all new labs and imaging over the last 24 hours. I personally reviewed no images or EKG's today.    Physical Exam   Heart Rate: 111, Blood pressure 132/63, pulse 62, temperature 97.9  F (36.6  C), temperature source Axillary, resp. rate 16, weight 52 kg (114 lb 9.6 oz), SpO2 91 %.  Vitals:    08/11/20 0459 08/15/20 0700 08/16/20 0202   Weight: 54.5 kg (120 lb 2.4 oz) 54.3 kg (119 lb 12.1 oz) 52 kg (114 lb 9.6 oz)     Vital Signs with Ranges  Temp:  [97.5  F (36.4  C)-97.9  F (36.6  C)] 97.9  F (36.6  C)  Heart Rate:  [] 111  Resp:  [16-18] 16  BP: (128-158)/(63-77) 132/63  SpO2:  [91 %-96 %] 91 %  Patient Vitals for the past 24 hrs:   BP Temp Temp src Heart Rate Resp SpO2   08/17/20 1100 132/63 97.9  F (36.6  C) Axillary 111 16 91 %   08/17/20 0748 139/77 97.8  F (36.6  C) Axillary 67 16 --   08/17/20 0400 -- -- -- -- 18 --   08/17/20 0200 -- -- -- -- 16 --   08/17/20 0029 (!) 158/67 97.5  F (36.4  C) Axillary 57 16 96 %   08/16/20 2012 -- -- -- -- 16 --   08/16/20 1957 128/71 97.8  F (36.6  C) Axillary 65 16 95 %   08/16/20 1542 (!) 141/74 97.5  F (36.4  C) Axillary 63 16 96 %     I/O's Last 24 hours  I/O last 3 completed shifts:  In: -   Out: 875 [Urine:875]    Constitutional: Awake, alert, more conversant, follows commands.  Respiratory: Diminished in bases. No crackles or wheezes.  Cardiovascular: RRR, +I/VI systolic m, no g/r.  GI: Soft, nt, nd, +BS.  Skin/Integumen:   Other:       Data   Recent Labs   Lab 08/17/20  0752 08/16/20  0941 08/15/20  1908 08/15/20  0601  08/14/20  0856 08/13/20  0929 08/12/20  0549 08/11/20  0618   WBC  --   --   --  6.8  --   --   --  6.3 6.9   HGB  --   --   --  13.3  --   --   --  11.2* 12.2   MCV  " --   --   --  93  --   --   --  96 97   PLT  --   --   --  158  --   --   --  127* 127*   NA  --   --   --   --   --  142 141  --  144   POTASSIUM  --  4.0 3.9 3.2*   < > 3.3* 3.3*  --  3.5   CHLORIDE  --   --   --   --   --  112* 113*  --  114*   CO2  --   --   --   --   --  26 20  --  25   BUN  --   --   --   --   --  4* 8  --  14   CR 0.54  --   --   --   --  0.42* 0.43*  --  0.67   ANIONGAP  --   --   --   --   --  4 8  --  5   JAZLYN  --   --   --   --   --  8.5 8.6  --  8.5   GLC  --   --   --   --   --  107* 82  --  76   ALBUMIN  --   --   --   --   --   --  3.0*  --  2.9*   PROTTOTAL  --   --   --   --   --   --  6.1*  --  5.9*   BILITOTAL  --   --   --   --   --   --  1.3  --  0.7   ALKPHOS  --   --   --   --   --   --  68  --  68   ALT  --   --   --   --   --   --  9  --  10   AST  --   --   --   --   --   --  10  --  10    < > = values in this interval not displayed.     Recent Labs   Lab Test 08/14/20  0856 08/13/20  0929 08/11/20  0618 08/10/20  0607 08/09/20  1402  06/29/19  1231  06/22/15  0556 06/21/15  2358 06/21/15  2308 06/21/15  1805   * 82 76 94 104*   < >  --    < >  --   --   --   --    BGM  --   --   --   --   --   --  77  --  90 91 92 98    < > = values in this interval not displayed.         No results found for this or any previous visit (from the past 24 hour(s)).    Medications   All medications were reviewed.    dextrose 5% and 0.45% NaCl + KCl 20 mEq/L 75 mL/hr at 08/17/20 0824     - MEDICATION INSTRUCTIONS -         apixaban ANTICOAGULANT  10 mg Oral BID     fluconazole  200 mg Oral Daily     levETIRAcetam  750 mg Intravenous Q12H     levothyroxine  75 mcg Oral Daily     metoprolol tartrate  12.5 mg Oral BID     sodium chloride (PF)  3 mL Intracatheter Q8H

## 2020-08-17 NOTE — PLAN OF CARE
PT/OT: Attempted to see pt for therapy. Pt more alert today, [place] but conversant, illogical statements. Attempted to engage patient in supine BLE AAROM/exercises, rolling, EOB activity. Pt not following commands, grabbing at writer's badge and shield, difficult to redirect. Not appropriate for therapy at this time. RN updated.

## 2020-08-17 NOTE — PROGRESS NOTES
08/17/20 0931   General Information   Onset Date 08/09/20   Start of Care Date 08/17/20   Referring Physician Dr. Crook   Patient Profile Review/OT: Additional Occupational Profile Info See Profile for full history and prior level of function   Patient/Family Goals Statement Did not state.    Swallowing Evaluation Bedside swallow evaluation   Behaviorial Observations Alert;Confused   Mode of current nutrition Oral diet   Type of oral diet Dysphagia diet level 1;Nectar - thick liquid   Respiratory Status Room air   Comments Acute encephalopathy, suspect multifactorial (metabolic and infectious) including possible seizures, UTI and bilateral PE on top of underlying advanced dementia, bilateral chronic subdural hygromas/hematomas, prior stroke and NPH.  Patient known to this department and recently since 8/4/20 and on a DDL with nectar.    Clinical Swallow Evaluation   Oral Musculature unable to assess due to poor participation/comprehension   Structural Abnormalities none present   Dentition present and adequate   Mucosal Quality dry   Swallow Eval   Feeding Assistance dependent   Clinical Swallow Eval: Nectar Thick Liquid Texture Trial   Mode of Presentation, Nectar spoon;cup;self-fed;fed by clinician   Volume of Nectar Presented 4 oz of juice   Oral Phase, Nectar Premature pharyngeal entry   Pharyngeal Phase, Nectar impaired;reduction in laryngeal movement;repeated swallows   Diagnostic Statement Bolus hoding especially via the cup and delayed.    Clinical Swallow Eval: Puree Solid Texture Trial   Mode of Presentation, Puree spoon;fed by clinician   Volume of Puree Presented 1/2 bowl of oatmeal   Oral Phase, Puree Poor AP movement;Premature pharyngeal entry;Residue in oral cavity   Pharyngeal Phase, Puree impaired;reduction in laryngeal movement;repeated swallows   Diagnostic Statement mastication of pureed textures prolonged oral phase.    Oral Residue, Puree mid posterior tongue   Swallow Compensations   Swallow  Compensations Alternate viscosity of consistencies;Pacing;Reduce amounts;Multiple swallow   Results Suspect silent aspiration;Oral difficulties only   General Therapy Interventions   Planned Therapy Interventions Dysphagia Treatment   Dysphagia treatment Instruction of safe swallow strategies;Modified diet education   Swallow Eval: Clinical Impressions   Skilled Criteria for Therapy Intervention Skilled criteria met.  Treatment indicated.   Functional Assessment Scale (FAS) 3   Treatment Diagnosis Moderate dysphagia   Diet texture recommendations Dysphagia diet level 1;Nectar thick liquids   Recommended Feeding/Eating Techniques alternate between small bites and sips of food/liquid;check mouth frequently for oral residue/pocketing;maintain upright posture during/after eating for 30 mins;no straws;small sips/bites   Therapy Frequency 3x/week   Predicted Duration of Therapy Intervention (days/wks) 1 week   Anticipated Discharge Disposition inpatient rehabilitation facility   Risks and Benefits of Treatment have been explained. Yes   Patient, family and/or staff in agreement with Plan of Care Yes   Clinical Impression Comments Patient presents with moderate oral and pharyngeal dysphagia at bedside characterized by bolus holding, reduced bolus manipulation and AP transport with delayed swallow response. She was able to tolerate nectar thick liquids via the spoon with mild delay, via the cup suspect penetration due to increased bolus holding and multiple swallows. Masticated pureed textures and prolonged AP transport with delay. No overt Sx of aspiration when implementing swallow strategies.    Total Evaluation Time   Total Evaluation Time (Minutes) 20

## 2020-08-17 NOTE — PLAN OF CARE
Discharge Planner SLP   Patient plan for discharge: Not addressed.   Current status: bedside swallow evaluation completed per MD orders. Patient presents with moderate oral and pharyngeal dysphagia at bedside characterized by bolus holding, reduced bolus manipulation and AP transport with delayed swallow response. She was able to tolerate nectar thick liquids via the spoon with mild delay, via the cup suspect penetration due to increased bolus holding and multiple swallows. Masticated pureed textures and prolonged AP transport with delay. No overt Sx of aspiration when implementing swallow strategies.     Recommend: 1. Continue on the DDL 1 with nectar thick liquids by the spoon. 2. 1:1 assistance with feeding, small bites, alternating liquids solids, and slow pace of feeding. Crush medications and place in apple sauce. 3. SLP will follow up for diet tolerance.   Barriers to return to prior living situation: None  Recommendations for discharge: Back to TCU when stable.   Rationale for recommendations: Patient may need short term ST needs for dysphagia management. Patient near her recent baseline.        Entered by: Latanya Millan 08/17/2020 9:43 AM

## 2020-08-17 NOTE — PROGRESS NOTES
"  [facility]    Stroke Consult Note    Reason for Consult:  Global aphasia    Chief Complaint: Altered Mental Status       Interval Events: neurology re consulted due to AMS, by the time I saw the patient she had improved and was back to her previous state of being drowsy but easily arousabe, this morning she is much better and up and alert.       Impression  #AMS  --etiology: may be related to focal cerebral dysfunction vs seizure due to abnormal EEG findings superimpose on baseline of dementia. Maybe component of toxic metabolic and infectious given UTI   --Repeat CT shows stabledural fluid collection     Recommendations  --Increase keppra to 750 mg BID, if this causes irritability, may consider switching to Depakote   --Agree with general neurology consult for seizure management.           Thank you for this consult. No further stroke evaluation is recommended, so we will sign off. Please contact us with any additional questions.    The Stroke Staff is Dr. Jones.    Fransisco Quick MD  Vascular Neurology Fellow  To page me or covering stroke neurology team member, click here: [facility]COM   Choose \"On Call\" tab at top, then search dropdown box for \"Neurology Adult\", select location, press Enter, then look for stroke/neuro ICU/telestroke.      _____________________________________________________    Past Medical History   Past Medical History:   Diagnosis Date     Asthma      CAD (coronary artery disease)     stent post RCA 2001     Cerebral ventriculomegaly      CVA (cerebral infarction) 2002     Dementia (H)      Dementia (H)      Depression      Diverticulosis      Dyslipidemia      Heterozygous factor V Leiden mutation (H)      HTN (hypertension)      Hyperlipidemia      Hypothyroidism      Normal pressure hydrocephalus (H)      Posterior reversible encephalopathy syndrome      Subarachnoid hemorrhage (H) 3/23/2013     Subdural hematoma (H)      TIA (transient ischaemic attack)     multiple     TIA " (transient ischaemic attack)      Unspecified cerebral artery occlusion with cerebral infarction      Urinary incontinence      UTI (lower urinary tract infection)     recurrent     Past Surgical History   Past Surgical History:   Procedure Laterality Date     APPENDECTOMY       BIOPSY      breast     CARDIAC SURGERY      stent to the right coronary artery in 2001     CHOLECYSTECTOMY       ENT SURGERY      tonsillectomy     GENITOURINARY SURGERY      Bladder suspension     GYN SURGERY      D & C     GYN SURGERY      laproscopy oophorectomy unilateral     HERNIA REPAIR       HYSTERECTOMY       LUMBAR PUNCTURE       OPEN REDUCTION INTERNAL FIXATION HIP NAILING  11/18/2013    Procedure: OPEN REDUCTION INTERNAL FIXATION HIP NAILING;  RIGHT HIP FRACTURE;  Surgeon: John Venegas MD;  Location:  OR     OPTICAL TRACKING SYSTEM IMPLANT SHUNT VENTRICULOPERITONEAL  7/16/2013    Procedure: OPTICAL TRACKING SYSTEM IMPLANT SHUNT VENTRICULOPERITONEAL;  RIGHT OCCIPITAL VENTRICULOPERITONEAL SHUNT, IMAGE GUIDANCE, NEUROENDOSCOPY, INTRATHECAL ANTIBIOTICS (STEALTH AXIEM, STRATA II LEVEL 2.5, PATIENT IN SUPINE 30-80, GEL DONUT WITH HEAD TURNED TO LEFT)      ;  Surgeon: Tayo Conley MD;  Location:  OR     STENT  2001    RCA     wisdom teeth extraction[       Medications   Home Meds  Prior to Admission medications    Medication Sig Start Date End Date Taking? Authorizing Provider   acetaminophen (TYLENOL) 325 MG tablet Take 650 mg by mouth every 6 hours as needed for pain    Yes Unknown, Entered By History   amoxicillin (AMOXIL) 500 MG capsule Take 1 capsule (500 mg) by mouth every 8 hours for 5 days  Patient taking differently: Take 500 mg by mouth every 8 hours Start date 08/05/2020-08/10/2020 ( 5 days) 8/5/20 8/10/20 Yes Sneha Boss PA-C   atorvastatin (LIPITOR) 10 MG tablet Take 1 tablet (10 mg) by mouth daily 7/6/15  Yes Patric Pinedo MD   cholecalciferol 1000 UNITS TABS Take  1,000 Units by mouth daily 7/6/15  Yes Patric Pinedo MD   folic acid (FOLVITE) 1 MG tablet Take 1 tablet (1 mg) by mouth daily 7/22/20  Yes Heather Odonnell MD   levothyroxine (SYNTHROID/LEVOTHROID) 75 MCG tablet Take 1 tablet (75 mcg) by mouth daily 7/22/20  Yes Heather Odonnell MD   lisinopril (PRINIVIL/ZESTRIL) 10 MG tablet Take 10 mg by mouth daily   Yes Unknown, Entered By History   melatonin 1 MG TABS tablet Take 1 mg by mouth nightly as needed for sleep    Yes Unknown, Entered By History   metoprolol tartrate (LOPRESSOR) 12.5 mg TABS half-tab Take 12.5 mg by mouth 2 times daily   Yes Unknown, Entered By History   nystatin (MYCOSTATIN) 239130 UNIT/GM external powder Apply topically 2 times daily as needed (intertriginal dermatitis)   Yes Unknown, Entered By History   potassium chloride ER (KLOR-CON M) 20 MEQ CR tablet Take 20 mEq by mouth once with dinner.   Yes Unknown, Entered By History   senna-docusate (SENOKOT-S/PERICOLACE) 8.6-50 MG tablet Take 1 tablet by mouth 2 times daily 7/21/20  Yes Heather Odonnell MD       Scheduled Meds    apixaban ANTICOAGULANT  10 mg Oral BID     fluconazole  200 mg Oral Daily     levETIRAcetam  750 mg Intravenous Q12H     levothyroxine  75 mcg Oral Daily     metoprolol tartrate  12.5 mg Oral BID     sodium chloride (PF)  3 mL Intracatheter Q8H       Infusion Meds    dextrose 5% and 0.45% NaCl + KCl 20 mEq/L 75 mL/hr at 08/17/20 0824     - MEDICATION INSTRUCTIONS -         PRN Meds  acetaminophen, acetaminophen, bacitracin, lidocaine 4%, lidocaine (buffered or not buffered), melatonin, miconazole, naloxone, ondansetron **OR** ondansetron, - MEDICATION INSTRUCTIONS -, potassium chloride, potassium chloride with lidocaine, potassium chloride, sodium chloride (PF)    Allergies   Allergies   Allergen Reactions     Tramadol      Family History   Family History   Problem Relation Age of Onset     Cerebrovascular Disease Mother      Cerebrovascular  Disease Father      Blood Disease Son      Social History   Social History     Tobacco Use     Smoking status: Never Smoker     Smokeless tobacco: Never Used   Substance Use Topics     Alcohol use: No     Drug use: No       Review of Systems   Review of systems not obtained due to patient factors - confusion, denies pain     PHYSICAL EXAMINATION   Temp:  [97.4  F (36.3  C)-97.8  F (36.6  C)] 97.8  F (36.6  C)  Heart Rate:  [57-67] 67  Resp:  [16-18] 16  BP: (128-158)/(67-77) 139/77  SpO2:  [95 %-96 %] 96 %    General:  patient lying in bed without any acute distress    HEENT:  R frontal crani incision well healed, old bruise R temple  Cardio:  RRR  Pulmonary:  no respiratory distress  Abdomen:  soft, non-tender, non-distended  Extremities:  no edema, peripheral pulses palpable  Skin:  intact, warm/dry     Neurologic  Mental Status:  Eyes closed, opens to voice, does not maintain alertness. Oriented to self but not time, place, or circumstance. Language is sparse and minimal output is fluent and coherent, with intact comprehension of simple commands, unable to name or repeat.  Cranial Nerves:  blinks to threat bilaterally, PERRL, horizontal gaze intact, face symmetric, tongue midline, mild dysarthria  Motor:  able to move all limbs on command, squeeze finger and move toes on command and able to give a thumbs up.  Reflexes:  toes down-going  Sensory:  detects sensation in 4/4 extremities  Coordination:  no ataxia of observed movements  Station/Gait:  not tested    Dysphagia Screen  Dysarthria or facial droop present - Maintain NPO, consult SLP    Stroke Scales    NIHSS  Interval transfer (08/10/20 1628)   Interval Comments     1a. Level of Consciousness 1-->Not alert, but arousable by minor stimulation to obey, answer, or respond   1b. LOC Questions 2-->Answers neither question correctly   1c. LOC Commands 0-->Performs both tasks correctly   2.   Best Gaze 0-->Normal   3.   Visual 0-->No visual loss   4.   Facial  Palsy 0-->Normal symmetrical movements   5a. Motor Arm, Left 0-->No drift, limb holds 90 (or 45) degrees for full 10 secs   5b. Motor Arm, Right 0-->No drift, limb holds 90 (or 45) degrees for full 10 secs   6a. Motor Leg, Left 2-->Some effort against gravity, leg falls to bed by 5 secs, but has some effort against gravity   6b. Motor Leg, right 2-->Some effort against gravity, leg falls to bed by 5 secs, but has some effort against gravity   7.   Limb Ataxia 0-->Absent   8.   Sensory 0-->Normal, no sensory loss   9.   Best Language 1-->Mild-to-moderate aphasia, some obvious loss of fluency or facility of comprehension, without significant limitation on ideas expressed or form of expression. Reduction of speech and/or comprehension, however, makes conversation. . . (see row details)   10. Dysarthria 1-->Mild-to-moderate dysarthria, patient slurs at least some words and, at worst, can be understood with some difficulty   11. Extinction and Inattention  0-->No abnormality   Total 9 (08/10/20 1628)       Imaging  I personally reviewed all imaging; relevant findings per HPI.    Labs Data   CBC  Recent Labs   Lab 08/15/20  0601 08/12/20  0549 08/11/20  0618   WBC 6.8 6.3 6.9   RBC 4.17 3.49* 3.78*   HGB 13.3 11.2* 12.2   HCT 38.9 33.6* 36.7    127* 127*     Basic Metabolic Panel   Recent Labs   Lab 08/17/20  0752 08/16/20  0941 08/15/20  1908 08/15/20  0601  08/14/20  0856 08/13/20  0929 08/11/20  0618   NA  --   --   --   --   --  142 141 144   POTASSIUM  --  4.0 3.9 3.2*   < > 3.3* 3.3* 3.5   CHLORIDE  --   --   --   --   --  112* 113* 114*   CO2  --   --   --   --   --  26 20 25   BUN  --   --   --   --   --  4* 8 14   CR 0.54  --   --   --   --  0.42* 0.43* 0.67   GLC  --   --   --   --   --  107* 82 76   JAZLYN  --   --   --   --   --  8.5 8.6 8.5    < > = values in this interval not displayed.     Liver Panel  Recent Labs   Lab 08/13/20  0929 08/11/20  0618   PROTTOTAL 6.1* 5.9*   ALBUMIN 3.0* 2.9*   BILITOTAL  1.3 0.7   ALKPHOS 68 68   AST 10 10   ALT 9 10     INR    Recent Labs   Lab Test 08/09/20  1402 05/31/15  1613 01/20/15  1600   INR 1.13 1.07 1.01      Lipid Profile    Recent Labs   Lab Test 06/21/15  0935 06/28/13  0703   CHOL 125 177   HDL 35* 33*   LDL 62 117   TRIG 141 135   CHOLHDLRATIO 3.6 5.4*     A1C  No lab results found.  Troponin I    No results for input(s): TROPI in the last 168 hours.       Stroke Code / Stroke Consult Data Data This was a non-emergent, non-tele stroke consult.

## 2020-08-18 LAB
ANION GAP SERPL CALCULATED.3IONS-SCNC: 2 MMOL/L (ref 3–14)
BASOPHILS # BLD AUTO: 0 10E9/L (ref 0–0.2)
BASOPHILS NFR BLD AUTO: 0.1 %
BUN SERPL-MCNC: 5 MG/DL (ref 7–30)
CALCIUM SERPL-MCNC: 9.3 MG/DL (ref 8.5–10.1)
CHLORIDE SERPL-SCNC: 111 MMOL/L (ref 94–109)
CO2 SERPL-SCNC: 27 MMOL/L (ref 20–32)
CREAT SERPL-MCNC: 0.63 MG/DL (ref 0.52–1.04)
DIFFERENTIAL METHOD BLD: NORMAL
EOSINOPHIL # BLD AUTO: 0.2 10E9/L (ref 0–0.7)
EOSINOPHIL NFR BLD AUTO: 2.7 %
ERYTHROCYTE [DISTWIDTH] IN BLOOD BY AUTOMATED COUNT: 14.8 % (ref 10–15)
GFR SERPL CREATININE-BSD FRML MDRD: 78 ML/MIN/{1.73_M2}
GLUCOSE BLDC GLUCOMTR-MCNC: 116 MG/DL (ref 70–99)
GLUCOSE SERPL-MCNC: 104 MG/DL (ref 70–99)
HCT VFR BLD AUTO: 40.2 % (ref 35–47)
HGB BLD-MCNC: 13.2 G/DL (ref 11.7–15.7)
IMM GRANULOCYTES # BLD: 0 10E9/L (ref 0–0.4)
IMM GRANULOCYTES NFR BLD: 0.3 %
LYMPHOCYTES # BLD AUTO: 3 10E9/L (ref 0.8–5.3)
LYMPHOCYTES NFR BLD AUTO: 42.4 %
MCH RBC QN AUTO: 31.5 PG (ref 26.5–33)
MCHC RBC AUTO-ENTMCNC: 32.8 G/DL (ref 31.5–36.5)
MCV RBC AUTO: 96 FL (ref 78–100)
MONOCYTES # BLD AUTO: 0.7 10E9/L (ref 0–1.3)
MONOCYTES NFR BLD AUTO: 10.3 %
NEUTROPHILS # BLD AUTO: 3.1 10E9/L (ref 1.6–8.3)
NEUTROPHILS NFR BLD AUTO: 44.2 %
NRBC # BLD AUTO: 0 10*3/UL
NRBC BLD AUTO-RTO: 0 /100
PLATELET # BLD AUTO: 190 10E9/L (ref 150–450)
POTASSIUM SERPL-SCNC: 4 MMOL/L (ref 3.4–5.3)
RBC # BLD AUTO: 4.19 10E12/L (ref 3.8–5.2)
SODIUM SERPL-SCNC: 140 MMOL/L (ref 133–144)
WBC # BLD AUTO: 7.1 10E9/L (ref 4–11)

## 2020-08-18 PROCEDURE — 25000132 ZZH RX MED GY IP 250 OP 250 PS 637: Mod: GY | Performed by: INTERNAL MEDICINE

## 2020-08-18 PROCEDURE — 00000146 ZZHCL STATISTIC GLUCOSE BY METER IP

## 2020-08-18 PROCEDURE — 25800030 ZZH RX IP 258 OP 636: Performed by: HOSPITALIST

## 2020-08-18 PROCEDURE — 85025 COMPLETE CBC W/AUTO DIFF WBC: CPT | Performed by: INTERNAL MEDICINE

## 2020-08-18 PROCEDURE — 25000132 ZZH RX MED GY IP 250 OP 250 PS 637: Mod: GY | Performed by: HOSPITALIST

## 2020-08-18 PROCEDURE — 80048 BASIC METABOLIC PNL TOTAL CA: CPT | Performed by: INTERNAL MEDICINE

## 2020-08-18 PROCEDURE — 99232 SBSQ HOSP IP/OBS MODERATE 35: CPT | Performed by: INTERNAL MEDICINE

## 2020-08-18 PROCEDURE — 36415 COLL VENOUS BLD VENIPUNCTURE: CPT | Performed by: INTERNAL MEDICINE

## 2020-08-18 PROCEDURE — 12000000 ZZH R&B MED SURG/OB

## 2020-08-18 RX ADMIN — LEVETIRACETAM 750 MG: 100 SOLUTION ORAL at 20:10

## 2020-08-18 RX ADMIN — APIXABAN 10 MG: 5 TABLET, FILM COATED ORAL at 20:09

## 2020-08-18 RX ADMIN — LEVOTHYROXINE SODIUM 75 MCG: 75 TABLET ORAL at 08:35

## 2020-08-18 RX ADMIN — LEVETIRACETAM 750 MG: 100 SOLUTION ORAL at 08:45

## 2020-08-18 RX ADMIN — METOPROLOL TARTRATE 12.5 MG: 25 TABLET, FILM COATED ORAL at 20:09

## 2020-08-18 RX ADMIN — POTASSIUM CHLORIDE, DEXTROSE MONOHYDRATE AND SODIUM CHLORIDE: 150; 5; 450 INJECTION, SOLUTION INTRAVENOUS at 10:08

## 2020-08-18 RX ADMIN — METOPROLOL TARTRATE 12.5 MG: 25 TABLET, FILM COATED ORAL at 08:35

## 2020-08-18 RX ADMIN — FLUCONAZOLE 200 MG: 200 TABLET ORAL at 08:35

## 2020-08-18 RX ADMIN — APIXABAN 10 MG: 5 TABLET, FILM COATED ORAL at 08:35

## 2020-08-18 ASSESSMENT — ACTIVITIES OF DAILY LIVING (ADL)
ADLS_ACUITY_SCORE: 35
ADLS_ACUITY_SCORE: 35
ADLS_ACUITY_SCORE: 37
ADLS_ACUITY_SCORE: 35
ADLS_ACUITY_SCORE: 35
ADLS_ACUITY_SCORE: 31

## 2020-08-18 NOTE — PROGRESS NOTES
BRIEF NUTRITION REASSESSMENT      CURRENT DIET AND INTAKE:  Diet:  DDd1, NT per SLP. Sending Magic Cups with meals.  Per flow sheets, pt is eating mostly 25%.                ANTHROPOMETRICS:  Vitals:    08/09/20 1359 08/09/20 1814 08/10/20 0708 08/11/20 0459   Weight: 54 kg (119 lb 0.8 oz) 51.5 kg (113 lb 8.6 oz) 54.2 kg (119 lb 7.8 oz) 54.5 kg (120 lb 2.4 oz)    08/15/20 0700 08/16/20 0202   Weight: 54.3 kg (119 lb 12.1 oz) 52 kg (114 lb 9.6 oz)       LABS:  Labs noted      NUTRITION INTERVENTION:  Nutrition Diagnosis:  No nutrition diagnosis at this time.    Implementation:  Will continue Magic Cups with meals.    FOLLOW UP/MONITORING:   Will re-evaluate in 7 - 10 days, or sooner, if re-consulted.

## 2020-08-18 NOTE — PLAN OF CARE
[id number]: Alert and oriented to self only, was quite lethargic when I first started but has since woken up better. Occasionally following commands. Neuros mainly just weakness but again pt did not follow all commands. Up with lift. Turn and repo. Purewick in place. Denies pain. Plan to discharge to TCU when able. Will continue to monitor.

## 2020-08-18 NOTE — PLAN OF CARE
Patient was admitted on 8/9 with altered mental status and bilateral PE's. Awake and alert most of the shift, oriented to self. Follows minimal commands. Generalized weakness but moves extremities equally. Tolerating DD1 diet, poor appetite this evening. Incontinent of urine, purewick in place. Discharge plan pending.

## 2020-08-18 NOTE — PLAN OF CARE
"PT-Attempted to see. Unable to awaken patient even with sternal rub except she did say \"your hands are cold\" when attempting to move her arm. Unable to get her to open her eyes or follow any commands even with passive ROM of bilateral UE's and left LE.   PT has been unable to get patient to participate for last 4 attempts so anticipate patient will need LTC as TCU not appropriate if patient continues to be unable to participate. Will decrease frequency of attempts to 3 times/week until patient able to participate.   "

## 2020-08-18 NOTE — PROGRESS NOTES
[facility]    Internal Medicine Hospitalist Progress Note  08/18/2020  I evaluated patient on the above date.    Jake Crook Jr., MD  [phone] (p)  Text Page        Assessment & Plan New actions/orders today (08/18/2020) are underlined.    Joelle Freeman is a 89 year old female with past medical history including HTN; CAD; CVI with right sided weakness; severe AS; NPH s/p  shunt; factor V Leiden (heterozygous); falls; and recent hospitalizations (7/19-7/21 for syncope and bilateral chronic subdural hygromas vs chronic SDH, and 8/2-8/5 for AMS and UTI).     She presented from TCU on 8/9/2020 with altered mental status.  Code stroke was initiated in the ED with essentially unremarkable imaging for acute intracranial pathology but was found incidentally with bilateral pulmonary emboli noted on CTA head and neck.  Neurology and Neurosurgery consulted.  ED MD discussed with Neurosurgery who was okay with therapeutic heparin infusion without boluses. Patient admitted for further management.    Acute encephalopathy with fluctuations in level of consciousness, suspect multifactorial (metabolic and infectious) including suspected seizures, UTI and bilateral PE on top of underlying advanced dementia, bilateral chronic subdural hygromas/hematomas, prior stroke and NPH.   * Recently hospitalization 7/19-7/21 for syncope and found with bilateral chronic subdural hygromas vs chronic SDH. Hospitalized again 8/2-8/5 for AMS and UTI.   * Reportedly not eating or drinking much in the 2 days PTA at TCU. On the morning of presentation 8/9, was only responsive to sternal rub.  In ED 8/9, patient was initially not responding or following commands and was only withdrawing to noxious stimuli.  A little later patient was more awake interacting with her sons and following commands  Code stroke was called in the ED. Head CT 8/9 showed no acute intracranial pathology, there was some slight interval increase in  thickness of hypodense subdural collections along the cerebral convexities bilaterally now measuring up to 0.8 cm maximum thickness on the right and 0.5 cm maximum thickness on the left. CT head perfusion 8/9 negative. CTA head and neck 8/9 no major head or neck findings, but did show large pulmonary emboli at the distal ends of the main pulmonary arteries bilaterally (see below).  * Neurology and Neurosurgery consulted. Empiric Keppra started on admit. Video EEG 8/11-8/12 showed epileptiform discharges right anterior/mid temporal region with focal cerebral dysfunction bilaterally; no clinical seizures seen.  * On 8/13, pt remained overall lethargic with minimal oral intake,at times is able to be more alert and a bit more responsive. Dr. Trejo  discussed goals of care again with her son, Osman and noted that pt would not want feeding tube; did not want to pursue MRI; he wanted to observe for 48 hours and if no improvement would consider palliative cares.   * On 8/14, pt much improved and was being fed food; son was not ready for hospice and wanted continued restorative cares, but understood that if she continued to decline with repeat hospitalizations, he would consider hospice at that point.   * On 8/16, pt more somnolent/lethargic; STAT head CT stable; STAT EEG did not show active seizures, though there were signs of diffuse encephalopathy; seen by Neurology again and suspected that pt suffered a seizure and Keppra increased and changed back to IV. Much improved and more conversant on 8/17. MRI 8/17 showed no evidence of CVI or other acute findings; subdural fluid collections slightly smaller than on prior CT. More somnolent again 8/18.  - Continue Keppra 750 mg BID; PRN IV Keppra if pt cannot take PO.  - Ask general Neurology to see for ongoing management of anti seizure med, question of persistent subclinical seizures causing fluctuations in LOC.  - Continue PT, OT, SLP.  - Re-orient as needed.  - Maintain normal  "day/night, sleep/wake cycles.  - Minimize sedating medications as able.  - Appreciate Neurology help.  - Management of other issues as below.    Acute bilateral pulmonary emboli, \"incidentally\" found on CTA of head and neck.  History of heterozygous FVL mutation.  CTA head and neck 8/9 no major head or neck findings, but did show large pulmonary emboli at the distal ends of the main pulmonary arteries bilaterally. Neurosurgery discussed with the ED MD and were okay with IV heparin infusion without boluses. Head CT 8/10 stable. Transitioned to apixaban 8/14.  - Continue apixaban.    FEN.  Moderate oropharyngeal dysphagia.  Inadequate oral intake.  Issues with encephalopathy as below. Seen by SLP.  Downgraded diet to DDL 1 with NTL.   - Continue DD1 with nectar thickened liquids.  - Continue nutritional supplements.  - Continue D5 1/2 NS with 20 meq KCl.     Recently noted LBBB.  Chronic troponin elevation.  * During her recent prior hospitalization her troponins were 0.46, 0.47. Noted with LBBB at that time, not noted previously. At that time she had an echocardiogram which showed a preserved EF and no wall motion abnormalities.  * Trop 0.364 on admit. No chest pain or dyspnea on admit. Trop subsequently flat. Family previously not interested in any potential cardiac interventions given advanced age and advanced dementia.  - Monitor clinically.    Possible candidal UTI vs colonization.  H/o recurrent UTI.  * Had completed 2 courses of antibiotics over last few weeks for UTI.  [facility] discharged from [facility] 8/5 on antibiotics for UTI.   * No urinary symptoms reported on admission. Recheck UA on admit showed > 182 WBC, 53 RBC, negative nitrite, no bacteria.  Urine cultures growing Candida. Fluconazole started 8 /14.  - Continue fluconazole (started 8/14) - stop after 8/20.    Hypertension (benign essential).  CAD - RCA stent 2001.  Severe aortic stenosis.  [PTA BP meds: lisinopril 10 mg daily; metoprolol 12.5 mg BID.]  * " Recent admission for syncope 7/2020 as above. Echocardiogram on 07/20/2020 showed aortic valve area of 0.5 square cm as per prior notes. Not considered a surgical candidate.   - Continue metoprolol.  - Restart atorvastatin, lisinopril when oral intake improves or is more consistent.  - No ASA given bilateral chronic subdural hygromas/hematomas and already on anticoagulation.  - Monitor i/o's, daily wts.    Recently discovered bilateral chronic subdural hygromas vs hematomas.  * Noted 7/2020. ASA stopped at that time.  * Head CT imaging this stay as above. Seen by Neurology and Neurosurgery this stay as above. Started on anticoagulation this hospitalization as above.  - Monitor closely while on anticoagulation.    CVI history (1999) with chronic right sided weakness.  - Continue PT, OT, SLP.  - No ASA given bilateral chronic subdural hygromas/hematomas and already on anticoagulation.     Hypothyroidism.    * Recently, her TSH was low at 0.22 and free T4 was high at 53.    Her Synthroid was reduced from 88 to 75 mcg daily.  Recheck TSH at 1.36 on 8/10.  - Continue levothyroxine 75 mcg daily.    Hypokalemia.  - Continue PRN K replacement.       COVID-19 testing.  COVID-19 PCR Results    COVID-19 PCR Results 5/20/20 6/22/20 6/29/20 7/6/20 7/19/20 7/22/20 8/2/20 8/9/20   COVID-19 Virus PCR to [place] - Result  Undetected Undetected        COVID-19 Virus PCR to [facility] - Result Not Detected   Not Detected Not Detected Not Detected Not Detected Not Detected   COVID-19 Virus PCR to [facility] - Source Nasopharyngeal   Nasopharyngeal Nasopharyngeal Nasopharyngeal Nasopharyngeal Nasopharyngeal   COVID-19 Virus PCR Source  Nasopharynx Nasopharyngeal           Comments are available for some flowsheets but are not being displayed.         COVID-19 Antibody Results, Testing for Immunity    COVID-19 Antibody Results, Testing for Immunity   No data to display.             Diet: Dysphagia Diet Level 1 Pureed Nectar Thickened Liquids  (pre-thickened or use instant food thickener)  Snacks/Supplements Adult: Magic Cup; With Meals  Room Service    Prophylaxis: PCD's, ambulation. On apixaban.  Benítez Catheter: not present  Code Status: No CPR- Do NOT Intubate    Disposition Plan   Expected discharge: 1-2d, recommended to TCU vs long term care pending consistent improvement in level of consciousness.  Entered: Jake Crook MD 08/18/2020, 1:32 PM       Communication.  - I d/w pt's son, Osman, 8/18; I d/w him that if pt fails to show consistent improvement, then we would need to consider a shift to palliative cares; he expressed understanding, but was still hopefull that she will start to have consistent improvement in mentation.    Interval History   More lethargic this am. Was able to take pills, however.  Presently, responded to loud voice, but not talking. Able to squeeze hands on request.    -Data reviewed today: I reviewed all new labs and imaging over the last 24 hours. I personally reviewed no images or EKG's today.    Physical Exam   Heart Rate: 64, Blood pressure 105/53, pulse 76, temperature 97.7  F (36.5  C), temperature source Oral, resp. rate 18, weight 52 kg (114 lb 9.6 oz), SpO2 98 %.  Vitals:    08/11/20 0459 08/15/20 0700 08/16/20 0202   Weight: 54.5 kg (120 lb 2.4 oz) 54.3 kg (119 lb 12.1 oz) 52 kg (114 lb 9.6 oz)     Vital Signs with Ranges  Temp:  [97.4  F (36.3  C)-98.5  F (36.9  C)] 97.7  F (36.5  C)  Pulse:  [76] 76  Heart Rate:  [64-78] 64  Resp:  [16-18] 18  BP: (105-145)/(53-86) 105/53  SpO2:  [92 %-98 %] 98 %  Patient Vitals for the past 24 hrs:   BP Temp Temp src Pulse Heart Rate Resp SpO2   08/18/20 1200 105/53 -- -- -- 64 18 98 %   08/18/20 1141 112/62 97.7  F (36.5  C) Oral -- 65 16 95 %   08/18/20 0726 113/66 98.5  F (36.9  C) Axillary -- 74 16 --   08/17/20 2348 129/68 97.8  F (36.6  C) Oral -- 65 16 95 %   08/17/20 2139 -- 97.4  F (36.3  C) Oral 76 -- 16 95 %   08/17/20 2126 (!) 145/77 -- -- -- -- -- --    08/17/20 1512 130/86 97.4  F (36.3  C) Axillary -- 78 16 92 %     I/O's Last 24 hours  I/O last 3 completed shifts:  In: 735 [P.O.:85; I.V.:650]  Out: 200 [Urine:200]    Constitutional: Somnolent; responded to loud voice, but not talking. Able to squeeze hands on request.  Respiratory:   Cardiovascular:   GI:   Skin/Integumen:   Other:       Data   Recent Labs   Lab 08/17/20  0752 08/16/20  0941 08/15/20  1908 08/15/20  0601  08/14/20  0856 08/13/20  0929  08/12/20  0549   WBC  --   --   --  6.8  --   --   --   --  6.3   HGB  --   --   --  13.3  --   --   --   --  11.2*   MCV  --   --   --  93  --   --   --   --  96   PLT  --   --   --  158  --   --   --   --  127*   NA  --   --   --   --   --  142 141  --   --    POTASSIUM  --  4.0 3.9 3.2*   < > 3.3* 3.3*   < >  --    CHLORIDE  --   --   --   --   --  112* 113*  --   --    CO2  --   --   --   --   --  26 20  --   --    BUN  --   --   --   --   --  4* 8  --   --    CR 0.54  --   --   --   --  0.42* 0.43*  --   --    ANIONGAP  --   --   --   --   --  4 8  --   --    JAZLYN  --   --   --   --   --  8.5 8.6  --   --    GLC  --   --   --   --   --  107* 82  --   --    ALBUMIN  --   --   --   --   --   --  3.0*  --   --    PROTTOTAL  --   --   --   --   --   --  6.1*  --   --    BILITOTAL  --   --   --   --   --   --  1.3  --   --    ALKPHOS  --   --   --   --   --   --  68  --   --    ALT  --   --   --   --   --   --  9  --   --    AST  --   --   --   --   --   --  10  --   --     < > = values in this interval not displayed.     Recent Labs   Lab Test 08/18/20  1249 08/14/20  0856 08/13/20  0929 08/11/20  0618 08/10/20  0607 08/09/20  1402  06/29/19  1231  06/22/15  0556 06/21/15  2358 06/21/15  2308   GLC  --  107* 82 76 94 104*   < >  --    < >  --   --   --    *  --   --   --   --   --   --  77  --  90 91 92    < > = values in this interval not displayed.         Recent Results (from the past 24 hour(s))   XR Skull 1/3 Views    Narrative    XR SKULL ONE-THREE  VIEWS   8/17/2020 4:13 PM     HISTORY: Presence of programmable ventriculoperitoneal shunt    COMPARISON: None.      Impression    IMPRESSION: Programmable shunt noted. Exam obtained for baseline to  MRI study.    TIO CAIN MD   MR Brain w/o Contrast    Narrative    MRI BRAIN WITHOUT CONTRAST  8/17/2020 4:53 PM    HISTORY:  Encephalopathy. Altered mental status, unexplained.    TECHNIQUE:  Multiplanar, multisequence MRI of the brain without  gadolinium IV contrast material.  Patient was unable to fully  cooperate with study, hence, intravenous contrast was not given and  the study was terminated.    COMPARISON: Head CT dated 8/16/2020.    FINDINGS: Diffusion-weighted images are limited due to metallic  artifact from a posterior right-sided shunt catheter. No areas of  restricted diffusion to suggest acute infarct are present. There are  bilateral subdural fluid collections approximately 0.5 cm in maximum  thickness bilaterally. The ventricles are minimally prominent but the  temporal horns are not dilated and there is no evidence to suggest any  hydrocephalus. The brain parenchyma appears to be within normal limits  except for a few tiny left hemispheric white matter lesions. These are  not associated with any mass effect. Vascular structures are patent at  the skull base.      Impression    IMPRESSION:  1. Limited exam due to motion, patient's inability to cooperate with  exam.  2. Right posterior ventricular catheter. No evidence hydrocephalus.  3. Persistent bilateral subdural fluid collections slightly smaller  than that seen on the prior CT exam.  4. No evidence for intraparenchymal hemorrhage, acute infarct, or any  focal mass lesions.  5. No evidence for encephalitis.    TIO CAIN MD   XR Skull 1/3 Views    Narrative    XR SKULL ONE-THREE VIEWS   8/17/2020 6:44 PM     HISTORY: Ventriculoperitoneal shunt.    COMPARISON: 1605 on same date      Impression    IMPRESSION: Programmable shunt is in similar  position to that seen on  exam performed at 1605 on same date.    TIO CAIN MD       Medications   All medications were reviewed.    dextrose 5% and 0.45% NaCl + KCl 20 mEq/L 75 mL/hr at 08/18/20 1008     - MEDICATION INSTRUCTIONS -         apixaban ANTICOAGULANT  10 mg Oral BID     fluconazole  200 mg Oral Daily     levETIRAcetam  750 mg Oral BID     levothyroxine  75 mcg Oral Daily     metoprolol tartrate  12.5 mg Oral BID     sodium chloride (PF)  3 mL Intracatheter Q8H

## 2020-08-18 NOTE — PLAN OF CARE
Pt is A&O to self. Doesn't follow some commands otherwise neuros intact. Generalized weakness. VSS. Up with lift. Incontinent of bladder, purewick in place with adequate OP. No BM. Denies pain. Pt scoring green on the aggression stoplight tool. Plan for discharge to TCU. Continue to monitor.

## 2020-08-18 NOTE — PROVIDER NOTIFICATION
MD Notification    Notified Person: MD    Notified Person Name: Dr Crook    Notification Date/Time: 8/18/20 1200pm    Notification Interaction: paged    Purpose of Notification: Pt has been lethargic and non verbal this am, has moved arms and did swallow a few bites of meds in applesauce. Has not spoken or opened her eyes. VSS.     Orders Received: MD to see pt, re consulted Neuro    Comments:

## 2020-08-18 NOTE — PLAN OF CARE
Pt is A&O to self, very lethargic this am- not opening eyes or talking. Better this afternoon, opening eyes and moving extremeties, able to follow commands at times- inconsistent,  strong.   LOC varies, MD consulted Neuro again. Generalized weakness. VSS. Up with lift. Incontinent of bladder, purewick in place with adequate OP. No BM. No signs of pain. Pt scoring green on the aggression stoplight tool. Plan for discharge to TCU?. Continue to monitor

## 2020-08-19 ENCOUNTER — APPOINTMENT (OUTPATIENT)
Dept: OCCUPATIONAL THERAPY | Facility: CLINIC | Age: 85
DRG: 070 | End: 2020-08-19
Payer: MEDICARE

## 2020-08-19 ENCOUNTER — APPOINTMENT (OUTPATIENT)
Dept: SPEECH THERAPY | Facility: CLINIC | Age: 85
DRG: 070 | End: 2020-08-19
Payer: MEDICARE

## 2020-08-19 LAB
ANION GAP SERPL CALCULATED.3IONS-SCNC: 5 MMOL/L (ref 3–14)
BASOPHILS # BLD AUTO: 0 10E9/L (ref 0–0.2)
BASOPHILS NFR BLD AUTO: 0.3 %
BUN SERPL-MCNC: 7 MG/DL (ref 7–30)
CALCIUM SERPL-MCNC: 8.7 MG/DL (ref 8.5–10.1)
CHLORIDE SERPL-SCNC: 112 MMOL/L (ref 94–109)
CO2 SERPL-SCNC: 24 MMOL/L (ref 20–32)
CREAT SERPL-MCNC: 0.57 MG/DL (ref 0.52–1.04)
DIFFERENTIAL METHOD BLD: NORMAL
EOSINOPHIL # BLD AUTO: 0.2 10E9/L (ref 0–0.7)
EOSINOPHIL NFR BLD AUTO: 2.9 %
ERYTHROCYTE [DISTWIDTH] IN BLOOD BY AUTOMATED COUNT: 14.7 % (ref 10–15)
GFR SERPL CREATININE-BSD FRML MDRD: 80 ML/MIN/{1.73_M2}
GLUCOSE SERPL-MCNC: 116 MG/DL (ref 70–99)
HCT VFR BLD AUTO: 42.2 % (ref 35–47)
HGB BLD-MCNC: 14.2 G/DL (ref 11.7–15.7)
IMM GRANULOCYTES # BLD: 0 10E9/L (ref 0–0.4)
IMM GRANULOCYTES NFR BLD: 0.4 %
LYMPHOCYTES # BLD AUTO: 3.5 10E9/L (ref 0.8–5.3)
LYMPHOCYTES NFR BLD AUTO: 43.8 %
MCH RBC QN AUTO: 32.3 PG (ref 26.5–33)
MCHC RBC AUTO-ENTMCNC: 33.6 G/DL (ref 31.5–36.5)
MCV RBC AUTO: 96 FL (ref 78–100)
MONOCYTES # BLD AUTO: 0.9 10E9/L (ref 0–1.3)
MONOCYTES NFR BLD AUTO: 11 %
NEUTROPHILS # BLD AUTO: 3.3 10E9/L (ref 1.6–8.3)
NEUTROPHILS NFR BLD AUTO: 41.6 %
NRBC # BLD AUTO: 0 10*3/UL
NRBC BLD AUTO-RTO: 0 /100
PLATELET # BLD AUTO: 183 10E9/L (ref 150–450)
POTASSIUM SERPL-SCNC: 4 MMOL/L (ref 3.4–5.3)
RBC # BLD AUTO: 4.4 10E12/L (ref 3.8–5.2)
SODIUM SERPL-SCNC: 141 MMOL/L (ref 133–144)
WBC # BLD AUTO: 7.9 10E9/L (ref 4–11)

## 2020-08-19 PROCEDURE — 92526 ORAL FUNCTION THERAPY: CPT | Mod: GN | Performed by: SPEECH-LANGUAGE PATHOLOGIST

## 2020-08-19 PROCEDURE — 12000000 ZZH R&B MED SURG/OB

## 2020-08-19 PROCEDURE — 97535 SELF CARE MNGMENT TRAINING: CPT | Mod: GO

## 2020-08-19 PROCEDURE — 25000132 ZZH RX MED GY IP 250 OP 250 PS 637: Mod: GY | Performed by: INTERNAL MEDICINE

## 2020-08-19 PROCEDURE — 25000132 ZZH RX MED GY IP 250 OP 250 PS 637: Mod: GY | Performed by: HOSPITALIST

## 2020-08-19 PROCEDURE — 36416 COLLJ CAPILLARY BLOOD SPEC: CPT | Performed by: INTERNAL MEDICINE

## 2020-08-19 PROCEDURE — 99232 SBSQ HOSP IP/OBS MODERATE 35: CPT | Performed by: HOSPITALIST

## 2020-08-19 PROCEDURE — 85025 COMPLETE CBC W/AUTO DIFF WBC: CPT | Performed by: INTERNAL MEDICINE

## 2020-08-19 PROCEDURE — 80048 BASIC METABOLIC PNL TOTAL CA: CPT | Performed by: INTERNAL MEDICINE

## 2020-08-19 PROCEDURE — 25800030 ZZH RX IP 258 OP 636: Performed by: HOSPITALIST

## 2020-08-19 RX ADMIN — LEVETIRACETAM 750 MG: 100 SOLUTION ORAL at 20:47

## 2020-08-19 RX ADMIN — LEVETIRACETAM 750 MG: 100 SOLUTION ORAL at 09:17

## 2020-08-19 RX ADMIN — APIXABAN 10 MG: 5 TABLET, FILM COATED ORAL at 08:17

## 2020-08-19 RX ADMIN — POTASSIUM CHLORIDE, DEXTROSE MONOHYDRATE AND SODIUM CHLORIDE: 150; 5; 450 INJECTION, SOLUTION INTRAVENOUS at 00:22

## 2020-08-19 RX ADMIN — POTASSIUM CHLORIDE, DEXTROSE MONOHYDRATE AND SODIUM CHLORIDE: 150; 5; 450 INJECTION, SOLUTION INTRAVENOUS at 12:52

## 2020-08-19 RX ADMIN — METOPROLOL TARTRATE 12.5 MG: 25 TABLET, FILM COATED ORAL at 08:17

## 2020-08-19 RX ADMIN — APIXABAN 10 MG: 5 TABLET, FILM COATED ORAL at 20:47

## 2020-08-19 RX ADMIN — FLUCONAZOLE 200 MG: 200 TABLET ORAL at 08:17

## 2020-08-19 RX ADMIN — LEVOTHYROXINE SODIUM 75 MCG: 75 TABLET ORAL at 08:17

## 2020-08-19 RX ADMIN — METOPROLOL TARTRATE 12.5 MG: 25 TABLET, FILM COATED ORAL at 20:47

## 2020-08-19 RX ADMIN — Medication 1 MG: at 20:47

## 2020-08-19 ASSESSMENT — ACTIVITIES OF DAILY LIVING (ADL)
ADLS_ACUITY_SCORE: 35
ADLS_ACUITY_SCORE: 33

## 2020-08-19 NOTE — PLAN OF CARE
Discharge Planner OT   Patient plan for discharge: did not state    Current status: Patient alert and pleasant, delayed responses with increased time/effort and verbal cues to respond. supine-sit EOB maxA. static seated balance min-modA as patient demo'ing decreased postural support. sit-stand maxA x2. pivot transfer from bed-chair maxA x2. Patient appeared fatigued afterwards    Barriers to return to prior living situation: Current level of A required; Fall risk     Recommendations for discharge: Return to TCU     Rationale for recommendations: Pt limited by impaired cognition and safety, decreased balance and activity tolerance, and weakness. OT will continue with 5x/week POC.        Entered by: Rebecca Parra 08/19/2020 11:19 AM

## 2020-08-19 NOTE — PROGRESS NOTES
[facility]    Medicine Progress Note - Hospitalist Service       Date of Admission:  8/9/2020  Assessment & Plan   Joelle Freeman is a 89 year old female with past medical history including HTN; CAD; CVI with right sided weakness; severe AS; NPH s/p  shunt; factor V Leiden (heterozygous); falls; and recent hospitalizations (7/19-7/21 for syncope and bilateral chronic subdural hygromas vs chronic SDH, and 8/2-8/5 for AMS and UTI).   She presented from TCU on 8/9/2020 with altered mental status.  Code stroke was initiated in the ED with essentially unremarkable imaging for acute intracranial pathology but was found incidentally with bilateral pulmonary emboli noted on CTA head and neck.  Neurology and Neurosurgery consulted.  ED MD discussed with Neurosurgery who was okay with therapeutic heparin infusion without boluses. Patient admitted for further management.     Acute encephalopathy with fluctuations in level of consciousness, suspect multifactorial (metabolic and infectious) including suspected seizures, UTI and bilateral PE on top of underlying advanced dementia, bilateral chronic subdural hygromas/hematomas, prior stroke and NPH  Recently hospitalization 7/19-7/21 for syncope and found with bilateral chronic subdural hygromas vs chronic SDH. Hospitalized again 8/2-8/5 for AMS and UTI.   Reportedly not eating or drinking much in the 2 days PTA at TCU. On the morning of presentation 8/9, was only responsive to sternal rub.  In ED 8/9, patient was initially not responding or following commands and was only withdrawing to noxious stimuli.  A little later patient was more awake interacting with her sons and following commands  Code stroke was called in the ED. Head CT 8/9 showed no acute intracranial pathology, there was some slight interval increase in thickness of hypodense subdural collections along the cerebral convexities bilaterally now measuring up to 0.8 cm maximum thickness on the right  and 0.5 cm maximum thickness on the left. CT head perfusion 8/9 negative. CTA head and neck 8/9 no major head or neck findings, but did show large pulmonary emboli at the distal ends of the main pulmonary arteries bilaterally (see below).  Neurology and Neurosurgery consulted. Empiric Keppra started on admit. Video EEG 8/11-8/12 showed epileptiform discharges right anterior/mid temporal region with focal cerebral dysfunction bilaterally; no clinical seizures seen.  On 8/13, pt remained overall lethargic with minimal oral intake,at times is able to be more alert and a bit more responsive. Dr. Trejo  discussed goals of care again with her son, Osman and noted that pt would not want feeding tube; did not want to pursue MRI; he wanted to observe for 48 hours and if no improvement would consider palliative cares.   On 8/14, pt much improved and was being fed food; son was not ready for hospice and wanted continued restorative cares, but understood that if she continued to decline with repeat hospitalizations, he would consider hospice at that point.   On 8/16, pt more somnolent/lethargic; STAT head CT stable; STAT EEG did not show active seizures, though there were signs of diffuse encephalopathy; seen by Neurology again and suspected that pt suffered a seizure and Keppra increased and changed back to IV. Much improved and more conversant on 8/17. MRI 8/17 showed no evidence of CVI or other acute findings; subdural fluid collections slightly smaller than on prior CT. More somnolent again 8/18.  - Continue Keppra 750 mg BID; PRN IV Keppra if pt cannot take PO.  - Asked general Neurology to see for ongoing management of anti seizure med, question of persistent subclinical seizures causing fluctuations in LOC.    Continue PT, OT, SLP    Re-orient as needed    Maintain normal day/night, sleep/wake cycles    Minimize sedating medications as able    Management of other issues as below     Acute bilateral pulmonary emboli,  "\"incidentally\" found on CTA of head and neck  History of heterozygous FVL mutation  CTA head and neck 8/9 no major head or neck findings, but did show large pulmonary emboli at the distal ends of the main pulmonary arteries bilaterally. Neurosurgery discussed with the ED MD and were okay with IV heparin infusion without boluses. Head CT 8/10 stable. Transitioned to apixaban 8/14.    Continue apixaban     FEN  Moderate oropharyngeal dysphagia  Inadequate oral intake  Issues with encephalopathy as below. Seen by SLP.  Downgraded diet to DDL 1 with NTL.     Continue DD1 with nectar thickened liquids    Continue nutritional supplements    Continue D5 1/2 NS with 20 meq KCl     Recently noted LBBB  Chronic troponin elevation  During her recent prior hospitalization her troponins were 0.46, 0.47. Noted with LBBB at that time, not noted previously. At that time she had an echocardiogram which showed a preserved EF and no wall motion abnormalities.  Trop 0.364 on admit. No chest pain or dyspnea on admit. Trop subsequently flat. Family previously not interested in any potential cardiac interventions given advanced age and advanced dementia.    Monitor clinically     Possible candidal UTI vs colonization  H/o recurrent UTI  Had completed 2 courses of antibiotics over last few weeks for UTI.  Recently discharged from [facility] 8/5 on antibiotics for UTI.   No urinary symptoms reported on admission. Recheck UA on admit showed > 182 WBC, 53 RBC, negative nitrite, no bacteria.  Urine cultures growing Candida. Fluconazole started 8 /14.    Continue fluconazole (started 8/14) - stop after 8/20     Hypertension (benign essential)  CAD - RCA stent 2001  Severe aortic stenosis  [PTA BP meds: lisinopril 10 mg daily; metoprolol 12.5 mg BID.]  * Recent admission for syncope 7/2020 as above. Echocardiogram on 07/20/2020 showed aortic valve area of 0.5 square cm as per prior notes. Not considered a surgical candidate.     Continue " metoprolol    Restart atorvastatin, lisinopril when oral intake improves or is more consistent.    No ASA given bilateral chronic subdural hygromas/hematomas and already on anticoagulation    Monitor i/o's, daily wts     Recently discovered bilateral chronic subdural hygromas vs hematomas  History of stroke in 1999  Noted 7/2020. ASA stopped at that time  Head CT imaging this stay as above. Seen by Neurology and Neurosurgery this stay as above. Started on anticoagulation this hospitalization as above    Monitor closely while on anticoagulation     Hypothyroidism  Recently, her TSH was low at 0.22 and free T4 was high at 53.    Her Synthroid was reduced from 88 to 75 mcg daily.  Recheck TSH at 1.36 on 8/10.    Continue levothyroxine 75 mcg daily     Hypokalemia    Continue PRN K replacement     COVID-19 testing             COVID-19 PCR Results    COVID-19 PCR Results 5/20/20 6/22/20 6/29/20 7/6/20 7/19/20 7/22/20 8/2/20 8/9/20   COVID-19 Virus PCR to [facility] - Result   Undetected Undetected             COVID-19 Virus PCR to [facility] - Result Not Detected     Not Detected Not Detected Not Detected Not Detected Not Detected   COVID-19 Virus PCR to [facility] - Source Nasopharyngeal     Nasopharyngeal Nasopharyngeal Nasopharyngeal Nasopharyngeal Nasopharyngeal   COVID-19 Virus PCR Source   Nasopharynx Nasopharyngeal                 Comments are available for some flowsheets but are not being displayed.           COVID-19 Antibody Results, Testing for Immunity    COVID-19 Antibody Results, Testing for Immunity   No data to display.            Diet: Dysphagia Diet Level 1 Pureed Nectar Thickened Liquids (pre-thickened or use instant food thickener)  Snacks/Supplements Adult: Magic Cup; With Meals  Room Service    DVT Prophylaxis: apixaban  Benítez Catheter: not present  Code Status: NCB         Disposition Plan   Expected discharge: to transitional care unit when facility available  Entered: Cameron Jaimes MD 08/19/2020,  11:25 AM       The patient's care was discussed with the Bedside Nurse and Patient.    Cameron Jaimes MD  Hospitalist Service  [facility]    ______________________________________________________________________    Interval History   Stable overnight.  No complaints but patient non verbal.    Data reviewed today: I reviewed all medications, new labs and imaging results over the last 24 hours. I personally reviewed no images or EKG's today.    Physical Exam   Vital Signs: Temp: 97.5  F (36.4  C) Temp src: Axillary BP: 133/65 Pulse: 68 RETIRE: Heart Rate: 65 Resp: 16 SpO2: 97 % O2 Device: None (Room air)    Weight: 114 lbs 8 oz  Constitutional: awake, no apparent distress  Respiratory: No increased work of breathing, good air exchange, clear to auscultation bilaterally, no crackles or wheezing  Cardiovascular:  regular rate and rhythm, normal S1 and S2, no S3 or S4, and no murmur noted  GI:  normal bowel sounds, soft, non-distended, non-tender  Neuropsychiatric: General: calm but not speaking or following commands    Data   Recent Labs   Lab 08/19/20  0905 08/18/20  1458 08/17/20  0752 08/16/20  0941  08/15/20  0601  08/14/20  0856 08/13/20  0929   WBC 7.9 7.1  --   --   --  6.8  --   --   --    HGB 14.2 13.2  --   --   --  13.3  --   --   --    MCV 96 96  --   --   --  93  --   --   --     190  --   --   --  158  --   --   --     140  --   --   --   --   --  142 141   POTASSIUM 4.0 4.0  --  4.0   < > 3.2*   < > 3.3* 3.3*   CHLORIDE 112* 111*  --   --   --   --   --  112* 113*   CO2 24 27  --   --   --   --   --  26 20   BUN 7 5*  --   --   --   --   --  4* 8   CR 0.57 0.63 0.54  --   --   --   --  0.42* 0.43*   ANIONGAP 5 2*  --   --   --   --   --  4 8   JAZLYN 8.7 9.3  --   --   --   --   --  8.5 8.6   * 104*  --   --   --   --   --  107* 82   ALBUMIN  --   --   --   --   --   --   --   --  3.0*   PROTTOTAL  --   --   --   --   --   --   --   --  6.1*   BILITOTAL  --   --    --   --   --   --   --   --  1.3   ALKPHOS  --   --   --   --   --   --   --   --  68   ALT  --   --   --   --   --   --   --   --  9   AST  --   --   --   --   --   --   --   --  10    < > = values in this interval not displayed.     No results found for this or any previous visit (from the past 24 hour(s)).  Medications     dextrose 5% and 0.45% NaCl + KCl 20 mEq/L 75 mL/hr at 08/19/20 0022     - MEDICATION INSTRUCTIONS -         apixaban ANTICOAGULANT  10 mg Oral BID     fluconazole  200 mg Oral Daily     levETIRAcetam  750 mg Oral BID     levothyroxine  75 mcg Oral Daily     metoprolol tartrate  12.5 mg Oral BID     sodium chloride (PF)  3 mL Intracatheter Q8H

## 2020-08-19 NOTE — PROGRESS NOTES
"SPIRITUAL HEALTH SERVICES Progress Note  -1    Visited pt per LOS. Pt is confusing but pleasant. Pt kept saying \"I hope he can come today (regarding to pt's son).\" Prayed for pt and called pt's son Osman (sahred pt's wish to see him), Osman is in the hospital to visit pt now. I will fellow up with pt tomorrow.         Virgil Billings  Chaplain Resident  "

## 2020-08-19 NOTE — PLAN OF CARE
A&Ox1 to self only, lethargic, opening eyes to speech. Unable to complete full neuro d/t pt. Not following commands. Grossly intact ex. generalized weakness. VSS ex. HTN on RA. Up with lift. Incontinent of bladder, purewick in place with adequate OP T/R in bed. No BM. No signs of pain. Pt scoring green on the aggression stoplight tool.PIV infusing Continue to monitor

## 2020-08-19 NOTE — PLAN OF CARE
VSS on RA. Alert to self, [place]. Neuro unchanged. Lethargic at times. Total feed. Incontinent, purewick in place. Worked with PT. Speech assessed, keeping DD1, nectar thick. Waiting placement.

## 2020-08-19 NOTE — PROGRESS NOTES
Sw Progress Note  Chart Reviewed, Pt discussed in Interdisciplinary Rounds.   Pt was anticipatred to return to [facility].  [facility] was sent referral for TCU.    Intervention:   SW spoke with admissions at [facility] regarding pt's return and they have declined pt stating that they feel is more appropriate for LTC.  SW sent additional referrals for TCU as recommended by therapy.    jW received call from Lena in admissions at [facility] stating that they would have a bed for pt tomorrow after 4:00pm.  SW did not receive return call from son yet so he needs to be updated of TCU bed and acceptance clinically.  Team Members notified:   SAW, RN, Hospitalist.    Plan: SYED continuing to assist with discharge anselmo Swanson, [facility]  [phone]

## 2020-08-19 NOTE — PLAN OF CARE
Discharge Planner SLP   Patient plan for discharge: not discussed  Current status: Patient seen for dysphagia treatment. Patient alert and interactive, yet slow/delayed response times and information processing. Patient required max- total feeding assist due to weakness and confusion. No overt aspiration signs occurred with limited intake of puree and nectar thick liquid. Note frequent oral holding and reduced attention to task/distractibility. Note large bolus size when drinking from cup which required multiple swallows. Patient also lost attention to oral residue. Patient had improved tolerance when drinking from spoon to help limit bolus size.     Recommend continue dysphagia diet level 1 with nectar thick liquids given 1:1 assist and swallow strategies (fully alert and upright position, small single sips/bites, liquid by spoon, alternate consistencies, verify swallows). SLP will follow up for diet tolerance.   Barriers to return to prior living situation: None  Recommendations for discharge: Back to TCU when stable.   Rationale for recommendations: Patient may need short term ST needs for dysphagia management. Patient near her recent baseline.        Entered by: Faviola Rendon 08/19/2020 9:25 AM

## 2020-08-19 NOTE — PLAN OF CARE
Pt here with AMS, UTI, PEs. Alert to self, speech is slow/slurred. Neuros grossly intact, generalized weakness. VSS. DD1 diet, nectar thick liquids. Takes pills crushed. Up with A2 w/ lift. Turn/repo q2hr. Incontinent, purewick in place, changed last at 1830. Denies pain. Pt scoring green on the Aggression Stop Light Tool. Plan to continue work w/ therapies. Discharge to TCU tomorrow after 1600 per SW note. Nursing will continue to monitor.

## 2020-08-20 VITALS
BODY MASS INDEX: 20.46 KG/M2 | OXYGEN SATURATION: 98 % | SYSTOLIC BLOOD PRESSURE: 137 MMHG | TEMPERATURE: 97.4 F | WEIGHT: 119.2 LBS | HEART RATE: 57 BPM | RESPIRATION RATE: 16 BRPM | DIASTOLIC BLOOD PRESSURE: 75 MMHG

## 2020-08-20 LAB
CREAT SERPL-MCNC: 0.69 MG/DL (ref 0.52–1.04)
GFR SERPL CREATININE-BSD FRML MDRD: 75 ML/MIN/{1.73_M2}

## 2020-08-20 PROCEDURE — 25000132 ZZH RX MED GY IP 250 OP 250 PS 637: Mod: GY | Performed by: INTERNAL MEDICINE

## 2020-08-20 PROCEDURE — 25000132 ZZH RX MED GY IP 250 OP 250 PS 637: Mod: GY | Performed by: HOSPITALIST

## 2020-08-20 PROCEDURE — 36415 COLL VENOUS BLD VENIPUNCTURE: CPT | Performed by: HOSPITALIST

## 2020-08-20 PROCEDURE — 82565 ASSAY OF CREATININE: CPT | Performed by: HOSPITALIST

## 2020-08-20 PROCEDURE — 25800030 ZZH RX IP 258 OP 636: Performed by: HOSPITALIST

## 2020-08-20 PROCEDURE — 99238 HOSP IP/OBS DSCHRG MGMT 30/<: CPT | Performed by: HOSPITALIST

## 2020-08-20 RX ORDER — LEVETIRACETAM 100 MG/ML
750 SOLUTION ORAL 2 TIMES DAILY
DISCHARGE
Start: 2020-08-20 | End: 2020-08-25

## 2020-08-20 RX ADMIN — APIXABAN 10 MG: 5 TABLET, FILM COATED ORAL at 11:10

## 2020-08-20 RX ADMIN — FLUCONAZOLE 200 MG: 200 TABLET ORAL at 11:10

## 2020-08-20 RX ADMIN — METOPROLOL TARTRATE 12.5 MG: 25 TABLET, FILM COATED ORAL at 11:12

## 2020-08-20 RX ADMIN — LEVOTHYROXINE SODIUM 75 MCG: 75 TABLET ORAL at 11:11

## 2020-08-20 RX ADMIN — LEVETIRACETAM 750 MG: 100 SOLUTION ORAL at 11:12

## 2020-08-20 RX ADMIN — POTASSIUM CHLORIDE, DEXTROSE MONOHYDRATE AND SODIUM CHLORIDE: 150; 5; 450 INJECTION, SOLUTION INTRAVENOUS at 00:52

## 2020-08-20 ASSESSMENT — ACTIVITIES OF DAILY LIVING (ADL)
ADLS_ACUITY_SCORE: 37
ADLS_ACUITY_SCORE: 37
ADLS_ACUITY_SCORE: 33

## 2020-08-20 NOTE — PROGRESS NOTES
CM    I: SYED received an update that [facility] has accepted pt for today, after 4pm. SYED spoke w/son, Osman, who was made aware  [facility] would start pt in their TCU but should pt decline, [facility] has stated they would have rooms in their LTC, should family agree with this. (Son does report pt has [place] Medicaid to cover this cost--not on our Epic information sheet)  Osman asks about in person visits within the facility if a pt declines and requires Hospice and/or window visits/outdoor visits while in TCU. SYED left  for their admissions regarding this.      SYED set up tentative stretcher for: 4:30 pm thru . PCS completed (for physical monitoring for safety, lethargy, turn & reposition Q2 hrs), faxed to  and placed on front of chart.      UPDATE@4109: Son was given [facility] information regarding visiting parameters; agrees with pt discharging to [facility]. SYED updated TCU. Will fax orders and PAS

## 2020-08-20 NOTE — PLAN OF CARE
OccupationalTherapy Discharge Summary    Reason for therapy discharge:    Discharged to transitional care facility.    Progress towards therapy goal(s). See goals on Care Plan in [facility] electronic health record for goal details.  Goals not met.  Barriers to achieving goals:   discharge from facility.    Therapy recommendation(s):    Continued therapy is recommended.  Rationale/Recommendations:  at TCU to improve ind/safety w/ ADLs and mobility.

## 2020-08-20 NOTE — PLAN OF CARE
"Speech Language Therapy Discharge Summary    Reason for therapy discharge:    Discharged to transitional care facility.    Progress towards therapy goal(s). See goals on Care Plan in Epic electronic health record for goal details.  Goals not met.  Barriers to achieving goals:   discharge from facility.    Therapy recommendation(s):    Continued therapy is recommended.  Rationale/Recommendations:  \"Recommend continue dysphagia diet level 1 with nectar thick liquids given 1:1 assist and swallow strategies (fully alert and upright position, small single sips/bites, liquid by spoon, alternate consistencies, verify swallows). SLP will follow up for diet tolerance. \".      "

## 2020-08-20 NOTE — PLAN OF CARE
Pt here with possible seizures, AMS, bilateral PE, UTI, bilateral hygromas. Disoriented to place, time, situation. Neuros grossly intact otherwise. VSS. DD1 diet with nectar thick liquids. Takes pills crushed in applesauce. Up with lift, turn and reposition q2. Incontinent of bowel and bladder, purewick in place changed at 0430. Denies pain, no signs or symptoms of pain. Pt scoring green on the Aggression Stop Light Tool. Plan for TCU. Discharge to [facility] possibly after 1600, needs to be officially accepted and family updated first.

## 2020-08-20 NOTE — PROGRESS NOTES
Pt somnolent during most of the day. Lifted to chair twice, slept in between cares. Incontinent of urine and bowel. Purwick in place. Skin pale and fragile. Brusing noted throughout. Took pills crushed in apple sauce, Plan for discharge to tcu later today. Son at bedside and made aware of discharge. Call light within reach, frequent rounding completed and bed in lowest/locked position. Will continue to monitor.

## 2020-08-20 NOTE — PROGRESS NOTES
CM    I: Orders and PAS faxed to facility.    P: No further SW interventions anticipated at this time.    PAS-RR    D: Per [employer] regulation, SW completed and submitted PAS-RR to [place] [employer] via the [employer].  PAS-RR confirmation # is : [id number]      P: Further questions may be directed to [employer] at #[phone], option #4 for PAS-RR staff.

## 2020-08-20 NOTE — PLAN OF CARE
PT-  PT cancelled as pt is discharging to TCU at 1630.  PT goals not met.  Physical Therapy Discharge Summary    Reason for therapy discharge:    Discharged to transitional care facility.    Progress towards therapy goal(s). See goals on Care Plan in [facility] electronic health record for goal details.  Goals not met.  Barriers to achieving goals:   discharge from facility.    Therapy recommendation(s):    Continued therapy is recommended.  Rationale/Recommendations:  Patient would benefit from continued skilled PT in TCU setting to progress activity tolerance and independence. .

## 2020-08-21 ENCOUNTER — NURSING HOME VISIT (OUTPATIENT)
Dept: GERIATRICS | Facility: CLINIC | Age: 85
End: 2020-08-21
Payer: MEDICARE

## 2020-08-21 VITALS
BODY MASS INDEX: 19.57 KG/M2 | OXYGEN SATURATION: 98 % | TEMPERATURE: 96.9 F | WEIGHT: 114.64 LBS | SYSTOLIC BLOOD PRESSURE: 136 MMHG | HEART RATE: 76 BPM | RESPIRATION RATE: 18 BRPM | HEIGHT: 64 IN | DIASTOLIC BLOOD PRESSURE: 82 MMHG

## 2020-08-21 DIAGNOSIS — R13.12 OROPHARYNGEAL DYSPHAGIA: ICD-10-CM

## 2020-08-21 DIAGNOSIS — N39.0 URINARY TRACT INFECTION WITHOUT HEMATURIA, SITE UNSPECIFIED: ICD-10-CM

## 2020-08-21 DIAGNOSIS — I10 ESSENTIAL HYPERTENSION: ICD-10-CM

## 2020-08-21 DIAGNOSIS — I67.9 CVD (CEREBROVASCULAR DISEASE): ICD-10-CM

## 2020-08-21 DIAGNOSIS — R41.82 ALTERED MENTAL STATUS, UNSPECIFIED ALTERED MENTAL STATUS TYPE: Primary | ICD-10-CM

## 2020-08-21 DIAGNOSIS — G40.909 SEIZURE DISORDER (H): ICD-10-CM

## 2020-08-21 DIAGNOSIS — F03.90 DEMENTIA WITHOUT BEHAVIORAL DISTURBANCE, UNSPECIFIED DEMENTIA TYPE: ICD-10-CM

## 2020-08-21 DIAGNOSIS — R53.81 PHYSICAL DECONDITIONING: ICD-10-CM

## 2020-08-21 DIAGNOSIS — G91.2 NORMAL PRESSURE HYDROCEPHALUS (H): ICD-10-CM

## 2020-08-21 DIAGNOSIS — I25.10 CORONARY ARTERY DISEASE INVOLVING NATIVE CORONARY ARTERY OF NATIVE HEART WITHOUT ANGINA PECTORIS: ICD-10-CM

## 2020-08-21 DIAGNOSIS — I62.03 CHRONIC SUBDURAL HEMATOMA (H): ICD-10-CM

## 2020-08-21 DIAGNOSIS — I26.99 ACUTE PULMONARY EMBOLISM WITHOUT ACUTE COR PULMONALE, UNSPECIFIED PULMONARY EMBOLISM TYPE (H): ICD-10-CM

## 2020-08-21 PROCEDURE — 99310 SBSQ NF CARE HIGH MDM 45: CPT | Performed by: NURSE PRACTITIONER

## 2020-08-21 ASSESSMENT — MIFFLIN-ST. JEOR: SCORE: 915

## 2020-08-21 NOTE — LETTER
8/21/2020        RE: Joelle Freeman  [address] [facility] [address]        [facility]  PRIMARY CARE PROVIDER AND CLINIC: EDNA CARCAMO, [address] / [facility]; Phone: [phone]; Fax: [phone]  Chief Complaint   Patient presents with     Hospital F/U     [place] [facility] Record Number: [id number]  Place of Service where encounter took place: [facility] [[id number]]    Joelle Freeman is a 89 year old (5/25/1928), admitted to the above facility from  [facility]. Hospital stay 08/09/20 through 08/20/20..  Admitted to this facility for rehab, medical management and nursing care.    HPI:    HPI information obtained from: facility chart records, facility staff, patient report and [facility] chart review.   Brief Summary of Hospital Course:   PMH of  HTN; CAD; CVI with right sided weakness; severe AS; NPH s/p  shunt; factor V Leiden (heterozygous); falls; and recent hospitalizations (7/19-7/21 for syncope and bilateral chronic subdural hygromas vs chronic SDH, and 8/2-8/5 for AMS and UTI), presented from TCU with altered mental status   Acute encephalopathy with fluctuations in level of consciousness, suspect multifactorial (metabolic and infectious) including suspected seizures, UTI and bilateral PE on top of underlying advanced dementia, bilateral chronic subdural hygromas/hematomas, prior stroke and NPH: see hospital note but patient had intermittent episodes of lethargy and minimal responsiveness throughout stay, neurology followed, patient started on Keppra for seizure control with ongoing period of lethargy. Palliative care consult and Son is not ready for hospice at this time.   Acute bilateral pulmonar embolism: found incidentally on CTA of head and neck: heparin to apixaban  Dysphagia/inadequate oral intake: SLP downgraded to DD1 diet with nectar thick liquids  UTI/candida: fluconazole started 8/14 will DC after  8/20  HTN/CAD severe AS: not a surgical candidate, hold lipitor and lisinopril until adequate oral intake  Recent discovery of bilateral chronic subdural hygromas vs hematomas with Hx of stroke 1999: no further ASA, monitor closely now on AC  Updates on Status Since Skilled nursing Admission: On exam today patient is resting in bed, appears comfortable, minimally responsive, opens eyes to verbal stimuli, does not respond to ROS questioning. Nursing report that patient was a little more alert this AM, denies pain or discomfort, ROS difficult due to LOC.     CODE STATUS/ADVANCE DIRECTIVES DISCUSSION: No CPR - do NOT Intubate  Patient's living condition: lives in an assisted living facility - [facility]  ALLERGIES: Tramadol  PAST MEDICAL HISTORY:  has a past medical history of Asthma, CAD (coronary artery disease), Cerebral ventriculomegaly, CVA (cerebral infarction) (2002), Dementia (H), Dementia (H), Depression, Diverticulosis, Dyslipidemia, Heterozygous factor V Leiden mutation (H), HTN (hypertension), Hyperlipidemia, Hypothyroidism, Normal pressure hydrocephalus (H), Posterior reversible encephalopathy syndrome, Subarachnoid hemorrhage (H) (3/23/2013), Subdural hematoma (H), TIA (transient ischaemic attack), TIA (transient ischaemic attack), Unspecified cerebral artery occlusion with cerebral infarction, Urinary incontinence, and UTI (lower urinary tract infection).  PAST SURGICAL HISTORY:  has a past surgical history that includes Cholecystectomy; hernia repair; Hysterectomy; Stent (2001); lumbar puncture; Cardiac surgery; Abdomen surgery; appendectomy; biopsy; ENT surgery; wisdom teeth extraction[; GYN surgery; GYN surgery; Optical tracking system implant shunt ventriculoperitoneal (7/16/2013); and Open reduction internal fixation hip nailing (11/18/2013).  FAMILY HISTORY: family history includes Blood Disease in her son; Cerebrovascular Disease in her father and mother.  SOCIAL HISTORY:   "reports that she has never smoked. She has never used smokeless tobacco. She reports that she does not drink alcohol or use drugs.    Post Discharge Medication Reconciliation Status: discharge medications reconciled, continue medications without change    Current Outpatient Medications   Medication Sig Dispense Refill     acetaminophen (TYLENOL) 325 MG tablet Take 650 mg by mouth every 6 hours as needed for pain        apixaban ANTICOAGULANT (ELIQUIS) 5 MG tablet Take 2 tablets (10 mg) by mouth 2 times daily for 3 days Change to 5mg bid on 8/24/20 12 tablet 0     [START ON 8/24/2020] apixaban ANTICOAGULANT (ELIQUIS) 5 MG tablet Take 1 tablet (5 mg) by mouth 2 times daily Start on 8/24/20 60 tablet 0     atorvastatin (LIPITOR) 10 MG tablet Take 1 tablet (10 mg) by mouth daily 30 tablet 1     cholecalciferol 1000 UNITS TABS Take 1,000 Units by mouth daily 30 tablet 1     folic acid (FOLVITE) 1 MG tablet Take 1 tablet (1 mg) by mouth daily 30 tablet 0     levETIRAcetam (KEPPRA) 100 MG/ML solution Take 7.5 mLs (750 mg) by mouth 2 times daily       levothyroxine (SYNTHROID/LEVOTHROID) 75 MCG tablet Take 1 tablet (75 mcg) by mouth daily 30 tablet 0     lisinopril (PRINIVIL/ZESTRIL) 10 MG tablet Take 10 mg by mouth daily       melatonin 1 MG TABS tablet Take 1 mg by mouth nightly as needed for sleep        metoprolol tartrate (LOPRESSOR) 12.5 mg TABS half-tab Take 12.5 mg by mouth 2 times daily       nystatin (MYCOSTATIN) 786523 UNIT/GM external powder Apply topically 2 times daily as needed (intertriginal dermatitis)       potassium chloride ER (KLOR-CON M) 20 MEQ CR tablet Take 20 mEq by mouth once with dinner.       senna-docusate (SENOKOT-S/PERICOLACE) 8.6-50 MG tablet Take 1 tablet by mouth 2 times daily 60 tablet 0         ROS:  Unable to obtain due to decreased LOC    Vitals:  /82   Pulse 76   Temp 96.9  F (36.1  C)   Resp 18   Ht 1.626 m (5' 4\")   Wt 52 kg (114 lb 10.2 oz)   SpO2 98%   BMI 19.68 kg/m  "   Exam:  GENERAL APPEARANCE:  opens eyes to verbal stimulus, unable to respond to questioning  ENT:  Mouth and posterior oropharynx normal, moist mucous membranes  EYES:  EOM, conjunctivae, lids, pupils and irises normal, PERRL  RESP:  no respiratory distress  CV:  peripheral edema trace+ in LE bilaterally  ABDOMEN:  abdomen flat  M/S:   patient resting in bed, unable to test strength patient minimally responsive  SKIN:  Inspection of skin and subcutaneous tissue baseline  NEURO:   minimally responsive, opens eyes to verbal stimuli    Lab/Diagnostic data:    Most Recent 3 CBC's:  Recent Labs   Lab Test 08/19/20  0905 08/18/20  1458 08/15/20  0601   WBC 7.9 7.1 6.8   HGB 14.2 13.2 13.3   MCV 96 96 93    190 158     Most Recent 3 BMP's:  Recent Labs   Lab Test 08/20/20  0925 08/19/20  0905 08/18/20  1458  08/16/20  0941  08/14/20  0856   NA  --  141 140  --   --   --  142   POTASSIUM  --  4.0 4.0  --  4.0   < > 3.3*   CHLORIDE  --  112* 111*  --   --   --  112*   CO2  --  24 27  --   --   --  26   BUN  --  7 5*  --   --   --  4*   CR 0.69 0.57 0.63   < >  --   --  0.42*   ANIONGAP  --  5 2*  --   --   --  4   JAZLYN  --  8.7 9.3  --   --   --  8.5   GLC  --  116* 104*  --   --   --  107*    < > = values in this interval not displayed.       ASSESSMENT/PLAN:  Altered mental status, unspecified altered mental status type  Seizure disorder (H)  Acute/ongoing: family not ready for hospice at this time  Continue keppra 750mg BID    Chronic subdural hematoma (H)  Normal pressure hydrocephalus (H)  CVD (cerebrovascular disease)  Dementia without behavioral disturbance, unspecified dementia type (H)  Physical deconditioning  Ongoing/acute unstable: follow on Eliquis monitor for changes in LOC  PT, OT and ST    Urinary tract infection without hematuria, site unspecified  Acute: finished course of Diflucan during hospitalzation    Acute pulmonary embolism without acute cor pulmonale, unspecified pulmonary embolism type  (H)  Acute: incidental finding on CTA, started on eliquis 10mg BID through 8/24/20 then decrease to 5mg BID  Monitor SaO2 at rest and with activity    Oropharyngeal dysphagia  Acute/ongoing: ST to follow, D1 diet with nectar thick liquids may advance as indicated    Essential hypertension  Coronary artery disease involving native coronary artery of native heart without angina pectoris  Acute/ongoing: vitals daily and prn, BMP follow, continue lopressor 12.5mg BID, lisinopril 10mg QD,        Orders written by provider at facility    Total time spent with patient visit at the [facility] nursing [facility] was 45 min including patient visit and review of past records. Greater than 50% of total time spent with counseling and coordinating care due to unable to discuss with patient, discussed POC with nursing, discussed patient current meds, labs and orders with nursing staff. Reviewed advanced directives, will contact family next visit to discuss advanced directives .     Electronically signed by:  Tonya Lynn Haase, APRN CNP         Sincerely,        Tonya Lynn Haase, APRN CNP

## 2020-08-21 NOTE — DISCHARGE SUMMARY
[facility]  Hospitalist Discharge Summary      Date of Admission:  8/9/2020  Date of Discharge:  8/20/2020  5:00 PM  Discharging Provider: Cameron Jaimes MD  Discharge Diagnoses      Acute metabolic encephalopathy, multifactorial including post-ictal state    Seizures     Acute bilateral pulmonary emboli    Moderate oropharyngeal dysphagia    Hypokalemia     Follow-ups Needed After Discharge   Follow-up Appointments     Follow Up and recommended labs and tests      Follow up with long-term physician.  The following labs/tests are   recommended: BMP, Mg, CBC in one week.             Unresulted Labs Ordered in the Past 30 Days of this Admission     No orders found from 7/10/2020 to 8/10/2020.        Discharge Disposition   Discharged to short-term care facility  Condition at discharge: Stable    Hospital Course   Joelle Freeman is a 89 year old female with past medical history including HTN; CAD; CVI with right sided weakness; severe AS; NPH s/p  shunt; factor V Leiden (heterozygous); falls; and recent hospitalizations (7/19-7/21 for syncope and bilateral chronic subdural hygromas vs chronic SDH, and 8/2-8/5 for AMS and UTI).   She presented from TCU on 8/9/2020 with altered mental status.  Code stroke was initiated in the ED with essentially unremarkable imaging for acute intracranial pathology but was found incidentally with bilateral pulmonary emboli noted on CTA head and neck.  Neurology and Neurosurgery consulted.  ED MD discussed with Neurosurgery who was okay with therapeutic heparin infusion without boluses. Patient admitted for further management.     Acute encephalopathy with fluctuations in level of consciousness, suspect multifactorial (metabolic and infectious) including suspected seizures, UTI and bilateral PE on top of underlying advanced dementia, bilateral chronic subdural hygromas/hematomas, prior stroke and NPH  Recently hospitalization 7/19-7/21 for syncope and found  with bilateral chronic subdural hygromas vs chronic SDH. Hospitalized again 8/2-8/5 for AMS and UTI.   Reportedly not eating or drinking much in the 2 days PTA at TCU. On the morning of presentation 8/9, was only responsive to sternal rub.  In ED 8/9, patient was initially not responding or following commands and was only withdrawing to noxious stimuli.  A little later patient was more awake interacting with her sons and following commands  Code stroke was called in the ED. Head CT 8/9 showed no acute intracranial pathology, there was some slight interval increase in thickness of hypodense subdural collections along the cerebral convexities bilaterally now measuring up to 0.8 cm maximum thickness on the right and 0.5 cm maximum thickness on the left. CT head perfusion 8/9 negative. CTA head and neck 8/9 no major head or neck findings, but did show large pulmonary emboli at the distal ends of the main pulmonary arteries bilaterally (see below).  Neurology and Neurosurgery consulted. Empiric Keppra started on admit. Video EEG 8/11-8/12 showed epileptiform discharges right anterior/mid temporal region with focal cerebral dysfunction bilaterally; no clinical seizures seen.  On 8/13, pt remained overall lethargic with minimal oral intake,at times is able to be more alert and a bit more responsive. Dr. Trejo  discussed goals of care again with her son, Osman and noted that pt would not want feeding tube; did not want to pursue MRI; he wanted to observe for 48 hours and if no improvement would consider palliative cares.   On 8/14, pt much improved and was being fed food; son was not ready for hospice and wanted continued restorative cares, but understood that if she continued to decline with repeat hospitalizations, he would consider hospice at that point.   On 8/16, pt more somnolent/lethargic; STAT head CT stable; STAT EEG did not show active seizures, though there were signs of diffuse encephalopathy; seen by Neurology  "again and suspected that pt suffered a seizure and Keppra increased and changed back to IV. Much improved and more conversant on 8/17. MRI 8/17 showed no evidence of CVI or other acute findings; subdural fluid collections slightly smaller than on prior CT.    Continue Keppra    Acute bilateral pulmonary emboli, \"incidentally\" found on CTA of head and neck  History of heterozygous FVL mutation  CTA head and neck 8/9 no major head or neck findings, but did show large pulmonary emboli at the distal ends of the main pulmonary arteries bilaterally. Neurosurgery discussed with the ED MD and were okay with IV heparin infusion without boluses. Head CT 8/10 stable. Transitioned to apixaban 8/14.    Continue apixaban     FEN  Moderate oropharyngeal dysphagia  Inadequate oral intake  Issues with encephalopathy as below. Seen by SLP.  Downgraded diet to DDL 1 with NTL.     Continue DD1 with nectar thickened liquids    Continue nutritional supplements     Recently noted LBBB  Chronic troponin elevation  During her recent prior hospitalization her troponins were 0.46, 0.47. Noted with LBBB at that time, not noted previously. At that time she had an echocardiogram which showed a preserved EF and no wall motion abnormalities.  Trop 0.364 on admit. No chest pain or dyspnea on admit. Trop subsequently flat. Family previously not interested in any potential cardiac interventions given advanced age and advanced dementia.     Possible candidal UTI vs colonization  H/o recurrent UTI  Had completed 2 courses of antibiotics over last few weeks for UTI.  Recently discharged from [facility] 8/5 on antibiotics for UTI.   No urinary symptoms reported on admission. Recheck UA on admit showed > 182 WBC, 53 RBC, negative nitrite, no bacteria.  Urine cultures growing Candida. She received a course of fluconazole.     Hypertension (benign essential)  CAD - RCA stent 2001  Severe aortic stenosis  [PTA BP meds: lisinopril 10 mg daily; metoprolol 12.5 mg " BID.]  * Recent admission for syncope 7/2020 as above. Echocardiogram on 07/20/2020 showed aortic valve area of 0.5 square cm as per prior notes. Not considered a surgical candidate.     Continue metoprolol, atorvastatin, lisinopril    No ASA given bilateral chronic subdural hygromas/hematomas and already on anticoagulation     Recently discovered bilateral chronic subdural hygromas vs hematomas  History of stroke in 1999  Noted 7/2020. ASA stopped at that time  Head CT imaging this stay as above. Seen by Neurology and Neurosurgery this stay as above. Started on anticoagulation this hospitalization as above     Hypothyroidism  Recently, her TSH was low at 0.22 and free T4 was high at 53.    Her Synthroid was reduced from 88 to 75 mcg daily.  Recheck TSH at 1.36 on 8/10.    Continue levothyroxine 75 mcg daily     Hypokalemia, corrected     Consultations This Hospital Stay   PHARMACY TO DOSE HEPARIN  NEUROLOGY IP CONSULT  PHARMACY TO DOSE HEPARIN  NEUROSURGERY IP CONSULT  PHYSICAL THERAPY ADULT IP CONSULT  OCCUPATIONAL THERAPY ADULT IP CONSULT  PHARMACY LIAISON FOR MEDICATION COVERAGE CONSULT  PALLIATIVE CARE ADULT IP CONSULT  PHARMACY TO DOSE HEPARIN  SPEECH LANGUAGE PATH ADULT IP CONSULT  NEUROLOGY IP CONSULT  NEUROLOGY IP CONSULT  NEUROLOGY IP CONSULT  PHYSICAL THERAPY ADULT IP CONSULT  OCCUPATIONAL THERAPY ADULT IP CONSULT    Code Status   No CPR- Do NOT Intubate    Time Spent on this Encounter   I, Cameron Jaimes MD, personally saw the patient today and spent less than or equal to 30 minutes discharging this patient.       Cameron Jaimes MD  [facility]  ______________________________________________________________________    Physical Exam   Vital Signs:                    Weight: 119 lbs 3.2 oz  Constitutional: awake, no acute distress.  Minimally verbal.       Primary Care Physician   EDNA CARCAMO    Discharge Orders      General info for SNF    Length of Stay Estimate: Short  Term Care: Estimated # of Days <30  Condition at Discharge: Stable  Level of care:skilled   Rehabilitation Potential: Fair  Admission H&P remains valid and up-to-date: Yes  Recent Chemotherapy: N/A  Use Nursing Home Standing Orders: Yes     Mantoux instructions    Give two-step Mantoux (PPD) Per Facility Policy Yes     Reason for your hospital stay    Altered mental status- possible seizures     Follow Up and recommended labs and tests    Follow up with [facility] physician.  The following labs/tests are recommended: BMP, Mg, CBC in one week.     Activity - Up with nursing assistance     No CPR- Do NOT Intubate     Physical Therapy Adult Consult    Evaluate and treat as clinically indicated.    Reason:  Deconditioning     Occupational Therapy Adult Consult    Evaluate and treat as clinically indicated.    Reason:  Deconditioning     Fall precautions     Advance Diet as Tolerated    Follow this diet upon discharge: Orders Placed This Encounter      Snacks/Supplements Adult: Magic Cup; With Meals      Dysphagia Diet Level 1 Pureed Nectar Thickened Liquids (pre-thickened or use instant food thickener)       Significant Results and Procedures   Most Recent 3 CBC's:  Recent Labs   Lab Test 08/19/20  0905 08/18/20  1458 08/15/20  0601   WBC 7.9 7.1 6.8   HGB 14.2 13.2 13.3   MCV 96 96 93    190 158     Most Recent 3 BMP's:  Recent Labs   Lab Test 08/20/20  0925 08/19/20  0905 08/18/20  1458  08/16/20  0941  08/14/20  0856   NA  --  141 140  --   --   --  142   POTASSIUM  --  4.0 4.0  --  4.0   < > 3.3*   CHLORIDE  --  112* 111*  --   --   --  112*   CO2  --  24 27  --   --   --  26   BUN  --  7 5*  --   --   --  4*   CR 0.69 0.57 0.63   < >  --   --  0.42*   ANIONGAP  --  5 2*  --   --   --  4   JAZLYN  --  8.7 9.3  --   --   --  8.5   GLC  --  116* 104*  --   --   --  107*    < > = values in this interval not displayed.   ,   Results for orders placed or performed during the hospital encounter of 08/09/20   CT Head  Perfusion w Contrast    Narrative    CT BRAIN PERFUSION 8/9/2020 2:41 PM    COMPARISON: None    HISTORY: Altered mental status.    TECHNIQUE: Time sequential axial CT images of the head were acquired  during the administration of intravenous contrast (50 mL Isovue-370).  CTA images of the [place] as well as color perfusion maps of  the brain were created from this time sequential axial source data.    FINDINGS: There are no focal or regional perfusion defects in the  brain.      Impression    IMPRESSION: Normal CT perfusion of the brain.      Radiation dose for this scan was reduced using automated exposure  control, adjustment of the mA and/or kV according to patient size, or  iterative reconstruction technique.    JOSE FELIX MD   CT Head w/o Contrast    Narrative    CT OF THE HEAD WITHOUT CONTRAST 8/9/2020 2:18 PM     COMPARISON: Head CT 8/2/2020    HISTORY: Code stroke.    TECHNIQUE: 5 mm thick axial CT images of the head were acquired  without IV contrast material.    FINDINGS: A  shunt catheter placed through a right parietal approach  with its tip in the mid body of the left lateral ventricle is again  noted without change in position. The lateral and third ventricles are  unchanged in size. There are hypodense subdural collections along the  cerebral convexities on both sides measuring up to 0.8 cm maximum  thickness on the right and 0.5 cm maximum thickness on the left; these  collections have increased in size from 0.5 cm maximum thickness on  the right and 0.4 cm maximum thickness on the left since the  comparison study. The subdural collections may be related to the  patient's  shunt.     There is moderate diffuse cerebral volume loss. There are subtle  patchy areas of decreased density in the cerebral white matter  bilaterally that are consistent with sequela of chronic small vessel  ischemic disease. The ventricles and basal cisterns are within normal  limits in configuration given the  degree of cerebral volume loss.  There is no midline shift.    No intracranial hemorrhage, mass or recent infarct.    The visualized paranasal sinuses are well-aerated. There is no  mastoiditis. There are no fractures of the visualized bones.      Impression    IMPRESSION:   1.  shunt catheter again noted. No evidence for worsening  hydrocephalus.  2. Slight interval increase in thickness of hypodense subdural  collections along the cerebral convexities bilaterally now measuring  up to 0.8 cm maximum thickness on the right and 0.5 cm maximum  thickness on the left. These collections may be related to the  patient's  shunt catheter.  3. Diffuse cerebral volume loss and cerebral white matter changes  consistent with chronic small vessel ischemic disease. No evidence for  acute intracranial pathology.      Radiation dose for this scan was reduced using automated exposure  control, adjustment of the mA and/or kV according to patient size, or  iterative reconstruction technique.    JOSE FELIX MD   CTA Head Neck with Contrast    Narrative    CT ANGIOGRAM OF THE HEAD AND NECK WITHOUT AND WITH CONTRAST  8/9/2020  2:30 PM     COMPARISON: None    HISTORY: Altered mental status.    TECHNIQUE:  Precontrast localizing scans were followed by CT  angiography with an injection of 70 mL Isovue-370 nonionic intravenous  contrast material with scans through the head and neck.  Images were  transferred to a separate 3-D workstation where multiplanar  reformations and 3-D images were created.  Estimates of carotid  stenoses are made relative to the distal internal carotid artery  diameters except as noted.      FINDINGS:   Incidental note is made of a large pulmonary emboli at the distal ends  of the main pulmonary arteries bilaterally.    Neck CTA: The common carotid arteries bilaterally are tortuous but are  patent without vascular narrowing. There is calcified atherosclerotic  plaque at the origins of the internal carotid  arteries on both sides  and results in mild narrowing (less than 50% luminal diameter  stenosis) at the origin of the right internal carotid artery but no  measurable stenosis at the origin of the left internal carotid artery.  The cervical internal carotid arteries bilaterally are otherwise  patent and unremarkable. There is minimal atherosclerotic narrowing at  the origin of the right vertebral artery. The vertebral arteries  bilaterally are otherwise patent and unremarkable.    Head CTA: There is heavily calcified atherosclerotic plaque of the  intracranial distal internal carotid arteries on both sides that does  not result in any significant vascular narrowing on either side. The  basilar, bilateral intracranial distal internal carotid, bilateral  middle cerebral and bilateral posterior cerebral arteries are patent  and unremarkable. The anterior communicating artery is patent.      Impression    IMPRESSION:  1. Large pulmonary emboli at the distal ends of the main pulmonary  arteries bilaterally.  2. Atherosclerotic plaque of the proximal and distal internal carotid  arteries on both sides that does not result in any significant  vascular narrowing in any of these locations.  3. Otherwise, normal neck and head CTA.    This imaging study, including the abnormal finding of bilateral  pulmonary emboli and slight interval increase in thickness of  bilateral subdural effusions, was discussed with the ordering  physician, Dr. Garibay, by Dr. Jama on 8/9/2020 2:45 PM.      Radiation dose for this scan was reduced using automated exposure  control, adjustment of the mA and/or kV according to patient size, or  iterative reconstruction technique    JOSE JAMA MD   Cervical spine CT w/o contrast    Narrative    CT OF THE CERVICAL SPINE WITHOUT CONTRAST   8/9/2020 2:46 PM     COMPARISON: CT cervical spine 8/2/2020    HISTORY: Recurrent falls, bilateral arm weakness.     TECHNIQUE: Axial images of the cervical spine  were acquired without  intravenous contrast. Multiplanar reformations were created.        Impression    IMPRESSION: Mild degenerative anterolisthesis of C4 upon C5, mild  degenerative retrolisthesis of C5 upon C6 and mild degenerative  anterolisthesis of C7 upon T1 again noted. Alignment of the cervical  vertebrae is otherwise normal. Vertebral body heights of the cervical  spine are normal. Craniocervical alignment is normal. No evidence for  fracture of the cervical spine. Moderate-severe facet arthropathy  throughout the cervical spine. Moderate degenerative spinal canal  narrowing from the C4-C5 level down to the C7 level again noted.  Overall, no appreciable change from the comparison study.      Radiation dose for this scan was reduced using automated exposure  control, adjustment of the mA and/or kV according to patient size, or  iterative reconstruction technique.    JOSE FELIX MD   CT Head w/o Contrast    Narrative    CT SCAN OF THE HEAD WITHOUT CONTRAST   8/10/2020 8:19 AM     HISTORY: Intracranial hemorrhage, known, follow up.    TECHNIQUE:  Axial images of the head and coronal reformations without  IV contrast material. Radiation dose for this scan was reduced using  automated exposure control, adjustment of the mA and/or kV according  to patient size, or iterative reconstruction technique.    COMPARISON: CT head dated 8/9/2020.    FINDINGS: Again seen is a right occipital approach ventricular shunt  catheter extending through the right lateral ventricle and crossing  midline across the septum pellucidum, with the tip terminating in the  anterior body of the left lateral ventricle. The size and  configuration of the ventricular system is unchanged, and there are no  findings of hydrocephalus. There has been no significant change in the  size or configuration of small bilateral holohemispheric cerebral  convexity subdural hematomas measuring up to 9 mm in thickness on the  right and 6 mm on the left.  These again extend along the superior  aspect of the tentorial leaflets. Findings of previous right  frontotemporal craniotomy with prominent dural/extra-axial  mineralization underlying the craniotomy flap, unchanged.    There is a background of mild-to-moderate global brain parenchymal  volume loss and mild presumed chronic small vessel ischemic disease in  the cerebral white matter. No definite new intracranial abnormality.  Scattered intracranial atherosclerotic calcifications are again  present.    Right lens replacement. Orbits otherwise within normal limits. The  left sphenoid sinus is nearly completely opacified, unchanged,  presumably related to inflammatory disease/mucosal thickening.  Otherwise, the paranasal sinuses and mastoids are clear.      Impression    IMPRESSION:  1. No significant change in the size or configuration of bilateral  small holohemispheric cerebral convexity subdural hematomas. Unchanged  mild associated mass effect on the cerebral hemispheres without  midline shift/herniation. No new intracranial hemorrhage seen.  2. Stable size and configuration of the ventricular system with right  occipital approach ventricular shunt catheter in place.  3. Otherwise stable exam, as detailed.    ZAYRA LACY MD   CT Head w/o Contrast    Narrative    CT SCAN OF THE HEAD WITHOUT CONTRAST   8/16/2020 11:58 AM     HISTORY: Bilateral chronic subdural hematoma versus hygromas. Now on  anticoagulation for pulmonary embolism and somnolent. Evaluate for  acute interval changes.    TECHNIQUE: Axial images of the head and coronal reformations without  IV contrast material. Radiation dose for this scan was reduced using  automated exposure control, adjustment of the mA and/or kV according  to patient size, or iterative reconstruction technique.    COMPARISON: CT head 8/10/2020.    FINDINGS: Again seen are changes of previous right  frontoparietotemporal craniotomy with prominent extra-axial  mineralization  underlying the craniotomy flap. There is a small focus  of pneumocephalus overlying the superolateral right frontal lobe which  is unchanged. The size and morphology of the bilateral predominantly  hypodense subdural fluid collections overlying the cerebral  convexities and continuing along the superior aspect of both tentorial  leaflets is now significantly changed compared to the most recent  exam. There is again mild associated mass effect on the cerebral  hemispheres without midline shift/herniation. Unchanged appearance of  the right occipital approach ventricular shunt catheter, with tip  crossing midline and terminating in the anterior aspect of the left  lateral ventricle. No acute intracranial hemorrhage identified. Mild  to moderate global parenchymal volume loss and mild presumed chronic  small vessel ischemic disease. 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    Right lens replacement. Orbits otherwise normal. Near-complete  opacification of the left sphenoid sinus, as before, presumably  inflammatory. Mastoid and middle ear cavities are clear. No other  significant change.      Impression    IMPRESSION:  1. No significant change in the bilateral small holohemispheric  cerebral convexity subdural fluid collections with mild associated  mass effect. No midline shift/herniation.  2. Stable, normal size of the ventricular system with right occipital  approach ventricular shunt catheter in place.  3. No acute intracranial abnormality. Other stable chronic findings,  as detailed.    ZAYRA LACY MD   XR Skull 1/3 Views    Narrative    XR SKULL ONE-THREE VIEWS   8/17/2020 4:13 PM     HISTORY: Presence of programmable ventriculoperitoneal shunt    COMPARISON: None.      Impression    IMPRESSION: Programmable shunt noted. Exam obtained for baseline to  MRI study.    TIO CAIN MD   XR Skull 1/3 Views    Narrative    XR SKULL ONE-THREE VIEWS   8/17/2020 6:44 PM      HISTORY: Ventriculoperitoneal shunt.    COMPARISON: 1605 on same date      Impression    IMPRESSION: Programmable shunt is in similar position to that seen on  exam performed at 1605 on same date.    TIO CAIN MD   MR Brain w/o Contrast    Narrative    MRI BRAIN WITHOUT CONTRAST  8/17/2020 4:53 PM    HISTORY:  Encephalopathy. Altered mental status, unexplained.    TECHNIQUE:  Multiplanar, multisequence MRI of the brain without  gadolinium IV contrast material.  Patient was unable to fully  cooperate with study, hence, intravenous contrast was not given and  the study was terminated.    COMPARISON: Head CT dated 8/16/2020.    FINDINGS: Diffusion-weighted images are limited due to metallic  artifact from a posterior right-sided shunt catheter. No areas of  restricted diffusion to suggest acute infarct are present. There are  bilateral subdural fluid collections approximately 0.5 cm in maximum  thickness bilaterally. The ventricles are minimally prominent but the  temporal horns are not dilated and there is no evidence to suggest any  hydrocephalus. The brain parenchyma appears to be within normal limits  except for a few tiny left hemispheric white matter lesions. These are  not associated with any mass effect. Vascular structures are patent at  the skull base.      Impression    IMPRESSION:  1. Limited exam due to motion, patient's inability to cooperate with  exam.  2. Right posterior ventricular catheter. No evidence hydrocephalus.  3. Persistent bilateral subdural fluid collections slightly smaller  than that seen on the prior CT exam.  4. No evidence for intraparenchymal hemorrhage, acute infarct, or any  focal mass lesions.  5. No evidence for encephalitis.    TIO CAIN MD       Discharge Medications   Discharge Medication List as of 8/20/2020  4:02 PM      START taking these medications    Details   levETIRAcetam (KEPPRA) 100 MG/ML solution Take 7.5 mLs (750 mg) by mouth 2 times daily, Transitional          CONTINUE these medications which have CHANGED    Details   !! apixaban ANTICOAGULANT (ELIQUIS) 5 MG tablet Take 2 tablets (10 mg) by mouth 2 times daily for 3 days Change to 5mg bid on 8/24/20, Disp-12 tablet,R-0, Transitional      !! apixaban ANTICOAGULANT (ELIQUIS) 5 MG tablet Take 1 tablet (5 mg) by mouth 2 times daily Start on 8/24/20, Disp-60 tablet,R-0, Transitional       !! - Potential duplicate medications found. Please discuss with provider.      CONTINUE these medications which have NOT CHANGED    Details   acetaminophen (TYLENOL) 325 MG tablet Take 650 mg by mouth every 6 hours as needed for pain , Historical      atorvastatin (LIPITOR) 10 MG tablet Take 1 tablet (10 mg) by mouth daily, Disp-30 tablet, R-1, E-Prescribe      cholecalciferol 1000 UNITS TABS Take 1,000 Units by mouth daily, Disp-30 tablet, R-1, E-Prescribe      folic acid (FOLVITE) 1 MG tablet Take 1 tablet (1 mg) by mouth daily, Disp-30 tablet,R-0, E-Prescribe      levothyroxine (SYNTHROID/LEVOTHROID) 75 MCG tablet Take 1 tablet (75 mcg) by mouth daily, Disp-30 tablet,R-0, E-PrescribeFuture refills by PCP Dr. EDNA CARCAMO with phone number [phone].      lisinopril (PRINIVIL/ZESTRIL) 10 MG tablet Take 10 mg by mouth daily, Historical      melatonin 1 MG TABS tablet Take 1 mg by mouth nightly as needed for sleep , Historical      metoprolol tartrate (LOPRESSOR) 12.5 mg TABS half-tab Take 12.5 mg by mouth 2 times daily, Historical      nystatin (MYCOSTATIN) 103128 UNIT/GM external powder Apply topically 2 times daily as needed (intertriginal dermatitis)Historical      potassium chloride ER (KLOR-CON M) 20 MEQ CR tablet Take 20 mEq by mouth once with dinner., Historical      senna-docusate (SENOKOT-S/PERICOLACE) 8.6-50 MG tablet Take 1 tablet by mouth 2 times daily, Disp-60 tablet,R-0, E-Prescribe         STOP taking these medications       amoxicillin (AMOXIL) 500 MG capsule Comments:   Reason for Stopping:             Allergies    Allergies   Allergen Reactions     Tramadol Unknown

## 2020-08-21 NOTE — PROGRESS NOTES
[place] [facility]  PRIMARY CARE PROVIDER AND CLINIC: EDNA CARCAMO, [facility] [address] [address] [place] [address] / [facility]; Phone: [phone]; Fax: [phone]  Chief Complaint   Patient presents with     Hospital F/U     [place] Medical Record Number: [id number]  Place of Service where encounter took place: [facility] ([facility]) [[id number]]    Joelle Freeman is a 89 year old (5/25/1928), admitted to the above facility from  [facility]. Hospital stay 08/09/20 through 08/20/20..  Admitted to this facility for rehab, medical management and nursing care.    HPI:    HPI information obtained from: facility chart records, facility staff, patient report and [facility] chart review.   Brief Summary of Hospital Course:   PMH of  HTN; CAD; CVI with right sided weakness; severe AS; NPH s/p  shunt; factor V Leiden (heterozygous); falls; and recent hospitalizations (7/19-7/21 for syncope and bilateral chronic subdural hygromas vs chronic SDH, and 8/2-8/5 for AMS and UTI), presented from TCU with altered mental status   Acute encephalopathy with fluctuations in level of consciousness, suspect multifactorial (metabolic and infectious) including suspected seizures, UTI and bilateral PE on top of underlying advanced dementia, bilateral chronic subdural hygromas/hematomas, prior stroke and NPH: see hospital note but patient had intermittent episodes of lethargy and minimal responsiveness throughout stay, neurology followed, patient started on Keppra for seizure control with ongoing period of lethargy. Palliative care consult and Son is not ready for hospice at this time.   Acute bilateral pulmonar embolism: found incidentally on CTA of head and neck: heparin to apixaban  Dysphagia/inadequate oral intake: SLP downgraded to DD1 diet with nectar thick liquids  UTI/candida: fluconazole started 8/14 will DC after 8/20  HTN/CAD severe AS: not a surgical candidate, hold lipitor and lisinopril until  adequate oral intake  Recent discovery of bilateral chronic subdural hygromas vs hematomas with Hx of stroke 1999: no further ASA, monitor closely now on AC  Updates on Status Since Skilled nursing Admission: On exam today patient is resting in bed, appears comfortable, minimally responsive, opens eyes to verbal stimuli, does not respond to ROS questioning. Nursing report that patient was a little more alert this AM, denies pain or discomfort, ROS difficult due to LOC.     CODE STATUS/ADVANCE DIRECTIVES DISCUSSION: No CPR - do NOT Intubate  Patient's living condition: lives in an assisted living facility - [facility]  ALLERGIES: Tramadol  PAST MEDICAL HISTORY:  has a past medical history of Asthma, CAD (coronary artery disease), Cerebral ventriculomegaly, CVA (cerebral infarction) (2002), Dementia (H), Dementia (H), Depression, Diverticulosis, Dyslipidemia, Heterozygous factor V Leiden mutation (H), HTN (hypertension), Hyperlipidemia, Hypothyroidism, Normal pressure hydrocephalus (H), Posterior reversible encephalopathy syndrome, Subarachnoid hemorrhage (H) (3/23/2013), Subdural hematoma (H), TIA (transient ischaemic attack), TIA (transient ischaemic attack), Unspecified cerebral artery occlusion with cerebral infarction, Urinary incontinence, and UTI (lower urinary tract infection).  PAST SURGICAL HISTORY:  has a past surgical history that includes Cholecystectomy; hernia repair; Hysterectomy; Stent (2001); lumbar puncture; Cardiac surgery; Abdomen surgery; appendectomy; biopsy; ENT surgery; wisdom teeth extraction[; GYN surgery; GYN surgery; Optical tracking system implant shunt ventriculoperitoneal (7/16/2013); and Open reduction internal fixation hip nailing (11/18/2013).  FAMILY HISTORY: family history includes Blood Disease in her son; Cerebrovascular Disease in her father and mother.  SOCIAL HISTORY:  reports that she has never smoked. She has never used smokeless tobacco. She reports  "that she does not drink alcohol or use drugs.    Post Discharge Medication Reconciliation Status: discharge medications reconciled, continue medications without change    Current Outpatient Medications   Medication Sig Dispense Refill     acetaminophen (TYLENOL) 325 MG tablet Take 650 mg by mouth every 6 hours as needed for pain        apixaban ANTICOAGULANT (ELIQUIS) 5 MG tablet Take 2 tablets (10 mg) by mouth 2 times daily for 3 days Change to 5mg bid on 8/24/20 12 tablet 0     [START ON 8/24/2020] apixaban ANTICOAGULANT (ELIQUIS) 5 MG tablet Take 1 tablet (5 mg) by mouth 2 times daily Start on 8/24/20 60 tablet 0     atorvastatin (LIPITOR) 10 MG tablet Take 1 tablet (10 mg) by mouth daily 30 tablet 1     cholecalciferol 1000 UNITS TABS Take 1,000 Units by mouth daily 30 tablet 1     folic acid (FOLVITE) 1 MG tablet Take 1 tablet (1 mg) by mouth daily 30 tablet 0     levETIRAcetam (KEPPRA) 100 MG/ML solution Take 7.5 mLs (750 mg) by mouth 2 times daily       levothyroxine (SYNTHROID/LEVOTHROID) 75 MCG tablet Take 1 tablet (75 mcg) by mouth daily 30 tablet 0     lisinopril (PRINIVIL/ZESTRIL) 10 MG tablet Take 10 mg by mouth daily       melatonin 1 MG TABS tablet Take 1 mg by mouth nightly as needed for sleep        metoprolol tartrate (LOPRESSOR) 12.5 mg TABS half-tab Take 12.5 mg by mouth 2 times daily       nystatin (MYCOSTATIN) 934523 UNIT/GM external powder Apply topically 2 times daily as needed (intertriginal dermatitis)       potassium chloride ER (KLOR-CON M) 20 MEQ CR tablet Take 20 mEq by mouth once with dinner.       senna-docusate (SENOKOT-S/PERICOLACE) 8.6-50 MG tablet Take 1 tablet by mouth 2 times daily 60 tablet 0         ROS:  Unable to obtain due to decreased LOC    Vitals:  /82   Pulse 76   Temp 96.9  F (36.1  C)   Resp 18   Ht 1.626 m (5' 4\")   Wt 52 kg (114 lb 10.2 oz)   SpO2 98%   BMI 19.68 kg/m    Exam:  GENERAL APPEARANCE:  opens eyes to verbal stimulus, unable to respond to " questioning  ENT:  Mouth and posterior oropharynx normal, moist mucous membranes  EYES:  EOM, conjunctivae, lids, pupils and irises normal, PERRL  RESP:  no respiratory distress  CV:  peripheral edema trace+ in LE bilaterally  ABDOMEN:  abdomen flat  M/S:   patient resting in bed, unable to test strength patient minimally responsive  SKIN:  Inspection of skin and subcutaneous tissue baseline  NEURO:   minimally responsive, opens eyes to verbal stimuli    Lab/Diagnostic data:    Most Recent 3 CBC's:  Recent Labs   Lab Test 08/19/20  0905 08/18/20  1458 08/15/20  0601   WBC 7.9 7.1 6.8   HGB 14.2 13.2 13.3   MCV 96 96 93    190 158     Most Recent 3 BMP's:  Recent Labs   Lab Test 08/20/20  0925 08/19/20  0905 08/18/20  1458  08/16/20  0941  08/14/20  0856   NA  --  141 140  --   --   --  142   POTASSIUM  --  4.0 4.0  --  4.0   < > 3.3*   CHLORIDE  --  112* 111*  --   --   --  112*   CO2  --  24 27  --   --   --  26   BUN  --  7 5*  --   --   --  4*   CR 0.69 0.57 0.63   < >  --   --  0.42*   ANIONGAP  --  5 2*  --   --   --  4   JAZLYN  --  8.7 9.3  --   --   --  8.5   GLC  --  116* 104*  --   --   --  107*    < > = values in this interval not displayed.       ASSESSMENT/PLAN:  Altered mental status, unspecified altered mental status type  Seizure disorder (H)  Acute/ongoing: family not ready for hospice at this time  Continue keppra 750mg BID    Chronic subdural hematoma (H)  Normal pressure hydrocephalus (H)  CVD (cerebrovascular disease)  Dementia without behavioral disturbance, unspecified dementia type (H)  Physical deconditioning  Ongoing/acute unstable: follow on Eliquis monitor for changes in LOC  PT, OT and ST    Urinary tract infection without hematuria, site unspecified  Acute: finished course of Diflucan during hospitalzation    Acute pulmonary embolism without acute cor pulmonale, unspecified pulmonary embolism type (H)  Acute: incidental finding on CTA, started on eliquis 10mg BID through 8/24/20  then decrease to 5mg BID  Monitor SaO2 at rest and with activity    Oropharyngeal dysphagia  Acute/ongoing: ST to follow, D1 diet with nectar thick liquids may advance as indicated    Essential hypertension  Coronary artery disease involving native coronary artery of native heart without angina pectoris  Acute/ongoing: vitals daily and prn, BMP follow, continue lopressor 12.5mg BID, lisinopril 10mg QD,        Orders written by provider at facility    Total time spent with patient visit at the [facility] nursing [facility] was 45 min including patient visit and review of past records. Greater than 50% of total time spent with counseling and coordinating care due to unable to discuss with patient, discussed POC with nursing, discussed patient current meds, labs and orders with nursing staff. Reviewed advanced directives, will contact family next visit to discuss advanced directives .     Electronically signed by:  Tonya Lynn Haase, APRN CNP

## 2020-08-24 VITALS
HEART RATE: 71 BPM | BODY MASS INDEX: 18.83 KG/M2 | SYSTOLIC BLOOD PRESSURE: 145 MMHG | WEIGHT: 110.3 LBS | TEMPERATURE: 97.8 F | OXYGEN SATURATION: 97 % | DIASTOLIC BLOOD PRESSURE: 84 MMHG | RESPIRATION RATE: 16 BRPM | HEIGHT: 64 IN

## 2020-08-24 ASSESSMENT — MIFFLIN-ST. JEOR
SCORE: 895.32
SCORE: 895.32

## 2020-08-24 NOTE — PROGRESS NOTES
"[place] [facility]  [place] Medical Record Number: [id number]  Place of Service where encounter took place: [facility] ([facility]) [[id number]]  Chief Complaint   Patient presents with     RECHECK       HPI:    Joelle Freeman is a 89 year old (5/25/1928), who is being seen today for an episodic care visit. HPI information obtained from: facility chart records, facility staff, patient report and [facility] chart review. Today's concern is:  AMS: minimally responsive, needing total assist with all cares, restless at night time, nursing staff put patient in chair last night and brought to nurses station. Talked to son Osman on phone regarding goals of care, he states he is aware of her age and \"just want her to be comfortable\" Osman feels that his Mom is on too much Keppra and noticed that the increased keppra dose caused her to be less responsive.   UTI: no further fevers  Acute PE: respirations easy, SaO2 > 90% on room air,  Dysphagia: ongoing, patient eating very little  HTN/CAD: /78, 145/84, 122/68 with HR 70's    Past Medical and Surgical History reviewed in Epic today.    MEDICATIONS:    Current Outpatient Medications   Medication Sig Dispense Refill     acetaminophen (TYLENOL) 325 MG tablet Take 650 mg by mouth every 6 hours as needed for pain        apixaban ANTICOAGULANT (ELIQUIS) 5 MG tablet Take 1 tablet (5 mg) by mouth 2 times daily Start on 8/24/20 60 tablet 0     atorvastatin (LIPITOR) 10 MG tablet Take 1 tablet (10 mg) by mouth daily 30 tablet 1     cholecalciferol 1000 UNITS TABS Take 1,000 Units by mouth daily 30 tablet 1     folic acid (FOLVITE) 1 MG tablet Take 1 tablet (1 mg) by mouth daily 30 tablet 0     levETIRAcetam (KEPPRA) 100 MG/ML solution Take 7.5 mLs (750 mg) by mouth 2 times daily       levothyroxine (SYNTHROID/LEVOTHROID) 75 MCG tablet Take 1 tablet (75 mcg) by mouth daily 30 tablet 0     lisinopril (PRINIVIL/ZESTRIL) 10 MG tablet Take 10 mg by mouth daily       " "melatonin 1 MG TABS tablet Take 1 mg by mouth nightly as needed for sleep        metoprolol tartrate (LOPRESSOR) 12.5 mg TABS half-tab Take 12.5 mg by mouth 2 times daily       nystatin (MYCOSTATIN) 026042 UNIT/GM external powder Apply topically 2 times daily as needed (intertriginal dermatitis)       potassium chloride ER (KLOR-CON M) 20 MEQ CR tablet Take 20 mEq by mouth once with dinner.       senna-docusate (SENOKOT-S/PERICOLACE) 8.6-50 MG tablet Take 1 tablet by mouth 2 times daily 60 tablet 0     REVIEW OF SYSTEMS:  10 point ROS of systems including Constitutional, Eyes, Respiratory, Cardiovascular, Gastroenterology, Genitourinary, Integumentary, Musculoskeletal, Psychiatric were all negative except for pertinent positives noted in my HPI.    Objective:  BP (!) 145/84   Pulse 71   Temp 97.8  F (36.6  C)   Resp 16   Ht 1.626 m (5' 4\")   Wt 50 kg (110 lb 4.8 oz)   SpO2 97%   BMI 18.93 kg/m    Exam:  GENERAL APPEARANCE:  opens eyes to verbal stimulus, unable to respond to questioning  ENT:  Mouth and posterior oropharynx normal, moist mucous membranes  EYES:  EOM, conjunctivae, lids, pupils and irises normal, PERRL  RESP:  no respiratory distress  CV:  peripheral edema trace+ in LE bilaterally  ABDOMEN:  abdomen flat  M/S:   patient resting in bed, unable to test strength patient minimally responsive  SKIN:  Inspection of skin and subcutaneous tissue baseline  NEURO:   minimally responsive, opens eyes to verbal stimuli    Labs:     Most Recent 3 CBC's:  Recent Labs   Lab Test 08/19/20  0905 08/18/20  1458 08/15/20  0601   WBC 7.9 7.1 6.8   HGB 14.2 13.2 13.3   MCV 96 96 93    190 158     Most Recent 3 BMP's:  Recent Labs   Lab Test 08/20/20  0925 08/19/20  0905 08/18/20  1458  08/16/20  0941  08/14/20  0856   NA  --  141 140  --   --   --  142   POTASSIUM  --  4.0 4.0  --  4.0   < > 3.3*   CHLORIDE  --  112* 111*  --   --   --  112*   CO2  --  24 27  --   --   --  26   BUN  --  7 5*  --   --   --  " "4*   CR 0.69 0.57 0.63   < >  --   --  0.42*   ANIONGAP  --  5 2*  --   --   --  4   JAZLYN  --  8.7 9.3  --   --   --  8.5   GLC  --  116* 104*  --   --   --  107*    < > = values in this interval not displayed.       ASSESSMENT/PLAN:  Altered mental status, unspecified altered mental status type  Seizure disorder (H)  Acute/ongoing: family not ready for hospice at this time  Decrease keppra to 500mg BID per conversation with Osman son and POA     Chronic subdural hematoma (H)  Normal pressure hydrocephalus (H)  CVD (cerebrovascular disease)  Dementia without behavioral disturbance, unspecified dementia type (H)  Physical deconditioning  Ongoing/acute unstable: follow on Eliquis monitor for changes in LOC  PT, OT and ST     Urinary tract infection without hematuria, site unspecified  Acute: finished course of Diflucan during hospitalzation     Acute pulmonary embolism without acute cor pulmonale, unspecified pulmonary embolism type (H)  Acute: incidental finding on CTA, started on eliquis 10mg BID through 8/24/20 then decrease to 5mg BID  Monitor SaO2 at rest and with activity     Oropharyngeal dysphagia  Acute/ongoing: ST to follow, D1 diet with nectar thick liquids may advance as indicated     Essential hypertension  Coronary artery disease involving native coronary artery of native heart without angina pectoris  Acute/ongoing: vitals daily and prn, BMP follow, continue lopressor 12.5mg BID, lisinopril 10mg QD,          Orders written by provider at facility  Decrease keppra to 500mg BID    Total time spent with patient visit at the skilled nursing facility was 45 min including patient visit and review of past records. Greater than 50% of total time spent with counseling and coordinating care due to discussed POC with Osman GANT, discussed goals of care which Osman states are comfort focused but be believes that his Mom will become more responsive and be alert enough to visit and have \"some quality of life\" discussed " keppra and titrating down the dose and Osman is in agreement. .  Electronically signed by:  Tonya Lynn Haase, APRN CNP

## 2020-08-25 ENCOUNTER — NURSING HOME VISIT (OUTPATIENT)
Dept: GERIATRICS | Facility: CLINIC | Age: 85
End: 2020-08-25
Payer: MEDICARE

## 2020-08-25 DIAGNOSIS — I67.9 CVD (CEREBROVASCULAR DISEASE): ICD-10-CM

## 2020-08-25 DIAGNOSIS — G91.2 NORMAL PRESSURE HYDROCEPHALUS (H): ICD-10-CM

## 2020-08-25 DIAGNOSIS — R41.82 ALTERED MENTAL STATUS, UNSPECIFIED ALTERED MENTAL STATUS TYPE: Primary | ICD-10-CM

## 2020-08-25 DIAGNOSIS — R13.12 OROPHARYNGEAL DYSPHAGIA: ICD-10-CM

## 2020-08-25 DIAGNOSIS — I26.99 ACUTE PULMONARY EMBOLISM WITHOUT ACUTE COR PULMONALE, UNSPECIFIED PULMONARY EMBOLISM TYPE (H): ICD-10-CM

## 2020-08-25 DIAGNOSIS — F03.90 DEMENTIA WITHOUT BEHAVIORAL DISTURBANCE, UNSPECIFIED DEMENTIA TYPE: ICD-10-CM

## 2020-08-25 DIAGNOSIS — I25.10 CORONARY ARTERY DISEASE INVOLVING NATIVE CORONARY ARTERY OF NATIVE HEART WITHOUT ANGINA PECTORIS: ICD-10-CM

## 2020-08-25 DIAGNOSIS — G40.909 SEIZURE DISORDER (H): ICD-10-CM

## 2020-08-25 DIAGNOSIS — I10 ESSENTIAL HYPERTENSION: ICD-10-CM

## 2020-08-25 DIAGNOSIS — R53.81 PHYSICAL DECONDITIONING: ICD-10-CM

## 2020-08-25 DIAGNOSIS — I62.03 CHRONIC SUBDURAL HEMATOMA (H): ICD-10-CM

## 2020-08-25 DIAGNOSIS — N39.0 URINARY TRACT INFECTION WITHOUT HEMATURIA, SITE UNSPECIFIED: ICD-10-CM

## 2020-08-25 PROCEDURE — 99310 SBSQ NF CARE HIGH MDM 45: CPT | Performed by: NURSE PRACTITIONER

## 2020-08-25 RX ORDER — LEVETIRACETAM 100 MG/ML
500 SOLUTION ORAL 2 TIMES DAILY
Start: 2020-08-25 | End: 2020-09-14

## 2020-08-25 NOTE — LETTER
"    8/25/2020        RE: Joelle Freeman  [address] [address]        [place] [facility]  [place] Medical Record Number: [id number]  Place of Service where encounter took place: [facility] ([facility]) [[id number]]  Chief Complaint   Patient presents with     RECHECK       HPI:    Joelle Freeman is a 89 year old (5/25/1928), who is being seen today for an episodic care visit. HPI information obtained from: facility chart records, facility staff, patient report and [facility] chart review. Today's concern is:  AMS: minimally responsive, needing total assist with all cares, restless at night time, nursing staff put patient in chair last night and brought to nurses station. Talked to son Osman on phone regarding goals of care, he states he is aware of her age and \"just want her to be comfortable\" Osman feels that his Mom is on too much Keppra and noticed that the increased keppra dose caused her to be less responsive.   UTI: no further fevers  Acute PE: respirations easy, SaO2 > 90% on room air,  Dysphagia: ongoing, patient eating very little  HTN/CAD: /78, 145/84, 122/68 with HR 70's    Past Medical and Surgical History reviewed in Epic today.    MEDICATIONS:    Current Outpatient Medications   Medication Sig Dispense Refill     acetaminophen (TYLENOL) 325 MG tablet Take 650 mg by mouth every 6 hours as needed for pain        apixaban ANTICOAGULANT (ELIQUIS) 5 MG tablet Take 1 tablet (5 mg) by mouth 2 times daily Start on 8/24/20 60 tablet 0     atorvastatin (LIPITOR) 10 MG tablet Take 1 tablet (10 mg) by mouth daily 30 tablet 1     cholecalciferol 1000 UNITS TABS Take 1,000 Units by mouth daily 30 tablet 1     folic acid (FOLVITE) 1 MG tablet Take 1 tablet (1 mg) by mouth daily 30 tablet 0     levETIRAcetam (KEPPRA) 100 MG/ML solution Take 7.5 mLs (750 mg) by mouth 2 times daily       levothyroxine (SYNTHROID/LEVOTHROID) 75 MCG tablet Take 1 tablet (75 mcg) by mouth daily 30 tablet 0 " "    lisinopril (PRINIVIL/ZESTRIL) 10 MG tablet Take 10 mg by mouth daily       melatonin 1 MG TABS tablet Take 1 mg by mouth nightly as needed for sleep        metoprolol tartrate (LOPRESSOR) 12.5 mg TABS half-tab Take 12.5 mg by mouth 2 times daily       nystatin (MYCOSTATIN) 291546 UNIT/GM external powder Apply topically 2 times daily as needed (intertriginal dermatitis)       potassium chloride ER (KLOR-CON M) 20 MEQ CR tablet Take 20 mEq by mouth once with dinner.       senna-docusate (SENOKOT-S/PERICOLACE) 8.6-50 MG tablet Take 1 tablet by mouth 2 times daily 60 tablet 0     REVIEW OF SYSTEMS:  10 point ROS of systems including Constitutional, Eyes, Respiratory, Cardiovascular, Gastroenterology, Genitourinary, Integumentary, Musculoskeletal, Psychiatric were all negative except for pertinent positives noted in my HPI.    Objective:  BP (!) 145/84   Pulse 71   Temp 97.8  F (36.6  C)   Resp 16   Ht 1.626 m (5' 4\")   Wt 50 kg (110 lb 4.8 oz)   SpO2 97%   BMI 18.93 kg/m    Exam:  GENERAL APPEARANCE:  opens eyes to verbal stimulus, unable to respond to questioning  ENT:  Mouth and posterior oropharynx normal, moist mucous membranes  EYES:  EOM, conjunctivae, lids, pupils and irises normal, PERRL  RESP:  no respiratory distress  CV:  peripheral edema trace+ in LE bilaterally  ABDOMEN:  abdomen flat  M/S:   patient resting in bed, unable to test strength patient minimally responsive  SKIN:  Inspection of skin and subcutaneous tissue baseline  NEURO:   minimally responsive, opens eyes to verbal stimuli    Labs:     Most Recent 3 CBC's:  Recent Labs   Lab Test 08/19/20  0905 08/18/20  1458 08/15/20  0601   WBC 7.9 7.1 6.8   HGB 14.2 13.2 13.3   MCV 96 96 93    190 158     Most Recent 3 BMP's:  Recent Labs   Lab Test 08/20/20  0925 08/19/20  0905 08/18/20  1458  08/16/20  0941  08/14/20  0856   NA  --  141 140  --   --   --  142   POTASSIUM  --  4.0 4.0  --  4.0   < > 3.3*   CHLORIDE  --  112* 111*  --   -- "   --  112*   CO2  --  24 27  --   --   --  26   BUN  --  7 5*  --   --   --  4*   CR 0.69 0.57 0.63   < >  --   --  0.42*   ANIONGAP  --  5 2*  --   --   --  4   JAZLYN  --  8.7 9.3  --   --   --  8.5   GLC  --  116* 104*  --   --   --  107*    < > = values in this interval not displayed.       ASSESSMENT/PLAN:  Altered mental status, unspecified altered mental status type  Seizure disorder (H)  Acute/ongoing: family not ready for hospice at this time  Decrease keppra to 500mg BID per conversation with Osman son and POA     Chronic subdural hematoma (H)  Normal pressure hydrocephalus (H)  CVD (cerebrovascular disease)  Dementia without behavioral disturbance, unspecified dementia type (H)  Physical deconditioning  Ongoing/acute unstable: follow on Eliquis monitor for changes in LOC  PT, OT and ST     Urinary tract infection without hematuria, site unspecified  Acute: finished course of Diflucan during hospitalzation     Acute pulmonary embolism without acute cor pulmonale, unspecified pulmonary embolism type (H)  Acute: incidental finding on CTA, started on eliquis 10mg BID through 8/24/20 then decrease to 5mg BID  Monitor SaO2 at rest and with activity     Oropharyngeal dysphagia  Acute/ongoing: ST to follow, D1 diet with nectar thick liquids may advance as indicated     Essential hypertension  Coronary artery disease involving native coronary artery of native heart without angina pectoris  Acute/ongoing: vitals daily and prn, BMP follow, continue lopressor 12.5mg BID, lisinopril 10mg QD,          Orders written by provider at facility  Decrease keppra to 500mg BID    Total time spent with patient visit at the skilled nursing facility was 45 min including patient visit and review of past records. Greater than 50% of total time spent with counseling and coordinating care due to discussed POC with Osman GANT, discussed goals of care which Osman states are comfort focused but be believes that his Mom will become more  "responsive and be alert enough to visit and have \"some quality of life\" discussed keppra and titrating down the dose and Osman is in agreement. .  Electronically signed by:  Tonya Lynn Haase, APRN CNP         Sincerely,        Tonya Lynn Haase, APRN CNP    "

## 2020-08-27 NOTE — PROGRESS NOTES
[place] [facility]  [place] Medical Record Number:  [id number]  Place of Service where encounter took place:  [facility] (S) [[id number]]  Chief Complaint   Patient presents with     Nursing Home Acute       HPI:    Joelle Freeman  is a 89 year old (5/25/1928), who is being seen today for an episodic care visit.  HPI information obtained from: facility chart records, facility staff, patient report and [facility] chart review. Today's concern is:  AMS:  awake this AM, very [place], denies pain or discomfort, difficult ROS due to [place] no hearing aid available. Patient appears comfortabl  UTI: no further fevers  Acute PE: respirations easy, SaO2 > 90% on room air,  Dysphagia: ongoing, patient eating very little nursing and nurse aids report patient is eating very little throughout the day, evening she is more alert, eating a little more  HTN/CAD: /78, 146/83, 136/74 with HR 60-70's    Past Medical and Surgical History reviewed in Epic today.    MEDICATIONS:    Current Outpatient Medications   Medication Sig Dispense Refill     acetaminophen (TYLENOL) 325 MG tablet Take 650 mg by mouth every 6 hours as needed for pain        apixaban ANTICOAGULANT (ELIQUIS) 5 MG tablet Take 1 tablet (5 mg) by mouth 2 times daily Start on 8/24/20 60 tablet 0     atorvastatin (LIPITOR) 10 MG tablet Take 1 tablet (10 mg) by mouth daily 30 tablet 1     cholecalciferol 1000 UNITS TABS Take 1,000 Units by mouth daily 30 tablet 1     folic acid (FOLVITE) 1 MG tablet Take 1 tablet (1 mg) by mouth daily 30 tablet 0     levETIRAcetam (KEPPRA) 100 MG/ML solution Take 5 mLs (500 mg) by mouth 2 times daily       levothyroxine (SYNTHROID/LEVOTHROID) 75 MCG tablet Take 1 tablet (75 mcg) by mouth daily 30 tablet 0     lisinopril (PRINIVIL/ZESTRIL) 10 MG tablet Take 10 mg by mouth daily       melatonin 1 MG TABS tablet Take 1 mg by mouth nightly as needed for sleep        metoprolol tartrate (LOPRESSOR) 12.5 mg TABS half-tab  "Take 12.5 mg by mouth 2 times daily       nystatin (MYCOSTATIN) 847035 UNIT/GM external powder Apply topically 2 times daily as needed (intertriginal dermatitis)       potassium chloride ER (KLOR-CON M) 20 MEQ CR tablet Take 20 mEq by mouth once with dinner.       senna-docusate (SENOKOT-S/PERICOLACE) 8.6-50 MG tablet Take 1 tablet by mouth 2 times daily 60 tablet 0         REVIEW OF SYSTEMS:  Patient alert, very [place], difficult ROS no hearing aid available    Objective:  /78   Pulse 69   Temp 97.9  F (36.6  C)   Resp 20   Ht 1.626 m (5' 4\")   Wt 50 kg (110 lb 4.8 oz)   SpO2 94%   BMI 18.93 kg/m    Exam:  GENERAL APPEARANCE: alert, NAD  ENT:  Mouth and posterior oropharynx normal, moist mucous membranes  EYES:  EOM, conjunctivae, lids, pupils and irises normal, PERRL  RESP:  no respiratory distress  CV:  no edema  ABDOMEN:  abdomen flat  M/S:   patient resting in bed very weak, able to move all 4 extremities  SKIN:  Inspection of skin and subcutaneous tissue baseline  NEURO:   alert     Labs:     Most Recent 3 CBC's:  Recent Labs   Lab Test 08/19/20  0905 08/18/20  1458 08/15/20  0601   WBC 7.9 7.1 6.8   HGB 14.2 13.2 13.3   MCV 96 96 93    190 158     Most Recent 3 BMP's:  Recent Labs   Lab Test 08/20/20  0925 08/19/20  0905 08/18/20  1458  08/16/20  0941  08/14/20  0856   NA  --  141 140  --   --   --  142   POTASSIUM  --  4.0 4.0  --  4.0   < > 3.3*   CHLORIDE  --  112* 111*  --   --   --  112*   CO2  --  24 27  --   --   --  26   BUN  --  7 5*  --   --   --  4*   CR 0.69 0.57 0.63   < >  --   --  0.42*   ANIONGAP  --  5 2*  --   --   --  4   JAZLYN  --  8.7 9.3  --   --   --  8.5   GLC  --  116* 104*  --   --   --  107*    < > = values in this interval not displayed.       ASSESSMENT/PLAN:  Altered mental status, unspecified altered mental status type  Seizure disorder (H)  Acute/ongoing: family not ready for hospice at this time  Continue keppra to 500mg BID   More alert on decreased keppra " dose  F/u neuro appt on 9/1     Chronic subdural hematoma (H)  Normal pressure hydrocephalus (H)  CVD (cerebrovascular disease)  Dementia without behavioral disturbance, unspecified dementia type (H)  Physical deconditioning  Ongoing/acute unstable: follow on Eliquis monitor for changes in LOC  PT, OT and ST     Urinary tract infection without hematuria, site unspecified  Acute: finished course of Diflucan during hospitalzation     Acute pulmonary embolism without acute cor pulmonale, unspecified pulmonary embolism type (H)  Acute: incidental finding on CTA, started on eliquis 10mg BID through 8/24/20 then decrease to 5mg BID  Monitor SaO2 at rest and with activity     Oropharyngeal dysphagia  Acute/ongoing: ST to follow, D1 diet with nectar thick liquids may advance as indicated     Essential hypertension  Coronary artery disease involving native coronary artery of native heart without angina pectoris  Acute/ongoing: vitals daily and prn, BMP follow, continue lopressor 12.5mg BID, lisinopril 10mg QD,        Orders written by provider at facility  No new orders today      Electronically signed by:  Tonya Lynn Haase, APRN CNP

## 2020-08-28 ENCOUNTER — NURSING HOME VISIT (OUTPATIENT)
Dept: GERIATRICS | Facility: CLINIC | Age: 85
End: 2020-08-28
Payer: MEDICARE

## 2020-08-28 VITALS
SYSTOLIC BLOOD PRESSURE: 126 MMHG | TEMPERATURE: 97.9 F | RESPIRATION RATE: 20 BRPM | OXYGEN SATURATION: 94 % | HEIGHT: 64 IN | HEART RATE: 69 BPM | WEIGHT: 110.3 LBS | BODY MASS INDEX: 18.83 KG/M2 | DIASTOLIC BLOOD PRESSURE: 78 MMHG

## 2020-08-28 DIAGNOSIS — I26.99 ACUTE PULMONARY EMBOLISM WITHOUT ACUTE COR PULMONALE, UNSPECIFIED PULMONARY EMBOLISM TYPE (H): ICD-10-CM

## 2020-08-28 DIAGNOSIS — F03.90 DEMENTIA WITHOUT BEHAVIORAL DISTURBANCE, UNSPECIFIED DEMENTIA TYPE: ICD-10-CM

## 2020-08-28 DIAGNOSIS — I10 ESSENTIAL HYPERTENSION: ICD-10-CM

## 2020-08-28 DIAGNOSIS — R41.82 ALTERED MENTAL STATUS, UNSPECIFIED ALTERED MENTAL STATUS TYPE: Primary | ICD-10-CM

## 2020-08-28 DIAGNOSIS — R53.81 PHYSICAL DECONDITIONING: ICD-10-CM

## 2020-08-28 DIAGNOSIS — I62.03 CHRONIC SUBDURAL HEMATOMA (H): ICD-10-CM

## 2020-08-28 DIAGNOSIS — I25.10 CORONARY ARTERY DISEASE INVOLVING NATIVE CORONARY ARTERY OF NATIVE HEART WITHOUT ANGINA PECTORIS: ICD-10-CM

## 2020-08-28 DIAGNOSIS — G40.909 SEIZURE DISORDER (H): ICD-10-CM

## 2020-08-28 DIAGNOSIS — N39.0 URINARY TRACT INFECTION WITHOUT HEMATURIA, SITE UNSPECIFIED: ICD-10-CM

## 2020-08-28 DIAGNOSIS — R13.12 OROPHARYNGEAL DYSPHAGIA: ICD-10-CM

## 2020-08-28 DIAGNOSIS — I67.9 CVD (CEREBROVASCULAR DISEASE): ICD-10-CM

## 2020-08-28 DIAGNOSIS — G91.2 NORMAL PRESSURE HYDROCEPHALUS (H): ICD-10-CM

## 2020-08-28 PROCEDURE — 99309 SBSQ NF CARE MODERATE MDM 30: CPT | Performed by: NURSE PRACTITIONER

## 2020-08-28 NOTE — LETTER
8/28/2020        RE: Joelle Freeman  [address] [address]        [place] [facility]  [place] Medical Record Number:  [id number]  Place of Service where encounter took place:  [facility] ([facility] [[id number]]  Chief Complaint   Patient presents with     Nursing Home Acute       HPI:    Joelle Freeman  is a 89 year old (5/25/1928), who is being seen today for an episodic care visit.  HPI information obtained from: facility chart records, facility staff, patient report and [facility] chart review. Today's concern is:  AMS:  awake this AM, very [place], denies pain or discomfort, difficult ROS due to [place] no hearing aid available. Patient appears comfortabl  UTI: no further fevers  Acute PE: respirations easy, SaO2 > 90% on room air,  Dysphagia: ongoing, patient eating very little nursing and nurse aids report patient is eating very little throughout the day, evening she is more alert, eating a little more  HTN/CAD: /78, 146/83, 136/74 with HR 60-70's    Past Medical and Surgical History reviewed in Epic today.    MEDICATIONS:    Current Outpatient Medications   Medication Sig Dispense Refill     acetaminophen (TYLENOL) 325 MG tablet Take 650 mg by mouth every 6 hours as needed for pain        apixaban ANTICOAGULANT (ELIQUIS) 5 MG tablet Take 1 tablet (5 mg) by mouth 2 times daily Start on 8/24/20 60 tablet 0     atorvastatin (LIPITOR) 10 MG tablet Take 1 tablet (10 mg) by mouth daily 30 tablet 1     cholecalciferol 1000 UNITS TABS Take 1,000 Units by mouth daily 30 tablet 1     folic acid (FOLVITE) 1 MG tablet Take 1 tablet (1 mg) by mouth daily 30 tablet 0     levETIRAcetam (KEPPRA) 100 MG/ML solution Take 5 mLs (500 mg) by mouth 2 times daily       levothyroxine (SYNTHROID/LEVOTHROID) 75 MCG tablet Take 1 tablet (75 mcg) by mouth daily 30 tablet 0     lisinopril (PRINIVIL/ZESTRIL) 10 MG tablet Take 10 mg by mouth daily       melatonin 1 MG TABS tablet Take 1 mg by mouth nightly  "as needed for sleep        metoprolol tartrate (LOPRESSOR) 12.5 mg TABS half-tab Take 12.5 mg by mouth 2 times daily       nystatin (MYCOSTATIN) 567588 UNIT/GM external powder Apply topically 2 times daily as needed (intertriginal dermatitis)       potassium chloride ER (KLOR-CON M) 20 MEQ CR tablet Take 20 mEq by mouth once with dinner.       senna-docusate (SENOKOT-S/PERICOLACE) 8.6-50 MG tablet Take 1 tablet by mouth 2 times daily 60 tablet 0         REVIEW OF SYSTEMS:  Patient alert, very [place], difficult ROS no hearing aid available    Objective:  /78   Pulse 69   Temp 97.9  F (36.6  C)   Resp 20   Ht 1.626 m (5' 4\")   Wt 50 kg (110 lb 4.8 oz)   SpO2 94%   BMI 18.93 kg/m    Exam:  GENERAL APPEARANCE: alert, NAD  ENT:  Mouth and posterior oropharynx normal, moist mucous membranes  EYES:  EOM, conjunctivae, lids, pupils and irises normal, PERRL  RESP:  no respiratory distress  CV:  no edema  ABDOMEN:  abdomen flat  M/S:   patient resting in bed very weak, able to move all 4 extremities  SKIN:  Inspection of skin and subcutaneous tissue baseline  NEURO:   alert     Labs:     Most Recent 3 CBC's:  Recent Labs   Lab Test 08/19/20  0905 08/18/20  1458 08/15/20  0601   WBC 7.9 7.1 6.8   HGB 14.2 13.2 13.3   MCV 96 96 93    190 158     Most Recent 3 BMP's:  Recent Labs   Lab Test 08/20/20  0925 08/19/20  0905 08/18/20  1458  08/16/20  0941  08/14/20  0856   NA  --  141 140  --   --   --  142   POTASSIUM  --  4.0 4.0  --  4.0   < > 3.3*   CHLORIDE  --  112* 111*  --   --   --  112*   CO2  --  24 27  --   --   --  26   BUN  --  7 5*  --   --   --  4*   CR 0.69 0.57 0.63   < >  --   --  0.42*   ANIONGAP  --  5 2*  --   --   --  4   JAZLYN  --  8.7 9.3  --   --   --  8.5   GLC  --  116* 104*  --   --   --  107*    < > = values in this interval not displayed.       ASSESSMENT/PLAN:  Altered mental status, unspecified altered mental status type  Seizure disorder (H)  Acute/ongoing: family not ready for hospice " at this time  Continue keppra to 500mg BID   More alert on decreased keppra dose  F/u neuro appt on 9/1     Chronic subdural hematoma (H)  Normal pressure hydrocephalus (H)  CVD (cerebrovascular disease)  Dementia without behavioral disturbance, unspecified dementia type (H)  Physical deconditioning  Ongoing/acute unstable: follow on Eliquis monitor for changes in LOC  PT, OT and ST     Urinary tract infection without hematuria, site unspecified  Acute: finished course of Diflucan during hospitalzation     Acute pulmonary embolism without acute cor pulmonale, unspecified pulmonary embolism type (H)  Acute: incidental finding on CTA, started on eliquis 10mg BID through 8/24/20 then decrease to 5mg BID  Monitor SaO2 at rest and with activity     Oropharyngeal dysphagia  Acute/ongoing: ST to follow, D1 diet with nectar thick liquids may advance as indicated     Essential hypertension  Coronary artery disease involving native coronary artery of native heart without angina pectoris  Acute/ongoing: vitals daily and prn, BMP follow, continue lopressor 12.5mg BID, lisinopril 10mg QD,        Orders written by provider at facility  No new orders today      Electronically signed by:  Tonya Lynn Haase, APRN CNP             Sincerely,        Tonya Lynn Haase, APRN CNP

## 2020-08-31 ENCOUNTER — OFFICE VISIT (OUTPATIENT)
Dept: NEUROSURGERY | Facility: CLINIC | Age: 85
End: 2020-08-31
Attending: PHYSICIAN ASSISTANT
Payer: MEDICARE

## 2020-08-31 ENCOUNTER — HOSPITAL ENCOUNTER (OUTPATIENT)
Dept: CT IMAGING | Facility: CLINIC | Age: 85
Discharge: HOME | End: 2020-08-31
Attending: PHYSICIAN ASSISTANT | Admitting: PHYSICIAN ASSISTANT
Payer: MEDICARE

## 2020-08-31 VITALS
HEART RATE: 68 BPM | DIASTOLIC BLOOD PRESSURE: 72 MMHG | HEIGHT: 64 IN | BODY MASS INDEX: 18.78 KG/M2 | OXYGEN SATURATION: 98 % | SYSTOLIC BLOOD PRESSURE: 132 MMHG | WEIGHT: 110 LBS

## 2020-08-31 DIAGNOSIS — S06.5XAA SDH (SUBDURAL HEMATOMA) (H): ICD-10-CM

## 2020-08-31 DIAGNOSIS — Z98.2 S/P VP SHUNT: Primary | ICD-10-CM

## 2020-08-31 DIAGNOSIS — I62.9 INTRACRANIAL HEMORRHAGE (H): ICD-10-CM

## 2020-08-31 PROCEDURE — 70450 CT HEAD/BRAIN W/O DYE: CPT

## 2020-08-31 PROCEDURE — G0463 HOSPITAL OUTPT CLINIC VISIT: HCPCS

## 2020-08-31 PROCEDURE — 99213 OFFICE O/P EST LOW 20 MIN: CPT | Performed by: PHYSICIAN ASSISTANT

## 2020-08-31 ASSESSMENT — MIFFLIN-ST. JEOR: SCORE: 893.96

## 2020-08-31 NOTE — NURSING NOTE
"Joelle Freeman is a 89 year old female who presents for:  Chief Complaint   Patient presents with     Neurologic Problem     hospital F/U 4-6 weeks with ct prior        Initial Vitals:  /72 (BP Location: Left arm, Patient Position: Supine, Cuff Size: Adult Regular)   Pulse 68   Ht 5' 4\" (1.626 m)   Wt 110 lb (49.9 kg)   SpO2 98%   BMI 18.88 kg/m   Estimated body mass index is 18.88 kg/m  as calculated from the following:    Height as of this encounter: 5' 4\" (1.626 m).    Weight as of this encounter: 110 lb (49.9 kg).. Body surface area is 1.5 meters squared. BP completed using cuff size: regular  Data Unavailable    Nursing Comments: see chief complaint      Jef Rodriguez, [facility]    "

## 2020-08-31 NOTE — LETTER
8/31/2020         RE: Joelle Freeman  [address] [address]        Dear Colleague,    Thank you for referring your patient, Joelle Freeman, to the [facility]. Please see a copy of my visit note below.    Neurosurgery follow-up    Ms. Freeman is a 89-year-old female who been seen multiple times [facility] by the neurosurgery service.  She has had stable bilateral subdural fluid collection, was recently anticoagulated for pulmonary embolism, she is on Eliquis 5 mg 2 times daily currently.  She is currently in a nursing home, further reports she has been lethargic, but does follow commands.  He is here today for repeat head CT scan.  She has had seizures as an inpatient and was being seen by neurology, she is on Keppra 500 mg twice daily.  The patient's daughter is not sure if they have a follow-up appointment with neurology anytime soon.    Patient denies headache, states she has felt better.    Shunt was checked and found to be at 1.0 station, according to chart review 2014 looks like may be in 1.5 station.  She did have a brain MRI recently, pre-and post image x-rays demonstrate no change in shunt setting.    Exam    B/P: 132/72, T: [unable to get orally[, P: 68, R: Data Unavailable     Arousable, sleepy, follows commands,  Lifts both arms, no pronator drift, finger-to-nose accurate bilaterally  Wiggles both feet      Imaging    CT scan demonstrates stable subdural fluid collections no acute intracranial hemorrhage.    Assessment    Subdural fluid collections  Status post  shunt  Seizures  Metabolic encephalopathy      Plan    Reviewed imaging case with Dr. Rockwell who does not recommend any change to the shunt at this time.  Imaging has been stable, no further head CT scans are needed at this time.  Patient should follow-up with neurology to manage her Keppra medication and seizures.  No further follow-up with neurosurgery as needed.      Total time of 30   minutes was spent with the patient today in face-to-face consultation and coordination regarding the above assessment and plan.      Again, thank you for allowing me to participate in the care of your patient.        Sincerely,        Evangelina Barr PA-C

## 2020-08-31 NOTE — PROGRESS NOTES
Neurosurgery follow-up    Ms. Freeman is a 89-year-old female who been seen multiple times [facility] by the neurosurgery service.  She has had stable bilateral subdural fluid collection, was recently anticoagulated for pulmonary embolism, she is on Eliquis 5 mg 2 times daily currently.  She is currently in a nursing home, further reports she has been lethargic, but does follow commands.  He is here today for repeat head CT scan.  She has had seizures as an inpatient and was being seen by neurology, she is on Keppra 500 mg twice daily.  The patient's daughter is not sure if they have a follow-up appointment with neurology anytime soon.    Patient denies headache, states she has felt better.    Shunt was checked and found to be at 1.0 station, according to chart review in 2018 looks like last setting was  1.0 station.  She did have a brain MRI recently, pre-and post image x-rays demonstrate no change in shunt setting.    Exam    B/P: 132/72, T: [unable to get orally[, P: 68, R: Data Unavailable     Arousable, sleepy, follows commands,  Lifts both arms, no pronator drift, finger-to-nose accurate bilaterally  Wiggles both feet      Imaging    CT scan demonstrates stable subdural fluid collections no acute intracranial hemorrhage.    Assessment    Subdural fluid collections  Status post  shunt  Seizures  Metabolic encephalopathy      Plan    Reviewed imaging case with Dr. Rockwell who does not recommend any change to the shunt at this time.  Imaging has been stable, no further head CT scans are needed at this time.  Patient should follow-up with neurology to manage her Keppra medication and seizures.  No further follow-up with neurosurgery as needed.      Total time of 30  minutes was spent with the patient today in face-to-face consultation and coordination regarding the above assessment and plan.

## 2020-09-01 ENCOUNTER — TELEPHONE (OUTPATIENT)
Dept: NEUROSURGERY | Facility: CLINIC | Age: 85
End: 2020-09-01

## 2020-09-01 DIAGNOSIS — Z98.2 S/P VP SHUNT: ICD-10-CM

## 2020-09-01 DIAGNOSIS — S06.5XAA SDH (SUBDURAL HEMATOMA) (H): ICD-10-CM

## 2020-09-01 DIAGNOSIS — R56.9 SEIZURES (H): Primary | ICD-10-CM

## 2020-09-01 ASSESSMENT — MIFFLIN-ST. JEOR: SCORE: 877.63

## 2020-09-01 NOTE — TELEPHONE ENCOUNTER
"----- Message from Evangelina Barr PA-C sent at 9/1/2020 11:59 AM CDT -----  Plan    Reviewed imaging case with Dr. Rockwell who does not recommend any change to the shunt at this time.  Imaging has been stable, no further head CT scans are needed at this time.  Patient should follow-up with neurology to manage her Keppra medication and seizures.  No further follow-up with neurosurgery is needed.    Writer spoke to Osman, listed as POBEBE, and informed him of the above from YESI Hutchinson, and he verbalized understanding. He states he believes patient is \"too sedated\" with the seizure medications. Osman states patient is not established with a Neurology team at this time. Informed him writer will further discuss with care team to see if we can get a new referral placed.     Per OLLIE Hutchinson to order Neurology referral. Referral faxed to [facility] at fax # [phone].   "

## 2020-09-02 ENCOUNTER — NURSING HOME VISIT (OUTPATIENT)
Dept: GERIATRICS | Facility: CLINIC | Age: 85
End: 2020-09-02
Payer: MEDICARE

## 2020-09-02 VITALS
TEMPERATURE: 97 F | WEIGHT: 106.4 LBS | BODY MASS INDEX: 18.16 KG/M2 | DIASTOLIC BLOOD PRESSURE: 88 MMHG | RESPIRATION RATE: 16 BRPM | OXYGEN SATURATION: 100 % | HEART RATE: 78 BPM | SYSTOLIC BLOOD PRESSURE: 146 MMHG | HEIGHT: 64 IN

## 2020-09-02 DIAGNOSIS — R13.12 OROPHARYNGEAL DYSPHAGIA: ICD-10-CM

## 2020-09-02 DIAGNOSIS — N39.0 URINARY TRACT INFECTION WITHOUT HEMATURIA, SITE UNSPECIFIED: ICD-10-CM

## 2020-09-02 DIAGNOSIS — R53.81 PHYSICAL DECONDITIONING: ICD-10-CM

## 2020-09-02 DIAGNOSIS — F03.90 DEMENTIA WITHOUT BEHAVIORAL DISTURBANCE, UNSPECIFIED DEMENTIA TYPE: ICD-10-CM

## 2020-09-02 DIAGNOSIS — G91.2 NORMAL PRESSURE HYDROCEPHALUS (H): ICD-10-CM

## 2020-09-02 DIAGNOSIS — R41.82 ALTERED MENTAL STATUS, UNSPECIFIED ALTERED MENTAL STATUS TYPE: Primary | ICD-10-CM

## 2020-09-02 DIAGNOSIS — I10 ESSENTIAL HYPERTENSION: ICD-10-CM

## 2020-09-02 DIAGNOSIS — I25.10 CORONARY ARTERY DISEASE INVOLVING NATIVE CORONARY ARTERY OF NATIVE HEART WITHOUT ANGINA PECTORIS: ICD-10-CM

## 2020-09-02 DIAGNOSIS — I67.9 CVD (CEREBROVASCULAR DISEASE): ICD-10-CM

## 2020-09-02 DIAGNOSIS — G40.909 SEIZURE DISORDER (H): ICD-10-CM

## 2020-09-02 DIAGNOSIS — I26.99 ACUTE PULMONARY EMBOLISM WITHOUT ACUTE COR PULMONALE, UNSPECIFIED PULMONARY EMBOLISM TYPE (H): ICD-10-CM

## 2020-09-02 DIAGNOSIS — I62.03 CHRONIC SUBDURAL HEMATOMA (H): ICD-10-CM

## 2020-09-02 PROCEDURE — 99309 SBSQ NF CARE MODERATE MDM 30: CPT | Performed by: NURSE PRACTITIONER

## 2020-09-02 NOTE — PROGRESS NOTES
[place] [facility]  [place] Medical Record Number:  [id number]  Place of Service where encounter took place:  [facility] (S) [[id number]]  Chief Complaint   Patient presents with     Nursing Home Acute       HPI:    Joelle Freeman  is a 89 year old (5/25/1928), who is being seen today for an episodic care visit.  HPI information obtained from: facility chart records, facility staff, patient report and [facility] chart review. Today's concern is:  AMS:  awake this AM, very [place], denies pain or discomfort, difficult ROS due to [place] no hearing aid available.patient sitting in broda chair this AM, nursing states she has been comfortable, more alert  UTI: no further fevers  Acute PE: respirations easy, SaO2 > 90% on room air,  Dysphagia: ongoing, patient eating very little poor appetite, continue to encourage patient to eat  HTN/CAD: /88, 133/66, 127/80  with HR 60-70's       Past Medical and Surgical History reviewed in Epic today.    MEDICATIONS:    Current Outpatient Medications   Medication Sig Dispense Refill     acetaminophen (TYLENOL) 325 MG tablet Take 650 mg by mouth every 6 hours as needed for pain        apixaban ANTICOAGULANT (ELIQUIS) 5 MG tablet Take 1 tablet (5 mg) by mouth 2 times daily Start on 8/24/20 60 tablet 0     atorvastatin (LIPITOR) 10 MG tablet Take 1 tablet (10 mg) by mouth daily 30 tablet 1     cholecalciferol 1000 UNITS TABS Take 1,000 Units by mouth daily 30 tablet 1     folic acid (FOLVITE) 1 MG tablet Take 1 tablet (1 mg) by mouth daily 30 tablet 0     levETIRAcetam (KEPPRA) 100 MG/ML solution Take 5 mLs (500 mg) by mouth 2 times daily       levothyroxine (SYNTHROID/LEVOTHROID) 75 MCG tablet Take 1 tablet (75 mcg) by mouth daily 30 tablet 0     lisinopril (PRINIVIL/ZESTRIL) 10 MG tablet Take 10 mg by mouth daily       melatonin 1 MG TABS tablet Take 1 mg by mouth nightly as needed for sleep        metoprolol tartrate (LOPRESSOR) 12.5 mg TABS half-tab Take 12.5  "mg by mouth 2 times daily       nystatin (MYCOSTATIN) [id number] UNIT/GM external powder Apply topically 2 times daily as needed (intertriginal dermatitis)       potassium chloride ER (KLOR-CON M) 20 MEQ CR tablet Take 20 mEq by mouth once with dinner.       senna-docusate (SENOKOT-S/PERICOLACE) 8.6-50 MG tablet Take 1 tablet by mouth 2 times daily 60 tablet 0         REVIEW OF SYSTEMS:  Unobtainable secondary to cognitive impairment.     Objective:  BP (!) 146/88   Pulse 78   Temp 97  F (36.1  C)   Resp 16   Ht 1.626 m (5' 4\")   Wt 48.3 kg (106 lb 6.4 oz)   SpO2 100%   BMI 18.26 kg/m    Exam:  GENERAL APPEARANCE: alert, NAD  ENT:  Mouth and posterior oropharynx normal, moist mucous membranes  EYES:  EOM, conjunctivae, lids, pupils and irises normal, PERRL  RESP:  no respiratory distress  CV:  no edema  ABDOMEN:  abdomen flat  M/S:   sitting up in broda chair able to move all 4 extremities  SKIN:  Inspection of skin and subcutaneous tissue baseline  NEURO:   alert       Labs:     Most Recent 3 CBC's:  Recent Labs   Lab Test 08/19/20  0905 08/18/20  1458 08/15/20  0601   WBC 7.9 7.1 6.8   HGB 14.2 13.2 13.3   MCV 96 96 93    190 158     Most Recent 3 BMP's:  Recent Labs   Lab Test 08/20/20  0925 08/19/20  0905 08/18/20  1458  08/16/20  0941  08/14/20  0856   NA  --  141 140  --   --   --  142   POTASSIUM  --  4.0 4.0  --  4.0   < > 3.3*   CHLORIDE  --  112* 111*  --   --   --  112*   CO2  --  24 27  --   --   --  26   BUN  --  7 5*  --   --   --  4*   CR 0.69 0.57 0.63   < >  --   --  0.42*   ANIONGAP  --  5 2*  --   --   --  4   JAZLYN  --  8.7 9.3  --   --   --  8.5   GLC  --  116* 104*  --   --   --  107*    < > = values in this interval not displayed.       ASSESSMENT/PLAN:  Altered mental status, unspecified altered mental status type  Seizure disorder (H)  Acute/ongoing: family not ready for hospice at this time  Continue keppra to 500mg BID   More alert on decreased keppra dose  F/u neuro appt on " 9/1     Chronic subdural hematoma (H)  Normal pressure hydrocephalus (H)  CVD (cerebrovascular disease)  Dementia without behavioral disturbance, unspecified dementia type (H)  Physical deconditioning  Ongoing/acute unstable: follow on Eliquis monitor for changes in LOC  PT, OT and ST     Urinary tract infection without hematuria, site unspecified  Acute: finished course of Diflucan during hospitalzation     Acute pulmonary embolism without acute cor pulmonale, unspecified pulmonary embolism type (H)  Acute: incidental finding on CTA, started on eliquis 10mg BID through 8/24/20 then decrease to 5mg BID  Monitor SaO2 at rest and with activity     Oropharyngeal dysphagia  Acute/ongoing: ST to follow, D1 diet with nectar thick liquids may advance as indicated     Essential hypertension  Coronary artery disease involving native coronary artery of native heart without angina pectoris  Acute/ongoing: vitals daily and prn, BMP follow, continue lopressor 12.5mg BID, lisinopril 10mg QD,     Orders written by provider at facility  No new orders      Electronically signed by:  Tonya Lynn Haase, APRN CNP

## 2020-09-02 NOTE — LETTER
9/2/2020        RE: Joelle Freeman  [address] [address]        [place] [facility]  [place] [facility] Record Number:  [id number]  Place of Service where encounter took place:  [facility] ([facility] [[id number]]  Chief Complaint   Patient presents with     Nursing Home Acute       HPI:    Joelle Freeman  is a 89 year old (5/25/1928), who is being seen today for an episodic care visit.  HPI information obtained from: facility chart records, facility staff, patient report and [facility] chart review. Today's concern is:  AMS:  awake this AM, very [place], denies pain or discomfort, difficult ROS due to [place] no hearing aid available.patient sitting in broda chair this AM, nursing states she has been comfortable, more alert  UTI: no further fevers  Acute PE: respirations easy, SaO2 > 90% on room air,  Dysphagia: ongoing, patient eating very little poor appetite, continue to encourage patient to eat  HTN/CAD: /88, 133/66, 127/80  with HR 60-70's       Past Medical and Surgical History reviewed in Epic today.    MEDICATIONS:    Current Outpatient Medications   Medication Sig Dispense Refill     acetaminophen (TYLENOL) 325 MG tablet Take 650 mg by mouth every 6 hours as needed for pain        apixaban ANTICOAGULANT (ELIQUIS) 5 MG tablet Take 1 tablet (5 mg) by mouth 2 times daily Start on 8/24/20 60 tablet 0     atorvastatin (LIPITOR) 10 MG tablet Take 1 tablet (10 mg) by mouth daily 30 tablet 1     cholecalciferol 1000 UNITS TABS Take 1,000 Units by mouth daily 30 tablet 1     folic acid (FOLVITE) 1 MG tablet Take 1 tablet (1 mg) by mouth daily 30 tablet 0     levETIRAcetam (KEPPRA) 100 MG/ML solution Take 5 mLs (500 mg) by mouth 2 times daily       levothyroxine (SYNTHROID/LEVOTHROID) 75 MCG tablet Take 1 tablet (75 mcg) by mouth daily 30 tablet 0     lisinopril (PRINIVIL/ZESTRIL) 10 MG tablet Take 10 mg by mouth daily       melatonin 1 MG TABS tablet Take 1 mg by mouth nightly as needed  "for sleep        metoprolol tartrate (LOPRESSOR) 12.5 mg TABS half-tab Take 12.5 mg by mouth 2 times daily       nystatin (MYCOSTATIN) 136017 UNIT/GM external powder Apply topically 2 times daily as needed (intertriginal dermatitis)       potassium chloride ER (KLOR-CON M) 20 MEQ CR tablet Take 20 mEq by mouth once with dinner.       senna-docusate (SENOKOT-S/PERICOLACE) 8.6-50 MG tablet Take 1 tablet by mouth 2 times daily 60 tablet 0         REVIEW OF SYSTEMS:  Unobtainable secondary to cognitive impairment.     Objective:  BP (!) 146/88   Pulse 78   Temp 97  F (36.1  C)   Resp 16   Ht 1.626 m (5' 4\")   Wt 48.3 kg (106 lb 6.4 oz)   SpO2 100%   BMI 18.26 kg/m    Exam:  GENERAL APPEARANCE: alert, NAD  ENT:  Mouth and posterior oropharynx normal, moist mucous membranes  EYES:  EOM, conjunctivae, lids, pupils and irises normal, PERRL  RESP:  no respiratory distress  CV:  no edema  ABDOMEN:  abdomen flat  M/S:   sitting up in broda chair able to move all 4 extremities  SKIN:  Inspection of skin and subcutaneous tissue baseline  NEURO:   alert       Labs:     Most Recent 3 CBC's:  Recent Labs   Lab Test 08/19/20  0905 08/18/20  1458 08/15/20  0601   WBC 7.9 7.1 6.8   HGB 14.2 13.2 13.3   MCV 96 96 93    190 158     Most Recent 3 BMP's:  Recent Labs   Lab Test 08/20/20  0925 08/19/20  0905 08/18/20  1458  08/16/20  0941  08/14/20  0856   NA  --  141 140  --   --   --  142   POTASSIUM  --  4.0 4.0  --  4.0   < > 3.3*   CHLORIDE  --  112* 111*  --   --   --  112*   CO2  --  24 27  --   --   --  26   BUN  --  7 5*  --   --   --  4*   CR 0.69 0.57 0.63   < >  --   --  0.42*   ANIONGAP  --  5 2*  --   --   --  4   JAZLYN  --  8.7 9.3  --   --   --  8.5   GLC  --  116* 104*  --   --   --  107*    < > = values in this interval not displayed.       ASSESSMENT/PLAN:  Altered mental status, unspecified altered mental status type  Seizure disorder (H)  Acute/ongoing: family not ready for hospice at this time  Continue " keppra to 500mg BID   More alert on decreased keppra dose  F/u neuro appt on 9/1     Chronic subdural hematoma (H)  Normal pressure hydrocephalus (H)  CVD (cerebrovascular disease)  Dementia without behavioral disturbance, unspecified dementia type (H)  Physical deconditioning  Ongoing/acute unstable: follow on Eliquis monitor for changes in LOC  PT, OT and ST     Urinary tract infection without hematuria, site unspecified  Acute: finished course of Diflucan during hospitalzation     Acute pulmonary embolism without acute cor pulmonale, unspecified pulmonary embolism type (H)  Acute: incidental finding on CTA, started on eliquis 10mg BID through 8/24/20 then decrease to 5mg BID  Monitor SaO2 at rest and with activity     Oropharyngeal dysphagia  Acute/ongoing: ST to follow, D1 diet with nectar thick liquids may advance as indicated     Essential hypertension  Coronary artery disease involving native coronary artery of native heart without angina pectoris  Acute/ongoing: vitals daily and prn, BMP follow, continue lopressor 12.5mg BID, lisinopril 10mg QD,     Orders written by provider at facility  No new orders      Electronically signed by:  Tonya Lynn Haase, APRN CNP             Sincerely,        Tonya Lynn Haase, APRN CNP

## 2020-09-09 ASSESSMENT — MIFFLIN-ST. JEOR: SCORE: 883.52

## 2020-09-10 ENCOUNTER — NURSING HOME VISIT (OUTPATIENT)
Dept: GERIATRICS | Facility: CLINIC | Age: 85
End: 2020-09-10
Payer: MEDICARE

## 2020-09-10 VITALS
HEIGHT: 64 IN | RESPIRATION RATE: 18 BRPM | TEMPERATURE: 96.9 F | WEIGHT: 107.7 LBS | OXYGEN SATURATION: 96 % | SYSTOLIC BLOOD PRESSURE: 97 MMHG | BODY MASS INDEX: 18.39 KG/M2 | HEART RATE: 83 BPM | DIASTOLIC BLOOD PRESSURE: 68 MMHG

## 2020-09-10 DIAGNOSIS — F03.90 DEMENTIA WITHOUT BEHAVIORAL DISTURBANCE, UNSPECIFIED DEMENTIA TYPE: ICD-10-CM

## 2020-09-10 DIAGNOSIS — D68.51 FACTOR V LEIDEN (H): ICD-10-CM

## 2020-09-10 DIAGNOSIS — G40.909 SEIZURE DISORDER (H): ICD-10-CM

## 2020-09-10 DIAGNOSIS — I25.10 CORONARY ARTERY DISEASE INVOLVING NATIVE CORONARY ARTERY OF NATIVE HEART WITHOUT ANGINA PECTORIS: ICD-10-CM

## 2020-09-10 DIAGNOSIS — I48.91 ATRIAL FIBRILLATION, UNSPECIFIED TYPE (H): ICD-10-CM

## 2020-09-10 DIAGNOSIS — I10 ESSENTIAL HYPERTENSION: ICD-10-CM

## 2020-09-10 DIAGNOSIS — I26.99 ACUTE PULMONARY EMBOLISM WITHOUT ACUTE COR PULMONALE, UNSPECIFIED PULMONARY EMBOLISM TYPE (H): ICD-10-CM

## 2020-09-10 DIAGNOSIS — I62.03 CHRONIC SUBDURAL HEMATOMA (H): ICD-10-CM

## 2020-09-10 DIAGNOSIS — E03.9 HYPOTHYROIDISM, UNSPECIFIED TYPE: ICD-10-CM

## 2020-09-10 DIAGNOSIS — I35.0 SEVERE AORTIC STENOSIS: ICD-10-CM

## 2020-09-10 DIAGNOSIS — R41.82 ALTERED MENTAL STATUS, UNSPECIFIED ALTERED MENTAL STATUS TYPE: Primary | ICD-10-CM

## 2020-09-10 DIAGNOSIS — R13.12 OROPHARYNGEAL DYSPHAGIA: ICD-10-CM

## 2020-09-10 DIAGNOSIS — G91.2 IDIOPATHIC NORMAL PRESSURE HYDROCEPHALUS (INPH) (H): ICD-10-CM

## 2020-09-10 PROCEDURE — 99310 SBSQ NF CARE HIGH MDM 45: CPT | Performed by: NURSE PRACTITIONER

## 2020-09-10 NOTE — LETTER
9/10/2020        RE: Joelle Freeman  [address]  [place] [address]        [place] [facility]  [place] [facility] Record Number:  [id number]  Place of Service where encounter took place:  No question data found.  Chief Complaint   Patient presents with     Nursing Home Acute     TCU to LTC transfer       HPI:    Joelle Freeman  is a 89 year old (5/25/1928), with PMH including HTN; CAD; dementia, CVI with right sided weakness; severe AS; NPH s/p  shunt; factor V Leiden (heterozygous); falls; and recent hospitalizations (7/19-7/21 for syncope and bilateral chronic subdural hygromas vs chronic SDH, and 8/2-8/5 for AMS and UTI). She is being seen today for an episodic care visit. HPI information obtained from: facility chart records, facility staff, patient report, [facility] chart review and family/first contact son & daughter in-law report. Today's concern is:  Patient hospitalized 8/9-8/20 for altered mental status, treated for acute metabolic encephalopathy with fluctuations in level of consciousness: multifactorial including post-ictal state, seizures, acute bilateral pulmonary emboli, moderate oropharyngeal dysphagia & hypokalemia. Spent some time in TCU, now transferred to LTC.    Patient again quite sleepy. Initially more alert with lowering Keppra dose. Poor appetite, assist with feeding. Sleeps often. EZ-stand for transfers, w/c for distance. Family feels patient has been angry & depressed, feeling like son has deserted her. History of frequent UTIs- no current urinary symptoms. Afebrile.   SBP  for the last week (usually 120-140s) with HR 67-83. O2 91-97% on RA. Weight 107lbs yesterday, 110lbs on admission 8/22.      Past Medical and Surgical History reviewed in Epic today.    MEDICATIONS:  Current Outpatient Medications   Medication Sig Dispense Refill     acetaminophen (TYLENOL) 325 MG tablet Take 650 mg by mouth every 6 hours as needed for pain        apixaban ANTICOAGULANT  "(ELIQUIS) 5 MG tablet Take 1 tablet (5 mg) by mouth 2 times daily Start on 8/24/20 60 tablet 0     atorvastatin (LIPITOR) 10 MG tablet Take 1 tablet (10 mg) by mouth daily 30 tablet 1     cholecalciferol 1000 UNITS TABS Take 1,000 Units by mouth daily 30 tablet 1     folic acid (FOLVITE) 1 MG tablet Take 1 tablet (1 mg) by mouth daily 30 tablet 0     levETIRAcetam (KEPPRA) 100 MG/ML solution Take 2.5 mLs (250 mg) by mouth every morning AND 5 mLs (500 mg) At Bedtime.       levothyroxine (SYNTHROID/LEVOTHROID) 75 MCG tablet Take 1 tablet (75 mcg) by mouth daily 30 tablet 0     lisinopril (PRINIVIL/ZESTRIL) 10 MG tablet Take 10 mg by mouth daily       melatonin 1 MG TABS tablet Take 1 mg by mouth nightly as needed for sleep        metoprolol tartrate (LOPRESSOR) 12.5 mg TABS half-tab Take 12.5 mg by mouth 2 times daily       nystatin (MYCOSTATIN) 554293 UNIT/GM external powder Apply topically 2 times daily as needed (intertriginal dermatitis)       potassium chloride ER (KLOR-CON M) 20 MEQ CR tablet Take 20 mEq by mouth once with dinner.       senna-docusate (SENOKOT-S/PERICOLACE) 8.6-50 MG tablet Take 1 tablet by mouth 2 times daily 60 tablet 0       REVIEW OF SYSTEMS:  Unobtainable secondary to cognitive impairment.   BIMS 0/15    Objective:  BP 97/68   Pulse 83   Temp 96.9  F (36.1  C)   Resp 18   Ht 1.626 m (5' 4\")   Wt 48.9 kg (107 lb 11.2 oz)   SpO2 96%   BMI 18.49 kg/m    Exam:  GENERAL APPEARANCE:  in no distress, calm, non verbal  ENT:  [place], oral mucous membranes moist  EYES:  Conjunctiva and lids normal  RESP:  lungs clear to auscultation , no respiratory distress  CV:  RRR, murmur  ABDOMEN:  bowel sounds normal, soft, non-tender, no distension  M/S:   decreased muscle tone, no edema   PSYCH:  insight and judgement impaired, memory impaired , affect and mood normal    Labs:   Most Recent 3 CBC's:  Recent Labs   Lab Test 08/19/20  0905 08/18/20  1458 08/15/20  0601   WBC 7.9 7.1 6.8   HGB 14.2 13.2 13.3 "   MCV 96 96 93    190 158     Most Recent 3 BMP's:  Recent Labs   Lab Test 08/20/20  0925 08/19/20  0905 08/18/20  1458  08/16/20  0941  08/14/20  0856   NA  --  141 140  --   --   --  142   POTASSIUM  --  4.0 4.0  --  4.0   < > 3.3*   CHLORIDE  --  112* 111*  --   --   --  112*   CO2  --  24 27  --   --   --  26   BUN  --  7 5*  --   --   --  4*   CR 0.69 0.57 0.63   < >  --   --  0.42*   ANIONGAP  --  5 2*  --   --   --  4   JAZLYN  --  8.7 9.3  --   --   --  8.5   GLC  --  116* 104*  --   --   --  107*    < > = values in this interval not displayed.   Folate 3.9 on 7/20 with Hgb at 11. Hgb since normal.   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  (F03.90) Dementia without behavioral disturbance, unspecified dementia type (H)  Comment: followed by Neurology & Neurosurg, no behavioral concerns, sleepy  Plan:   --assist with ADLs, transfers, meals, meds  --follow with Neurology   --monitor for change in mood/behavior  --onsite psych to eval/treat     (I26.99) Acute pulmonary embolism without acute cor pulmonale, unspecified pulmonary embolism type (H)  Comment: incidental finding on CTA, started on eliquis 10mg BID through 8/24/20 then decrease to 5mg BID  Plan:   --continue Eliquis same  --monitor O2 with activity & at rest     (R13.12) Oropharyngeal dysphagia  Comment: persists  Plan:   --continue speech therapy   --D1 diet with nectar  thick liquids may advance as indicated    (I10) Essential hypertension  (I25.10) Coronary artery disease involving native coronary artery of native heart without angina pectoris  (I35.0) Severe aortic stenosis  Comment: some lower BPs but SBP generally 120-140s   Plan:   --continue meds same, consider lowering lisinopril if BPs remain low  --vitals per facility policy  --follow BMP    (E03.9) Hypothyroidism, unspecified type  Comment: chronic, TSH therapeutic    Plan:   --continue levothyroxine 75mcg daily  --annual & prn TSH     Orders written by provider at facility  1. Decrease Keppra to 250mg in AM, continue 500mg at HS  2. Onsite psych visit per family request    3. Onsite hearing to eval/treat    Total time spent with patient visit at the [facility] nursing facility was 39 minutes including including patient visit, review of past records & discussions about the POC. Greater than 50% of total time spent time on patient's counseling and coordinating care regarding conditions and treatment options: Spoke with son who would like to further decrease Keppra dose. Discussed risk of seizure at lower dose and that his mom is high risk for seizures. He verbalized understanding & would still like dose lowered. Discussed patient's current symptoms, possible depression, cognitive deficits.       Electronically signed by:  Susu Pereira NP             Sincerely,        Susu Pereira NP

## 2020-09-10 NOTE — PROGRESS NOTES
[facility]  [place] [facility] Record Number:  [id number]  Place of Service where encounter took place:  No question data found.  Chief Complaint   Patient presents with     Nursing Home Acute     TCU to LTC transfer       HPI:    Joelle Freeman  is a 89 year old (5/25/1928), with PMH including HTN; CAD; dementia, CVI with right sided weakness; severe AS; NPH s/p  shunt; factor V Leiden (heterozygous); falls; and recent hospitalizations (7/19-7/21 for syncope and bilateral chronic subdural hygromas vs chronic SDH, and 8/2-8/5 for AMS and UTI). She is being seen today for an episodic care visit. HPI information obtained from: facility chart records, facility staff, patient report, [facility] chart review and family/first contact son & daughter in-law report. Today's concern is:  Patient hospitalized 8/9-8/20 for altered mental status, treated for acute metabolic encephalopathy with fluctuations in level of consciousness: multifactorial including post-ictal state, seizures, acute bilateral pulmonary emboli, moderate oropharyngeal dysphagia & hypokalemia. Spent some time in TCU, now transferred to LTC.    Patient again quite sleepy. Initially more alert with lowering Keppra dose. Poor appetite, assist with feeding. Sleeps often. EZ-stand for transfers, w/c for distance. Family feels patient has been angry & depressed, feeling like son has deserted her. History of frequent UTIs- no current urinary symptoms. Afebrile.   SBP  for the last week (usually 120-140s) with HR 67-83. O2 91-97% on RA. Weight 107lbs yesterday, 110lbs on admission 8/22.      Past Medical and Surgical History reviewed in Epic today.    MEDICATIONS:  Current Outpatient Medications   Medication Sig Dispense Refill     acetaminophen (TYLENOL) 325 MG tablet Take 650 mg by mouth every 6 hours as needed for pain        apixaban ANTICOAGULANT (ELIQUIS) 5 MG tablet Take 1 tablet (5 mg) by mouth 2 times daily Start on 8/24/20 60  "tablet 0     atorvastatin (LIPITOR) 10 MG tablet Take 1 tablet (10 mg) by mouth daily 30 tablet 1     cholecalciferol 1000 UNITS TABS Take 1,000 Units by mouth daily 30 tablet 1     folic acid (FOLVITE) 1 MG tablet Take 1 tablet (1 mg) by mouth daily 30 tablet 0     levETIRAcetam (KEPPRA) 100 MG/ML solution Take 2.5 mLs (250 mg) by mouth every morning AND 5 mLs (500 mg) At Bedtime.       levothyroxine (SYNTHROID/LEVOTHROID) 75 MCG tablet Take 1 tablet (75 mcg) by mouth daily 30 tablet 0     lisinopril (PRINIVIL/ZESTRIL) 10 MG tablet Take 10 mg by mouth daily       melatonin 1 MG TABS tablet Take 1 mg by mouth nightly as needed for sleep        metoprolol tartrate (LOPRESSOR) 12.5 mg TABS half-tab Take 12.5 mg by mouth 2 times daily       nystatin (MYCOSTATIN) 345800 UNIT/GM external powder Apply topically 2 times daily as needed (intertriginal dermatitis)       potassium chloride ER (KLOR-CON M) 20 MEQ CR tablet Take 20 mEq by mouth once with dinner.       senna-docusate (SENOKOT-S/PERICOLACE) 8.6-50 MG tablet Take 1 tablet by mouth 2 times daily 60 tablet 0       REVIEW OF SYSTEMS:  Unobtainable secondary to cognitive impairment.   BIMS 0/15    Objective:  BP 97/68   Pulse 83   Temp 96.9  F (36.1  C)   Resp 18   Ht 1.626 m (5' 4\")   Wt 48.9 kg (107 lb 11.2 oz)   SpO2 96%   BMI 18.49 kg/m    Exam:  GENERAL APPEARANCE:  in no distress, calm, non verbal  ENT:  [place], oral mucous membranes moist  EYES:  Conjunctiva and lids normal  RESP:  lungs clear to auscultation , no respiratory distress  CV:  RRR, murmur  ABDOMEN:  bowel sounds normal, soft, non-tender, no distension  M/S:   decreased muscle tone, no edema   PSYCH:  insight and judgement impaired, memory impaired , affect and mood normal    Labs:   Most Recent 3 CBC's:  Recent Labs   Lab Test 08/19/20  0905 08/18/20  1458 08/15/20  0601   WBC 7.9 7.1 6.8   HGB 14.2 13.2 13.3   MCV 96 96 93    190 158     Most Recent 3 BMP's:  Recent Labs   Lab Test " 08/20/20  0925 08/19/20  0905 08/18/20  1458  08/16/20  0941  08/14/20  0856   NA  --  141 140  --   --   --  142   POTASSIUM  --  4.0 4.0  --  4.0   < > 3.3*   CHLORIDE  --  112* 111*  --   --   --  112*   CO2  --  24 27  --   --   --  26   BUN  --  7 5*  --   --   --  4*   CR 0.69 0.57 0.63   < >  --   --  0.42*   ANIONGAP  --  5 2*  --   --   --  4   JAZLYN  --  8.7 9.3  --   --   --  8.5   GLC  --  116* 104*  --   --   --  107*    < > = values in this interval not displayed.   Folate 3.9 on 7/20 with Hgb at 11. Hgb since normal.   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  (F03.90) Dementia without behavioral disturbance, unspecified dementia type (H)  Comment: followed by Neurology & Neurosurg, no behavioral concerns, sleepy  Plan:   --assist with ADLs, transfers, meals, meds  --follow with Neurology   --monitor for change in mood/behavior  --onsite psych to eval/treat     (I26.99) Acute pulmonary embolism without acute cor pulmonale, unspecified pulmonary embolism type (H)  Comment: incidental finding on CTA, started on eliquis 10mg BID through 8/24/20 then decrease to 5mg BID  Plan:   --continue Eliquis same  --monitor O2 with activity & at rest     (R13.12) Oropharyngeal dysphagia  Comment: persists  Plan:   --continue speech therapy   --D1 diet with nectar thick liquids may advance as indicated    (I10) Essential hypertension  (I25.10)  Coronary artery disease involving native coronary artery of native heart without angina pectoris  (I35.0) Severe aortic stenosis  Comment: some lower BPs but SBP generally 120-140s   Plan:   --continue meds same, consider lowering lisinopril if BPs remain low  --vitals per facility policy  --follow BMP    (E03.9) Hypothyroidism, unspecified type  Comment: chronic, TSH therapeutic    Plan:   --continue levothyroxine 75mcg daily  --annual & prn TSH     Orders written by provider at facility  1. Decrease Keppra to 250mg in AM, continue 500mg at HS  2. Onsite psych visit per family request    3. Onsite hearing to eval/treat    Total time spent with patient visit at the [facility] nursing [facility] was 39 minutes including including patient visit, review of past records & discussions about the POC. Greater than 50% of total time spent time on patient's counseling and coordinating care regarding conditions and treatment options: Spoke with son who would like to further decrease Keppra dose. Discussed risk of seizure at lower dose and that his mom is high risk for seizures. He verbalized understanding & would still like dose lowered. Discussed patient's current symptoms, possible depression, cognitive deficits.       Electronically signed by:  Susu Pereira NP

## 2020-09-14 RX ORDER — LEVETIRACETAM 100 MG/ML
SOLUTION ORAL
Start: 2020-09-14 | End: 2020-09-30

## 2020-09-16 PROBLEM — R41.82 ALTERED MENTAL STATUS: Status: RESOLVED | Noted: 2020-08-09 | Resolved: 2020-09-16

## 2020-09-16 PROBLEM — D68.51 FACTOR V LEIDEN (H): Status: ACTIVE | Noted: 2020-09-16

## 2020-09-16 PROBLEM — R55 SYNCOPE: Status: RESOLVED | Noted: 2020-07-19 | Resolved: 2020-09-16

## 2020-09-16 PROBLEM — N39.0 UTI (URINARY TRACT INFECTION): Status: RESOLVED | Noted: 2019-06-29 | Resolved: 2020-09-16

## 2020-09-16 ASSESSMENT — MIFFLIN-ST. JEOR: SCORE: 914.37

## 2020-09-22 ENCOUNTER — NURSING HOME VISIT (OUTPATIENT)
Dept: GERIATRICS | Facility: CLINIC | Age: 85
End: 2020-09-22
Payer: MEDICARE

## 2020-09-22 VITALS
WEIGHT: 114.5 LBS | RESPIRATION RATE: 18 BRPM | OXYGEN SATURATION: 97 % | DIASTOLIC BLOOD PRESSURE: 77 MMHG | BODY MASS INDEX: 19.55 KG/M2 | HEIGHT: 64 IN | TEMPERATURE: 97.3 F | SYSTOLIC BLOOD PRESSURE: 123 MMHG | HEART RATE: 72 BPM

## 2020-09-22 DIAGNOSIS — I62.03 CHRONIC SUBDURAL HEMATOMA (H): ICD-10-CM

## 2020-09-22 DIAGNOSIS — G40.909 SEIZURE DISORDER (H): ICD-10-CM

## 2020-09-22 DIAGNOSIS — R41.82 ALTERED MENTAL STATUS, UNSPECIFIED ALTERED MENTAL STATUS TYPE: Primary | ICD-10-CM

## 2020-09-22 DIAGNOSIS — I25.10 CORONARY ARTERY DISEASE INVOLVING NATIVE CORONARY ARTERY OF NATIVE HEART WITHOUT ANGINA PECTORIS: ICD-10-CM

## 2020-09-22 DIAGNOSIS — I35.0 SEVERE AORTIC STENOSIS: ICD-10-CM

## 2020-09-22 DIAGNOSIS — I10 ESSENTIAL HYPERTENSION: ICD-10-CM

## 2020-09-22 DIAGNOSIS — F03.90 DEMENTIA WITHOUT BEHAVIORAL DISTURBANCE, UNSPECIFIED DEMENTIA TYPE: ICD-10-CM

## 2020-09-22 DIAGNOSIS — I26.99 ACUTE PULMONARY EMBOLISM WITHOUT ACUTE COR PULMONALE, UNSPECIFIED PULMONARY EMBOLISM TYPE (H): ICD-10-CM

## 2020-09-22 DIAGNOSIS — G91.2 IDIOPATHIC NORMAL PRESSURE HYDROCEPHALUS (INPH) (H): ICD-10-CM

## 2020-09-22 PROCEDURE — 99309 SBSQ NF CARE MODERATE MDM 30: CPT | Performed by: NURSE PRACTITIONER

## 2020-09-22 NOTE — PROGRESS NOTES
"[place] [facility]  [place] Medical Record Number:  [id number]  Place of Service where encounter took place:  [facility] ([facility]) [[id number]]  Chief Complaint   Patient presents with     Nursing Home Acute     med changes       HPI:    Joelle Freeman  is a 89 year old (5/25/1928), who is being seen today for an episodic care visit.  HPI information obtained from: facility chart records, facility staff, patient report and [facility] chart review. Today's concern is:  Staff called to report son requesting further reduction in seizure medication despite known risks. Son verbalizes understanding risk vs benefit & would like the medication lowered or discontinued as he feels his mom is lethargic & no longer has ability to stay awake for a conversation. Family feels this is more important at this point in her life. Patient is awake during visit today. Very [place]. Either unable to hear writer or unable to understand questions. She appears comfortable \"I'll be alright\".     Past Medical and Surgical History reviewed in Epic today.    MEDICATIONS:  Current Outpatient Medications   Medication Sig Dispense Refill     acetaminophen (TYLENOL) 325 MG tablet Take 650 mg by mouth every 6 hours as needed for pain        apixaban ANTICOAGULANT (ELIQUIS) 5 MG tablet Take 1 tablet (5 mg) by mouth 2 times daily Start on 8/24/20 60 tablet 0     atorvastatin (LIPITOR) 10 MG tablet Take 1 tablet (10 mg) by mouth daily 30 tablet 1     cholecalciferol 1000 UNITS TABS Take 1,000 Units by mouth daily 30 tablet 1     folic acid (FOLVITE) 1 MG tablet Take 1 tablet (1 mg) by mouth daily 30 tablet 0     levETIRAcetam (KEPPRA) 100 MG/ML solution Take 2.5 mLs (250 mg) by mouth every morning AND 5 mLs (500 mg) At Bedtime.       levothyroxine (SYNTHROID/LEVOTHROID) 75 MCG tablet Take 1 tablet (75 mcg) by mouth daily 30 tablet 0     lisinopril (PRINIVIL/ZESTRIL) 10 MG tablet Take 10 mg by mouth daily       melatonin 1 MG TABS tablet " "Take 1 mg by mouth nightly as needed for sleep        metoprolol tartrate (LOPRESSOR) 12.5 mg TABS half-tab Take 12.5 mg by mouth 2 times daily       nystatin (MYCOSTATIN) 268593 UNIT/GM external powder Apply topically 2 times daily as needed (intertriginal dermatitis)       potassium chloride ER (KLOR-CON M) 20 MEQ CR tablet Take 20 mEq by mouth once with dinner.       senna-docusate (SENOKOT-S/PERICOLACE) 8.6-50 MG tablet Take 1 tablet by mouth 2 times daily 60 tablet 0       REVIEW OF SYSTEMS:  Limited secondary to cognitive impairment     Objective:  /77   Pulse 72   Temp 97.3  F (36.3  C)   Resp 18   Ht 1.626 m (5' 4\")   Wt 51.9 kg (114 lb 8 oz)   SpO2 97%   BMI 19.65 kg/m    Exam:  GENERAL APPEARANCE:  in no distress, calm, alert  ENT:  [place], dry mucous membranes moist  EYES:  conjunctiva and lids normal  RESP:  lungs clear to auscultation , no respiratory distress  CV:  RRR, murmur  ABDOMEN:  bowel sounds normal, soft, non-tender, no distension  M/S:   decreased muscle tone, no edema   PSYCH:  insight and judgement impaired, memory impaired, affect and mood normal    Labs:   Recent labs in [facility] reviewed by me today.     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): Shunt 7/2013  (F03.90) Dementia without behavioral disturbance, unspecified dementia type (H)  Comment: followed by Neurology & Neurosurg, no behavioral concerns, sleepy  Plan:   --assist with ADLs, transfers, meals, meds  --follow with Neurology   --monitor for change in mood/behavior  --onsite psych to eval/treat "      (I26.99) Acute pulmonary embolism without acute cor pulmonale, unspecified pulmonary embolism type (H)  Comment: incidental finding on CTA, started on eliquis 10mg BID through 8/24/20 then decrease to 5mg BID  Plan:   --continue Eliquis 5mg PO BID  --monitor O2 with activity & at rest      (I10) Essential hypertension  (I25.10) Coronary artery disease involving native coronary artery of native heart without angina pectoris  (I35.0) Severe aortic stenosis  Comment: no documented BP for the last week  Plan:   --continue meds same for now  --vitals per facility policy: at least weekly   --follow BMP     1. Decrease Keppra to 250mg PO BID      Electronically signed by:  Susu Pereira NP

## 2020-09-22 NOTE — LETTER
"    9/22/2020        RE: Joelle Freeman  [address] [address]        [place] [facility]  [place] Medical Record Number:  [id number]  Place of Service where encounter took place:  [facility] ([facility]) [[id number]]  Chief Complaint   Patient presents with     Nursing Home Acute     med changes       HPI:    Joelle Freeman  is a 89 year old (5/25/1928), who is being seen today for an episodic care visit.  HPI information obtained from: facility chart records, facility staff, patient report and [facility] chart review. Today's concern is:  Staff called to report son requesting further reduction in seizure medication despite known risks. Son verbalizes understanding risk vs benefit & would like the medication lowered or discontinued as he feels his mom is lethargic & no longer has ability to stay awake for a conversation. Family feels this is more important at this point in her life. Patient is awake during visit today. Very [place]. Either unable to hear writer or unable to understand questions. She appears comfortable \"I'll be alright\".     Past Medical and Surgical History reviewed in Epic today.    MEDICATIONS:  Current Outpatient Medications   Medication Sig Dispense Refill     acetaminophen (TYLENOL) 325 MG tablet Take 650 mg by mouth every 6 hours as needed for pain        apixaban ANTICOAGULANT (ELIQUIS) 5 MG tablet Take 1 tablet (5 mg) by mouth 2 times daily Start on 8/24/20 60 tablet 0     atorvastatin (LIPITOR) 10 MG tablet Take 1 tablet (10 mg) by mouth daily 30 tablet 1     cholecalciferol 1000 UNITS TABS Take 1,000 Units by mouth daily 30 tablet 1     folic acid (FOLVITE) 1 MG tablet Take 1 tablet (1 mg) by mouth daily 30 tablet 0     levETIRAcetam (KEPPRA) 100 MG/ML solution Take 2.5 mLs (250 mg) by mouth every morning AND 5 mLs (500 mg) At Bedtime.       levothyroxine (SYNTHROID/LEVOTHROID) 75 MCG tablet Take 1 tablet (75 mcg) by mouth daily 30 tablet 0     lisinopril " "(PRINIVIL/ZESTRIL) 10 MG tablet Take 10 mg by mouth daily       melatonin 1 MG TABS tablet Take 1 mg by mouth nightly as needed for sleep        metoprolol tartrate (LOPRESSOR) 12.5 mg TABS half-tab Take 12.5 mg by mouth 2 times daily       nystatin (MYCOSTATIN) 362195 UNIT/GM external powder Apply topically 2 times daily as needed (intertriginal dermatitis)       potassium chloride ER (KLOR-CON M) 20 MEQ CR tablet Take 20 mEq by mouth once with dinner.       senna-docusate (SENOKOT-S/PERICOLACE) 8.6-50 MG tablet Take 1 tablet by mouth 2 times daily 60 tablet 0       REVIEW OF SYSTEMS:  Limited secondary to cognitive impairment     Objective:  /77   Pulse 72   Temp 97.3  F (36.3  C)   Resp 18   Ht 1.626 m (5' 4\")   Wt 51.9 kg (114 lb 8 oz)   SpO2 97%   BMI 19.65 kg/m    Exam:  GENERAL APPEARANCE:  in no distress, calm, alert  ENT:  [place], dry mucous membranes moist  EYES:  conjunctiva and lids normal  RESP:  lungs clear to auscultation , no respiratory distress  CV:  RRR, murmur  ABDOMEN:  bowel sounds normal, soft, non-tender, no distension  M/S:   decreased muscle tone, no edema   PSYCH:  insight and judgement impaired, memory impaired, affect and mood normal    Labs:   Recent labs in [facility] reviewed by me today.     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): Shunt 7/2013  (F03.90) Dementia without behavioral disturbance, unspecified dementia type (H)  Comment: followed by Neurology & Neurosurg, no behavioral concerns, sleepy  Plan:   --assist with ADLs, transfers, meals, meds  --follow " with Neurology   --monitor for change in mood/behavior  --onsite psych to eval/treat      (I26.99) Acute pulmonary embolism without acute cor pulmonale, unspecified pulmonary embolism type (H)  Comment: incidental finding on CTA, started on eliquis 10mg BID through 8/24/20 then decrease to 5mg BID  Plan:   --continue Eliquis 5mg PO BID  --monitor O2 with activity & at rest      (I10) Essential hypertension  (I25.10) Coronary artery disease involving native coronary artery of native heart without angina pectoris  (I35.0) Severe aortic stenosis  Comment: no documented BP for the last week  Plan:   --continue meds same for now  --vitals per facility policy: at least weekly   --follow BMP     1. Decrease Keppra to 250mg PO BID      Electronically signed by:  Susu Pereira NP             Sincerely,        Susu Pereira NP

## 2020-09-26 ASSESSMENT — MIFFLIN-ST. JEOR: SCORE: 899.85

## 2020-09-29 ENCOUNTER — TELEPHONE (OUTPATIENT)
Dept: GERIATRICS | Facility: CLINIC | Age: 85
End: 2020-09-29

## 2020-09-29 ENCOUNTER — TRANSFERRED RECORDS (OUTPATIENT)
Dept: HEALTH INFORMATION MANAGEMENT | Facility: CLINIC | Age: 85
End: 2020-09-29

## 2020-09-29 NOTE — TELEPHONE ENCOUNTER
[place] [facility] TELEPHONE ENCOUNTER    Joelle Freeman is a 89 year old  (5/25/1928),Nurse called today to report: patient fell and hit her head. Has hx of traumatic head injury. Son wants her to have her head internally evaluated with an xray. Nursing also requesting a UA/UC because normally patient is lethargic and now she is more alert. Vitals stable.     ASSESSMENT/PLAN  1. Options are to have patient evaluated in the ED with a CT scan to assess for bleeding or to monitor her at the nursing home for neuro changes.   2. Ok for UA/UC       Lucina Schwartz NP

## 2020-09-30 RX ORDER — LEVETIRACETAM 100 MG/ML
250 SOLUTION ORAL 2 TIMES DAILY
Start: 2020-09-30 | End: 2020-10-07

## 2020-10-01 ENCOUNTER — NURSING HOME VISIT (OUTPATIENT)
Dept: GERIATRICS | Facility: CLINIC | Age: 85
End: 2020-10-01
Payer: MEDICARE

## 2020-10-01 VITALS
OXYGEN SATURATION: 98 % | WEIGHT: 111.3 LBS | HEART RATE: 72 BPM | RESPIRATION RATE: 18 BRPM | BODY MASS INDEX: 19 KG/M2 | DIASTOLIC BLOOD PRESSURE: 64 MMHG | SYSTOLIC BLOOD PRESSURE: 108 MMHG | HEIGHT: 64 IN | TEMPERATURE: 97 F

## 2020-10-01 DIAGNOSIS — I62.03 CHRONIC SUBDURAL HEMATOMA (H): ICD-10-CM

## 2020-10-01 DIAGNOSIS — I10 ESSENTIAL HYPERTENSION: ICD-10-CM

## 2020-10-01 DIAGNOSIS — R41.82 ALTERED MENTAL STATUS, UNSPECIFIED ALTERED MENTAL STATUS TYPE: Primary | ICD-10-CM

## 2020-10-01 DIAGNOSIS — G91.2 IDIOPATHIC NORMAL PRESSURE HYDROCEPHALUS (INPH) (H): ICD-10-CM

## 2020-10-01 DIAGNOSIS — G40.909 SEIZURE DISORDER (H): ICD-10-CM

## 2020-10-01 DIAGNOSIS — F03.90 DEMENTIA WITHOUT BEHAVIORAL DISTURBANCE, UNSPECIFIED DEMENTIA TYPE: ICD-10-CM

## 2020-10-01 PROCEDURE — 99309 SBSQ NF CARE MODERATE MDM 30: CPT | Performed by: NURSE PRACTITIONER

## 2020-10-01 NOTE — PROGRESS NOTES
[place] [facility]  [place] [facility] Record Number:  [id number]  Place of Service where encounter took place:  [facility] (S) [[id number]]  Chief Complaint   Patient presents with     Nursing Home Acute       HPI:    Joelle Freeman  is a 89 year old (5/25/1928), who is being seen today for an episodic care visit.  HPI information obtained from: facility chart records, facility staff, patient report and [facility] chart review. Today's concern is:  Keppra dose decreased to 250 mg PO BID on 9/23. Son has mentioned to staff that patient tested positive for UTI prior to hospitalization. She obtained antibiotic however never took the medication. Staff did repeat urine sample. UC indicates multiple bacterial morphotype. No urinary symptoms. Lethargy has been improving for lowering of Keppra dose. Afebrile. Patient poor historian d/t advanced dementia. She is currently quarantined in single room with isolation precautions as son visited with patient outside without a mask. -150s with HR 60-80s. Weight 111lbs, unchanged from admission 08/2020.     Past Medical and Surgical History reviewed in Epic today.    MEDICATIONS:  Current Outpatient Medications   Medication Sig Dispense Refill     acetaminophen (TYLENOL) 325 MG tablet Take 650 mg by mouth every 6 hours as needed for pain        apixaban ANTICOAGULANT (ELIQUIS) 5 MG tablet Take 1 tablet (5 mg) by mouth 2 times daily Start on 8/24/20 60 tablet 0     atorvastatin (LIPITOR) 10 MG tablet Take 1 tablet (10 mg) by mouth daily 30 tablet 1     cholecalciferol 1000 UNITS TABS Take 1,000 Units by mouth daily 30 tablet 1     folic acid (FOLVITE) 1 MG tablet Take 1 tablet (1 mg) by mouth daily 30 tablet 0     levETIRAcetam (KEPPRA) 100 MG/ML solution Take 2.5 mLs (250 mg) by mouth 2 times daily       levothyroxine (SYNTHROID/LEVOTHROID) 75 MCG tablet Take 1 tablet (75 mcg) by mouth daily 30 tablet 0     lisinopril (PRINIVIL/ZESTRIL) 10 MG tablet Take  "10 mg by mouth daily       melatonin 1 MG TABS tablet Take 1 mg by mouth nightly as needed for sleep        metoprolol tartrate (LOPRESSOR) 12.5 mg TABS half-tab Take 12.5 mg by mouth 2 times daily       nystatin (MYCOSTATIN) 744593 UNIT/GM external powder Apply topically 2 times daily as needed (intertriginal dermatitis)       potassium chloride ER (KLOR-CON M) 20 MEQ CR tablet Take 20 mEq by mouth once with dinner.       senna-docusate (SENOKOT-S/PERICOLACE) 8.6-50 MG tablet Take 1 tablet by mouth 2 times daily 60 tablet 0       REVIEW OF SYSTEMS:  Limited secondary to cognitive impairment     Objective:  /64   Pulse 72   Temp 97  F (36.1  C)   Resp 18   Ht 1.626 m (5' 4\")   Wt 50.5 kg (111 lb 4.8 oz)   SpO2 98%   BMI 19.10 kg/m    Exam:  GENERAL APPEARANCE:  in no distress, calm, alert, oriented to self  ENT:  [place], dry mucous membranes moist  EYES:  conjunctiva and lids normal  RESP:  lungs clear to auscultation , no respiratory distress  CV:  RRR, murmur  ABDOMEN:  bowel sounds normal, soft, non-tender, no distension  M/S:   decreased muscle tone, no edema   PSYCH:  insight and judgement impaired, memory impaired, affect and mood normal    Labs:   Most Recent 3 CBC's:  Recent Labs   Lab Test 08/19/20  0905 08/18/20  1458 08/15/20  0601   WBC 7.9 7.1 6.8   HGB 14.2 13.2 13.3   MCV 96 96 93    190 158     Most Recent 3 BMP's:  Recent Labs   Lab Test 08/20/20  0925 08/19/20  0905 08/18/20  1458 08/16/20  0941 08/16/20  0941 08/14/20  0856 08/14/20  0856   NA  --  141 140  --   --   --  142   POTASSIUM  --  4.0 4.0  --  4.0   < > 3.3*   CHLORIDE  --  112* 111*  --   --   --  112*   CO2  --  24 27  --   --   --  26   BUN  --  7 5*  --   --   --  4*   CR 0.69 0.57 0.63   < >  --   --  0.42*   ANIONGAP  --  5 2*  --   --   --  4   JAZLYN  --  8.7 9.3  --   --   --  8.5   GLC  --  116* 104*  --   --   --  107*    < > = values in this interval not displayed.       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 No change in POC today, plan further reduction in Keppra dose next week 10/6 to 125 mg PO BID       Electronically signed by:  Susu Pereira NP

## 2020-10-01 NOTE — LETTER
10/1/2020        RE: Joelle Freeman  [address] [address]        [place] [facility]  [place] Medical Record Number:  [id number]  Place of Service where encounter took place:  [facility] (S) [[id number]]  Chief Complaint   Patient presents with     Nursing Home Acute       HPI:    Joelle Freeman  is a 89 year old (5/25/1928), who is being seen today for an episodic care visit.  HPI information obtained from: facility chart records, facility staff, patient report and [facility] chart review. Today's concern is:  Keppra dose decreased to 250 mg PO BID on 9/23. Son has mentioned to staff that patient tested positive for UTI prior to hospitalization. She obtained antibiotic however never took the medication. Staff did repeat urine sample. UC indicates multiple bacterial morphotype. No urinary symptoms. Lethargy has been improving for lowering of Keppra dose. Afebrile. Patient poor historian d/t advanced dementia. She is currently quarantined in single room with isolation precautions as son visited with patient outside without a mask. -150s with HR 60-80s. Weight 111lbs, unchanged from admission 08/2020.     Past Medical and Surgical History reviewed in Epic today.    MEDICATIONS:  Current Outpatient Medications   Medication Sig Dispense Refill     acetaminophen (TYLENOL) 325 MG tablet Take 650 mg by mouth every 6 hours as needed for pain        apixaban ANTICOAGULANT (ELIQUIS) 5 MG tablet Take 1 tablet (5 mg) by mouth 2 times daily Start on 8/24/20 60 tablet 0     atorvastatin (LIPITOR) 10 MG tablet Take 1 tablet (10 mg) by mouth daily 30 tablet 1     cholecalciferol 1000 UNITS TABS Take 1,000 Units by mouth daily 30 tablet 1     folic acid (FOLVITE) 1 MG tablet Take 1 tablet (1 mg) by mouth daily 30 tablet 0     levETIRAcetam (KEPPRA) 100 MG/ML solution Take 2.5 mLs (250 mg) by mouth 2 times daily       levothyroxine (SYNTHROID/LEVOTHROID) 75 MCG tablet Take 1 tablet (75  "mcg) by mouth daily 30 tablet 0     lisinopril (PRINIVIL/ZESTRIL) 10 MG tablet Take 10 mg by mouth daily       melatonin 1 MG TABS tablet Take 1 mg by mouth nightly as needed for sleep        metoprolol tartrate (LOPRESSOR) 12.5 mg TABS half-tab Take 12.5 mg by mouth 2 times daily       nystatin (MYCOSTATIN) 963290 UNIT/GM external powder Apply topically 2 times daily as needed (intertriginal dermatitis)       potassium chloride ER (KLOR-CON M) 20 MEQ CR tablet Take 20 mEq by mouth once with dinner.       senna-docusate (SENOKOT-S/PERICOLACE) 8.6-50 MG tablet Take 1 tablet by mouth 2 times daily 60 tablet 0       REVIEW OF SYSTEMS:  Limited secondary to cognitive impairment     Objective:  /64   Pulse 72   Temp 97  F (36.1  C)   Resp 18   Ht 1.626 m (5' 4\")   Wt 50.5 kg (111 lb 4.8 oz)   SpO2 98%   BMI 19.10 kg/m    Exam:  GENERAL APPEARANCE:  in no distress, calm, alert, oriented to self  ENT:  [place], dry mucous membranes moist  EYES:  conjunctiva and lids normal  RESP:  lungs clear to auscultation , no respiratory distress  CV:  RRR, murmur  ABDOMEN:  bowel sounds normal, soft, non-tender, no distension  M/S:   decreased muscle tone, no edema   PSYCH:  insight and judgement impaired, memory impaired, affect and mood normal    Labs:   Most Recent 3 CBC's:  Recent Labs   Lab Test 08/19/20  0905 08/18/20  1458 08/15/20  0601   WBC 7.9 7.1 6.8   HGB 14.2 13.2 13.3   MCV 96 96 93    190 158     Most Recent 3 BMP's:  Recent Labs   Lab Test 08/20/20  0925 08/19/20  0905 08/18/20  1458 08/16/20  0941 08/16/20  0941 08/14/20  0856 08/14/20  0856   NA  --  141 140  --   --   --  142   POTASSIUM  --  4.0 4.0  --  4.0   < > 3.3*   CHLORIDE  --  112* 111*  --   --   --  112*   CO2  --  24 27  --   --   --  26   BUN  --  7 5*  --   --   --  4*   CR 0.69 0.57 0.63   < >  --   --  0.42*   ANIONGAP  --  5 2*  --   --   --  4   JAZLYN  --  8.7 9.3  --   --   --  8.5   GLC  --  116* 104*  --   --   --  107*    < > = " values in this interval not displayed.       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 No change in POC today, plan further reduction in Keppra dose next week 10/6 to 125 mg PO BID       Electronically signed by:  Susu Pereira NP               Sincerely,        Susu Pereira NP

## 2020-10-07 RX ORDER — LEVETIRACETAM 100 MG/ML
125 SOLUTION ORAL 2 TIMES DAILY
Status: ON HOLD
Start: 2020-10-07 | End: 2021-01-01

## 2020-10-15 ENCOUNTER — NURSING HOME VISIT (OUTPATIENT)
Dept: GERIATRICS | Facility: CLINIC | Age: 85
End: 2020-10-15
Payer: MEDICARE

## 2020-10-15 DIAGNOSIS — I35.0 SEVERE AORTIC STENOSIS: ICD-10-CM

## 2020-10-15 DIAGNOSIS — G40.909 SEIZURE DISORDER (H): ICD-10-CM

## 2020-10-15 DIAGNOSIS — F03.90 DEMENTIA WITHOUT BEHAVIORAL DISTURBANCE, UNSPECIFIED DEMENTIA TYPE: ICD-10-CM

## 2020-10-15 DIAGNOSIS — I10 ESSENTIAL HYPERTENSION: ICD-10-CM

## 2020-10-15 DIAGNOSIS — G91.2 IDIOPATHIC NORMAL PRESSURE HYDROCEPHALUS (INPH) (H): ICD-10-CM

## 2020-10-15 DIAGNOSIS — I62.03 CHRONIC SUBDURAL HEMATOMA (H): Primary | ICD-10-CM

## 2020-10-16 NOTE — PROGRESS NOTES
[facility]  [place] Medical Record Number:  [id number]  Place of Service where encounter took place:      Patient was seen by Dr. Ramon at the [facility] on October 15, 2020, for a initial long-term care visit.    Circumstances related to recent hospital admissions and TCU placement reviewed:    Pt  is a 89 year old (5/25/1928), with PMH including HTN; CAD; dementia, CVI with right sided weakness; severe AS; NPH s/p  shunt; factor V Leiden (heterozygous); falls; and recent hospitalizations (7/19-7/21 for syncope and bilateral chronic subdural hygromas vs chronic SDH, and 8/2-8/5 for AMS and UTI, subsequent hospitalization August 9 August 20 for altered mental status in the setting of incidentally noted bilateral pulmonary emboli and suspicion of seizures).    Patient was admitted to long-term care from to you secondary to ongoing  care needs.    Patient had been noted to be very sleepy following most recent hospital discharge, with poor intake and marked decline in functional status.  Because of concerns that mental and functional status decline might be related to Keppra, this has been weaned down to 125 mg bid    Patient has appeared more alert since reduction and discontinuation of Keppra.  She has remained very confused.  There have  been no clinically evident episodes of seizures.  There have been no episodes of decreased responsiveness.  Patient continues to require considerable assistance with all cares.  Intake has been good.  Weight has been stable.  Blood pressures have been stable.    This morning, patient complains of bilateral great toe pain.  There is no reported trauma.  She denies headache, dizziness, cough, shortness of breath, abdominal pain, nausea, vomiting.    Past Medical and Surgical History reviewed in Epic today.    MEDICATIONS:  Current Outpatient Medications   Medication Sig Dispense Refill     acetaminophen (TYLENOL) 325 MG tablet Take 650 mg by mouth every  6 hours as needed for pain        apixaban ANTICOAGULANT (ELIQUIS) 5 MG tablet Take 1 tablet (5 mg) by mouth 2 times daily Start on 8/24/20 60 tablet 0     atorvastatin (LIPITOR) 10 MG tablet Take 1 tablet (10 mg) by mouth daily 30 tablet 1     cholecalciferol 1000 UNITS TABS Take 1,000 Units by mouth daily 30 tablet 1     folic acid (FOLVITE) 1 MG tablet Take 1 tablet (1 mg) by mouth daily 30 tablet 0     levETIRAcetam (KEPPRA) 100 MG/ML solution Take 1.25 mLs (125 mg) by mouth 2 times daily       levothyroxine (SYNTHROID/LEVOTHROID) 75 MCG tablet Take 1 tablet (75 mcg) by mouth daily 30 tablet 0     lisinopril (PRINIVIL/ZESTRIL) 10 MG tablet Take 10 mg by mouth daily       melatonin 1 MG TABS tablet Take 1 mg by mouth nightly as needed for sleep        metoprolol tartrate (LOPRESSOR) 12.5 mg TABS half-tab Take 12.5 mg by mouth 2 times daily       nystatin (MYCOSTATIN) 147685 UNIT/GM external powder Apply topically 2 times daily as needed (intertriginal dermatitis)       potassium chloride ER (KLOR-CON M) 20 MEQ CR tablet Take 20 mEq by mouth once with dinner.       senna-docusate (SENOKOT-S/PERICOLACE) 8.6-50 MG tablet Take 1 tablet by mouth 2 times daily 60 tablet 0         Exam:  GENERAL APPEARANCE:   Thin appearing female lying in bed.  Alert, very pleasant, frequently cheerfully corrects interviewer's pronunciation of her last name  ENT:  [place], oral mucous membranes moist  EYES: No eye redness or drainage  RESP: Respiratory 12, unlabored  CV:  RRR, prominent systolic murmur  ABDOMEN: Soft, nontender, nondistended   M/S:   no edema or calf tenderness   Tenderness to palpation over both great toes.  Dystrophic toenail right great toe without erythema or drainage.  PSYCH: Alert, very pleasant, in good spirits.  Oriented to person.  Face symmetric.  No focal weakness.    Labs:   Most Recent 3 CBC's:  Recent Labs   Lab Test 08/19/20  0905 08/18/20  1458 08/15/20  0601   WBC 7.9 7.1 6.8   HGB 14.2 13.2 13.3   MCV 96  96 93    190 158     Most Recent 3 BMP's:  Recent Labs   Lab Test 08/20/20  0925 08/19/20  0905 08/18/20  1458 08/16/20  0941 08/16/20  0941 08/14/20  0856 08/14/20  0856   NA  --  141 140  --   --   --  142   POTASSIUM  --  4.0 4.0  --  4.0   < > 3.3*   CHLORIDE  --  112* 111*  --   --   --  112*   CO2  --  24 27  --   --   --  26   BUN  --  7 5*  --   --   --  4*   CR 0.69 0.57 0.63   < >  --   --  0.42*   ANIONGAP  --  5 2*  --   --   --  4   JAZLYN  --  8.7 9.3  --   --   --  8.5   GLC  --  116* 104*  --   --   --  107*    < > = values in this interval not displayed.   Folate 3.9 on 7/20 with Hgb at 11. Hgb since normal.   TSH 1.36 on 8/10      ASSESSMENT/PLAN:  (R41.82) Altered mental status, unspecified altered mental status type  (primary encounter diagnosis)  History of underlying dementia, subdural hematoma, recent hospitalizations for UTI with suggestion of concurrent metabolic encephalopathy  (G40.909) Seizure disorder (H) as noted on EEG  Comment: Patient is more alert with reduction in Keppra.  No clinically evident seizures though she would appear to be at risk for such.  Plan: Continue to closely monitor mental status.  Neurology follow-up.  Consider alternative antielliptic medication i.e. 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  Plan: Monitor mental and functional status.  Neurology follow-up      (I26.99) Acute pulmonary embolism without acute cor pulmonale, unspecified pulmonary embolism type (H)  Comment: incidental finding on CTA, started on eliquis 10mg BID through 8/24/20 then decrease to 5mg BID  Plan: continue Eliquis for at least 6 months, perhaps lifelong, in view  of heterozygous factor V Leiden state      (I10) Essential hypertension  (I25.10) Coronary artery disease involving native coronary artery of native heart without angina pectoris  (I35.0) Severe aortic stenosis  Comment: CV status stable  Plan: Monitor blood pressure at rest and with activity.  Avoid hypotension in view of severe aortic stenosis      Bilateral toe discomfort with dystrophic right great toe, without signs of infection or recent trauma  Plan: Podiatry evaluation      (E03.9) Hypothyroidism, unspecified type  Comment: chronic, TSH therapeutic    Plan: continue levothyroxine 75mcg daily      Edenilson Ramon MD

## 2020-11-02 ENCOUNTER — OFFICE VISIT (OUTPATIENT)
Dept: NEUROLOGY | Facility: CLINIC | Age: 85
End: 2020-11-02
Payer: MEDICARE

## 2020-11-02 VITALS
RESPIRATION RATE: 16 BRPM | OXYGEN SATURATION: 97 % | DIASTOLIC BLOOD PRESSURE: 74 MMHG | HEART RATE: 76 BPM | SYSTOLIC BLOOD PRESSURE: 131 MMHG

## 2020-11-02 DIAGNOSIS — G96.08 SUBDURAL HYGROMA: ICD-10-CM

## 2020-11-02 DIAGNOSIS — R56.9 SEIZURES (H): ICD-10-CM

## 2020-11-02 DIAGNOSIS — F03.90 DEMENTIA WITHOUT BEHAVIORAL DISTURBANCE, UNSPECIFIED DEMENTIA TYPE: Primary | ICD-10-CM

## 2020-11-02 PROCEDURE — 99214 OFFICE O/P EST MOD 30 MIN: CPT | Performed by: PSYCHIATRY & NEUROLOGY

## 2020-11-02 NOTE — LETTER
11/2/2020       RE: Joelle Freeman  [address] [address]     Dear Colleague,    Thank you for referring your patient, Joelle Freeman, to the [facility] [place] at [facility]. Please see a copy of my visit note below.    Service Date: 11/02/2020      HISTORY OF PRESENT ILLNESS:  Joelle Freeman returns for neurologic followup.  She is accompanied by her son, Osman.      She is a patient I saw in 07/2019 for episodic confusion.  She has dementia with gait disturbance.  She does have a history of a shunt placed for normal pressure hydrocephalus in the past which initially helped, but ultimately she progressed.  I have not seen her since 2019.      She was hospitalized at [facility] from 08/09-08/20/2020 with acute encephalopathy.  This was felt to be multifactorial, likely due to metabolic factors and a UTI.  As a part of her workup, she did have EEG monitoring and was found to have an active epileptiform focus in the right anterior and mid temporal region, although no clinical seizure was seen.  She had an episode off of EEG where she became very lethargic.  A stat EEG showed worsening signs of encephalopathy.  She was seen by Neurology and it was felt that she may have suffered a seizure and she was on Keppra.  She was discharged on Keppra 750 mg twice a day.      In addition, during that hospitalization, she was incidentally found to have pulmonary emboli when a CTA of the head and neck was done.  She is now on apixaban.      Furthermore, a head CT scan did reveal bilateral subdural hygromas or possibly chronic subdural hematomas without any acute blood.  She did have a followup head CAT scan done on 08/31/2020 that revealed no change in the bilateral subdural fluid collections.      According to Osman, when she was discharged from the hospital, she was extremely lethargic and was not eating.  Because of this, her Keppra dose has  been gradually reduced.  Osman would like to have her off the drug completely, and thinks she may be, but the notes I have indicate that she is still on 125 mg of Keppra twice a day.  She has become much more alert with this dosage reduction, and she is starting to eat more.      She has had no definite seizures.  She still has issues with sundowning and intermittent confusion, likely related to her dementing illness.      CURRENT MEDICATIONS:   1.  Tylenol.   2.  Apixaban 5 mg twice a day.   3.  Atorvastatin.   4.  Vitamin D.   5.  Folvite.   6.  Keppra 125 mg twice a day.   7.  Levothyroxine.   8.  Lisinopril.   9.  Melatonin.   10.  Metoprolol.   11.  Potassium.   12.  Senna.      PHYSICAL EXAMINATION:  Examination reveals her blood pressure is 131/74.      She is very hard of hearing.  She is, however, quite alert and cooperative.  She recognizes her son and knows his name.  She indicates she is in a hospital in [place].  She does indicate that I am a doctor.      She denies a headache.      Pupils are small but round and reactive.  Visual fields are intact to threat.  She has a symmetric smile.  Her speech is clear.  She does move her arms and legs well.  She is in a wheelchair.  Reflexes are 2+ in the upper extremities, 2+ at the knees and absent at the ankles.  Plantar responses are flexor.      IMPRESSION:   1.  Dementia.   2.  Fluctuating encephalopathy in August with a concern for nonconvulsive seizures.   3.  Subdural hygromas.   4.  Incidentally identified pulmonary emboli, now on apixaban.      PLAN:  I discussed the pros and cons of taking her off the Keppra completely.  The EEG monitoring did demonstrate a significant risk for seizures.  I discussed this with Osman.  It was ultimately decided that she is doing better on this lower dose of Keppra and she will continue on 125 mg twice a day.      I do want to continue to monitor her course.  I will be seeing her back in 2 months.      Total visit time  was 25 minutes.  More than 50% of this was spent in counseling.      MD CRICKET Sage MD             D: 2020   T: 2020   MT: al      Name:     RENA YING   MRN:      [id number]        Account:      [id number]   :      1928           Service Date: 2020      Document: [id number]

## 2020-11-02 NOTE — PROGRESS NOTES
Service Date: 11/02/2020      HISTORY OF PRESENT ILLNESS:  Joelle Freeman returns for neurologic followup.  She is accompanied by her son, Osman.      She is a patient I saw in 07/2019 for episodic confusion.  She has dementia with gait disturbance.  She does have a history of a shunt placed for normal pressure hydrocephalus in the past which initially helped, but ultimately she progressed.  I have not seen her since 2019.      She was hospitalized at [facility] from 08/09-08/20/2020 with acute encephalopathy.  This was felt to be multifactorial, likely due to metabolic factors and a UTI.  As a part of her workup, she did have EEG monitoring and was found to have an active epileptiform focus in the right anterior and mid temporal region, although no clinical seizure was seen.  She had an episode off of EEG where she became very lethargic.  A stat EEG showed worsening signs of encephalopathy.  She was seen by Neurology and it was felt that she may have suffered a seizure and she was on Keppra.  She was discharged on Keppra 750 mg twice a day.      In addition, during that hospitalization, she was incidentally found to have pulmonary emboli when a CTA of the head and neck was done.  She is now on apixaban.      Furthermore, a head CT scan did reveal bilateral subdural hygromas or possibly chronic subdural hematomas without any acute blood.  She did have a followup head CAT scan done on 08/31/2020 that revealed no change in the bilateral subdural fluid collections.      According to Osman, when she was discharged from the hospital, she was extremely lethargic and was not eating.  Because of this, her Keppra dose has been gradually reduced.  Osman would like to have her off the drug completely, and thinks she may be, but the notes I have indicate that she is still on 125 mg of Keppra twice a day.  She has become much more alert with this dosage reduction, and she is starting to eat more.      She has had no definite  seizures.  She still has issues with sundowning and intermittent confusion, likely related to her dementing illness.      CURRENT MEDICATIONS:   1.  Tylenol.   2.  Apixaban 5 mg twice a day.   3.  Atorvastatin.   4.  Vitamin D.   5.  Folvite.   6.  Keppra 125 mg twice a day.   7.  Levothyroxine.   8.  Lisinopril.   9.  Melatonin.   10.  Metoprolol.   11.  Potassium.   12.  Senna.      PHYSICAL EXAMINATION:  Examination reveals her blood pressure is 131/74.      She is very hard of hearing.  She is, however, quite alert and cooperative.  She recognizes her son and knows his name.  She indicates she is in a hospital in [place].  She does indicate that I am a doctor.      She denies a headache.      Pupils are small but round and reactive.  Visual fields are intact to threat.  She has a symmetric smile.  Her speech is clear.  She does move her arms and legs well.  She is in a wheelchair.  Reflexes are 2+ in the upper extremities, 2+ at the knees and absent at the ankles.  Plantar responses are flexor.      IMPRESSION:   1.  Dementia.   2.  Fluctuating encephalopathy in August with a concern for nonconvulsive seizures.   3.  Subdural hygromas.   4.  Incidentally identified pulmonary emboli, now on apixaban.      PLAN:  I discussed the pros and cons of taking her off the Keppra completely.  The EEG monitoring did demonstrate a significant risk for seizures.  I discussed this with Osman.  It was ultimately decided that she is doing better on this lower dose of Keppra and she will continue on 125 mg twice a day.      I do want to continue to monitor her course.  I will be seeing her back in 2 months.      Total visit time was 25 minutes.  More than 50% of this was spent in counseling.      MD CRICKET Sage MD             D: 2020   T: 2020   MT: al      Name:     RENA YING   MRN:      [id number]        Account:      [id number]   :      1928           Service Date:  11/02/2020      Document: [id number]

## 2020-11-03 ENCOUNTER — NURSING HOME VISIT (OUTPATIENT)
Dept: GERIATRICS | Facility: CLINIC | Age: 85
End: 2020-11-03
Payer: MEDICARE

## 2020-11-03 VITALS
TEMPERATURE: 97.5 F | RESPIRATION RATE: 18 BRPM | OXYGEN SATURATION: 94 % | HEART RATE: 71 BPM | DIASTOLIC BLOOD PRESSURE: 80 MMHG | SYSTOLIC BLOOD PRESSURE: 135 MMHG

## 2020-11-03 DIAGNOSIS — F03.90 DEMENTIA WITHOUT BEHAVIORAL DISTURBANCE, UNSPECIFIED DEMENTIA TYPE: ICD-10-CM

## 2020-11-03 DIAGNOSIS — I62.03 CHRONIC SUBDURAL HEMATOMA (H): ICD-10-CM

## 2020-11-03 DIAGNOSIS — I10 ESSENTIAL HYPERTENSION: ICD-10-CM

## 2020-11-03 DIAGNOSIS — G91.2 IDIOPATHIC NORMAL PRESSURE HYDROCEPHALUS (INPH) (H): ICD-10-CM

## 2020-11-03 DIAGNOSIS — G40.909 SEIZURE DISORDER (H): Primary | ICD-10-CM

## 2020-11-03 PROCEDURE — 99309 SBSQ NF CARE MODERATE MDM 30: CPT | Performed by: NURSE PRACTITIONER

## 2020-11-03 NOTE — PROGRESS NOTES
[facility]  Chief Complaint   Patient presents with     USP Regulatory     [place] Medical Record Number:  [id number]  Place of Service where encounter took place:  [facility] ([facility]) [[id number]]    HPI:    Joelle Freeman  is 89 year old (5/25/1928), who is being seen today for a federally mandated E/M visit.  HPI information obtained from: {FGS HPI:635186}. Today's concerns are:  {FGS DX:486913}    ALLERGIES:Tramadol  PAST MEDICAL HISTORY:   has a past medical history of Acute, but ill-defined, cerebrovascular disease (6/7/2003), Anemia of chronic disease (4/6/2014), Asthma, CAD (coronary artery disease), Cerebral ventriculomegaly, CVA (cerebral infarction) (2002), Dementia (H), Dementia (H), Depression, Diverticulosis, Dyslipidemia, Heterozygous factor V Leiden mutation (H), HTN (hypertension), Hyperlipidemia, Hypothyroidism, Normal pressure hydrocephalus (H), Posterior reversible encephalopathy syndrome, Subarachnoid hemorrhage (H) (3/23/2013), Subdural hematoma (H), Syncope (7/19/2020), TIA (transient ischaemic attack), TIA (transient ischaemic attack), Unspecified cerebral artery occlusion with cerebral infarction, Urinary incontinence, and UTI (lower urinary tract infection).  PAST SURGICAL HISTORY:   has a past surgical history that includes Cholecystectomy; hernia repair; Hysterectomy; Stent (2001); lumbar puncture; Cardiac surgery; Abdomen surgery; appendectomy; biopsy; ENT surgery; wisdom teeth extraction[; GYN surgery; GYN surgery; Optical tracking system implant shunt ventriculoperitoneal (7/16/2013); and Open reduction internal fixation hip nailing (11/18/2013).  FAMILY HISTORY: family history includes Blood Disease in her son; Cerebrovascular Disease in her father and mother.  SOCIAL HISTORY:  reports that she has never smoked. She has never used smokeless tobacco. She reports that she does not drink alcohol or use drugs.    MEDICATIONS:  Current Outpatient  Medications   Medication Sig Dispense Refill     acetaminophen (TYLENOL) 325 MG tablet Take 650 mg by mouth every 6 hours as needed for pain        apixaban ANTICOAGULANT (ELIQUIS) 5 MG tablet Take 1 tablet (5 mg) by mouth 2 times daily Start on 8/24/20 60 tablet 0     atorvastatin (LIPITOR) 10 MG tablet Take 1 tablet (10 mg) by mouth daily 30 tablet 1     cholecalciferol 1000 UNITS TABS Take 1,000 Units by mouth daily 30 tablet 1     folic acid (FOLVITE) 1 MG tablet Take 1 tablet (1 mg) by mouth daily 30 tablet 0     levETIRAcetam (KEPPRA) 100 MG/ML solution Take 1.25 mLs (125 mg) by mouth 2 times daily       levothyroxine (SYNTHROID/LEVOTHROID) 75 MCG tablet Take 1 tablet (75 mcg) by mouth daily 30 tablet 0     lisinopril (PRINIVIL/ZESTRIL) 10 MG tablet Take 10 mg by mouth daily       melatonin 1 MG TABS tablet Take 1 mg by mouth nightly as needed for sleep        metoprolol tartrate (LOPRESSOR) 12.5 mg TABS half-tab Take 12.5 mg by mouth 2 times daily       nystatin (MYCOSTATIN) 420409 UNIT/GM external powder Apply topically 2 times daily as needed (intertriginal dermatitis)       potassium chloride ER (KLOR-CON M) 20 MEQ CR tablet Take 20 mEq by mouth once with dinner.       senna-docusate (SENOKOT-S/PERICOLACE) 8.6-50 MG tablet Take 1 tablet by mouth 2 times daily 60 tablet 0     {Providers Please double check the med list (in the plan section >> meds & orders tab) and Discontinue any of the meds flagged by the TC to be discontinued}  ***  Case Management:  I have reviewed the care plan and MDS and do agree with the plan. Patient's desire to return to the community is {FGS RETURN TO COMMUNITY:928473}. Information reviewed:  Medications, vital signs, orders, and nursing notes.    ROS:  {ROS FGS:505851}    Vitals:  /80   Pulse 71   Temp 97.5  F (36.4  C)   Resp 18   SpO2 94%   There is no height or weight on file to calculate BMI.  Exam:  {penitentiary physical exam :639056}    Lab/Diagnostic data:    {fgslab:190281}    ASSESSMENT/PLAN  {FGS DX:390402}    {fgsorders:865011}  ***    {fgstime1:925424}    Electronically signed by:  Ingrid Angel MA***    {Providers Please double check the med list (in the plan section >> meds & orders tab) and Discontinue any of the meds flagged by the TC to be discontinued}

## 2020-11-03 NOTE — LETTER
11/3/2020        RE: Joelle Freeman  [address] [facility] [address]        [place] [facility]  Chief Complaint   Patient presents with     FDC Regulatory     [place] Medical Record Number:  [id number]  Place of Service where encounter took place:  [facility] ([facility]) [[id number]]    HPI:    Joelle Freeman  is 89 year old (5/25/1928), who is being seen today for a federally mandated E/M visit.  HPI information obtained from: facility chart records, facility staff, patient report and [facility] chart review. Today's concerns are:  Patient seen today for regulatory visit. She is in good spirits, talkative, confused. Staff without acute concerns. Sleeping well. Good appetite. Patient denies headache or dizziness. Denies SOB, CP or cough.    SBP  for the last month with HR generally 70-80s. O2 93-98% on RA. Weight 112# on 11/29, was 110# on admission in Aug.    ALLERGIES:Tramadol  PAST MEDICAL HISTORY:   has a past medical history of Acute, but ill-defined, cerebrovascular disease (6/7/2003), Anemia of chronic disease (4/6/2014), Asthma, CAD (coronary artery disease), Cerebral ventriculomegaly, CVA (cerebral infarction) (2002), Dementia (H), Dementia (H), Depression, Diverticulosis, Dyslipidemia, Heterozygous factor V Leiden mutation (H), HTN (hypertension), Hyperlipidemia, Hypothyroidism, Normal pressure hydrocephalus (H), Posterior reversible encephalopathy syndrome, Subarachnoid hemorrhage (H) (3/23/2013), Subdural hematoma (H), Syncope (7/19/2020), TIA (transient ischaemic attack), TIA (transient ischaemic attack), Unspecified cerebral artery occlusion with cerebral infarction, Urinary incontinence, and UTI (lower urinary tract infection).  PAST SURGICAL HISTORY:   has a past surgical history that includes Cholecystectomy; hernia repair; Hysterectomy; Stent (2001); lumbar puncture; Cardiac surgery; Abdomen surgery; appendectomy; biopsy; ENT surgery; wisdom teeth extraction[;  GYN surgery; GYN surgery; Optical tracking system implant shunt ventriculoperitoneal (7/16/2013); and Open reduction internal fixation hip nailing (11/18/2013).  FAMILY HISTORY: family history includes Blood Disease in her son; Cerebrovascular Disease in her father and mother.  SOCIAL HISTORY:  reports that she has never smoked. She has never used smokeless tobacco. She reports that she does not drink alcohol or use drugs.    MEDICATIONS:  Current Outpatient Medications   Medication Sig Dispense Refill     acetaminophen (TYLENOL) 325 MG tablet Take 650 mg by mouth every 6 hours as needed for pain        apixaban ANTICOAGULANT (ELIQUIS) 5 MG tablet Take 1 tablet (5 mg) by mouth 2 times daily Start on 8/24/20 60 tablet 0     atorvastatin (LIPITOR) 10 MG tablet Take 1 tablet (10 mg) by mouth daily 30 tablet 1     cholecalciferol 1000 UNITS TABS Take 1,000 Units by mouth daily 30 tablet 1     folic acid (FOLVITE) 1 MG tablet Take 1 tablet (1 mg) by mouth daily 30 tablet 0     levETIRAcetam (KEPPRA) 100 MG/ML solution Take 1.25 mLs (125 mg) by mouth 2 times daily       levothyroxine (SYNTHROID/LEVOTHROID) 75 MCG tablet Take 1 tablet (75 mcg) by mouth daily 30 tablet 0     lisinopril (PRINIVIL/ZESTRIL) 10 MG tablet Take 10 mg by mouth daily       melatonin 1 MG TABS tablet Take 1 mg by mouth nightly as needed for sleep        metoprolol tartrate (LOPRESSOR) 12.5 mg TABS half-tab Take 12.5 mg by mouth 2 times daily       nystatin (MYCOSTATIN) 501031 UNIT/GM external powder Apply topically 2 times daily as needed (intertriginal dermatitis)       potassium chloride ER (KLOR-CON M) 20 MEQ CR tablet Take 20 mEq by mouth once with dinner.       senna-docusate (SENOKOT-S/PERICOLACE) 8.6-50 MG tablet Take 1 tablet by mouth 2 times daily 60 tablet 0         Case Management:  I have reviewed the care plan and MDS and do agree with the plan. Patient's desire to return to the community is not assessible due to cognitive impairment.  Information reviewed:  Medications, vital signs, orders, and nursing notes.    ROS:  Unobtainable secondary to cognitive impairment.     Vitals:  /80   Pulse 71   Temp 97.5  F (36.4  C)   Resp 18   SpO2 94%   There is no height or weight on file to calculate BMI.  Exam:  GENERAL APPEARANCE:  in no distress, calm, alert, oriented to self  ENT:  very [place], dry mucous membranes moist  EYES:  conjunctiva and lids normal  RESP:  lungs clear to auscultation, no respiratory distress  CV:  RRR, murmur  ABDOMEN:  bowel sounds normal, soft, non-tender, no distension  M/S:   decreased muscle tone, no edema   PSYCH:  insight and judgement impaired, memory impaired, affect and mood normal    Lab/Diagnostic data:     Most Recent 3 CBC's:  Recent Labs   Lab Test 08/19/20  0905 08/18/20  1458 08/15/20  0601   WBC 7.9 7.1 6.8   HGB 14.2 13.2 13.3   MCV 96 96 93    190 158     Most Recent 3 BMP's:  Recent Labs   Lab Test 08/20/20  0925 08/19/20  0905 08/18/20  1458 08/16/20  0941 08/16/20  0941 08/14/20  0856 08/14/20  0856   NA  --  141 140  --   --   --  142   POTASSIUM  --  4.0 4.0  --  4.0   < > 3.3*   CHLORIDE  --  112* 111*  --   --   --  112*   CO2  --  24 27  --   --   --  26   BUN  --  7 5*  --   --   --  4*   CR 0.69 0.57 0.63   < >  --   --  0.42*   ANIONGAP  --  5 2*  --   --   --  4   JAZLYN  --  8.7 9.3  --   --   --  8.5   GLC  --  116* 104*  --   --   --  107*    < > = values in this interval not displayed.       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 Decrease lisinopril to 5 mg PO daily       Electronically signed by:  Susu Pereira NP              Sincerely,        Susu Pereira, NP

## 2020-11-05 NOTE — PROGRESS NOTES
[place] [facility]  Chief Complaint   Patient presents with     care home Regulatory     [place] Medical Record Number:  [id number]  Place of Service where encounter took place:  [facility] (Counts include 234 beds at the [facility]) [[id number]]    HPI:    Joelle Freeman  is 89 year old (5/25/1928), who is being seen today for a federally mandated E/M visit.  HPI information obtained from: {[facility] HPI:175960}. Today's concerns are:  {[facility] DX:086436}    ALLERGIES:Tramadol  PAST MEDICAL HISTORY:   has a past medical history of Acute, but ill-defined, cerebrovascular disease (6/7/2003), Anemia of chronic disease (4/6/2014), Asthma, CAD (coronary artery disease), Cerebral ventriculomegaly, CVA (cerebral infarction) (2002), Dementia (H), Dementia (H), Depression, Diverticulosis, Dyslipidemia, Heterozygous factor V Leiden mutation (H), HTN (hypertension), Hyperlipidemia, Hypothyroidism, Normal pressure hydrocephalus (H), Posterior reversible encephalopathy syndrome, Subarachnoid hemorrhage (H) (3/23/2013), Subdural hematoma (H), Syncope (7/19/2020), TIA (transient ischaemic attack), TIA (transient ischaemic attack), Unspecified cerebral artery occlusion with cerebral infarction, Urinary incontinence, and UTI (lower urinary tract infection).  PAST SURGICAL HISTORY:   has a past surgical history that includes Cholecystectomy; hernia repair; Hysterectomy; Stent (2001); lumbar puncture; Cardiac surgery; Abdomen surgery; appendectomy; biopsy; ENT surgery; wisdom teeth extraction[; GYN surgery; GYN surgery; Optical tracking system implant shunt ventriculoperitoneal (7/16/2013); and Open reduction internal fixation hip nailing (11/18/2013).  FAMILY HISTORY: family history includes Blood Disease in her son; Cerebrovascular Disease in her father and mother.  SOCIAL HISTORY:  reports that she has never smoked. She has never used smokeless tobacco. She reports that she does not drink alcohol or use drugs.    MEDICATIONS:  Current Outpatient  Medications   Medication Sig Dispense Refill     acetaminophen (TYLENOL) 325 MG tablet Take 650 mg by mouth every 6 hours as needed for pain        apixaban ANTICOAGULANT (ELIQUIS) 5 MG tablet Take 1 tablet (5 mg) by mouth 2 times daily Start on 8/24/20 60 tablet 0     atorvastatin (LIPITOR) 10 MG tablet Take 1 tablet (10 mg) by mouth daily 30 tablet 1     cholecalciferol 1000 UNITS TABS Take 1,000 Units by mouth daily 30 tablet 1     folic acid (FOLVITE) 1 MG tablet Take 1 tablet (1 mg) by mouth daily 30 tablet 0     levETIRAcetam (KEPPRA) 100 MG/ML solution Take 1.25 mLs (125 mg) by mouth 2 times daily       levothyroxine (SYNTHROID/LEVOTHROID) 75 MCG tablet Take 1 tablet (75 mcg) by mouth daily 30 tablet 0     lisinopril (PRINIVIL/ZESTRIL) 10 MG tablet Take 10 mg by mouth daily       melatonin 1 MG TABS tablet Take 1 mg by mouth nightly as needed for sleep        metoprolol tartrate (LOPRESSOR) 12.5 mg TABS half-tab Take 12.5 mg by mouth 2 times daily       nystatin (MYCOSTATIN) 123545 UNIT/GM external powder Apply topically 2 times daily as needed (intertriginal dermatitis)       potassium chloride ER (KLOR-CON M) 20 MEQ CR tablet Take 20 mEq by mouth once with dinner.       senna-docusate (SENOKOT-S/PERICOLACE) 8.6-50 MG tablet Take 1 tablet by mouth 2 times daily 60 tablet 0     {Providers Please double check the med list (in the plan section >> meds & orders tab) and Discontinue any of the meds flagged by the TC to be discontinued}  ***  Case Management:  I have reviewed the care plan and MDS and do agree with the plan. Patient's desire to return to the community is {[facility] RETURN TO COMMUNITY:977100}. Information reviewed:  Medications, vital signs, orders, and nursing notes.    ROS:  {ROS [facility]:684085}    Vitals:  /80   Pulse 71   Temp 97.5  F (36.4  C)   Resp 18   SpO2 94%   There is no height or weight on file to calculate BMI.  Exam:  {assisted physical exam :508762}    Lab/Diagnostic data:    {fgslab:335939}    ASSESSMENT/PLAN  {[facility] DX:552169}    {fgsorders:350595}  ***    {fgstime1:399790}    Electronically signed by:  Ingrid Angel MA***    {Providers Please double check the med list (in the plan section >> meds & orders tab) and Discontinue any of the meds flagged by the TC to be discontinued}

## 2020-11-12 ENCOUNTER — CARE COORDINATION (OUTPATIENT)
Dept: NEUROLOGY | Facility: CLINIC | Age: 85
End: 2020-11-12

## 2020-11-12 NOTE — PROGRESS NOTES
Pt 11/2 office visit note from Dr. Santacruz faxed to pt care facility fax: [phone]; per request from care center staff.     Damari JACOB

## 2020-11-19 ENCOUNTER — CARE COORDINATION (OUTPATIENT)
Dept: NEUROLOGY | Facility: CLINIC | Age: 85
End: 2020-11-19

## 2020-11-19 NOTE — PROGRESS NOTES
I faxed Dr. Santacruz 11/2 office visit note and pt AVS to [facility] (fax [phone]).     Damari JACOB

## 2020-11-24 NOTE — CONSULTS
"BRIEF NUTRITION ASSESSMENT      REASON FOR ASSESSMENT:  Joelle Freeman is a 89 year old female assessed by Registered Dietitian for Admission Nutrition Risk Screen for reduced oral intake over the last month      CURRENT DIET AND INTAKE:  Diet:  (8/9) NPO              Chart reviewed  Pt currently undergoing EEG  Family noted decreased po intake past few days PTA  Pt resides at an [facility]  History of dementia   Per family, pt would not want any aggressive measures    8/4: SLP eval ---> DD1, NTL     ANTHROPOMETRICS:  Height: 5'4\"  Weight:(8/11) 54.5 kg 120 lbs 2.41 oz  Body mass index is 20.62 kg/m .   Weight Status: Normal BMI  IBW:  54.5 kg  %IBW: 100%  Weight History:   Wt Readings from Last 10 Encounters:   08/11/20 54.5 kg (120 lb 2.4 oz)   08/05/20 54.8 kg (120 lb 12.8 oz)   07/20/20 57.1 kg (125 lb 12.8 oz)   06/29/19 56.7 kg (125 lb)   07/26/18 56.7 kg (125 lb)   01/19/16 51.7 kg (114 lb)   12/15/15 53.5 kg (118 lb)   12/07/15 54.3 kg (119 lb 9.6 oz)   11/30/15 55.2 kg (121 lb 9.6 oz)   11/26/15 57.3 kg (126 lb 5.2 oz)     Vitals:    08/09/20 1359 08/09/20 1814 08/10/20 0708 08/11/20 0459   Weight: 54 kg (119 lb 0.8 oz) 51.5 kg (113 lb 8.6 oz) 54.2 kg (119 lb 7.8 oz) 54.5 kg (120 lb 2.4 oz)         LABS:  Labs noted    MALNUTRITION:  Patient does not meet two of the following criteria necessary for diagnosing malnutrition.     % Weight Loss:  Weight loss does not meet criteria for malnutrition (wt down 5#, 4% from 1 month ago)  % Intake:  </= 50% for >/= 5 days (severe malnutrition)  Subcutaneous Fat Loss:  deferred  Muscle Loss:  deferred  Fluid Retention:  None noted    NUTRITION INTERVENTION:  Nutrition Diagnosis:  No nutrition diagnosis at this time.    Implementation:  Nutrition Education ---> not appropriate at this time due to patient condition  Once diet resumed, will send a magic cup TID with meals for added cals/pro    FOLLOW UP/MONITORING:   Will re-evaluate in 7 - 10 days, or sooner, if " FWD to non-clinical, please inform pt of normal normal cologuard test   re-consulted.

## 2020-12-16 NOTE — PROGRESS NOTES
"[place] [facility]  [place] Medical Record Number:  [id number]  Place of Service where encounter took place:  [facility] (S) [[id number]]  Chief Complaint   Patient presents with     Nursing Home Acute       HPI:    Joelle Freeman  is a 89 year old (5/25/1928), who is being seen today for an episodic care visit.  HPI information obtained from: facility chart records, facility staff, patient report and [facility] chart review. Today's concern is:  NPH/dementia/weight loss: patient is alert, pleasant on exam, states her appetite is not great, dietician is offering supplements between meals, current weight is 109.7lbs and on 9/16/20 3 months ago weight was 114.6lbs, Son is concerned there is \"something going on\" patient denies abdominal pain, N/V/D or constipation  Seizure d/o: no seizures noted per nursing documentation.   CAD/HTN: BP stable 115/74, 136/77, 99/68 with HR in 60-70 range, denies CP, palpitations, SOB.   Hypothyroidism: ongoing    Past Medical and Surgical History reviewed in Epic today.    MEDICATIONS:    Current Outpatient Medications   Medication Sig Dispense Refill     acetaminophen (TYLENOL) 325 MG tablet Take 650 mg by mouth every 6 hours as needed for pain        apixaban ANTICOAGULANT (ELIQUIS) 5 MG tablet Take 1 tablet (5 mg) by mouth 2 times daily Start on 8/24/20 60 tablet 0     atorvastatin (LIPITOR) 10 MG tablet Take 1 tablet (10 mg) by mouth daily 30 tablet 1     cholecalciferol 1000 UNITS TABS Take 1,000 Units by mouth daily 30 tablet 1     folic acid (FOLVITE) 1 MG tablet Take 1 tablet (1 mg) by mouth daily 30 tablet 0     levETIRAcetam (KEPPRA) 100 MG/ML solution Take 1.25 mLs (125 mg) by mouth 2 times daily       levothyroxine (SYNTHROID/LEVOTHROID) 75 MCG tablet Take 1 tablet (75 mcg) by mouth daily 30 tablet 0     lisinopril (PRINIVIL/ZESTRIL) 10 MG tablet Take 5 mg by mouth daily       melatonin 1 MG TABS tablet Take 1 mg by mouth nightly as needed for sleep   " "     metoprolol tartrate (LOPRESSOR) 12.5 mg TABS half-tab Take 12.5 mg by mouth 2 times daily       nystatin (MYCOSTATIN) 735218 UNIT/GM external powder Apply topically 2 times daily as needed (intertriginal dermatitis)       potassium chloride ER (KLOR-CON M) 20 MEQ CR tablet Take 20 mEq by mouth once with dinner.       senna-docusate (SENOKOT-S/PERICOLACE) 8.6-50 MG tablet Take 1 tablet by mouth 2 times daily 60 tablet 0         REVIEW OF SYSTEMS:  10 point ROS of systems including Constitutional, Eyes, Respiratory, Cardiovascular, Gastroenterology, Genitourinary, Integumentary, Musculoskeletal, Psychiatric were all negative except for pertinent positives noted in my HPI.    Objective:  /74   Pulse 64   Temp 97.2  F (36.2  C)   Resp 18   Ht 1.626 m (5' 4\")   Wt 49.8 kg (109 lb 11.2 oz)   SpO2 96%   BMI 18.83 kg/m    Exam:  GENERAL APPEARANCE:  Alert, in no distress, thin  ENT:  Mouth and posterior oropharynx normal, moist mucous membranes, [place]  EYES:  EOM, conjunctivae, lids, pupils and irises normal, PERRL  RESP:  no respiratory distress  CV:  Palpation and auscultation of heart done , regular rate and rhythm, no murmur, rub, or gallop, no edema  ABDOMEN:  normal bowel sounds, soft, nontender, no hepatosplenomegaly or other masses  M/S:   patient sitting up in WC, able to move all 4 extremities  SKIN:  Inspection of skin and subcutaneous tissue baseline  NEURO:   speech WNL    Labs:     Most Recent 3 CBC's:  Recent Labs   Lab Test 08/19/20  0905 08/18/20  1458 08/15/20  0601   WBC 7.9 7.1 6.8   HGB 14.2 13.2 13.3   MCV 96 96 93    190 158     Most Recent 3 BMP's:  Recent Labs   Lab Test 08/20/20  0925 08/19/20  0905 08/18/20  1458 08/16/20  0941 08/16/20  0941 08/14/20  0856 08/14/20  0856   NA  --  141 140  --   --   --  142   POTASSIUM  --  4.0 4.0  --  4.0   < > 3.3*   CHLORIDE  --  112* 111*  --   --   --  112*   CO2  --  24 27  --   --   --  26   BUN  --  7 5*  --   --   --  4*   CR 0.69 " 0.57 0.63   < >  --   --  0.42*   ANIONGAP  --  5 2*  --   --   --  4   JAZLYN  --  8.7 9.3  --   --   --  8.5   GLC  --  116* 104*  --   --   --  107*    < > = values in this interval not displayed.     Most Recent TSH and T4:  Recent Labs   Lab Test 08/10/20  0607 07/19/20 2000   TSH 1.36 0.22*   T4  --  1.53*       ASSESSMENT/PLAN:  Seizure disorder (H)  Chronic subdural hematoma (H)  Idiopathic normal pressure hydrocephalus (INPH): Shunt 7/2013  Dementia without behavioral disturbance, unspecified dementia type (H)  Ongoing: continue keppra 125mg BID     Weight loss  Slow weight loss over 3 months: patient states appetite is poor: CBC, BMP and TSH FT4 on monday    Coronary artery disease involving native coronary artery of native heart without angina pectoris  Essential hypertension  Ongoing: continue metoprolol 12.5mg BID, lisinopril 5mg QD, on apixaban 5mg BID    Hypothyroidism, unspecified type  Ongoing: continue synthroid 75mcg QD  TSH and free T 4 level on monday      Orders written by provider at facility  CBC, BMP, TSH and Free T 4 level on monday      Electronically signed by:  Tonya Lynn Haase, APRN CNP

## 2020-12-16 NOTE — LETTER
"    12/16/2020        RE: Joelle Freeman  [address] [address]        [place] [facility]  [place] Medical Record Number:  [id number]  Place of Service where encounter took place:  [facility] (S) [[id number]]  Chief Complaint   Patient presents with     Nursing Home Acute       HPI:    Joelle Freeman  is a 89 year old (5/25/1928), who is being seen today for an episodic care visit.  HPI information obtained from: facility chart records, facility staff, patient report and [facility] chart review. Today's concern is:  NPH/dementia/weight loss: patient is alert, pleasant on exam, states her appetite is not great, dietician is offering supplements between meals, current weight is 109.7lbs and on 9/16/20 3 months ago weight was 114.6lbs, Son is concerned there is \"something going on\" patient denies abdominal pain, N/V/D or constipation  Seizure d/o: no seizures noted per nursing documentation.   CAD/HTN: BP stable 115/74, 136/77, 99/68 with HR in 60-70 range, denies CP, palpitations, SOB.   Hypothyroidism: ongoing    Past Medical and Surgical History reviewed in Epic today.    MEDICATIONS:    Current Outpatient Medications   Medication Sig Dispense Refill     acetaminophen (TYLENOL) 325 MG tablet Take 650 mg by mouth every 6 hours as needed for pain        apixaban ANTICOAGULANT (ELIQUIS) 5 MG tablet Take 1 tablet (5 mg) by mouth 2 times daily Start on 8/24/20 60 tablet 0     atorvastatin (LIPITOR) 10 MG tablet Take 1 tablet (10 mg) by mouth daily 30 tablet 1     cholecalciferol 1000 UNITS TABS Take 1,000 Units by mouth daily 30 tablet 1     folic acid (FOLVITE) 1 MG tablet Take 1 tablet (1 mg) by mouth daily 30 tablet 0     levETIRAcetam (KEPPRA) 100 MG/ML solution Take 1.25 mLs (125 mg) by mouth 2 times daily       levothyroxine (SYNTHROID/LEVOTHROID) 75 MCG tablet Take 1 tablet (75 mcg) by mouth daily 30 tablet 0     lisinopril (PRINIVIL/ZESTRIL) 10 MG tablet Take 5 mg by mouth " "daily       melatonin 1 MG TABS tablet Take 1 mg by mouth nightly as needed for sleep        metoprolol tartrate (LOPRESSOR) 12.5 mg TABS half-tab Take 12.5 mg by mouth 2 times daily       nystatin (MYCOSTATIN) 030580 UNIT/GM external powder Apply topically 2 times daily as needed (intertriginal dermatitis)       potassium chloride ER (KLOR-CON M) 20 MEQ CR tablet Take 20 mEq by mouth once with dinner.       senna-docusate (SENOKOT-S/PERICOLACE) 8.6-50 MG tablet Take 1 tablet by mouth 2 times daily 60 tablet 0         REVIEW OF SYSTEMS:  10 point ROS of systems including Constitutional, Eyes, Respiratory, Cardiovascular, Gastroenterology, Genitourinary, Integumentary, Musculoskeletal, Psychiatric were all negative except for pertinent positives noted in my HPI.    Objective:  /74   Pulse 64   Temp 97.2  F (36.2  C)   Resp 18   Ht 1.626 m (5' 4\")   Wt 49.8 kg (109 lb 11.2 oz)   SpO2 96%   BMI 18.83 kg/m    Exam:  GENERAL APPEARANCE:  Alert, in no distress, thin  ENT:  Mouth and posterior oropharynx normal, moist mucous membranes, [place]  EYES:  EOM, conjunctivae, lids, pupils and irises normal, PERRL  RESP:  no respiratory distress  CV:  Palpation and auscultation of heart done , regular rate and rhythm, no murmur, rub, or gallop, no edema  ABDOMEN:  normal bowel sounds, soft, nontender, no hepatosplenomegaly or other masses  M/S:   patient sitting up in WC, able to move all 4 extremities  SKIN:  Inspection of skin and subcutaneous tissue baseline  NEURO:   speech WNL    Labs:     Most Recent 3 CBC's:  Recent Labs   Lab Test 08/19/20  0905 08/18/20  1458 08/15/20  0601   WBC 7.9 7.1 6.8   HGB 14.2 13.2 13.3   MCV 96 96 93    190 158     Most Recent 3 BMP's:  Recent Labs   Lab Test 08/20/20  0925 08/19/20  0905 08/18/20  1458 08/16/20  0941 08/16/20  0941 08/14/20  0856 08/14/20  0856   NA  --  141 140  --   --   --  142   POTASSIUM  --  4.0 4.0  --  4.0   < > 3.3*   CHLORIDE  --  112* 111*  --   --   " --  112*   CO2  --  24 27  --   --   --  26   BUN  --  7 5*  --   --   --  4*   CR 0.69 0.57 0.63   < >  --   --  0.42*   ANIONGAP  --  5 2*  --   --   --  4   JAZLYN  --  8.7 9.3  --   --   --  8.5   GLC  --  116* 104*  --   --   --  107*    < > = values in this interval not displayed.     Most Recent TSH and T4:  Recent Labs   Lab Test 08/10/20  0607 07/19/20 2000   TSH 1.36 0.22*   T4  --  1.53*       ASSESSMENT/PLAN:  Seizure disorder (H)  Chronic subdural hematoma (H)  Idiopathic normal pressure hydrocephalus (INPH): Shunt 7/2013  Dementia without behavioral disturbance, unspecified dementia type (H)  Ongoing: continue keppra 125mg BID     Weight loss  Slow weight loss over 3 months: patient states appetite is poor: CBC, BMP and TSH FT4 on monday    Coronary artery disease involving native coronary artery of native heart without angina pectoris  Essential hypertension  Ongoing: continue metoprolol 12.5mg BID, lisinopril 5mg QD, on apixaban 5mg BID    Hypothyroidism, unspecified type  Ongoing: continue synthroid 75mcg QD  TSH and free T 4 level on monday      Orders written by provider at facility  CBC, BMP, TSH and Free T 4 level on monday      Electronically signed by:  Tonya Lynn Haase, APRN CNP               Sincerely,        Tonya Lynn Haase, APRN CNP

## 2020-12-22 NOTE — LETTER
"    12/22/2020        RE: Joelle Freeman  [address] [facility] [address]        [place] [facility]  [place] [facility] Record Number:  [id number]  Place of Service where encounter took place:  [facility] ([facility]) [[id number]]  Chief Complaint   Patient presents with     Nursing Home Acute       HPI:    Joelle Freeman  is a 89 year old (5/25/1928), who is being seen today for an episodic care visit.  HPI information obtained from: facility chart records, facility staff, patient report and [facility] chart review. Today's concern is:  NPH/dementia/weight loss: patient is alert, pleasant on exam, states her appetite is poor, food does not taste good to her, dietician is offering supplements between meals, current weight is 108.8lbs and on 9/16/20 3 months ago weight was 114.6lbs, Son is concerned there is \"something going on\" patient denies abdominal pain, N/V/D or constipation, did call Son Osman to discuss lab workup which is negative for hypothyroidism, infection or any other concerns note, patient continues to have weight loss which is expected with progressive dementia, Osman voiced understanding  CAD/HTN: BP stable 120/72, 115/74, 136/77with HR in 60-70 range, denies CP, palpitations, SOB.   Hypothyroidism: TSH WNL    Past Medical and Surgical History reviewed in Epic today.    MEDICATIONS:    Current Outpatient Medications   Medication Sig Dispense Refill     acetaminophen (TYLENOL) 325 MG tablet Take 650 mg by mouth every 6 hours as needed for pain        apixaban ANTICOAGULANT (ELIQUIS) 5 MG tablet Take 1 tablet (5 mg) by mouth 2 times daily Start on 8/24/20 60 tablet 0     atorvastatin (LIPITOR) 10 MG tablet Take 1 tablet (10 mg) by mouth daily 30 tablet 1     cholecalciferol 1000 UNITS TABS Take 1,000 Units by mouth daily 30 tablet 1     folic acid (FOLVITE) 1 MG tablet Take 1 tablet (1 mg) by mouth daily 30 tablet 0     levETIRAcetam (KEPPRA) 100 MG/ML solution Take 1.25 mLs (125 mg) by " "mouth 2 times daily       levothyroxine (SYNTHROID/LEVOTHROID) 75 MCG tablet Take 1 tablet (75 mcg) by mouth daily 30 tablet 0     lisinopril (PRINIVIL/ZESTRIL) 10 MG tablet Take 5 mg by mouth daily       melatonin 1 MG TABS tablet Take 1 mg by mouth nightly as needed for sleep        metoprolol tartrate (LOPRESSOR) 12.5 mg TABS half-tab Take 12.5 mg by mouth 2 times daily       nystatin (MYCOSTATIN) 075147 UNIT/GM external powder Apply topically 2 times daily as needed (intertriginal dermatitis)       potassium chloride ER (KLOR-CON M) 20 MEQ CR tablet Take 20 mEq by mouth once with dinner.       senna-docusate (SENOKOT-S/PERICOLACE) 8.6-50 MG tablet Take 1 tablet by mouth 2 times daily 60 tablet 0         REVIEW OF SYSTEMS:  10 point ROS of systems including Constitutional, Eyes, Respiratory, Cardiovascular, Gastroenterology, Genitourinary, Integumentary, Musculoskeletal, Psychiatric were all negative except for pertinent positives noted in my HPI.    Objective:  /72   Pulse 66   Temp 97  F (36.1  C)   Resp 20   Ht 1.626 m (5' 4\")   Wt 49 kg (108 lb)   SpO2 97%   BMI 18.54 kg/m    Exam:  GENERAL APPEARANCE:  Alert, in no distress  ENT:  Mouth and posterior oropharynx normal, moist mucous membranes, [place]  EYES:  EOM, conjunctivae, lids, pupils and irises normal, PERRL  RESP:  no respiratory distress  CV:  no edema  ABDOMEN:  abdomen flat  M/S:   patient sitting up in WC, able to move all 4 extremities  SKIN:  Inspection of skin and subcutaneous tissue baseline  NEURO:   speech WNL    Labs:   12/21/20:   TSH 2.99, FT4 1.1, Na 143, K+ 3.9, BUN 21, creat 0.83, WBC 10.9, Hgb 14.5, Plt 160    Most Recent 3 CBC's:  Recent Labs   Lab Test 08/19/20  0905 08/18/20  1458 08/15/20  0601   WBC 7.9 7.1 6.8   HGB 14.2 13.2 13.3   MCV 96 96 93    190 158     Most Recent 3 BMP's:  Recent Labs   Lab Test 08/20/20  0925 08/19/20  0905 08/18/20  1458 08/16/20  0941 08/16/20  0941 08/14/20  0856 08/14/20  0856   NA  " --  141 140  --   --   --  142   POTASSIUM  --  4.0 4.0  --  4.0   < > 3.3*   CHLORIDE  --  112* 111*  --   --   --  112*   CO2  --  24 27  --   --   --  26   BUN  --  7 5*  --   --   --  4*   CR 0.69 0.57 0.63   < >  --   --  0.42*   ANIONGAP  --  5 2*  --   --   --  4   JAZLYN  --  8.7 9.3  --   --   --  8.5   GLC  --  116* 104*  --   --   --  107*    < > = values in this interval not displayed.       ASSESSMENT/PLAN:  Chronic subdural hematoma (H)  Idiopathic normal pressure hydrocephalus (INPH): Shunt 7/2013  Dementia without behavioral disturbance, unspecified dementia type (H)  Ongoing: continue keppra 125mg BID      Weight loss  Slow weight loss over 3 months: patient states appetite is poor: labs done 12/21 see above, continue supplements, discussed with Leroy Brewer as above     Coronary artery disease involving native coronary artery of native heart without angina pectoris  Essential hypertension  Ongoing: continue metoprolol 12.5mg BID, lisinopril 5mg QD, on apixaban 5mg BID     Hypothyroidism, unspecified type  Ongoing: continue synthroid 75mcg QD  TSH and free T 4 level stable    Orders written by provider at facility  No new orders    Total time spent with patient visit at the [facility] nursing [facility] was 35 min including patient visit and review of past records. Greater than 50% of total time spent with counseling and coordinating care due to discussion with Leroy Brewer as above, education on Dementia and ongoing progression, discussed labs and medications, .  Electronically signed by:  Tonya Lynn Haase, APRN CNP               Sincerely,        Tonya Lynn Haase, APRN CNP

## 2020-12-22 NOTE — PROGRESS NOTES
"[place] [facility]  [place] [facility] Record Number:  [id number]  Place of Service where encounter took place:  [facility] ([facility] [[id number]]  Chief Complaint   Patient presents with     Nursing Home Acute       HPI:    Joelle Freeman  is a 89 year old (5/25/1928), who is being seen today for an episodic care visit.  HPI information obtained from: facility chart records, facility staff, patient report and [facility] chart review. Today's concern is:  NPH/dementia/weight loss: patient is alert, pleasant on exam, states her appetite is poor, food does not taste good to her, dietician is offering supplements between meals, current weight is 108.8lbs and on 9/16/20 3 months ago weight was 114.6lbs, Son is concerned there is \"something going on\" patient denies abdominal pain, N/V/D or constipation, did call Son Osman to discuss lab workup which is negative for hypothyroidism, infection or any other concerns note, patient continues to have weight loss which is expected with progressive dementia, Osman voiced understanding  CAD/HTN: BP stable 120/72, 115/74, 136/77with HR in 60-70 range, denies CP, palpitations, SOB.   Hypothyroidism: TSH WNL    Past Medical and Surgical History reviewed in Epic today.    MEDICATIONS:    Current Outpatient Medications   Medication Sig Dispense Refill     acetaminophen (TYLENOL) 325 MG tablet Take 650 mg by mouth every 6 hours as needed for pain        apixaban ANTICOAGULANT (ELIQUIS) 5 MG tablet Take 1 tablet (5 mg) by mouth 2 times daily Start on 8/24/20 60 tablet 0     atorvastatin (LIPITOR) 10 MG tablet Take 1 tablet (10 mg) by mouth daily 30 tablet 1     cholecalciferol 1000 UNITS TABS Take 1,000 Units by mouth daily 30 tablet 1     folic acid (FOLVITE) 1 MG tablet Take 1 tablet (1 mg) by mouth daily 30 tablet 0     levETIRAcetam (KEPPRA) 100 MG/ML solution Take 1.25 mLs (125 mg) by mouth 2 times daily       levothyroxine (SYNTHROID/LEVOTHROID) 75 MCG tablet Take 1 " "tablet (75 mcg) by mouth daily 30 tablet 0     lisinopril (PRINIVIL/ZESTRIL) 10 MG tablet Take 5 mg by mouth daily       melatonin 1 MG TABS tablet Take 1 mg by mouth nightly as needed for sleep        metoprolol tartrate (LOPRESSOR) 12.5 mg TABS half-tab Take 12.5 mg by mouth 2 times daily       nystatin (MYCOSTATIN) 419641 UNIT/GM external powder Apply topically 2 times daily as needed (intertriginal dermatitis)       potassium chloride ER (KLOR-CON M) 20 MEQ CR tablet Take 20 mEq by mouth once with dinner.       senna-docusate (SENOKOT-S/PERICOLACE) 8.6-50 MG tablet Take 1 tablet by mouth 2 times daily 60 tablet 0         REVIEW OF SYSTEMS:  10 point ROS of systems including Constitutional, Eyes, Respiratory, Cardiovascular, Gastroenterology, Genitourinary, Integumentary, Musculoskeletal, Psychiatric were all negative except for pertinent positives noted in my HPI.    Objective:  /72   Pulse 66   Temp 97  F (36.1  C)   Resp 20   Ht 1.626 m (5' 4\")   Wt 49 kg (108 lb)   SpO2 97%   BMI 18.54 kg/m    Exam:  GENERAL APPEARANCE:  Alert, in no distress  ENT:  Mouth and posterior oropharynx normal, moist mucous membranes, [place]  EYES:  EOM, conjunctivae, lids, pupils and irises normal, PERRL  RESP:  no respiratory distress  CV:  no edema  ABDOMEN:  abdomen flat  M/S:   patient sitting up in WC, able to move all 4 extremities  SKIN:  Inspection of skin and subcutaneous tissue baseline  NEURO:   speech WNL    Labs:   12/21/20:   TSH 2.99, FT4 1.1, Na 143, K+ 3.9, BUN 21, creat 0.83, WBC 10.9, Hgb 14.5, Plt 160    Most Recent 3 CBC's:  Recent Labs   Lab Test 08/19/20  0905 08/18/20  1458 08/15/20  0601   WBC 7.9 7.1 6.8   HGB 14.2 13.2 13.3   MCV 96 96 93    190 158     Most Recent 3 BMP's:  Recent Labs   Lab Test 08/20/20  0925 08/19/20  0905 08/18/20  1458 08/16/20  0941 08/16/20  0941 08/14/20  0856 08/14/20  0856   NA  --  141 140  --   --   --  142   POTASSIUM  --  4.0 4.0  --  4.0   < > 3.3* "   CHLORIDE  --  112* 111*  --   --   --  112*   CO2  --  24 27  --   --   --  26   BUN  --  7 5*  --   --   --  4*   CR 0.69 0.57 0.63   < >  --   --  0.42*   ANIONGAP  --  5 2*  --   --   --  4   JAZLYN  --  8.7 9.3  --   --   --  8.5   GLC  --  116* 104*  --   --   --  107*    < > = values in this interval not displayed.       ASSESSMENT/PLAN:  Chronic subdural hematoma (H)  Idiopathic normal pressure hydrocephalus (INPH): Shunt 7/2013  Dementia without behavioral disturbance, unspecified dementia type (H)  Ongoing: continue keppra 125mg BID      Weight loss  Slow weight loss over 3 months: patient states appetite is poor: labs done 12/21 see above, continue supplements, discussed with Leroy Brewer as above     Coronary artery disease involving native coronary artery of native heart without angina pectoris  Essential hypertension  Ongoing: continue metoprolol 12.5mg BID, lisinopril 5mg QD, on apixaban 5mg BID     Hypothyroidism, unspecified type  Ongoing: continue synthroid 75mcg QD  TSH and free T 4 level stable    Orders written by provider at facility  No new orders    Total time spent with patient visit at the [facility] nursing facility was 35 min including patient visit and review of past records. Greater than 50% of total time spent with counseling and coordinating care due to discussion with Leroy Brewer as above, education on Dementia and ongoing progression, discussed labs and medications, .  Electronically signed by:  Tonya Lynn Haase, APRN CNP

## 2020-12-25 NOTE — PROGRESS NOTES
Pt was seen for a regulatory LTC visit    Course reviewed.    Pt has lost approx 6 pounds over the last 3 months secondary to decreased intake.  Vitals have been stable    CBC, BMP, TSH wnl    Pt does not acknowledge any specific physical concerns    Pt is alert, sitting up in chair, feeding self breakfast (eating about one-half of meal)  Alert, oriented to self, appears frail, comfortable.  Lungs clear  CV rrr  Abd soft, non-tender  No LE edema      Assessment    Dementia, advanced    Wt loss, no obvious reversible process, most likely secondary to dementia    Seizure disorder, stable on low dose Keppra    History of CAD, HTN, stable    History of PE, heterozygous factor V Leiden, on chronic eliquis    Plan  Continue current tx  Monitor intake, wt,  Ongoing review with Pt's family

## 2021-01-01 ENCOUNTER — TELEPHONE (OUTPATIENT)
Dept: GERIATRICS | Facility: CLINIC | Age: 86
End: 2021-01-01

## 2021-01-01 ENCOUNTER — NURSING HOME VISIT (OUTPATIENT)
Dept: GERIATRICS | Facility: CLINIC | Age: 86
End: 2021-01-01
Payer: MEDICARE

## 2021-01-01 ENCOUNTER — APPOINTMENT (OUTPATIENT)
Dept: GENERAL RADIOLOGY | Facility: CLINIC | Age: 86
End: 2021-01-01
Attending: NURSE PRACTITIONER
Payer: MEDICARE

## 2021-01-01 ENCOUNTER — TELEPHONE (OUTPATIENT)
Dept: GERIATRICS | Facility: CLINIC | Age: 86
End: 2021-01-01
Payer: MEDICARE

## 2021-01-01 ENCOUNTER — TELEPHONE (OUTPATIENT)
Dept: NEUROLOGY | Facility: CLINIC | Age: 86
End: 2021-01-01

## 2021-01-01 ENCOUNTER — TRANSFERRED RECORDS (OUTPATIENT)
Dept: HEALTH INFORMATION MANAGEMENT | Facility: CLINIC | Age: 86
End: 2021-01-01

## 2021-01-01 ENCOUNTER — APPOINTMENT (OUTPATIENT)
Dept: CT IMAGING | Facility: CLINIC | Age: 86
End: 2021-01-01
Attending: EMERGENCY MEDICINE
Payer: MEDICARE

## 2021-01-01 ENCOUNTER — VIRTUAL VISIT (OUTPATIENT)
Dept: NEUROLOGY | Facility: CLINIC | Age: 86
End: 2021-01-01
Payer: MEDICARE

## 2021-01-01 ENCOUNTER — HOSPITAL ENCOUNTER (OUTPATIENT)
Dept: NEUROLOGY | Facility: CLINIC | Age: 86
Setting detail: OBSERVATION
End: 2021-10-24
Attending: PHYSICIAN ASSISTANT
Payer: MEDICARE

## 2021-01-01 ENCOUNTER — APPOINTMENT (OUTPATIENT)
Dept: MRI IMAGING | Facility: CLINIC | Age: 86
End: 2021-01-01
Attending: PHYSICIAN ASSISTANT
Payer: MEDICARE

## 2021-01-01 ENCOUNTER — APPOINTMENT (OUTPATIENT)
Dept: CARDIOLOGY | Facility: CLINIC | Age: 86
End: 2021-01-01
Attending: PHYSICIAN ASSISTANT
Payer: MEDICARE

## 2021-01-01 ENCOUNTER — APPOINTMENT (OUTPATIENT)
Dept: SPEECH THERAPY | Facility: CLINIC | Age: 86
End: 2021-01-01
Attending: PHYSICIAN ASSISTANT
Payer: MEDICARE

## 2021-01-01 ENCOUNTER — APPOINTMENT (OUTPATIENT)
Dept: SPEECH THERAPY | Facility: CLINIC | Age: 86
End: 2021-01-01
Attending: PSYCHIATRY & NEUROLOGY
Payer: MEDICARE

## 2021-01-01 ENCOUNTER — HOSPITAL ENCOUNTER (OUTPATIENT)
Facility: CLINIC | Age: 86
Setting detail: OBSERVATION
Discharge: NURSING, REHAB OR LONG-TERM CARE | End: 2021-10-27
Attending: EMERGENCY MEDICINE | Admitting: HOSPITALIST
Payer: MEDICARE

## 2021-01-01 ENCOUNTER — DOCUMENTATION ONLY (OUTPATIENT)
Dept: GERIATRICS | Facility: CLINIC | Age: 86
End: 2021-01-01

## 2021-01-01 ENCOUNTER — HOSPITAL ENCOUNTER (OUTPATIENT)
Dept: NEUROLOGY | Facility: CLINIC | Age: 86
Setting detail: OBSERVATION
End: 2021-10-26
Attending: PSYCHIATRY & NEUROLOGY
Payer: MEDICARE

## 2021-01-01 ENCOUNTER — TRANSFERRED RECORDS (OUTPATIENT)
Dept: HEALTH INFORMATION MANAGEMENT | Facility: CLINIC | Age: 86
End: 2021-01-01
Payer: MEDICARE

## 2021-01-01 ENCOUNTER — DOCUMENTATION ONLY (OUTPATIENT)
Dept: OTHER | Facility: CLINIC | Age: 86
End: 2021-01-01

## 2021-01-01 ENCOUNTER — APPOINTMENT (OUTPATIENT)
Dept: PHYSICAL THERAPY | Facility: CLINIC | Age: 86
End: 2021-01-01
Attending: PHYSICIAN ASSISTANT
Payer: MEDICARE

## 2021-01-01 VITALS
HEART RATE: 68 BPM | HEIGHT: 64 IN | DIASTOLIC BLOOD PRESSURE: 67 MMHG | WEIGHT: 115 LBS | RESPIRATION RATE: 18 BRPM | SYSTOLIC BLOOD PRESSURE: 109 MMHG | BODY MASS INDEX: 19.63 KG/M2 | TEMPERATURE: 97.5 F | OXYGEN SATURATION: 95 %

## 2021-01-01 VITALS
HEART RATE: 75 BPM | RESPIRATION RATE: 18 BRPM | OXYGEN SATURATION: 97 % | SYSTOLIC BLOOD PRESSURE: 128 MMHG | HEIGHT: 64 IN | WEIGHT: 102.4 LBS | DIASTOLIC BLOOD PRESSURE: 74 MMHG | TEMPERATURE: 97.7 F | BODY MASS INDEX: 17.48 KG/M2

## 2021-01-01 VITALS
DIASTOLIC BLOOD PRESSURE: 80 MMHG | TEMPERATURE: 97.7 F | HEART RATE: 79 BPM | WEIGHT: 108 LBS | BODY MASS INDEX: 18.44 KG/M2 | OXYGEN SATURATION: 93 % | SYSTOLIC BLOOD PRESSURE: 132 MMHG | RESPIRATION RATE: 20 BRPM | HEIGHT: 64 IN

## 2021-01-01 VITALS
RESPIRATION RATE: 18 BRPM | OXYGEN SATURATION: 96 % | BODY MASS INDEX: 17.67 KG/M2 | DIASTOLIC BLOOD PRESSURE: 67 MMHG | TEMPERATURE: 97.4 F | SYSTOLIC BLOOD PRESSURE: 114 MMHG | HEIGHT: 64 IN | HEART RATE: 75 BPM | WEIGHT: 103.5 LBS

## 2021-01-01 VITALS
TEMPERATURE: 98 F | SYSTOLIC BLOOD PRESSURE: 108 MMHG | HEART RATE: 62 BPM | DIASTOLIC BLOOD PRESSURE: 55 MMHG | BODY MASS INDEX: 18.13 KG/M2 | RESPIRATION RATE: 18 BRPM | OXYGEN SATURATION: 96 % | WEIGHT: 105.6 LBS

## 2021-01-01 VITALS
TEMPERATURE: 98.5 F | RESPIRATION RATE: 18 BRPM | HEART RATE: 69 BPM | WEIGHT: 112.4 LBS | HEIGHT: 64 IN | DIASTOLIC BLOOD PRESSURE: 69 MMHG | BODY MASS INDEX: 19.19 KG/M2 | OXYGEN SATURATION: 97 % | SYSTOLIC BLOOD PRESSURE: 114 MMHG

## 2021-01-01 DIAGNOSIS — G40.909 SEIZURE DISORDER (H): ICD-10-CM

## 2021-01-01 DIAGNOSIS — N39.0 URINARY TRACT INFECTION WITHOUT HEMATURIA, SITE UNSPECIFIED: Primary | ICD-10-CM

## 2021-01-01 DIAGNOSIS — F03.90 DEMENTIA WITHOUT BEHAVIORAL DISTURBANCE, UNSPECIFIED DEMENTIA TYPE: ICD-10-CM

## 2021-01-01 DIAGNOSIS — G91.2 IDIOPATHIC NORMAL PRESSURE HYDROCEPHALUS (INPH) (H): ICD-10-CM

## 2021-01-01 DIAGNOSIS — I62.03 CHRONIC SUBDURAL HEMATOMA (H): ICD-10-CM

## 2021-01-01 DIAGNOSIS — Z86.711 HISTORY OF PULMONARY EMBOLISM: ICD-10-CM

## 2021-01-01 DIAGNOSIS — E03.8 OTHER SPECIFIED HYPOTHYROIDISM: Primary | ICD-10-CM

## 2021-01-01 DIAGNOSIS — N18.30 BENIGN HYPERTENSION WITH CHRONIC KIDNEY DISEASE, STAGE III (H): ICD-10-CM

## 2021-01-01 DIAGNOSIS — Z51.5 HOSPICE CARE PATIENT: ICD-10-CM

## 2021-01-01 DIAGNOSIS — I12.9 BENIGN HYPERTENSION WITH CHRONIC KIDNEY DISEASE, STAGE III (H): ICD-10-CM

## 2021-01-01 DIAGNOSIS — E53.8 FOLATE DEFICIENCY: ICD-10-CM

## 2021-01-01 DIAGNOSIS — E03.9 HYPOTHYROIDISM, UNSPECIFIED TYPE: ICD-10-CM

## 2021-01-01 DIAGNOSIS — G91.2 IDIOPATHIC NORMAL PRESSURE HYDROCEPHALUS (INPH) (H): Primary | ICD-10-CM

## 2021-01-01 DIAGNOSIS — D68.51 FACTOR V LEIDEN (H): ICD-10-CM

## 2021-01-01 DIAGNOSIS — I25.10 CORONARY ARTERY DISEASE INVOLVING NATIVE CORONARY ARTERY OF NATIVE HEART WITHOUT ANGINA PECTORIS: ICD-10-CM

## 2021-01-01 DIAGNOSIS — R05.9 COUGH: Primary | ICD-10-CM

## 2021-01-01 DIAGNOSIS — R13.12 OROPHARYNGEAL DYSPHAGIA: ICD-10-CM

## 2021-01-01 DIAGNOSIS — E87.6 HYPOKALEMIA: ICD-10-CM

## 2021-01-01 DIAGNOSIS — E43 SEVERE PROTEIN-CALORIE MALNUTRITION (H): ICD-10-CM

## 2021-01-01 DIAGNOSIS — N18.30 BENIGN HYPERTENSION WITH CHRONIC KIDNEY DISEASE, STAGE III (H): Primary | ICD-10-CM

## 2021-01-01 DIAGNOSIS — F03.90 DEMENTIA WITHOUT BEHAVIORAL DISTURBANCE, UNSPECIFIED DEMENTIA TYPE: Primary | ICD-10-CM

## 2021-01-01 DIAGNOSIS — R41.82 ALTERED MENTAL STATUS, UNSPECIFIED ALTERED MENTAL STATUS TYPE: ICD-10-CM

## 2021-01-01 DIAGNOSIS — I35.0 SEVERE AORTIC STENOSIS: ICD-10-CM

## 2021-01-01 DIAGNOSIS — G40.909 SEIZURE DISORDER (H): Primary | ICD-10-CM

## 2021-01-01 DIAGNOSIS — G93.40 ENCEPHALOPATHY: Primary | ICD-10-CM

## 2021-01-01 DIAGNOSIS — I12.9 BENIGN HYPERTENSION WITH CHRONIC KIDNEY DISEASE, STAGE III (H): Primary | ICD-10-CM

## 2021-01-01 LAB
ALBUMIN UR-MCNC: 10 MG/DL
ANION GAP SERPL CALC-SCNC: 8 MMOL/L (ref 7–16)
ANION GAP SERPL CALCULATED.3IONS-SCNC: 4 MMOL/L (ref 3–14)
ANION GAP SERPL CALCULATED.3IONS-SCNC: 5 MMOL/L (ref 3–14)
ANION GAP SERPL CALCULATED.3IONS-SCNC: 8 MMOL/L (ref 3–14)
APPEARANCE UR: CLEAR
APTT PPP: 32 SECONDS (ref 22–38)
ATRIAL RATE - MUSE: 76 BPM
BASOPHILS # BLD MANUAL: 0 10E3/UL (ref 0–0.2)
BASOPHILS NFR BLD MANUAL: 0 %
BILIRUB UR QL STRIP: NEGATIVE
BUN SERPL-MCNC: 11 MG/DL (ref 7–30)
BUN SERPL-MCNC: 13 MG/DL (ref 7–30)
BUN SERPL-MCNC: 16 MG/DL (ref 7–26)
BUN SERPL-MCNC: 9 MG/DL (ref 7–30)
CALCIUM (EXTERNAL): 9.2 MG/DL (ref 8.4–10.4)
CALCIUM SERPL-MCNC: 8.4 MG/DL (ref 8.5–10.1)
CALCIUM SERPL-MCNC: 8.5 MG/DL (ref 8.5–10.1)
CALCIUM SERPL-MCNC: 8.7 MG/DL (ref 8.5–10.1)
CHLORIDE (EXTERNAL): 108 MMOL/L (ref 98–109)
CHLORIDE BLD-SCNC: 109 MMOL/L (ref 94–109)
CHLORIDE BLD-SCNC: 110 MMOL/L (ref 94–109)
CHLORIDE BLD-SCNC: 112 MMOL/L (ref 94–109)
CHOLEST SERPL-MCNC: 123 MG/DL
CO2 (EXTERNAL): 25 MMOL/L (ref 20–29)
CO2 SERPL-SCNC: 21 MMOL/L (ref 20–32)
CO2 SERPL-SCNC: 25 MMOL/L (ref 20–32)
CO2 SERPL-SCNC: 28 MMOL/L (ref 20–32)
COLOR UR AUTO: ABNORMAL
CREAT SERPL-MCNC: 0.66 MG/DL (ref 0.52–1.04)
CREAT SERPL-MCNC: 0.7 MG/DL (ref 0.52–1.04)
CREAT SERPL-MCNC: 0.8 MG/DL (ref 0.52–1.04)
CREATININE (EXTERNAL): 0.62 MG/DL (ref 0.55–1.02)
DIASTOLIC BLOOD PRESSURE - MUSE: NORMAL MMHG
EOSINOPHIL # BLD MANUAL: 0.2 10E3/UL (ref 0–0.7)
EOSINOPHIL NFR BLD MANUAL: 2 %
ERYTHROCYTE [DISTWIDTH] IN BLOOD BY AUTOMATED COUNT: 12.3 % (ref 10–15)
ERYTHROCYTE [DISTWIDTH] IN BLOOD BY AUTOMATED COUNT: 12.4 % (ref 10–15)
FOLATE: 16 NG/ML
GFR ESTIMATED (EXTERNAL): >60 ML/MIN/1.73M2
GFR SERPL CREATININE-BSD FRML MDRD: 64 ML/MIN/1.73M2
GFR SERPL CREATININE-BSD FRML MDRD: 75 ML/MIN/1.73M2
GFR SERPL CREATININE-BSD FRML MDRD: 76 ML/MIN/1.73M2
GLUCOSE (EXTERNAL): 89 MG/DL (ref 70–100)
GLUCOSE BLD-MCNC: 147 MG/DL (ref 70–99)
GLUCOSE BLD-MCNC: 84 MG/DL (ref 70–99)
GLUCOSE BLD-MCNC: 86 MG/DL (ref 70–99)
GLUCOSE BLDC GLUCOMTR-MCNC: 104 MG/DL (ref 70–99)
GLUCOSE BLDC GLUCOMTR-MCNC: 119 MG/DL (ref 70–99)
GLUCOSE BLDC GLUCOMTR-MCNC: 80 MG/DL (ref 70–99)
GLUCOSE BLDC GLUCOMTR-MCNC: 94 MG/DL (ref 70–99)
GLUCOSE BLDC GLUCOMTR-MCNC: 95 MG/DL (ref 70–99)
GLUCOSE BLDC GLUCOMTR-MCNC: 97 MG/DL (ref 70–99)
GLUCOSE BLDC GLUCOMTR-MCNC: 99 MG/DL (ref 70–99)
GLUCOSE UR STRIP-MCNC: NEGATIVE MG/DL
HBA1C MFR BLD: 5.5 % (ref 0–5.6)
HCT VFR BLD AUTO: 35.7 % (ref 35–47)
HCT VFR BLD AUTO: 37.5 % (ref 35–47)
HDLC SERPL-MCNC: 34 MG/DL
HGB BLD-MCNC: 11.8 G/DL (ref 11.7–15.7)
HGB BLD-MCNC: 12.4 G/DL (ref 11.7–15.7)
HGB UR QL STRIP: NEGATIVE
HOLD SPECIMEN: NORMAL
INR PPP: 1.4 (ref 0.85–1.15)
INTERPRETATION ECG - MUSE: NORMAL
KETONES UR STRIP-MCNC: NEGATIVE MG/DL
LACTATE SERPL-SCNC: 2 MMOL/L (ref 0.7–2)
LDLC SERPL CALC-MCNC: 65 MG/DL
LEUKOCYTE ESTERASE UR QL STRIP: ABNORMAL
LEVETIRACETAM SERPL-MCNC: 8 UG/ML
LVEF ECHO: NORMAL
LYMPHOCYTES # BLD MANUAL: 1.9 10E3/UL (ref 0.8–5.3)
LYMPHOCYTES NFR BLD MANUAL: 21 %
MCH RBC QN AUTO: 31.4 PG (ref 26.5–33)
MCH RBC QN AUTO: 31.6 PG (ref 26.5–33)
MCHC RBC AUTO-ENTMCNC: 33.1 G/DL (ref 31.5–36.5)
MCHC RBC AUTO-ENTMCNC: 33.1 G/DL (ref 31.5–36.5)
MCV RBC AUTO: 95 FL (ref 78–100)
MCV RBC AUTO: 96 FL (ref 78–100)
MONOCYTES # BLD MANUAL: 0.4 10E3/UL (ref 0–1.3)
MONOCYTES NFR BLD MANUAL: 5 %
NEUTROPHILS # BLD MANUAL: 6.4 10E3/UL (ref 1.6–8.3)
NEUTROPHILS NFR BLD MANUAL: 72 %
NITRATE UR QL: NEGATIVE
NONHDLC SERPL-MCNC: 89 MG/DL
P AXIS - MUSE: 66 DEGREES
PH UR STRIP: 6.5 [PH] (ref 5–7)
PLAT MORPH BLD: NORMAL
PLATELET # BLD AUTO: 154 10E3/UL (ref 150–450)
PLATELET # BLD AUTO: 170 10E3/UL (ref 150–450)
POTASSIUM (EXTERNAL): 3.9 MMOL/L (ref 3.5–5.1)
POTASSIUM BLD-SCNC: 3.2 MMOL/L (ref 3.4–5.3)
POTASSIUM BLD-SCNC: 3.4 MMOL/L (ref 3.4–5.3)
POTASSIUM BLD-SCNC: 3.5 MMOL/L (ref 3.4–5.3)
POTASSIUM BLD-SCNC: 3.5 MMOL/L (ref 3.4–5.3)
POTASSIUM BLD-SCNC: 3.7 MMOL/L (ref 3.4–5.3)
POTASSIUM BLD-SCNC: 3.7 MMOL/L (ref 3.4–5.3)
POTASSIUM BLD-SCNC: 3.8 MMOL/L (ref 3.4–5.3)
POTASSIUM SERPL-SCNC: 3.8 MMOL/L
PR INTERVAL - MUSE: 178 MS
QRS DURATION - MUSE: 144 MS
QT - MUSE: 468 MS
QTC - MUSE: 526 MS
R AXIS - MUSE: -49 DEGREES
RBC # BLD AUTO: 3.76 10E6/UL (ref 3.8–5.2)
RBC # BLD AUTO: 3.92 10E6/UL (ref 3.8–5.2)
RBC MORPH BLD: NORMAL
RBC URINE: 2 /HPF
SARS-COV-2 RNA RESP QL NAA+PROBE: NEGATIVE
SODIUM (EXTERNAL): 141 MMOL/L (ref 136–145)
SODIUM SERPL-SCNC: 140 MMOL/L (ref 133–144)
SODIUM SERPL-SCNC: 141 MMOL/L (ref 133–144)
SODIUM SERPL-SCNC: 141 MMOL/L (ref 133–144)
SP GR UR STRIP: 1 (ref 1–1.03)
SYSTOLIC BLOOD PRESSURE - MUSE: NORMAL MMHG
T AXIS - MUSE: 88 DEGREES
TRIGL SERPL-MCNC: 118 MG/DL
TROPONIN I SERPL-MCNC: 0.44 UG/L (ref 0–0.04)
TROPONIN I SERPL-MCNC: 0.46 UG/L (ref 0–0.04)
TROPONIN I SERPL-MCNC: 0.48 UG/L (ref 0–0.04)
TROPONIN I SERPL-MCNC: 0.49 UG/L (ref 0–0.04)
TSH SERPL DL<=0.005 MIU/L-ACNC: 0.53 MU/L (ref 0.4–4)
UROBILINOGEN UR STRIP-MCNC: NORMAL MG/DL
VENTRICULAR RATE- MUSE: 76 BPM
WBC # BLD AUTO: 7.9 10E3/UL (ref 4–11)
WBC # BLD AUTO: 8.9 10E3/UL (ref 4–11)
WBC URINE: 10 /HPF

## 2021-01-01 PROCEDURE — 93306 TTE W/DOPPLER COMPLETE: CPT | Mod: 26 | Performed by: INTERNAL MEDICINE

## 2021-01-01 PROCEDURE — 82962 GLUCOSE BLOOD TEST: CPT

## 2021-01-01 PROCEDURE — 70250 X-RAY EXAM OF SKULL: CPT

## 2021-01-01 PROCEDURE — 250N000013 HC RX MED GY IP 250 OP 250 PS 637: Performed by: INTERNAL MEDICINE

## 2021-01-01 PROCEDURE — 36415 COLL VENOUS BLD VENIPUNCTURE: CPT | Performed by: PHYSICIAN ASSISTANT

## 2021-01-01 PROCEDURE — 255N000002 HC RX 255 OP 636: Performed by: INTERNAL MEDICINE

## 2021-01-01 PROCEDURE — 85027 COMPLETE CBC AUTOMATED: CPT | Performed by: PHYSICIAN ASSISTANT

## 2021-01-01 PROCEDURE — 99220 PR INITIAL OBSERVATION CARE,LEVEL III: CPT | Performed by: INTERNAL MEDICINE

## 2021-01-01 PROCEDURE — A9585 GADOBUTROL INJECTION: HCPCS | Performed by: INTERNAL MEDICINE

## 2021-01-01 PROCEDURE — 93005 ELECTROCARDIOGRAM TRACING: CPT

## 2021-01-01 PROCEDURE — 99203 OFFICE O/P NEW LOW 30 MIN: CPT | Performed by: NURSE PRACTITIONER

## 2021-01-01 PROCEDURE — 96360 HYDRATION IV INFUSION INIT: CPT | Mod: 59

## 2021-01-01 PROCEDURE — 999N000208 ECHOCARDIOGRAM COMPLETE

## 2021-01-01 PROCEDURE — 250N000011 HC RX IP 250 OP 636: Performed by: EMERGENCY MEDICINE

## 2021-01-01 PROCEDURE — 99285 EMERGENCY DEPT VISIT HI MDM: CPT | Mod: 25

## 2021-01-01 PROCEDURE — 36415 COLL VENOUS BLD VENIPUNCTURE: CPT | Performed by: EMERGENCY MEDICINE

## 2021-01-01 PROCEDURE — 36415 COLL VENOUS BLD VENIPUNCTURE: CPT | Performed by: INTERNAL MEDICINE

## 2021-01-01 PROCEDURE — 95816 EEG AWAKE AND DROWSY: CPT | Mod: 26 | Performed by: PSYCHIATRY & NEUROLOGY

## 2021-01-01 PROCEDURE — 84484 ASSAY OF TROPONIN QUANT: CPT | Performed by: STUDENT IN AN ORGANIZED HEALTH CARE EDUCATION/TRAINING PROGRAM

## 2021-01-01 PROCEDURE — 99205 OFFICE O/P NEW HI 60 MIN: CPT | Performed by: STUDENT IN AN ORGANIZED HEALTH CARE EDUCATION/TRAINING PROGRAM

## 2021-01-01 PROCEDURE — 92526 ORAL FUNCTION THERAPY: CPT | Mod: GN | Performed by: SPEECH-LANGUAGE PATHOLOGIST

## 2021-01-01 PROCEDURE — 84132 ASSAY OF SERUM POTASSIUM: CPT | Performed by: INTERNAL MEDICINE

## 2021-01-01 PROCEDURE — 99226 PR SUBSEQUENT OBSERVATION CARE,LEVEL III: CPT | Performed by: INTERNAL MEDICINE

## 2021-01-01 PROCEDURE — 83036 HEMOGLOBIN GLYCOSYLATED A1C: CPT | Performed by: PHYSICIAN ASSISTANT

## 2021-01-01 PROCEDURE — G0378 HOSPITAL OBSERVATION PER HR: HCPCS

## 2021-01-01 PROCEDURE — 250N000013 HC RX MED GY IP 250 OP 250 PS 637: Performed by: PSYCHIATRY & NEUROLOGY

## 2021-01-01 PROCEDURE — 99309 SBSQ NF CARE MODERATE MDM 30: CPT | Performed by: NURSE PRACTITIONER

## 2021-01-01 PROCEDURE — 250N000009 HC RX 250: Performed by: EMERGENCY MEDICINE

## 2021-01-01 PROCEDURE — 999N000111 HC STATISTIC OT IP EVAL DEFER

## 2021-01-01 PROCEDURE — 250N000013 HC RX MED GY IP 250 OP 250 PS 637: Performed by: PHYSICIAN ASSISTANT

## 2021-01-01 PROCEDURE — 84484 ASSAY OF TROPONIN QUANT: CPT | Mod: 91 | Performed by: PHYSICIAN ASSISTANT

## 2021-01-01 PROCEDURE — 80048 BASIC METABOLIC PNL TOTAL CA: CPT | Performed by: EMERGENCY MEDICINE

## 2021-01-01 PROCEDURE — 250N000011 HC RX IP 250 OP 636: Performed by: INTERNAL MEDICINE

## 2021-01-01 PROCEDURE — 82465 ASSAY BLD/SERUM CHOLESTEROL: CPT | Performed by: PHYSICIAN ASSISTANT

## 2021-01-01 PROCEDURE — 99309 SBSQ NF CARE MODERATE MDM 30: CPT | Mod: GV | Performed by: NURSE PRACTITIONER

## 2021-01-01 PROCEDURE — 99207 PR APP CREDIT; MD BILLING SHARED VISIT: CPT | Performed by: PHYSICIAN ASSISTANT

## 2021-01-01 PROCEDURE — 84443 ASSAY THYROID STIM HORMONE: CPT | Performed by: PHYSICIAN ASSISTANT

## 2021-01-01 PROCEDURE — 96361 HYDRATE IV INFUSION ADD-ON: CPT

## 2021-01-01 PROCEDURE — 70450 CT HEAD/BRAIN W/O DYE: CPT | Mod: MG,XS

## 2021-01-01 PROCEDURE — 99207 PR CDG-CODE INCORRECT PER BILLING BASED ON TIME: CPT | Performed by: NURSE PRACTITIONER

## 2021-01-01 PROCEDURE — 70496 CT ANGIOGRAPHY HEAD: CPT | Mod: MG

## 2021-01-01 PROCEDURE — 81001 URINALYSIS AUTO W/SCOPE: CPT | Performed by: EMERGENCY MEDICINE

## 2021-01-01 PROCEDURE — 80048 BASIC METABOLIC PNL TOTAL CA: CPT | Performed by: INTERNAL MEDICINE

## 2021-01-01 PROCEDURE — C9803 HOPD COVID-19 SPEC COLLECT: HCPCS

## 2021-01-01 PROCEDURE — 85610 PROTHROMBIN TIME: CPT | Performed by: EMERGENCY MEDICINE

## 2021-01-01 PROCEDURE — 99217 PR OBSERVATION CARE DISCHARGE: CPT | Performed by: INTERNAL MEDICINE

## 2021-01-01 PROCEDURE — 99207 PR NO BILLABLE SERVICE THIS VISIT: CPT | Performed by: STUDENT IN AN ORGANIZED HEALTH CARE EDUCATION/TRAINING PROGRAM

## 2021-01-01 PROCEDURE — 70553 MRI BRAIN STEM W/O & W/DYE: CPT | Mod: ME

## 2021-01-01 PROCEDURE — 95816 EEG AWAKE AND DROWSY: CPT

## 2021-01-01 PROCEDURE — 80177 DRUG SCRN QUAN LEVETIRACETAM: CPT | Performed by: EMERGENCY MEDICINE

## 2021-01-01 PROCEDURE — 83605 ASSAY OF LACTIC ACID: CPT | Performed by: EMERGENCY MEDICINE

## 2021-01-01 PROCEDURE — 87635 SARS-COV-2 COVID-19 AMP PRB: CPT | Performed by: EMERGENCY MEDICINE

## 2021-01-01 PROCEDURE — 85027 COMPLETE CBC AUTOMATED: CPT | Performed by: EMERGENCY MEDICINE

## 2021-01-01 PROCEDURE — 92610 EVALUATE SWALLOWING FUNCTION: CPT | Mod: GN

## 2021-01-01 PROCEDURE — 80048 BASIC METABOLIC PNL TOTAL CA: CPT | Performed by: PHYSICIAN ASSISTANT

## 2021-01-01 PROCEDURE — 99442 PR PHYSICIAN TELEPHONE EVALUATION 11-20 MIN: CPT | Mod: 95 | Performed by: PSYCHIATRY & NEUROLOGY

## 2021-01-01 PROCEDURE — 85730 THROMBOPLASTIN TIME PARTIAL: CPT | Performed by: EMERGENCY MEDICINE

## 2021-01-01 PROCEDURE — 99225 PR SUBSEQUENT OBSERVATION CARE,LEVEL II: CPT | Performed by: INTERNAL MEDICINE

## 2021-01-01 PROCEDURE — 84484 ASSAY OF TROPONIN QUANT: CPT | Performed by: EMERGENCY MEDICINE

## 2021-01-01 PROCEDURE — 97161 PT EVAL LOW COMPLEX 20 MIN: CPT | Mod: GP

## 2021-01-01 RX ORDER — LEVETIRACETAM 100 MG/ML
250 SOLUTION ORAL EVERY EVENING
DISCHARGE
Start: 2021-01-01

## 2021-01-01 RX ORDER — ATORVASTATIN CALCIUM 10 MG/1
10 TABLET, FILM COATED ORAL AT BEDTIME
Status: DISCONTINUED | OUTPATIENT
Start: 2021-01-01 | End: 2021-01-01 | Stop reason: HOSPADM

## 2021-01-01 RX ORDER — LISINOPRIL 2.5 MG/1
1 TABLET ORAL DAILY
COMMUNITY
Start: 2021-01-01

## 2021-01-01 RX ORDER — GADOBUTROL 604.72 MG/ML
4.5 INJECTION INTRAVENOUS ONCE
Status: COMPLETED | OUTPATIENT
Start: 2021-01-01 | End: 2021-01-01

## 2021-01-01 RX ORDER — ONDANSETRON 2 MG/ML
4 INJECTION INTRAMUSCULAR; INTRAVENOUS EVERY 6 HOURS PRN
Status: DISCONTINUED | OUTPATIENT
Start: 2021-01-01 | End: 2021-01-01 | Stop reason: HOSPADM

## 2021-01-01 RX ORDER — IOPAMIDOL 755 MG/ML
120 INJECTION, SOLUTION INTRAVASCULAR ONCE
Status: COMPLETED | OUTPATIENT
Start: 2021-01-01 | End: 2021-01-01

## 2021-01-01 RX ORDER — POTASSIUM CHLORIDE 1.5 G/1.58G
10 POWDER, FOR SOLUTION ORAL ONCE
Status: COMPLETED | OUTPATIENT
Start: 2021-01-01 | End: 2021-01-01

## 2021-01-01 RX ORDER — POTASSIUM CHLORIDE 7.45 MG/ML
10 INJECTION INTRAVENOUS
Status: COMPLETED | OUTPATIENT
Start: 2021-01-01 | End: 2021-01-01

## 2021-01-01 RX ORDER — LISINOPRIL 2.5 MG/1
2.5 TABLET ORAL DAILY
Status: DISCONTINUED | OUTPATIENT
Start: 2021-01-01 | End: 2021-01-01 | Stop reason: HOSPADM

## 2021-01-01 RX ORDER — ACETAMINOPHEN 325 MG/1
650 TABLET ORAL EVERY 4 HOURS PRN
Status: DISCONTINUED | OUTPATIENT
Start: 2021-01-01 | End: 2021-01-01 | Stop reason: HOSPADM

## 2021-01-01 RX ORDER — SODIUM CHLORIDE AND POTASSIUM CHLORIDE 150; 900 MG/100ML; MG/100ML
INJECTION, SOLUTION INTRAVENOUS CONTINUOUS
Status: ACTIVE | OUTPATIENT
Start: 2021-01-01 | End: 2021-01-01

## 2021-01-01 RX ORDER — ONDANSETRON 4 MG/1
4 TABLET, ORALLY DISINTEGRATING ORAL EVERY 6 HOURS PRN
Status: DISCONTINUED | OUTPATIENT
Start: 2021-01-01 | End: 2021-01-01 | Stop reason: HOSPADM

## 2021-01-01 RX ORDER — LEVETIRACETAM 100 MG/ML
125 SOLUTION ORAL EVERY MORNING
Status: DISCONTINUED | OUTPATIENT
Start: 2021-01-01 | End: 2021-01-01 | Stop reason: HOSPADM

## 2021-01-01 RX ORDER — HYDRALAZINE HYDROCHLORIDE 20 MG/ML
10-20 INJECTION INTRAMUSCULAR; INTRAVENOUS
Status: DISCONTINUED | OUTPATIENT
Start: 2021-01-01 | End: 2021-01-01 | Stop reason: HOSPADM

## 2021-01-01 RX ORDER — POTASSIUM CHLORIDE 1500 MG/1
1 TABLET, EXTENDED RELEASE ORAL DAILY
COMMUNITY
Start: 2021-01-01 | End: 2021-01-01

## 2021-01-01 RX ORDER — NITROFURANTOIN 25; 75 MG/1; MG/1
100 CAPSULE ORAL 2 TIMES DAILY
Qty: 10 CAPSULE | Refills: 0 | Status: SHIPPED | OUTPATIENT
Start: 2021-01-01 | End: 2021-01-01

## 2021-01-01 RX ORDER — SODIUM CHLORIDE 9 MG/ML
INJECTION, SOLUTION INTRAVENOUS CONTINUOUS PRN
Status: DISCONTINUED | OUTPATIENT
Start: 2021-01-01 | End: 2021-01-01 | Stop reason: HOSPADM

## 2021-01-01 RX ORDER — POTASSIUM CHLORIDE 1.5 G/1.58G
20 POWDER, FOR SOLUTION ORAL ONCE
Status: COMPLETED | OUTPATIENT
Start: 2021-01-01 | End: 2021-01-01

## 2021-01-01 RX ORDER — LABETALOL HYDROCHLORIDE 5 MG/ML
10-40 INJECTION, SOLUTION INTRAVENOUS EVERY 10 MIN PRN
Status: DISCONTINUED | OUTPATIENT
Start: 2021-01-01 | End: 2021-01-01 | Stop reason: HOSPADM

## 2021-01-01 RX ORDER — LEVOTHYROXINE SODIUM 137 UG/1
66 TABLET ORAL
DISCHARGE
Start: 2021-01-01 | End: 2021-01-01

## 2021-01-01 RX ORDER — LEVOTHYROXINE SODIUM 75 UG/1
75 TABLET ORAL DAILY
DISCHARGE
Start: 2021-01-01

## 2021-01-01 RX ORDER — AMOXICILLIN 250 MG
1-2 CAPSULE ORAL 2 TIMES DAILY
Status: DISCONTINUED | OUTPATIENT
Start: 2021-01-01 | End: 2021-01-01 | Stop reason: HOSPADM

## 2021-01-01 RX ORDER — LEVETIRACETAM 100 MG/ML
125 SOLUTION ORAL EVERY MORNING
Qty: 37.5 ML | Refills: 0 | DISCHARGE
Start: 2021-01-01 | End: 2021-11-27

## 2021-01-01 RX ORDER — LEVOTHYROXINE SODIUM 75 UG/1
75 TABLET ORAL
Status: DISCONTINUED | OUTPATIENT
Start: 2021-01-01 | End: 2021-01-01

## 2021-01-01 RX ORDER — AMOXICILLIN 250 MG
1 CAPSULE ORAL 2 TIMES DAILY
Status: DISCONTINUED | OUTPATIENT
Start: 2021-01-01 | End: 2021-01-01

## 2021-01-01 RX ORDER — LEVETIRACETAM 100 MG/ML
125 SOLUTION ORAL 2 TIMES DAILY
Status: DISCONTINUED | OUTPATIENT
Start: 2021-01-01 | End: 2021-01-01

## 2021-01-01 RX ORDER — ACETAMINOPHEN 650 MG/1
650 SUPPOSITORY RECTAL EVERY 4 HOURS PRN
Status: DISCONTINUED | OUTPATIENT
Start: 2021-01-01 | End: 2021-01-01 | Stop reason: HOSPADM

## 2021-01-01 RX ORDER — LEVETIRACETAM 100 MG/ML
250 SOLUTION ORAL EVERY EVENING
Status: DISCONTINUED | OUTPATIENT
Start: 2021-01-01 | End: 2021-01-01 | Stop reason: HOSPADM

## 2021-01-01 RX ADMIN — SENNOSIDES AND DOCUSATE SODIUM 2 TABLET: 8.6; 5 TABLET ORAL at 12:12

## 2021-01-01 RX ADMIN — METOPROLOL TARTRATE 12.5 MG: 25 TABLET, FILM COATED ORAL at 10:28

## 2021-01-01 RX ADMIN — POTASSIUM CHLORIDE AND SODIUM CHLORIDE: 900; 150 INJECTION, SOLUTION INTRAVENOUS at 08:01

## 2021-01-01 RX ADMIN — METOPROLOL TARTRATE 12.5 MG: 25 TABLET, FILM COATED ORAL at 12:12

## 2021-01-01 RX ADMIN — LEVETIRACETAM 125 MG: 100 SOLUTION ORAL at 21:40

## 2021-01-01 RX ADMIN — LISINOPRIL 2.5 MG: 2.5 TABLET ORAL at 12:10

## 2021-01-01 RX ADMIN — POTASSIUM CHLORIDE 10 MEQ: 1.5 POWDER, FOR SOLUTION ORAL at 12:54

## 2021-01-01 RX ADMIN — APIXABAN 2.5 MG: 2.5 TABLET, FILM COATED ORAL at 21:03

## 2021-01-01 RX ADMIN — LEVOTHYROXINE SODIUM 75 MCG: 75 TABLET ORAL at 10:43

## 2021-01-01 RX ADMIN — LEVOTHYROXINE SODIUM 68.5 MCG: 112 TABLET ORAL at 06:51

## 2021-01-01 RX ADMIN — APIXABAN 2.5 MG: 2.5 TABLET, FILM COATED ORAL at 09:03

## 2021-01-01 RX ADMIN — LEVETIRACETAM 125 MG: 100 SOLUTION ORAL at 09:03

## 2021-01-01 RX ADMIN — SENNOSIDES AND DOCUSATE SODIUM 1 TABLET: 8.6; 5 TABLET ORAL at 10:43

## 2021-01-01 RX ADMIN — POTASSIUM CHLORIDE 20 MEQ: 1.5 POWDER, FOR SOLUTION ORAL at 15:00

## 2021-01-01 RX ADMIN — ATORVASTATIN CALCIUM 10 MG: 10 TABLET, FILM COATED ORAL at 21:00

## 2021-01-01 RX ADMIN — LEVOTHYROXINE SODIUM 68.5 MCG: 112 TABLET ORAL at 12:10

## 2021-01-01 RX ADMIN — APIXABAN 2.5 MG: 2.5 TABLET, FILM COATED ORAL at 21:28

## 2021-01-01 RX ADMIN — HUMAN ALBUMIN MICROSPHERES AND PERFLUTREN 3 ML: 10; .22 INJECTION, SOLUTION INTRAVENOUS at 08:57

## 2021-01-01 RX ADMIN — GADOBUTROL 4.5 ML: 604.72 INJECTION INTRAVENOUS at 18:49

## 2021-01-01 RX ADMIN — METOPROLOL TARTRATE 12.5 MG: 25 TABLET, FILM COATED ORAL at 21:04

## 2021-01-01 RX ADMIN — APIXABAN 2.5 MG: 2.5 TABLET, FILM COATED ORAL at 10:43

## 2021-01-01 RX ADMIN — METOPROLOL TARTRATE 12.5 MG: 25 TABLET, FILM COATED ORAL at 09:03

## 2021-01-01 RX ADMIN — ATORVASTATIN CALCIUM 10 MG: 10 TABLET, FILM COATED ORAL at 21:03

## 2021-01-01 RX ADMIN — LEVETIRACETAM 250 MG: 100 SOLUTION ORAL at 20:05

## 2021-01-01 RX ADMIN — METOPROLOL TARTRATE 12.5 MG: 25 TABLET, FILM COATED ORAL at 20:06

## 2021-01-01 RX ADMIN — METOPROLOL TARTRATE 12.5 MG: 25 TABLET, FILM COATED ORAL at 21:29

## 2021-01-01 RX ADMIN — APIXABAN 2.5 MG: 2.5 TABLET, FILM COATED ORAL at 20:05

## 2021-01-01 RX ADMIN — SENNOSIDES AND DOCUSATE SODIUM 1 TABLET: 8.6; 5 TABLET ORAL at 21:29

## 2021-01-01 RX ADMIN — SENNOSIDES AND DOCUSATE SODIUM 1 TABLET: 8.6; 5 TABLET ORAL at 20:05

## 2021-01-01 RX ADMIN — SENNOSIDES AND DOCUSATE SODIUM 1 TABLET: 8.6; 5 TABLET ORAL at 10:27

## 2021-01-01 RX ADMIN — SENNOSIDES AND DOCUSATE SODIUM 1 TABLET: 8.6; 5 TABLET ORAL at 09:03

## 2021-01-01 RX ADMIN — POTASSIUM CHLORIDE 10 MEQ: 7.46 INJECTION, SOLUTION INTRAVENOUS at 08:10

## 2021-01-01 RX ADMIN — LEVETIRACETAM 125 MG: 100 SOLUTION ORAL at 10:53

## 2021-01-01 RX ADMIN — LISINOPRIL 2.5 MG: 2.5 TABLET ORAL at 09:03

## 2021-01-01 RX ADMIN — LEVETIRACETAM 125 MG: 100 SOLUTION ORAL at 10:30

## 2021-01-01 RX ADMIN — LEVETIRACETAM 125 MG: 100 SOLUTION ORAL at 12:12

## 2021-01-01 RX ADMIN — LEVETIRACETAM 250 MG: 100 SOLUTION ORAL at 21:10

## 2021-01-01 RX ADMIN — LISINOPRIL 2.5 MG: 2.5 TABLET ORAL at 10:43

## 2021-01-01 RX ADMIN — APIXABAN 2.5 MG: 2.5 TABLET, FILM COATED ORAL at 10:29

## 2021-01-01 RX ADMIN — POTASSIUM CHLORIDE 10 MEQ: 7.46 INJECTION, SOLUTION INTRAVENOUS at 06:36

## 2021-01-01 RX ADMIN — LISINOPRIL 2.5 MG: 2.5 TABLET ORAL at 10:29

## 2021-01-01 RX ADMIN — SENNOSIDES AND DOCUSATE SODIUM 1 TABLET: 8.6; 5 TABLET ORAL at 21:03

## 2021-01-01 RX ADMIN — ATORVASTATIN CALCIUM 10 MG: 10 TABLET, FILM COATED ORAL at 21:29

## 2021-01-01 RX ADMIN — APIXABAN 2.5 MG: 2.5 TABLET, FILM COATED ORAL at 12:35

## 2021-01-01 RX ADMIN — SODIUM CHLORIDE 100 ML: 900 INJECTION INTRAVENOUS at 13:57

## 2021-01-01 RX ADMIN — IOPAMIDOL 120 ML: 755 INJECTION, SOLUTION INTRAVENOUS at 13:57

## 2021-01-01 RX ADMIN — LEVOTHYROXINE SODIUM 75 MCG: 75 TABLET ORAL at 09:03

## 2021-01-01 ASSESSMENT — MIFFLIN-ST. JEOR
SCORE: 911.64
SCORE: 879.88
SCORE: 904.84
SCORE: 859.47
SCORE: 854.48

## 2021-01-01 ASSESSMENT — ENCOUNTER SYMPTOMS: SPEECH DIFFICULTY: 1

## 2021-01-12 NOTE — TELEPHONE ENCOUNTER
[place] [facility] TELEPHONE ENCOUNTER    Chief Complaint   Patient presents with     Results       Joelle Freeman is a 89 year old  (5/25/1928),Nurse called today to report: urinalysis results of the following: Many bacteria. Positive nitrites. Leukocytes large. UC pending at this time. No known antibiotic allergy listed.     ASSESSMENT/PLAN  Suspect UTI due to results above      Start macrobid 100mg BID for 5 days    Encourage oral fluids    Encourage proper divina-area hygiene cares    UC pending. Follow up with PCP    Electronically signed by:   SELIN Mo CNP

## 2021-02-08 PROBLEM — G40.909 SEIZURE DISORDER (H): Status: ACTIVE | Noted: 2021-01-01

## 2021-02-08 NOTE — LETTER
2/8/2021        RE: Joelle Freeman  [address] [facility] [address]        [place] [facility]  Chief Complaint   Patient presents with     long term Regulatory     [facility] Record Number:  [id number]  Place of Service where encounter took place:  [facility] ([facility] [[id number]]    HPI:    Joelle Freeman  is 89 year old (5/25/1928), who is being seen today for a federally mandated E/M visit.  HPI information obtained from: facility chart records, facility staff, patient report and [facility] chart review. Today's concerns are:  Patient seen today for regulatory visit. Patient in good spirits, joking with staff. Sitting in w/c, independent with drinking & eating. Patient denies pain, SOB, CP, headache or dizziness. -142 for the last month with HR 60-80. Weight 112 lbs, was 110 lbs on admit 10/2020. Wheelchair for distance. No recent falls.     ALLERGIES:Tramadol  PAST MEDICAL HISTORY:   has a past medical history of Acute, but ill-defined, cerebrovascular disease (6/7/2003), Anemia of chronic disease (4/6/2014), Asthma, CAD (coronary artery disease), Cerebral ventriculomegaly, CVA (cerebral infarction) (2002), Dementia (H), Dementia (H), Depression, Diverticulosis, Dyslipidemia, Heterozygous factor V Leiden mutation (H), HTN (hypertension), Hyperlipidemia, Hypothyroidism, Normal pressure hydrocephalus (H), Posterior reversible encephalopathy syndrome, Subarachnoid hemorrhage (H) (3/23/2013), Subdural hematoma (H), Syncope (7/19/2020), TIA (transient ischaemic attack), TIA (transient ischaemic attack), Unspecified cerebral artery occlusion with cerebral infarction, Urinary incontinence, and UTI (lower urinary tract infection).  PAST SURGICAL HISTORY:   has a past surgical history that includes Cholecystectomy; hernia repair; Hysterectomy; Stent (2001); lumbar puncture; Cardiac surgery; Abdomen surgery; appendectomy; biopsy; ENT surgery; wisdom teeth extraction[; GYN surgery;  GYN surgery; Optical tracking system implant shunt ventriculoperitoneal (7/16/2013); and Open reduction internal fixation hip nailing (11/18/2013).  FAMILY HISTORY: family history includes Blood Disease in her son; Cerebrovascular Disease in her father and mother.  SOCIAL HISTORY:  reports that she has never smoked. She has never used smokeless tobacco. She reports that she does not drink alcohol or use drugs.    MEDICATIONS:  Current Outpatient Medications   Medication Sig Dispense Refill     acetaminophen (TYLENOL) 325 MG tablet Take 650 mg by mouth every 6 hours as needed for pain        apixaban ANTICOAGULANT (ELIQUIS) 5 MG tablet Take 1 tablet (5 mg) by mouth 2 times daily Start on 8/24/20 60 tablet 0     atorvastatin (LIPITOR) 10 MG tablet Take 1 tablet (10 mg) by mouth daily 30 tablet 1     cholecalciferol 1000 UNITS TABS Take 1,000 Units by mouth daily 30 tablet 1     folic acid (FOLVITE) 1 MG tablet Take 1 tablet (1 mg) by mouth daily 30 tablet 0     levETIRAcetam (KEPPRA) 100 MG/ML solution Take 1.25 mLs (125 mg) by mouth 2 times daily       levothyroxine (SYNTHROID/LEVOTHROID) 75 MCG tablet Take 1 tablet (75 mcg) by mouth daily 30 tablet 0     lisinopril (PRINIVIL/ZESTRIL) 10 MG tablet Take 2.5 mg by mouth daily        melatonin 1 MG TABS tablet Take 1 mg by mouth nightly as needed for sleep        metoprolol tartrate (LOPRESSOR) 12.5 mg TABS half-tab Take 12.5 mg by mouth 2 times daily       nystatin (MYCOSTATIN) 923647 UNIT/GM external powder Apply topically 2 times daily as needed (intertriginal dermatitis)       potassium chloride ER (KLOR-CON M) 20 MEQ CR tablet Take 20 mEq by mouth once with dinner.       senna-docusate (SENOKOT-S/PERICOLACE) 8.6-50 MG tablet Take 1 tablet by mouth 2 times daily 60 tablet 0         Case Management:  I have reviewed the care plan and MDS and do agree with the plan. Patient's desire to return to the community is not assessible due to cognitive impairment. Information  "reviewed:  Medications, vital signs, orders, and nursing notes.    ROS:  Limited secondary to cognitive impairment     Vitals:  /69   Pulse 69   Temp 98.5  F (36.9  C)   Resp 18   Ht 1.626 m (5' 4\")   Wt 51 kg (112 lb 6.4 oz)   SpO2 97%   BMI 19.29 kg/m    Body mass index is 19.29 kg/m .  Exam:  GENERAL APPEARANCE:  Alert, in no distress, thin, cooperative  ENT:  [place], oral mucous membranes moist  EYES:  Conjunctiva and lids normal  RESP:  lungs clear to auscultation , no respiratory distress  CV:  RRR, systolic murmur  ABDOMEN:  bowel sounds normal, soft, non-tender  M/S:   decreased muscle tone, no edema  PSYCH:  insight and judgement impaired, memory impaired , affect and mood normal    Lab/Diagnostic data:     Most Recent 3 CBC's:  Recent Labs   Lab Test 12/21/20 08/19/20  0905 08/18/20  1458   WBC 10.0 7.9 7.1   HGB 14.8 14.2 13.2   .9* 96 96    183 190     Most Recent 3 BMP's:  Recent Labs   Lab Test 12/21/20 08/20/20  0925 08/19/20  0905 08/18/20  1458     --  141 140   POTASSIUM 3.9  --  4.0 4.0   CHLORIDE 110*  --  112* 111*   CO2 21  --  24 27   BUN 21  --  7 5*   CR 0.93 0.69 0.57 0.63   ANIONGAP 12  --  5 2*   JAZLYN 9.5  --  8.7 9.3   *  --  116* 104*     Most Recent TSH and T4:  Recent Labs   Lab Test 12/21/20 07/19/20 2000 07/19/20 2000   TSH 2.99   < > 0.22*   T4  --   --  1.53*    < > = values in this interval not displayed.       ASSESSMENT/PLAN  (I12.9,  N18.30) Benign hypertension with chronic kidney disease, stage III  (primary encounter diagnosis)  Comment: chronic, some soft BPs- recognize history of SBPs in 200  Plan:   --decrease lisinopril to 2.5 mg PO daily   --vitals per facility policy: at least weekly   --follow BMP    (E87.6) Hypokalemia  Comment: first noted during hospitalization 08/2020, dietary?  Plan:   --trial off KCl  --recheck levels in 1 week     (E53.8) Folate deficiency  Comment: normal Hgb, no recent folate level  Plan:   --recheck " folate in 1 week, consider discontinuing supp if normal levels     (G40.909) Seizure disorder (H)  Comment: family in agreement to continue low-dose Keppra  Plan:  --continue Keppra 125 mg PO BID  --monitor for seizure-like activity   --follow up with Neurology as recommended     (I62.03) Chronic subdural hematoma (H)  (G91.2) Idiopathic normal pressure hydrocephalus (INPH): Shunt 7/2013  (F03.90) Dementia without behavioral disturbance, unspecified dementia type (H)  Comment: followed by Neurology & Neurosurg, no behavioral concerns, more alert on lower Keppra dose   Plan:   --continue Keppra as noted above   --assist with ADLs, transfers, meals, meds  --follow with Neurology   --monitor for change in mood/behavior    transcribed by : Ingrid Angel MA    1. Decrease lisinopril to 2.5 mg po daily.   2. Discontinue potassium chloride  3. Recheck K+ & folate levels in 1 week      Electronically signed by:  Susu Pereira NP              Sincerely,        Susu Pereira NP

## 2021-02-08 NOTE — PROGRESS NOTES
[facility]  Chief Complaint   Patient presents with     penitentiary Regulatory     [facility] Medical Record Number:  [id number]  Place of Service where encounter took place:  [facility] ([facility]) [[id number]]    HPI:    Joelle Freeman  is 89 year old (5/25/1928), who is being seen today for a federally mandated E/M visit.  HPI information obtained from: facility chart records, facility staff, patient report and [facility] Epic chart review. Today's concerns are:  Patient seen today for regulatory visit. Patient in good spirits, joking with staff. Sitting in w/c, independent with drinking & eating. Patient denies pain, SOB, CP, headache or dizziness. -142 for the last month with HR 60-80. Weight 112 lbs, was 110 lbs on admit 10/2020. Wheelchair for distance. No recent falls.     ALLERGIES:Tramadol  PAST MEDICAL HISTORY:   has a past medical history of Acute, but ill-defined, cerebrovascular disease (6/7/2003), Anemia of chronic disease (4/6/2014), Asthma, CAD (coronary artery disease), Cerebral ventriculomegaly, CVA (cerebral infarction) (2002), Dementia (H), Dementia (H), Depression, Diverticulosis, Dyslipidemia, Heterozygous factor V Leiden mutation (H), HTN (hypertension), Hyperlipidemia, Hypothyroidism, Normal pressure hydrocephalus (H), Posterior reversible encephalopathy syndrome, Subarachnoid hemorrhage (H) (3/23/2013), Subdural hematoma (H), Syncope (7/19/2020), TIA (transient ischaemic attack), TIA (transient ischaemic attack), Unspecified cerebral artery occlusion with cerebral infarction, Urinary incontinence, and UTI (lower urinary tract infection).  PAST SURGICAL HISTORY:   has a past surgical history that includes Cholecystectomy; hernia repair; Hysterectomy; Stent (2001); lumbar puncture; Cardiac surgery; Abdomen surgery; appendectomy; biopsy; ENT surgery; wisdom teeth extraction[; GYN surgery; GYN surgery; Optical tracking system implant shunt ventriculoperitoneal  (7/16/2013); and Open reduction internal fixation hip nailing (11/18/2013).  FAMILY HISTORY: family history includes Blood Disease in her son; Cerebrovascular Disease in her father and mother.  SOCIAL HISTORY:  reports that she has never smoked. She has never used smokeless tobacco. She reports that she does not drink alcohol or use drugs.    MEDICATIONS:  Current Outpatient Medications   Medication Sig Dispense Refill     acetaminophen (TYLENOL) 325 MG tablet Take 650 mg by mouth every 6 hours as needed for pain        apixaban ANTICOAGULANT (ELIQUIS) 5 MG tablet Take 1 tablet (5 mg) by mouth 2 times daily Start on 8/24/20 60 tablet 0     atorvastatin (LIPITOR) 10 MG tablet Take 1 tablet (10 mg) by mouth daily 30 tablet 1     cholecalciferol 1000 UNITS TABS Take 1,000 Units by mouth daily 30 tablet 1     folic acid (FOLVITE) 1 MG tablet Take 1 tablet (1 mg) by mouth daily 30 tablet 0     levETIRAcetam (KEPPRA) 100 MG/ML solution Take 1.25 mLs (125 mg) by mouth 2 times daily       levothyroxine (SYNTHROID/LEVOTHROID) 75 MCG tablet Take 1 tablet (75 mcg) by mouth daily 30 tablet 0     lisinopril (PRINIVIL/ZESTRIL) 10 MG tablet Take 2.5 mg by mouth daily        melatonin 1 MG TABS tablet Take 1 mg by mouth nightly as needed for sleep        metoprolol tartrate (LOPRESSOR) 12.5 mg TABS half-tab Take 12.5 mg by mouth 2 times daily       nystatin (MYCOSTATIN) 048719 UNIT/GM external powder Apply topically 2 times daily as needed (intertriginal dermatitis)       potassium chloride ER (KLOR-CON M) 20 MEQ CR tablet Take 20 mEq by mouth once with dinner.       senna-docusate (SENOKOT-S/PERICOLACE) 8.6-50 MG tablet Take 1 tablet by mouth 2 times daily 60 tablet 0         Case Management:  I have reviewed the care plan and MDS and do agree with the plan. Patient's desire to return to the community is not assessible due to cognitive impairment. Information reviewed:  Medications, vital signs, orders, and nursing  "notes.    ROS:  Limited secondary to cognitive impairment     Vitals:  /69   Pulse 69   Temp 98.5  F (36.9  C)   Resp 18   Ht 1.626 m (5' 4\")   Wt 51 kg (112 lb 6.4 oz)   SpO2 97%   BMI 19.29 kg/m    Body mass index is 19.29 kg/m .  Exam:  GENERAL APPEARANCE:  Alert, in no distress, thin, cooperative  ENT:  [place], oral mucous membranes moist  EYES:  Conjunctiva and lids normal  RESP:  lungs clear to auscultation , no respiratory distress  CV:  RRR, systolic murmur  ABDOMEN:  bowel sounds normal, soft, non-tender  M/S:   decreased muscle tone, no edema  PSYCH:  insight and judgement impaired, memory impaired , affect and mood normal    Lab/Diagnostic data:     Most Recent 3 CBC's:  Recent Labs   Lab Test 12/21/20 08/19/20  0905 08/18/20  1458   WBC 10.0 7.9 7.1   HGB 14.8 14.2 13.2   .9* 96 96    183 190     Most Recent 3 BMP's:  Recent Labs   Lab Test 12/21/20 08/20/20  0925 08/19/20 0905 08/18/20  1458     --  141 140   POTASSIUM 3.9  --  4.0 4.0   CHLORIDE 110*  --  112* 111*   CO2 21  --  24 27   BUN 21  --  7 5*   CR 0.93 0.69 0.57 0.63   ANIONGAP 12  --  5 2*   JAZLYN 9.5  --  8.7 9.3   *  --  116* 104*     Most Recent TSH and T4:  Recent Labs   Lab Test 12/21/20 07/19/20 2000 07/19/20 2000   TSH 2.99   < > 0.22*   T4  --   --  1.53*    < > = values in this interval not displayed.       ASSESSMENT/PLAN  (I12.9,  N18.30) Benign hypertension with chronic kidney disease, stage III  (primary encounter diagnosis)  Comment: chronic, some soft BPs- recognize history of SBPs in 200  Plan:   --decrease lisinopril to 2.5 mg PO daily   --vitals per facility policy: at least weekly   --follow BMP    (E87.6) Hypokalemia  Comment: first noted during hospitalization 08/2020, dietary?  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 Decrease lisinopril to 2.5 mg po daily.   2. Discontinue potassium chloride  3. Recheck K+ & folate levels in 1 week      Electronically signed by:  Susu Pereira NP

## 2021-03-01 NOTE — TELEPHONE ENCOUNTER
Spoke w pt's son. Will be calling back later today to schedule pt for med check-up with Dr. Santacruz

## 2021-03-29 NOTE — LETTER
3/29/2021       RE: Joelle Freeman  [address] [address]     Dear Colleague,    Thank you for referring your patient, Joelle Freeman, to the [facility] [place] at [facility]. Please see a copy of my visit note below.    Joelle is a 89 year old who is being evaluated via a billable telephone visit.      What phone number would you like to be contacted at? [phone] - senior living   How would you like to obtain your AVS? Mail  Phone call duration: 17 minutes      Service Date: 2021      Edenilson Ramon MD   [place] [address] [place]  [address]      RE: Joelle Freeman   MRN: [id number]   : 1928      Dear Dr. Ramon:      I did a telephone followup visit on Joelle Freeman.  She is a 89-year-old female currently residing at the [facility].        History is obtained from Greta, her nurse supervisor.      Ms. Freeman has a history of dementia.  She was hospitalized at [facility] in 2020 with acute encephalopathy felt to be multifactorial.  This included metabolic factors and a urinary tract infection.  She did, however, have an EEG done that revealed an active epileptiform focus in the right anterior and mid temporal region, although no clinical seizures were identified.  She also had an episode of acute lethargy and a stat EEG showed worsening encephalopathy and it was felt that she may have had a nonconvulsive seizure, which led to her being started on Keppra 750 mg twice a day.      She also had a CT scan that revealed bilateral subdural hygromas that appeared stable on followup imaging.      I did meet with the patient and her son, Osman, on 2020.  Osman had felt she had become extremely lethargic on Keppra and he wanted her to come off the drug completely.  By the time I had seen her, she was down to 125 mg of Keppra twice a day and seemed to  be tolerating it much better.  I was reluctant to discontinue the Keppra completely because of the EEG findings in 2020.      According to Greta, the nurse supervisor, they have not witnessed any seizures.  There has been nothing that might suggest acute mental status change, although her level of alertness does seem to vary day to day.  She continues on Keppra 125 mg twice a day.      OTHER MEDICATIONS:   1.  Tylenol.   2.  Apixaban.   3.  Atorvastatin.   4.  Vitamin D.   5.  Folic acid.   6.  Levothyroxine.   7.  Lisinopril.   8.  Melatonin.   9.  Metoprolol.   10.  Nystatin.   11.  Senna.      PHYSICAL EXAMINATION:  Examination was very limited.  She is extremely hard of hearing, which complicates matters further.  She was able to tell me she feels wonderful.  She was able to tell me she is at the [facility].      IMPRESSION:   1.  Dementia.   2.  Fluctuating encephalopathy in 2020 concerning for nonconvulsive seizures in view of abnormal EEG findings.      PLAN:  I am reluctant to discontinue the Keppra completely.  Prior to doing so, if this was desired, I would first want to do an EEG.  I did explain this to Greta, the nurse supervisor.      For now, she is going to continue on the Keppra at its current dose of 125 mg twice a day.      Neurologic followup will be as needed.      Sincerely      Michael Santacruz MD      Total visit time was 20 minutes.  This included 17 minutes of telephone time and 3 minutes of documentation.         MICHAEL SANTACRUZ MD             D: 2021   T: 2021   MT: maryjane      Name:     RENA YING   MRN:      [id number]        Account:      [id number]   :      1928           Service Date: 2021      Document: [id number]

## 2021-03-29 NOTE — PROGRESS NOTES
Joelle is a 89 year old who is being evaluated via a billable telephone visit.      What phone number would you like to be contacted at? [phone] - [facility]   How would you like to obtain your AVS? Mail  Phone call duration: 17 minutes

## 2021-03-29 NOTE — PROGRESS NOTES
Service Date: 2021      Edenilson Ramon MD   [place] [address] [place]  [address]      RE: Joelle Freeman   MRN: [id number]   : 1928      Dear Dr. Ramon:      I did a telephone followup visit on Joelle Freeman.  She is a 89-year-old female currently residing at the [facility].        History is obtained from Greta, her nurse supervisor.      Ms. Freeman has a history of dementia.  She was hospitalized at [facility] in 2020 with acute encephalopathy felt to be multifactorial.  This included metabolic factors and a urinary tract infection.  She did, however, have an EEG done that revealed an active epileptiform focus in the right anterior and mid temporal region, although no clinical seizures were identified.  She also had an episode of acute lethargy and a stat EEG showed worsening encephalopathy and it was felt that she may have had a nonconvulsive seizure, which led to her being started on Keppra 750 mg twice a day.      She also had a CT scan that revealed bilateral subdural hygromas that appeared stable on followup imaging.      I did meet with the patient and her son, Osman, on 2020.  Osman had felt she had become extremely lethargic on Keppra and he wanted her to come off the drug completely.  By the time I had seen her, she was down to 125 mg of Keppra twice a day and seemed to be tolerating it much better.  I was reluctant to discontinue the Keppra completely because of the EEG findings in 2020.      According to Greta, the nurse supervisor, they have not witnessed any seizures.  There has been nothing that might suggest acute mental status change, although her level of alertness does seem to vary day to day.  She continues on Keppra 125 mg twice a day.      OTHER MEDICATIONS:   1.  Tylenol.   2.  Apixaban.   3.  Atorvastatin.   4.  Vitamin D.   5.  Folic acid.   6.  Levothyroxine.   7.  Lisinopril.   8.  Melatonin.    9.  Metoprolol.   10.  Nystatin.   11.  Senna.      PHYSICAL EXAMINATION:  Examination was very limited.  She is extremely hard of hearing, which complicates matters further.  She was able to tell me she feels wonderful.  She was able to tell me she is at the [facility].      IMPRESSION:   1.  Dementia.   2.  Fluctuating encephalopathy in 2020 concerning for nonconvulsive seizures in view of abnormal EEG findings.      PLAN:  I am reluctant to discontinue the Keppra completely.  Prior to doing so, if this was desired, I would first want to do an EEG.  I did explain this to Greta, the nurse supervisor.      For now, she is going to continue on the Keppra at its current dose of 125 mg twice a day.      Neurologic followup will be as needed.      Sincerely      Michael Santacruz MD      Total visit time was 20 minutes.  This included 17 minutes of telephone time and 3 minutes of documentation.         MICHAEL SANTACRUZ MD             D: 2021   T: 2021   MT: maryjane      Name:     RENA YING   MRN:      [id number]        Account:      [id number]   :      1928           Service Date: 2021      Document: [id number]

## 2021-03-29 NOTE — LETTER
3/29/2021       RE: Joelle Freeman  [address] [address]     Dear Colleague,    Thank you for referring your patient, Joelle Freeman, to the [facility] [place] at [facility]. Please see a copy of my visit note below.    Joelle is a 89 year old who is being evaluated via a billable telephone visit.      What phone number would you like to be contacted at? [phone] - longterm   How would you like to obtain your AVS? Mail  Phone call duration: 17 minutes      Service Date: 2021      Edenilson Ramon MD   [place] [address] [place]  [address]      RE: Joelle Freeman   MRN: [id number]   : 1928      Dear Dr. Ramon:      I did a telephone followup visit on Joelle Freeman.  She is a 89-year-old female currently residing at the [facility].        History is obtained from Greta, her nurse supervisor.      Ms. Freeman has a history of dementia.  She was hospitalized at [facility] in 2020 with acute encephalopathy felt to be multifactorial.  This included metabolic factors and a urinary tract infection.  She did, however, have an EEG done that revealed an active epileptiform focus in the right anterior and mid temporal region, although no clinical seizures were identified.  She also had an episode of acute lethargy and a stat EEG showed worsening encephalopathy and it was felt that she may have had a nonconvulsive seizure, which led to her being started on Keppra 750 mg twice a day.      She also had a CT scan that revealed bilateral subdural hygromas that appeared stable on followup imaging.      I did meet with the patient and her son, Osman, on 2020.  Osman had felt she had become extremely lethargic on Keppra and he wanted her to come off the drug completely.  By the time I had seen her, she was down to 125 mg of Keppra twice a day and seemed to  be tolerating it much better.  I was reluctant to discontinue the Keppra completely because of the EEG findings in 2020.      According to Greta, the nurse supervisor, they have not witnessed any seizures.  There has been nothing that might suggest acute mental status change, although her level of alertness does seem to vary day to day.  She continues on Keppra 125 mg twice a day.      OTHER MEDICATIONS:   1.  Tylenol.   2.  Apixaban.   3.  Atorvastatin.   4.  Vitamin D.   5.  Folic acid.   6.  Levothyroxine.   7.  Lisinopril.   8.  Melatonin.   9.  Metoprolol.   10.  Nystatin.   11.  Senna.      PHYSICAL EXAMINATION:  Examination was very limited.  She is extremely hard of hearing, which complicates matters further.  She was able to tell me she feels wonderful.  She was able to tell me she is at the [facility].      IMPRESSION:   1.  Dementia.   2.  Fluctuating encephalopathy in 2020 concerning for nonconvulsive seizures in view of abnormal EEG findings.      PLAN:  I am reluctant to discontinue the Keppra completely.  Prior to doing so, if this was desired, I would first want to do an EEG.  I did explain this to Greta, the nurse supervisor.      For now, she is going to continue on the Keppra at its current dose of 125 mg twice a day.      Neurologic followup will be as needed.      Sincerely      Michael Santacruz MD      Total visit time was 20 minutes.  This included 17 minutes of telephone time and 3 minutes of documentation.         MICHAEL SANTACRUZ MD             D: 2021   T: 2021   MT: maryjane      Name:     RENA YING   MRN:      [id number]        Account:      [id number]   :      1928           Service Date: 2021      Document: [id number]

## 2021-03-31 NOTE — TELEPHONE ENCOUNTER
[facility] NON-EMERGENT VOICEMAIL ENCOUNTER    Joelle Freeman is a 89 year old  (5/25/1928), Voicemail Message received today regarding:   Order for ST eval and treat.      Disposition    Nurse manager, Greta, called requesting ST orders for eval due to patient not liking current diet orders.  Patient has improved since admission and is currently on mechanical soft.      Requests    Ok for ST eval and treat from [place] standing orders.       Electronically signed by:   Edna Miller RN

## 2021-04-02 NOTE — TELEPHONE ENCOUNTER
[facility] NON-EMERGENT VOICEMAIL ENCOUNTER    Joelle Freeman is a 89 year old  (5/25/1928), Voicemail Message received today regarding:   Requesting orders for PT eval and treat.      Disposition    Nurse called in regards to requesting PT eval and treat.  Patient has been expressing wanting to work with therapy for strengthening.      Requests    Per SO okay for PT eval and treat given to nurse, Greta, at [facility].        Electronically signed by:   Edna Miller RN

## 2021-06-21 PROBLEM — N18.30 BENIGN HYPERTENSION WITH CHRONIC KIDNEY DISEASE, STAGE III (H): Status: ACTIVE | Noted: 2021-01-01

## 2021-06-21 PROBLEM — I12.9 BENIGN HYPERTENSION WITH CHRONIC KIDNEY DISEASE, STAGE III (H): Status: ACTIVE | Noted: 2021-01-01

## 2021-06-21 NOTE — LETTER
6/21/2021        RE: Joelle Freeman  [address] [facility] [address]        [place] [facility]  Chief Complaint   Patient presents with     [facility]     [place] Medical Record Number:  [id number]  Place of Service where encounter took place:  [facility] () [[id number]]    HPI:    Joelle Freeman  is 89 year old (5/25/1928), who is being seen today for a federally mandated E/M visit.  HPI information obtained from: facility chart records, facility staff, patient report and [facility] chart review. Today's concerns are:  Patient seen today for regulatory visit. Patient denies cp/sob/n/v/d/c, no new issues reported by facility staff.   Please see assessment and plan below for medical concerns addressed today's visit.  SBP  for the last month with HR 68-82. Weight 115 lbs, up 5 lbs from admission 10 months ago.     ALLERGIES:Tramadol  PAST MEDICAL HISTORY:   has a past medical history of Acute, but ill-defined, cerebrovascular disease (6/7/2003), Anemia of chronic disease (4/6/2014), Asthma, CAD (coronary artery disease), Cerebral ventriculomegaly, CVA (cerebral infarction) (2002), Dementia (H), Dementia (H), Depression, Diverticulosis, Dyslipidemia, Heterozygous factor V Leiden mutation (H), HTN (hypertension), Hyperlipidemia, Hypothyroidism, Normal pressure hydrocephalus (H), Posterior reversible encephalopathy syndrome, Subarachnoid hemorrhage (H) (3/23/2013), Subdural hematoma (H), Syncope (7/19/2020), TIA (transient ischaemic attack), TIA (transient ischaemic attack), Unspecified cerebral artery occlusion with cerebral infarction, Urinary incontinence, and UTI (lower urinary tract infection).  PAST SURGICAL HISTORY:   has a past surgical history that includes Cholecystectomy; hernia repair; Hysterectomy; Stent (2001); lumbar puncture; Cardiac surgery; Abdomen surgery; appendectomy; biopsy; ENT surgery; wisdom teeth extraction[; GYN surgery; GYN surgery; Optical  tracking system implant shunt ventriculoperitoneal (7/16/2013); and Open reduction internal fixation hip nailing (11/18/2013).  FAMILY HISTORY: family history includes Blood Disease in her son; Cerebrovascular Disease in her father and mother.  SOCIAL HISTORY:  reports that she has never smoked. She has never used smokeless tobacco. She reports that she does not drink alcohol or use drugs.    MEDICATIONS:  Current Outpatient Medications   Medication Sig Dispense Refill     acetaminophen (TYLENOL) 325 MG tablet Take 650 mg by mouth every 6 hours as needed for pain        apixaban ANTICOAGULANT (ELIQUIS) 5 MG tablet Take 1 tablet (5 mg) by mouth 2 times daily Start on 8/24/20 60 tablet 0     atorvastatin (LIPITOR) 10 MG tablet Take 1 tablet (10 mg) by mouth daily 30 tablet 1     cholecalciferol 1000 UNITS TABS Take 1,000 Units by mouth daily 30 tablet 1     levETIRAcetam (KEPPRA) 100 MG/ML solution Take 1.25 mLs (125 mg) by mouth 2 times daily       levothyroxine (SYNTHROID/LEVOTHROID) 75 MCG tablet Take 1 tablet (75 mcg) by mouth daily 30 tablet 0     lisinopril (ZESTRIL) 2.5 MG tablet Take 1 tablet by mouth daily       melatonin 1 MG TABS tablet Take 1 mg by mouth nightly as needed for sleep        metoprolol tartrate (LOPRESSOR) 12.5 mg TABS half-tab Take 12.5 mg by mouth 2 times daily       nystatin (MYCOSTATIN) 028024 UNIT/GM external powder Apply topically 2 times daily as needed (intertriginal dermatitis)       senna-docusate (SENOKOT-S/PERICOLACE) 8.6-50 MG tablet Take 1 tablet by mouth 2 times daily 60 tablet 0       Case Management:  I have reviewed the care plan and MDS and do agree with the plan. Patient's desire to return to the community is not assessible due to cognitive impairment. Information reviewed:  Medications, vital signs, orders, and nursing notes.    ROS:  Unobtainable secondary to cognitive impairment.     Vitals:  /67   Pulse 68   Temp 97.5  F (36.4  C)   Resp 18   Ht 1.626 m (5'  "4\")   Wt 52.2 kg (115 lb)   SpO2 95%   BMI 19.74 kg/m    Body mass index is 19.74 kg/m .  Exam:  GENERAL APPEARANCE:  Alert, in no distress, thin, cooperative  ENT:  [place], oral mucous membranes moist  EYES:  Conjunctiva and lids normal  RESP:  lungs clear to auscultation , no respiratory distress  CV:  RRR  ABDOMEN:  bowel sounds normal  M/S:   no edema, decreased muscle tone   PSYCH:  insight and judgement impaired, memory impaired , affect and mood normal    Lab/Diagnostic data:     Most Recent 3 CBC's:  Recent Labs   Lab Test 12/21/20 08/19/20  0905 08/18/20  1458   WBC 10.0 7.9 7.1   HGB 14.8 14.2 13.2   .9* 96 96    183 190     Most Recent 3 BMP's:  Recent Labs   Lab Test 02/24/21 12/21/20 08/20/20 0925 08/19/20 0905 08/18/20  1458   NA  --  143  --  141 140   POTASSIUM 3.8 3.9  --  4.0 4.0   CHLORIDE  --  110*  --  112* 111*   CO2  --  21  --  24 27   BUN  --  21  --  7 5*   CR  --  0.93 0.69 0.57 0.63   ANIONGAP  --  12  --  5 2*   JAZLYN  --  9.5  --  8.7 9.3   GLC  --  120*  --  116* 104*     Most Recent TSH and T4:  Recent Labs   Lab Test 12/21/20 07/19/20 2000 07/19/20 2000   TSH 2.99   < > 0.22*   T4  --   --  1.53*    < > = values in this interval not displayed.       ASSESSMENT/PLAN   (I12.9,  N18.30) Benign hypertension with chronic kidney disease, stage III  (primary encounter diagnosis)  Comment: chronic, history of labile BPs  Plan:   --continue lisinopril 2.5 mg PO daily   --vitals per facility policy: at least weekly   --follow BMP     (E03.9) Hypothyroidism   Comment: TSH therapeutic 12/2020  Plan:  --continue levothyroxine 75 mcg daily  --annual & prn TSH    (E87.6) Hypokalemia  Comment: first noted during hospitalization 08/2020, dietary? 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 Discontinue folate      Electronically signed by:  Susu Pereira NP              Sincerely,        Susu Pereira NP

## 2021-06-21 NOTE — PROGRESS NOTES
[place] [facility]  Chief Complaint   Patient presents with     [facility]     [place] Medical Record Number:  [id number]  Place of Service where encounter took place:  [facility] [[id number]]    HPI:    Joelle Freeman  is 89 year old (5/25/1928), who is being seen today for a federally mandated E/M visit.  HPI information obtained from: facility chart records, facility staff, patient report and [facility] chart review. Today's concerns are:  Patient seen today for regulatory visit. Patient denies cp/sob/n/v/d/c, no new issues reported by facility staff.   Please see assessment and plan below for medical concerns addressed today's visit.  SBP  for the last month with HR 68-82. Weight 115 lbs, up 5 lbs from admission 10 months ago.     ALLERGIES:Tramadol  PAST MEDICAL HISTORY:   has a past medical history of Acute, but ill-defined, cerebrovascular disease (6/7/2003), Anemia of chronic disease (4/6/2014), Asthma, CAD (coronary artery disease), Cerebral ventriculomegaly, CVA (cerebral infarction) (2002), Dementia (H), Dementia (H), Depression, Diverticulosis, Dyslipidemia, Heterozygous factor V Leiden mutation (H), HTN (hypertension), Hyperlipidemia, Hypothyroidism, Normal pressure hydrocephalus (H), Posterior reversible encephalopathy syndrome, Subarachnoid hemorrhage (H) (3/23/2013), Subdural hematoma (H), Syncope (7/19/2020), TIA (transient ischaemic attack), TIA (transient ischaemic attack), Unspecified cerebral artery occlusion with cerebral infarction, Urinary incontinence, and UTI (lower urinary tract infection).  PAST SURGICAL HISTORY:   has a past surgical history that includes Cholecystectomy; hernia repair; Hysterectomy; Stent (2001); lumbar puncture; Cardiac surgery; Abdomen surgery; appendectomy; biopsy; ENT surgery; wisdom teeth extraction[; GYN surgery; GYN surgery; Optical tracking system implant shunt ventriculoperitoneal (7/16/2013); and Open reduction  "internal fixation hip nailing (11/18/2013).  FAMILY HISTORY: family history includes Blood Disease in her son; Cerebrovascular Disease in her father and mother.  SOCIAL HISTORY:  reports that she has never smoked. She has never used smokeless tobacco. She reports that she does not drink alcohol or use drugs.    MEDICATIONS:  Current Outpatient Medications   Medication Sig Dispense Refill     acetaminophen (TYLENOL) 325 MG tablet Take 650 mg by mouth every 6 hours as needed for pain        apixaban ANTICOAGULANT (ELIQUIS) 5 MG tablet Take 1 tablet (5 mg) by mouth 2 times daily Start on 8/24/20 60 tablet 0     atorvastatin (LIPITOR) 10 MG tablet Take 1 tablet (10 mg) by mouth daily 30 tablet 1     cholecalciferol 1000 UNITS TABS Take 1,000 Units by mouth daily 30 tablet 1     levETIRAcetam (KEPPRA) 100 MG/ML solution Take 1.25 mLs (125 mg) by mouth 2 times daily       levothyroxine (SYNTHROID/LEVOTHROID) 75 MCG tablet Take 1 tablet (75 mcg) by mouth daily 30 tablet 0     lisinopril (ZESTRIL) 2.5 MG tablet Take 1 tablet by mouth daily       melatonin 1 MG TABS tablet Take 1 mg by mouth nightly as needed for sleep        metoprolol tartrate (LOPRESSOR) 12.5 mg TABS half-tab Take 12.5 mg by mouth 2 times daily       nystatin (MYCOSTATIN) 304593 UNIT/GM external powder Apply topically 2 times daily as needed (intertriginal dermatitis)       senna-docusate (SENOKOT-S/PERICOLACE) 8.6-50 MG tablet Take 1 tablet by mouth 2 times daily 60 tablet 0       Case Management:  I have reviewed the care plan and MDS and do agree with the plan. Patient's desire to return to the community is not assessible due to cognitive impairment. Information reviewed:  Medications, vital signs, orders, and nursing notes.    ROS:  Unobtainable secondary to cognitive impairment.     Vitals:  /67   Pulse 68   Temp 97.5  F (36.4  C)   Resp 18   Ht 1.626 m (5' 4\")   Wt 52.2 kg (115 lb)   SpO2 95%   BMI 19.74 kg/m    Body mass index is 19.74 " kg/m .  Exam:  GENERAL APPEARANCE:  Alert, in no distress, thin, cooperative  ENT:  [place], oral mucous membranes moist  EYES:  Conjunctiva and lids normal  RESP:  lungs clear to auscultation , no respiratory distress  CV:  RRR  ABDOMEN:  bowel sounds normal  M/S:   no edema, decreased muscle tone   PSYCH:  insight and judgement impaired, memory impaired , affect and mood normal    Lab/Diagnostic data:     Most Recent 3 CBC's:  Recent Labs   Lab Test 12/21/20 08/19/20 0905 08/18/20  1458   WBC 10.0 7.9 7.1   HGB 14.8 14.2 13.2   .9* 96 96    183 190     Most Recent 3 BMP's:  Recent Labs   Lab Test 02/24/21 12/21/20 08/20/20 0925 08/19/20 0905 08/18/20  1458   NA  --  143  --  141 140   POTASSIUM 3.8 3.9  --  4.0 4.0   CHLORIDE  --  110*  --  112* 111*   CO2  --  21  --  24 27   BUN  --  21  --  7 5*   CR  --  0.93 0.69 0.57 0.63   ANIONGAP  --  12  --  5 2*   JAZLYN  --  9.5  --  8.7 9.3   GLC  --  120*  --  116* 104*     Most Recent TSH and T4:  Recent Labs   Lab Test 12/21/20 07/19/20 2000 07/19/20 2000   TSH 2.99   < > 0.22*   T4  --   --  1.53*    < > = values in this interval not displayed.       ASSESSMENT/PLAN   (I12.9,  N18.30) Benign hypertension with chronic kidney disease, stage III  (primary encounter diagnosis)  Comment: chronic, history of labile BPs  Plan:   --continue lisinopril 2.5 mg PO daily   --vitals per facility policy: at least weekly   --follow BMP     (E03.9) Hypothyroidism   Comment: TSH therapeutic 12/2020  Plan:  --continue levothyroxine 75 mcg daily  --annual & prn TSH    (E87.6) Hypokalemia  Comment: first noted during hospitalization 08/2020, dietary? 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 Discontinue folate      Electronically signed by:  Susu Pereira NP

## 2021-08-23 NOTE — PROGRESS NOTES
Patient was seen for regulatory long-term care visit.    Course reviewed with nurse practitioner.    Overall mental functional status have been stable.    Patient reports feeling well, denies cough, chest pain, shortness of breath.    Vital signs have been stable    Patient appears well, sitting in a chair in her room.  She is oriented to person, very pleasant, hard of hearing  Lungs clear  CV regular rhythm  Abdomen soft  No lower extremity edema    Assessment    Dementia, with history of normal pressure hydrocephalus and chronic subdural hematoma.  Overall status stable.    History of seizure disorder, without clinically evident seizures on low-dose Keppra 125 mg twice daily    Hypertension, chronic kidney disease stage III  Stable on low-dose lisinopril and metoprolol    Hypothyroidism, on replacement    Plan:  Continue current cares.

## 2021-10-05 NOTE — TELEPHONE ENCOUNTER
[facility] Nurse Triage Telephone Note    Provider: Susu Pereira NP   Facility: [facility] Facility Type:  C    Caller: Rosita  Call Back Number: [phone]    Allergies:    Allergies   Allergen Reactions     Tramadol Unknown        Reason for call: Son concerned pt has runny nose & cough, AFeb, Crackles head upper lobes. Room air sats good. Giving Guaifenesin & enc fluids. Appetite is poor baseline. Son request CoVID test.    Verbal Order/Direction given by Provider: OK CoVID test, continue to enc fluids & monitor & call back if symptoms worsten.    Provider giving Order:  Tonya Haase, APRN CNP    Verbal Order given to: Carly Coronel RN

## 2021-10-06 NOTE — PROGRESS NOTES
"[facility]    Chief Complaint   Patient presents with     RECHECK     cough     HPI:  Joelle Freeman is a 89 year old  (5/25/1928), who is being seen today for an episodic care visit at: [facility] () [[id number]]. Today's concern is:   Patient has been experiencing cough & runny nose for the last few days. Afebrile. Other vitals per baseline. She is in good spirits this morning: sitting up in w/c with lipstick on. Appears comfortable. Drinking protein shake.     Allergies, and PMH/PSH reviewed in [facility] today.  REVIEW OF SYSTEMS:  Unobtainable secondary to cognitive impairment.     Objective:   /80   Pulse 79   Temp 97.7  F (36.5  C)   Resp 20   Ht 1.626 m (5' 4\")   Wt 49 kg (108 lb)   SpO2 93%   BMI 18.54 kg/m     GENERAL APPEARANCE:  Alert, in no distress, thin  ENT:  [place], oral mucous membranes moist  EYES:  Conjunctiva and lids normal  RESP:  lungs clear to auscultation , no respiratory distress- no notable cough or wheeze, good air movement   CV:  RRR  ABDOMEN:  bowel sounds normal  M/S:   no edema, decreased muscle tone   PSYCH:  insight and judgement impaired, memory impaired , affect and mood normal      Most Recent 3 CBC's:  Recent Labs   Lab Test 12/21/20  0000 08/19/20  0905 08/18/20  1458   WBC 10.0 7.9 7.1   HGB 14.8 14.2 13.2   .9* 96 96    183 190     Most Recent 3 BMP's:  Recent Labs   Lab Test 02/24/21  0000 12/21/20  0000 08/20/20  0925 08/19/20  0905 08/18/20  1458 08/18/20  1458   NA  --  143  --  141  --  140   POTASSIUM 3.8 3.9  --  4.0   < > 4.0   CHLORIDE  --  110*  --  112*  --  111*   CO2  --  21  --  24  --  27   BUN  --  21  --  7  --  5*   CR  --  0.93 0.69 0.57   < > 0.63   ANIONGAP  --  12  --  5  --  2*   JAZLYN  --  9.5  --  8.7  --  9.3   GLC  --  120*  --  116*  --  104*    < > = values in this interval not displayed.       Assessment/Plan:  (R05.9) Cough (primary encounter diagnosis)  Comment: acute, cough for the last 3-4 days; " COVID-19 pending  Plan:   --CXR AP/L today   --CMP & CBC tomorrow  --await COVID-19 results, continue isolation precautions for now     (I12.9,  N18.30) Benign hypertension with chronic kidney disease, stage III   Comment: chronic, history of labile BPs- fairly stable for the last month  Plan:   --continue lisinopril 2.5 mg PO daily   --vitals per facility policy: at least weekly   --follow BMP    (G40.909) Seizure disorder (H)  Comment: family would like Keppra discontinued however in agreement to continue low-dose   Plan:  --continue Keppra 125 mg PO BID  --monitor for seizure-like activity   --follow up with Neurology as recommended      (I62.03) Chronic subdural hematoma (H)  (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 CXR AP/L today  2. CMP & CBC tomorrow     Electronically signed by: Susu Pereira NP

## 2021-10-07 NOTE — LETTER
"    10/7/2021        RE: Joelle Freeman  [address] [address] [facility] [address]        [facility]    Chief Complaint   Patient presents with     RECHECK     cough     HPI:  Joelle Freeman is a 89 year old  (5/25/1928), who is being seen today for an episodic care visit at: [facility] () [[id number]]. Today's concern is:   Patient has been experiencing cough & runny nose for the last few days. Afebrile. Other vitals per baseline. She is in good spirits this morning: sitting up in w/c with lipstick on. Appears comfortable. Drinking protein shake.     Allergies, and PMH/PSH reviewed in [facility] today.  REVIEW OF SYSTEMS:  Unobtainable secondary to cognitive impairment.     Objective:   /80   Pulse 79   Temp 97.7  F (36.5  C)   Resp 20   Ht 1.626 m (5' 4\")   Wt 49 kg (108 lb)   SpO2 93%   BMI 18.54 kg/m     GENERAL APPEARANCE:  Alert, in no distress, thin  ENT:  [place], oral mucous membranes moist  EYES:  Conjunctiva and lids normal  RESP:  lungs clear to auscultation , no respiratory distress- no notable cough or wheeze, good air movement   CV:  RRR  ABDOMEN:  bowel sounds normal  M/S:   no edema, decreased muscle tone   PSYCH:  insight and judgement impaired, memory impaired , affect and mood normal      Most Recent 3 CBC's:  Recent Labs   Lab Test 12/21/20  0000 08/19/20  0905 08/18/20  1458   WBC 10.0 7.9 7.1   HGB 14.8 14.2 13.2   .9* 96 96    183 190     Most Recent 3 BMP's:  Recent Labs   Lab Test 02/24/21  0000 12/21/20  0000 08/20/20  0925 08/19/20  0905 08/18/20  1458 08/18/20  1458   NA  --  143  --  141  --  140   POTASSIUM 3.8 3.9  --  4.0   < > 4.0   CHLORIDE  --  110*  --  112*  --  111*   CO2  --  21  --  24  --  27   BUN  --  21  --  7  --  5*   CR  --  0.93 0.69 0.57   < > 0.63   ANIONGAP  --  12  --  5  --  2*   JAZLYN  --  9.5  --  8.7  --  9.3   GLC  --  120*  --  116*  --  104*    < > = values in this interval not displayed. "       Assessment/Plan:  (R05.9) Cough (primary encounter diagnosis)  Comment: acute, cough for the last 3-4 days; COVID-19 pending  Plan:   --CXR AP/L today   --CMP & CBC tomorrow  --await COVID-19 results, continue isolation precautions for now     (I12.9,  N18.30) Benign hypertension with chronic kidney disease, stage III   Comment: chronic, history of labile BPs- fairly stable for the last month  Plan:   --continue lisinopril 2.5 mg PO daily   --vitals per facility policy: at least weekly   --follow BMP    (G40.909) Seizure disorder (H)  Comment: family would like Keppra discontinued however in agreement to continue low-dose   Plan:  --continue Keppra 125 mg PO BID  --monitor for seizure-like activity   --follow up with Neurology as recommended      (I62.03) Chronic subdural hematoma (H)  (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 CXR AP/L today  2. CMP & CBC tomorrow     Electronically signed by: Susu Pereira NP           Sincerely,        Susu Pereira NP

## 2021-10-23 NOTE — PROGRESS NOTES
RECEIVING UNIT ED HANDOFF REVIEW    ED Nurse Handoff Report was reviewed by: Lake Quintanilla RN on October 23, 2021 at 3:42 PM

## 2021-10-23 NOTE — ED PROVIDER NOTES
History     Chief Complaint:  Altered Mental Status      HPI   Joelle Freeman is a 89 year old female with history of seizures, stroke, cerebrovascular disease, CAD, dementia, and CVA who presents with altered mental state. Per EMS she lives at [facility] and was last seen in normal condition at 1115. At 1215 she was found by her sons to not be able to speak. She was only able to say a few word on arrival of EMS but improved a few minutes before arrival to emergency room. She reports pain to her right leg but denies pain anywhere else. She also denies incontinance.    Review of Systems   Neurological: Positive for speech difficulty.   All other systems reviewed and are negative.      Allergies:  Tramadol    Medications:    Eliquis  Lipitor  keppra  Levothyroxine  Lisinopril   Lopressor    Past Medical History:    Cerebrovascular disease  Anemia   Asthma  CAD  Cerebral ventriculomegaly  Chronic respiratory insufficiency  CVA  Cerebral infarction  Dementia  Depression  Diverticulosis  Dyslipidemia  Hypertension  Insomnia   Migraine  Subarachnoid hemorrhage  Traumatic encephalopathy  Severe aortic stenosis  LAKSHMI  Osteoarthritis   Hypothyroidism   Seizure disorder     Past Surgical History:    Appendectomy  Stent ot the right coronary artery  Cholecystectomy  Tonsillectomy  Bladder suspension  D&C  Laproscopy oophorectomy unilateral  Open reduction internal fixatin hip nialing  Optical tracking system implant shunt ventriculoperitoneal   RCA stent  [place] teeth extraction      Family History:    Mother: cerebrovascular disease  Father: cerebrovascular disease    Social History:  Patient resents via EMS    Physical Exam     Patient Vitals for the past 24 hrs:   BP Temp Temp src Pulse Resp SpO2 Weight   10/23/21 1343 -- -- -- -- -- -- 46 kg (101 lb 6.6 oz)   10/23/21 1342 95/77 98.4  F (36.9  C) Oral 67 16 99 % --       Physical Exam  Eyes:  The pupils are equal and round    Conjunctivae and sclerae are  normal  ENT:    The nose is normal    Pinnae are normal    The oropharynx is dry  Neck:  Normal range of motion    There is no rigidity noted  CV:  Regular rate and rhythm     No edema  Resp:  Lungs are clear    Non-labored    No rales    No wheezing   GI:  Abdomen is soft and non-tender, there is no rigidity    No distension    No rebound tenderness   MS:  Normal muscular tone    No asymmetric leg swelling  Skin:  No rash or acute skin lesions noted  Neuro:   Awake, alert.      Speech is normal but slow    Repetitive questions    SILT in all four extremities    Equal     No drift of upper or lower extremities    Face is symmetric.     Moves all extremities    Emergency Department Course   ECG  ECG taken at 1453, ECG read at 1520  Sinus rhythm with occasional premature ventricular complexes  Left axis deviation  Left bundle brand block  Abnormal ECG   Rate 76 bpm. WV interval 178 ms. QRS duration 144 ms. QT/QTc 468/526 ms. P-R-T axes 66 -49 88.     Imaging:  CT Head w/o Contrast  1.  No CT evidence of acute ischemia or hemorrhage.  2.  Chronic bilateral subdural fluid collections, unchanged.  3.  Multiple small old infarcts.  4.  Right-sided ventriculostomy shunt catheter.  Reading per radiology     CTA Head Neck with Contrast  HEAD CTA:   1.  No evidence of large vessel occlusion or high-grade stenosis.  2.  Scattered atherosclerotic plaquing with multiple mild stenoses.    NECK CTA:  1.  No evidence of large vessel occlusion or high-grade stenosis.  2.  Severe plaquing of the bilateral carotid bifurcations without evidence of flow-limiting stenosis.  3.  Moderate stenosis at the right vertebral artery origin.  Reading per radiology     Laboratory:  Asymptomatic COVID19 Virus PCR by nasopharyngeal swab negative    Labs Ordered and Resulted from Time of ED Arrival Up to the Time of Departure from the ED   BASIC METABOLIC PANEL - Abnormal; Notable for the following components:       Result Value    Glucose 147  (*)     All other components within normal limits   INR - Abnormal; Notable for the following components:    INR 1.40 (*)     All other components within normal limits   TROPONIN I - Abnormal; Notable for the following components:    Troponin I 0.442 (*)     All other components within normal limits   PARTIAL THROMBOPLASTIN TIME - Normal   COVID-19 VIRUS (CORONAVIRUS) BY PCR - Normal    Narrative:     Testing was performed using the yakov  SARS-CoV-2 & Influenza A/B Assay on the yakov  Flora  System.  This test should be ordered for the detection of SARS-COV-2 in individuals who meet SARS-CoV-2 clinical and/or epidemiological criteria. Test performance is unknown in asymptomatic patients.  This test is for in vitro diagnostic use under the FDA EUA for laboratories certified under CLIA to perform moderate and/or high complexity testing. This test has not been FDA cleared or approved.  A negative test does not rule out the presence of PCR inhibitors in the specimen or target RNA in concentration below the limit of detection for the assay. The possibility of a false negative should be considered if the patient's recent exposure or clinical presentation suggests COVID-19.  [facility] are certified under the Clinical Laboratory Improvement Amendments of 1988 (CLIA-88) as qualified to perform moderate and/or high complexity laboratory testing.   LACTIC ACID WHOLE BLOOD - Normal   CBC WITH PLATELETS AND DIFFERENTIAL - Normal   EXTRA BLUE TOP TUBE   EXTRA RED TOP TUBE   EXTRA GREEN TOP (LITHIUM HEPARIN) TUBE   EXTRA PURPLE TOP TUBE   EXTRA GREEN TOP (LITHIUM HEPARIN) ON ICE   GLUCOSE MONITOR NURSING POCT   DIFFERENTIAL   EXTRA BLOOD BANK PURPLE TOP TUBE   ROUTINE UA WITH MICROSCOPIC REFLEX TO CULTURE   KEPPRA (LEVETIRACETAM) LEVEL   ISTAT CG4 GASES LACTATE TYLER NURSING POCT   INITIATE   NOTIFY   MEASURE WEIGHT   VITAL SIGNS   NEURO CHECKS   CARDIAC CONTINUOUS MONITORING   PULSE OXIMETRY NURSING   ACTIVITY    HEAD OF BED   IP DYSPHAGIA SCREEN   PERIPHERAL IV CATHETER   IV ACCESS   VITAL SIGNS   NEURO CHECKS   NOTIFY   ACTIVITY   CBC WITH PLATELETS & DIFFERENTIAL    Narrative:     The following orders were created for panel order CBC with Platelets & Differential.  Procedure                               Abnormality         Status                     ---------                               -----------         ------                     CBC with platelets and d...[[id number]]  Normal              Final result               Manual Differential[[id number]]                              Final result                 Please view results for these tests on the individual orders.   EXTRA TUBE    Narrative:     The following orders were created for panel order [place] Draw.  Procedure                               Abnormality         Status                     ---------                               -----------         ------                     Extra Blue Top Tube[[id number]]                              Final result               Extra Red Top Tube[[id number]]                               Final result               Extra Green Top (Lithium...[[id number]]                      Final result               Extra Purple Top Tube[[id number]]                            Final result               Extra Blood Bank Purple ...[[id number]]                                                 Extra Green Top (Lithium...[[id number]]                      Final result                 Please view results for these tests on the individual orders.        Emergency Department Course:    1336 Patient arrives, performed exam  1338 Tier 1 stroke called, monitor placed  1350 Taken to CT  1405 Patient moved to main ED room    Consults:   1510 I spoke with Dr. Hernandez of the Hospitalist service from [facility] regarding patient's presentation, findings, and plan of care.    Disposition:  The patient was admitted to the hospital under the care of   David.    Impression & Plan        Medical Decision Making:  Joelle Freeman is a 89 year old female who presents to the emergency department after having had difficulty with speech.  The symptoms were first noted by family after they returned to see here.  She was last known normal to be at 1115.  She was aphasic and EMS was called who brought her here to the emergency department.  Here she is speaking slowly but is intelligible.  She does not have any focal weakness noted on exam.  She does have a complicated medical history with seizure disorder, prior subdural hemorrhages,  shunt.  She is on Keppra as well as Eliquis.  Work-up here shows no new findings on CT scan of her brain.  As she is improved and symptoms could be suggestive of seizure disorder and other causes IV TPA was not considered especially in light of her ongoing blood thinner use and chronic subdural hemorrhages.  Discussed with stroke neurology who recommended patient have an MRI and EEG while admitted.  Discussed with hospitalist agreed accept the patient as admission.    Covid-19  Joelle Freeman was evaluated during a global COVID-19 pandemic, which necessitated consideration that the patient might be at risk for infection with the SARS-CoV-2 virus that causes COVID-19.   Applicable protocols for evaluation were followed during the patient's care.   COVID-19 was considered as part of the patient's evaluation. The plan for testing is:  a test was obtained during this visit.    Diagnosis:    ICD-10-CM    1. Altered mental status, unspecified altered mental status type  R41.82    2. Seizure disorder (H)  G40.909        Scribe Disclosure:  I, Juan Woodson, am serving as a scribe at 2:07 PM on 10/23/2021 to document services personally performed by Young Carnes MD based on my observations and the provider's statements to me.      Young Carnes MD  10/23/21 0036

## 2021-10-23 NOTE — PHARMACY-ADMISSION MEDICATION HISTORY
Pharmacy Medication History  Admission medication history interview status for the 10/23/2021  admission is complete. See EPIC admission navigator for prior to admission medications     Location of Interview: Outside patient room but on unit  Medication history sources: MAR ([facility])    Significant changes made to the medication list:  Adjusted apixaban 5 mg BID --> 2.5 mg BID     In the past week, patient estimated taking medication this percent of the time: greater than 90%    Additional medication history information:   Did not interview patient. Patient also has Mighty protein shakes on MAR if poor oral intake, up to three times daily - did not add to PTA med list, but patient takes frequently per MAR.     Medication reconciliation completed by provider prior to medication history? No    Time spent in this activity: 10 minutes    Prior to Admission medications    Medication Sig Last Dose Taking? Auth Provider   acetaminophen (TYLENOL) 325 MG tablet Take 650 mg by mouth every 6 hours as needed for pain  Unknown at Unknown time Yes Unknown, Entered By History   apixaban ANTICOAGULANT (ELIQUIS) 2.5 MG tablet Take 2.5 mg by mouth 2 times daily 10/23/2021 at AM Yes Unknown, Entered By History   atorvastatin (LIPITOR) 10 MG tablet Take 1 tablet (10 mg) by mouth daily 10/22/2021 at HS Yes Patric Pinedo MD   cholecalciferol 1000 UNITS TABS Take 1,000 Units by mouth daily 10/23/2021 at AM Yes Patric Pinedo MD   levETIRAcetam (KEPPRA) 100 MG/ML solution Take 1.25 mLs (125 mg) by mouth 2 times daily 10/23/2021 at AM Yes Susu Pereira, NP   levothyroxine (SYNTHROID/LEVOTHROID) 75 MCG tablet Take 1 tablet (75 mcg) by mouth daily 10/23/2021 at 0700 Yes Heather Odonnell MD   lisinopril (ZESTRIL) 2.5 MG tablet Take 1 tablet by mouth daily 10/23/2021 at AM Yes Reported, Patient   melatonin 1 MG TABS tablet Take 1 mg by mouth nightly as needed for sleep  Unknown  at Unknown time Yes Unknown, Entered By History   metoprolol tartrate (LOPRESSOR) 12.5 mg TABS half-tab Take 12.5 mg by mouth 2 times daily 10/23/2021 at AM Yes Unknown, Entered By History   nystatin (MYCOSTATIN) 511466 UNIT/GM external powder Apply topically 2 times daily as needed (intertriginal dermatitis) Unknown at Unknown time Yes Unknown, Entered By History   senna-docusate (SENOKOT-S/PERICOLACE) 8.6-50 MG tablet Take 1 tablet by mouth 2 times daily 10/23/2021 at AM Yes Heather Odonnell MD       The information provided in this note is only as accurate as the sources available at the time of update(s)   Leroy ZieglerD

## 2021-10-23 NOTE — H&P
[facility]    History and Physical  Hospitalist       Date of Admission:  10/23/2021  Date of Service (when I saw the patient): 10/23/21    Assessment & Plan   Joelle Freeman is a 89 year old female with a past medical history of presumed seizures, multiple CVAs and TIAs, NPH status post ventriculostomy shunt, elevated troponin, LBBB, hypertension, CAD, severe aortic stenosis, dysphagia, dementia, and pulmonary emboli on chronic anticoagulation who presents with acute aphasia.    Acute aphasia, and altered mental status  Possible syncope, history of seizures, with concern for acute seizure  History of multiple TIAs/CVAs  History of NPH status post ventricular shunt  Bilateral hygromas versus chronic subdural hematomas   Patient has history of acute encephalopathy with fluctuations in LOC, suspected to be multifactorial (metabolic and infectious) including suspected seizures, UTI, and underlying advanced dementia on top of bilateral chronic subdural hygromas/hematomas and prior stroke and NPH.  Presents with acute onset of being unable to speak, witnessed by family at her LTC, and improved upon arrival to the ED.  --Neurology stroke has been consulted from the ED, appreciate them following.  --Neurology recommends brain MRI, and routine EEG --orders entered  --Consider cardiogenic syncope in the differential, will have on telemetry, trend serial troponins.    --Neurochecks every 4 hours  --Continue PTA Keppra  --Neurosurgery consult not needed at this time as her CT is stable.  Defer to neurology if NSG should be involved  --No aspirin ordered, PTA apixaban restarted  --Allow permissive hypertension, labetalol and hydralazine IV available for SBP greater than 220 per CVA protocol.  --Patient has known history of severe aortic stenosis, last echocardiogram from 7/2020 shows aortic valve area 0.5 cm  and is slightly worsened from previous. Will repeat echocardiogram. Consider cardiology  consult if patient and family wish to pursue further cardiac interventions.  --Check TSH and UA  --PT/SLP/OT  --Family communication: Contacted son Osman (POA) 10/23/2021    Chronically elevated troponin  LBBB  Hypertension  Hyperlipidemia  CAD -RCA stent 2001  Severe aortic stenosis  --Patient has known history of severe aortic stenosis, last echocardiogram from 7/2020 shows aortic valve area 0.5 cm  and is slightly worsened from prior study.  Previously considered not to be a surgical candidate.  --Will repeat echocardiogram. Consider cardiology consult if patient and family wish to pursue further cardiac interventions.  --Continue PTA metoprolol and lisinopril with hold parameters  --Continue PTA atorvastatin  --Monitor for chest pain or anginal symptoms  --Continuous telemetry monitoring  --Trend serial troponins.  Initial troponin 0.442    Dementia  Moderate oropharyngeal dysphagia  Failure to thrive  Inadequate oral intake  Per son report, patient has had very little to eat or drink over the last 7 days.  Family and staff and try to encourage patient to eat, but she has a very low appetite.  This seems to have been a problem for a while now, and is worsening.  --We will work-up reversible causes as above.  --PT/OT, SW consulted  --If no definite metabolic, infectious, cardiac or neurologic issue is identified, need to consider that her dementia is worsening, and given her advanced age and frailty she may not tolerate repeated hospitalizations and interventions.  Palliative care was brought up previously with family, but has not yet formally been pursued.    History of bilateral pulmonary emboli  History of Heterozygous FVL mutation  --Continue treatment with apixaban  --Monitor vital signs, currently without hypoxia on room air, add supplemental O2 if needed    Hypothyroidism  --Check TSH  --Resume PTA levothyroxine when able    Depression/anxiety  --Resume PTA Lexapro when able    COVID-19 test is negative as  of 10/23/2021      Diet: NPO for Medical/Clinical Reasons Except for: Other; Specify: Until swallow evaluation has been done (no oral medications).     DVT Prophylaxis: Resume PTA apixaban  Benítez Catheter: Not present  Code Status: Patient is DNR/DNI, confirmed with son who is the power of .  Disposition Plan    Observation status, anticipate discharge in the next 1-2 days pending further work-up and neurology input. She resides at LT, can likely return to previous living setting.      Entered: LETI Art 10/23/2021, 4:50 PM        The patient's care was discussed with the Bedside Nurse, Patient and Patient's Family over the phone.    The patient has been discussed with Dr. Joyce, who agrees with the assessment and plan at this time.       Dmitriy Garibay    Primary Care Physician   Dr. Edenilson Ramon    Chief Complaint   Speech difficulty    History is obtained from the patient, and ER provider reports.    History of Present Illness   89 year old female w hx of CAD, HTN, HLD, Factor V Leiden, PRES, bilateral subdural hematoma versus hygromas, SAH, NPH ventriculostomy shunt, multiple CVAs and TIAs, seizures (presumed, taking Keppra) who was brought to the emergency department after having a speech difficulty.  Patient was last known well at approximately 1115 at her long-term care facility.  At around 1230, patient was found unable to speak per family.  She was only able to say a few words on EMS arrival.  She improved a few minutes before arrival to the emergency department per report.  She reports pain to her right leg, but denies pain anywhere else.  She denies any chest pain, palpitations, lightheadedness, syncope, falls, focal weakness, numbness, tingling, headache, or vision changes.  Her speech continued to improve while in the ED, but was notably slow.      In the emergency department, patient was evaluated by Dr. Carnes. She was hypotensive to 90s in ED, vital signs  otherwise stable.  Code stroke was called and neurology has come by to evaluate the patient. EKG shows sinus rhythm with occasional PVCs, left axis deviation, LBBB. CT head without contrast shows no evidence of acute ischemia or hemorrhage. There are chronic bilateral subdural fluid collections, unchanged from previous. Multiple small old infarcts. Right-sided ventriculostomy shunt catheter. CTA shows no evidence of large vessel occlusion or high-grade stenosis. Scattered atherosclerotic plaquing with multiple mild stenoses. Severe plaquing of bilateral carotid bifurcations without evidence of flow-limiting stenosis. Moderate stenosis of the right vertebral artery origin. Lab work is unremarkable other than a troponin of 0.44. BMP with glucose 147, otherwise with normal limits, creatinine 0.80, potassium 3.5, sodium 141. CBC with WBC 8.9, hemoglobin 12.4, platelet 170. Urinalysis pending. COVID-19 PCR negative. Patient is registered to observation for further work-up with brain MRI, EEG, and cardiac monitoring.    Past Medical History      Cerebrovascular disease    Anemia     Asthma    CAD    Cerebral ventriculomegaly    Chronic respiratory insufficiency    CVA    Cerebral infarction    Dementia    Depression    Diverticulosis    Dyslipidemia    Hypertension    Insomnia     Migraine    Subarachnoid hemorrhage    Traumatic encephalopathy    Severe aortic stenosis    LAKSHMI    Osteoarthritis     Hypothyroidism     Seizure disorder     Past Surgical History   I have reviewed this patient's surgical history and updated it with pertinent information if needed.  Past Surgical History:   Procedure Laterality Date     APPENDECTOMY       BIOPSY      breast     CARDIAC SURGERY      stent to the right coronary artery in 2001     CHOLECYSTECTOMY       ENT SURGERY      tonsillectomy     GENITOURINARY SURGERY      Bladder suspension     GYN SURGERY      D & C     GYN SURGERY      laproscopy oophorectomy unilateral     HERNIA REPAIR        HYSTERECTOMY       LUMBAR PUNCTURE       OPEN REDUCTION INTERNAL FIXATION HIP NAILING  11/18/2013    Procedure: OPEN REDUCTION INTERNAL FIXATION HIP NAILING;  RIGHT HIP FRACTURE;  Surgeon: John Venegas MD;  Location:  OR     OPTICAL TRACKING SYSTEM IMPLANT SHUNT VENTRICULOPERITONEAL  7/16/2013    Procedure: OPTICAL TRACKING SYSTEM IMPLANT SHUNT VENTRICULOPERITONEAL;  RIGHT OCCIPITAL VENTRICULOPERITONEAL SHUNT, IMAGE GUIDANCE, NEUROENDOSCOPY, INTRATHECAL ANTIBIOTICS (STEALTH AXIEM, STRATA II LEVEL 2.5, PATIENT IN SUPINE 30-80, GEL DONUT WITH HEAD TURNED TO LEFT)      ;  Surgeon: Tayo Conley MD;  Location:  OR     STENT  2001    RCA     wisdom teeth extraction[         Prior to Admission Medications   Prior to Admission Medications   Prescriptions Last Dose Informant Patient Reported? Taking?   acetaminophen (TYLENOL) 325 MG tablet Unknown at Unknown time Nursing Home Yes Yes   Sig: Take 650 mg by mouth every 6 hours as needed for pain    apixaban ANTICOAGULANT (ELIQUIS) 2.5 MG tablet 10/23/2021 at AM Nursing Home Yes Yes   Sig: Take 2.5 mg by mouth 2 times daily   atorvastatin (LIPITOR) 10 MG tablet 10/22/2021 at HS Nursing Home No Yes   Sig: Take 1 tablet (10 mg) by mouth daily   cholecalciferol 1000 UNITS TABS 10/23/2021 at AM Nursing Home No Yes   Sig: Take 1,000 Units by mouth daily   levETIRAcetam (KEPPRA) 100 MG/ML solution 10/23/2021 at AM Nursing Home No Yes   Sig: Take 1.25 mLs (125 mg) by mouth 2 times daily   levothyroxine (SYNTHROID/LEVOTHROID) 75 MCG tablet 10/23/2021 at 0700 Nursing Home No Yes   Sig: Take 1 tablet (75 mcg) by mouth daily   lisinopril (ZESTRIL) 2.5 MG tablet 10/23/2021 at AM Nursing Home Yes Yes   Sig: Take 1 tablet by mouth daily   melatonin 1 MG TABS tablet Unknown at Unknown time Nursing Home Yes Yes   Sig: Take 1 mg by mouth nightly as needed for sleep    metoprolol tartrate (LOPRESSOR) 12.5 mg TABS half-tab 10/23/2021 at AM Nursing Home Yes Yes    Sig: Take 12.5 mg by mouth 2 times daily   nystatin (MYCOSTATIN) 190037 UNIT/GM external powder Unknown at Unknown time Nursing Home Yes Yes   Sig: Apply topically 2 times daily as needed (intertriginal dermatitis)   senna-docusate (SENOKOT-S/PERICOLACE) 8.6-50 MG tablet 10/23/2021 at AM Nursing Home No Yes   Sig: Take 1 tablet by mouth 2 times daily      Facility-Administered Medications: None       Allergies   Allergies   Allergen Reactions     Tramadol Unknown       Social History   I have reviewed this patient's social history and updated it with pertinent information if needed. Patient is a lifelong non-smoker.  She does not drink alcohol currently.  She does not use any illicit drugs.  She resides at a long-term care facility.    Family History   I have reviewed this patient's family history and updated it with pertinent information if needed.   Family History   Problem Relation Age of Onset     Cerebrovascular Disease Mother      Cerebrovascular Disease Father      Blood Disease Son      Family hx has been reviewed and is noncontributory to this presentation    Review of Systems   The 10 point Review of Systems is negative, albeit somewhat limited given the patient's level of dementia.  She denies any fever, chills, headache, lightheadedness, chest pain, shortness of breath, palpitations, cough, abdominal pain, nausea, vomiting, diarrhea, dysuria, or new focal weakness or numbness, or rash.    Physical Exam   Temp: 97.5  F (36.4  C) Temp src: Oral BP: 112/67 Pulse: 77   Resp: 16 SpO2: 94 % O2 Device: None (Room air)    Vital Signs with Ranges  Temp:  [97.5  F (36.4  C)-98.4  F (36.9  C)] 97.5  F (36.4  C)  Pulse:  [67-77] 77  Resp:  [16] 16  BP: ()/(67-77) 112/67  SpO2:  [94 %-99 %] 94 %  101 lbs 6.59 oz    Constitutional: Elderly and frail-appearing patient, lying in bed on my arrival. Awake, alert, cooperative, no apparent distress.  ENT: Normocephalic, without obvious abnormality, atraumatic, oral  pharynx with moist mucus membranes.  Eyes pupils are equal, round; extra occular movements intact.  Normal sclera.    Neck: Supple, symmetrical, trachea midline, no adenopathy.  Pulmonary: No increased work of breathing, good air exchange, clear to auscultation bilaterally, no crackles or wheezing.  Cardiovascular: Regular rate and rhythm, with occasional premature beats. normal S1 and S2, and no murmur noted.  GI: Normal bowel sounds, soft, non-distended, non-tender.    Skin/Integumen: Warm, dry, no rashes on exposed skin.  Neuro: CN II-XII grossly intact. Slightly diminished strength in right upper extremity versus left upper extremity, but strength is intact in all 4.  strength equal bilaterally. Coordination grossly normal. Speech slow but normal, no aphasia, no facial droop.  Psych:  Alert, oriented to self only. Able to recite birthdate. Not aware of situation, date, time. She is calm and cooperative, normal mood.  Extremities: No lower extremity edema noted, and calves are non-tender to palpation bilaterally. Dorsal pedal pulses and posterior tibial pulses palpable.      Data   Data reviewed today:  I personally reviewed EKG showing sinus rhythm with occasional PVCs, left axis deviation, and LBBB. Also reviewed imaging reports by radiology, and lab data.  Recent Labs   Lab 10/23/21  1351   WBC 8.9   HGB 12.4   MCV 96      INR 1.40*      POTASSIUM 3.5   CHLORIDE 109   CO2 28   BUN 13   CR 0.80   ANIONGAP 4   JAZLYN 8.7   *   TROPONIN 0.442*       Recent Results (from the past 24 hour(s))   CTA Head Neck with Contrast    Narrative    EXAM: CTA  HEAD NECK WITH CONTRAST  LOCATION: [facility]  DATE/TIME: 10/23/2021, 2:02 PM    INDICATION: Aphasia. Code stroke.  COMPARISON: None.  CONTRAST: 70 mL Isovue-370.      TECHNIQUE: Axial helical CT images of the head and neck vessels obtained during the arterial phase of intravenous contrast administration. Axial 2D  reconstructed images and multiplanar 3D MIP reconstructed images of the head and neck vessels were   performed by the technologist. Dose reduction techniques were used. All stenosis measurements made according to NASCET criteria unless otherwise specified.    FINDINGS:     HEAD CTA:  The vertebral arteries, basilar artery, and posterior cerebral arteries are patent. The internal carotid arteries, anterior cerebral arteries, and middle cerebral arteries are patent. Scattered atherosclerotic plaquing is present with multiple mild   stenoses. No evidence of large vessel occlusion or high-grade stenosis. No evidence of aneurysm. Presumed varix along the right anterior temporal lobe. Dural venous sinuses are unremarkable.    NECK CTA:  A three-vessel aortic arch is present. The bilateral common carotid, internal carotid, external carotid, and vertebral arteries are patent. Severe plaque at the bilateral carotid bifurcations. Approximately 10% stenosis of the proximal right internal   carotid artery. Approximately 10% stenosis of the proximal left internal carotid artery. Moderate stenosis at the right vertebral artery origin.    Presumed scattered pulmonary atelectasis. Cervical spine degenerative change. Cervical spine probable subdural vascular plexus engorgement.      Impression    IMPRESSION:     HEAD CTA:   1.  No evidence of large vessel occlusion or high-grade stenosis.  2.  Scattered atherosclerotic plaquing with multiple mild stenoses.    NECK CTA:  1.  No evidence of large vessel occlusion or high-grade stenosis.  2.  Severe plaquing of the bilateral carotid bifurcations without evidence of flow-limiting stenosis.  3.  Moderate stenosis at the right vertebral artery origin.     CT Head w/o Contrast    Narrative    EXAM: CT HEAD WITHOUT CONTRAST  LOCATION: [facility]  DATE/TIME: 10/23/2021, 2:01 PM    INDICATION: Aphasia, code stroke.  COMPARISON: Head CT 08/31/2020.  TECHNIQUE: Routine  CT Head without IV contrast. Multiplanar reformats. Dose reduction techniques were used.    FINDINGS:  No CT evidence of acute ischemia. Postoperative change of right occipital ventriculostomy shunt catheter placement with tip crossing midline abutting the septum pellucidum. Bilateral subdural fluid collections are present measuring approximately 7-9 mm,   unchanged. Subdural collections probably also extend into the posterior fossa and along the clivus, unchanged. The fluid collections demonstrate predominantly hypoattenuation. Background mild parenchymal volume loss is present with white matter   hypoattenuation likely representing chronic small vessel ischemic change. Old basal ganglia lacunar infarcts. 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    Prior right-sided craniotomy. Opacification of the left sphenoid sinus. Right lens replacement. The tympanic and mastoid cavities appear well aerated      Impression    IMPRESSION:  1.  No CT evidence of acute ischemia or hemorrhage.  2.  Chronic bilateral subdural fluid collections, unchanged.  3.  Multiple small old infarcts.  4.  Right-sided ventriculostomy shunt catheter.

## 2021-10-23 NOTE — ED NOTES
Bed: ST02  Expected date:   Expected time:   Means of arrival:   Comments:  514  89 F stroke alert/onset [phone]

## 2021-10-23 NOTE — CONSULTS
[facility]    Stroke Consult Note    I was called by Young Carnes on 10/23/21 regarding patient Joelle Freeman. The patient is a 89 year old female w hx of CAD, HTN, HLD, Factor V Leiden, PRES, subdural hem bilateral, SAH, NPH ventriculostomy shunt, multiple CVA  and TIAs per report, seizures (pressumed, taking Keppra), LKN 11:15, 1230 patient found unable to speak, improving in ED with slow speech hypotensive to 90s in ED. Non-focal besides slow speech.    Patient on Eliquis for PE.    Per further hx from son at bedside patient has not eaten in several days, was found unconscious, undetectable bp, gradually came to now is at baseline besides global weakness/fatigue. RLE weakness and very poor memory at baseline. 0.44 trop in ED.    Stroke Code Data (for stroke code without tele)  Stroke code activated 10/23/21   1340   Stroke provider first response  10/23/21   1341    10/23/21   1410   Last known normal 10/23/21   1115        Time of discovery   (or onset of symptoms) 10/23/21   1230   Head CT read by Stroke Neuro Dr/Provider 10/23/21   1404   Was stroke code de-escalated? Yes 10/23/21 1447          Imaging Findings   CTH no new large infarct or hem, chronic bilat subdural unchanged, multiple old infarcts, R ventriculostomy shunt.  CTA no LVO moderate plaque aorta and bilateral bifurcations, R vert    Thrombolytic Treatment   Not given due to history of intracranial hemorrhage.    Endovascular Treatment  Not initiated due to absence of proximal vessel occlusion    Impression  Broad differential, patient has not been eating, elevated trop, hx CAD, found unconscious with low bp suspect cardiogenic syncope 2/2 arrythmia/coronary event, needs card workup per ED. Differential includes seizure (no documented hx but is on Keppra), TIA lower on differential despite significant vascular disease and prior stroke because LOC is an uncommon presentation, low bp points to card  "cause.    Recommendations   -MRI brain if stroke bring in for workup  -Parallel workup for syncope, elevated trop, failure to thrive  -Recommend routine EEG  -Shunt and ventricles stable no need for NSGY at this time but consider consult if suspicion for worsening NPH    Exam:  AOx1  Able to name with some errors, can repeat basic phrases, follows commands  MELA EOMI face sens intact face symmetric  Hard of hearing  LUE 4+/5 RUE 4+/5 LLE 4+/5 RLE 2/5  Sensation intact  No dysmetria    My recommendations are based on the information provided over the phone by Joelle Freeman's in-person providers. They are not intended to replace the clinical judgment of her in-person providers. I was not requested to personally see or examine the patient at this time.    The Stroke Staff is Dr. Romano.    Thai Duarte MD  Vascular Neurology Fellow  To page me or covering stroke neurology team member, click here: AMCOM   Choose \"On Call\" tab at top, then search dropdown box for \"Neurology Adult\", select location, press Enter, then look for stroke/neuro ICU/telestroke.      " wean O2

## 2021-10-23 NOTE — ED TRIAGE NOTES
Last normal 1115, at 1215 aphasic, couldn't speak, hx cva and seizures, on eloquis, woke up for ems talking and alert now, WC bound from old CVA,

## 2021-10-24 NOTE — PROVIDER NOTIFICATION
MD Notification    Notified Person: MD    Notified Person Name:Stanley    Notification Date/Time:1539    Notification Interaction: Web page    Purpose of Notification:GATITOI Pt's MRI will happen after 1700.  Thanks    Orders Received:    Comments:

## 2021-10-24 NOTE — PROGRESS NOTES
Care Management Follow Up    Length of Stay (days): 0    Expected Discharge Date: 10/26/2021     Concerns to be Addressed: discharge planning     Patient plan of care discussed at interdisciplinary rounds: Yes    Anticipated Discharge Disposition: Skilled Nursing Facility.  Disposition Comments: Pt is from LTC at [facility]. She is on a bed hold at this time  Anticipated Discharge Services:  LTC  Anticipated Discharge DME:      Patient/family educated on Medicare website which has current facility and service quality ratings: no  Education Provided on the Discharge Plan:    Patient/Family in Agreement with the Plan:  Per CTRN note from speaking with the son and the feedback from Genie JAOCB at [facility], the family is unhappy with her placement at [facility] and they have been looking into another LTC facility for pt.  Genie RN states that pt came to them and was on hospice care for awhile and she was taken off of hospice care once she stabilized.  [facility] staff reports that azra Brewer called 911 because pt was not speaking to him and he felt this was a significant change in her functioning and warranted immediate attention.  [facility] staff reports that they did not feel this was a big change as she is often sleepy in the am and takes awhile to respond to staff some mornings.  This is the second time in two weeks that the son has called 911 on his own. Previously, the EMT staff did not bring pt in to the hospital as they felt it was not warranted.   [facility] staff has noted a change in pt's appetite but attributes this to her being 89 and frail.     SW stopped to see pt and pt currently working with SLP .  Will return later per pt invitation.    Referrals Placed by CM/SW:    Private pay costs discussed: no    Additional Information: Anticipate pt will need continued LTC rather that TCU placement. Pt under going stroke work up.    SUSANNE Flowers  [facility]  [phone]

## 2021-10-24 NOTE — CONSULTS
"[facility]    Stroke Consult Note    Reason for Consult:  \"possible TIA/CVA\"    Chief Complaint: Altered Mental Status     HPI  Joelle Freeman is a 89 year old female with a history of CAD, HTN, HLD, Factor V Leiden, PRES, bilateral subdural hematoma versus hygromas, SAH, NPH ventriculostomy shunt, multiple strokes and TIAs, seizures (presumed, taking Keppra) who was brought to the emergency department after having a speech difficulty.    A stroke code was called, see note from my colleague Dr. Duarte for details. She is on eliquis for PE.      She has severe dementia and is unable to provide any history today but is up in the chair telling us about how she is ordering dinner for herself and a little boy.    Impression  Encephalopathy and aphasia:  Broad differential, patient has not been eating, elevated trop, hx CAD, found unconscious with low bp suspect cardiogenic syncope 2/2 arrythmia/coronary event.  Differential includes seizure (no documented hx but is on Keppra), TIA lower on differential despite significant vascular disease and prior stroke because LOC is an uncommon presentation, low bp points to more of a cardiac cause    Recommendations  - EEG shows sharp waves but no seizures- continue Keppra. Very low dose due to age and low weight  - Await brain MRI. Stroke is not highly suspected since today she is not aphasic so apparently a rapid improvement.  - Continue cardiac workup, troponin elevated  - Continue anticoagulation    Patient Follow-up    - final recommendation pending work-up    Thank you for this consult. We will continue to follow.     Rebecca Rodriguez PA-C  Neurology  10/24/2021 4:00 PM  To page me or covering stroke neurology team member, click here: [facility]   Choose \"On Call\" tab at top, then search dropdown box for \"Neurology Adult\", select location, press Enter, then look for stroke/neuro " ICU/telestroke.    _____________________________________________________    Clinically Significant Risk Factors Present on Admission            # Coagulation Defect: home medication list includes an anticoagulant medication        Past Medical History   Past Medical History:   Diagnosis Date     Acute, but ill-defined, cerebrovascular disease 6/7/2003    Stroke     Anemia of chronic disease 4/6/2014     Asthma      CAD (coronary artery disease)     stent post RCA 2001     Cerebral ventriculomegaly      Chronic respiratory insufficiency 4/6/2014     Compression of brain (H) 3/6/2014     CVA (cerebral infarction) 2002     CVA (cerebral vascular accident) (H) 3/4/2014     Dementia (H)      Dementia (H)      Depression      Diverticulosis      Dyslipidemia      Heterozygous factor V Leiden mutation (H)      HTN (hypertension)      Hyperlipidemia      Hypothyroidism      Insomnia 6/24/2014     Migraine 6/7/2003    Migraine Without Aura     Normal pressure hydrocephalus (H)      Posterior reversible encephalopathy syndrome      Subarachnoid hemorrhage (H) 3/23/2013     Subdural hematoma (H)      Syncope 7/19/2020     TIA (transient ischaemic attack)     multiple     TIA (transient ischaemic attack)      Traumatic encephalopathy 4/6/2014     Unspecified cerebral artery occlusion with cerebral infarction      Urinary incontinence      UTI (lower urinary tract infection)     recurrent     Past Surgical History   Past Surgical History:   Procedure Laterality Date     APPENDECTOMY       BIOPSY      breast     CARDIAC SURGERY      stent to the right coronary artery in 2001     CHOLECYSTECTOMY       ENT SURGERY      tonsillectomy     GENITOURINARY SURGERY      Bladder suspension     GYN SURGERY      D & C     GYN SURGERY      laproscopy oophorectomy unilateral     HERNIA REPAIR       HYSTERECTOMY       LUMBAR PUNCTURE       OPEN REDUCTION INTERNAL FIXATION HIP NAILING  11/18/2013    Procedure: OPEN REDUCTION INTERNAL FIXATION  HIP NAILING;  RIGHT HIP FRACTURE;  Surgeon: John Venegas MD;  Location:  OR     OPTICAL TRACKING SYSTEM IMPLANT SHUNT VENTRICULOPERITONEAL  7/16/2013    Procedure: OPTICAL TRACKING SYSTEM IMPLANT SHUNT VENTRICULOPERITONEAL;  RIGHT OCCIPITAL VENTRICULOPERITONEAL SHUNT, IMAGE GUIDANCE, NEUROENDOSCOPY, INTRATHECAL ANTIBIOTICS (STEALTH AXIEM, STRATA II LEVEL 2.5, PATIENT IN SUPINE 30-80, GEL DONUT WITH HEAD TURNED TO LEFT)      ;  Surgeon: Tayo Conley MD;  Location:  OR     STENT  2001    RCA     wisdom teeth extraction[       Medications   Home Meds  Prior to Admission medications    Medication Sig Start Date End Date Taking? Authorizing Provider   acetaminophen (TYLENOL) 325 MG tablet Take 650 mg by mouth every 6 hours as needed for pain    Yes Unknown, Entered By History   apixaban ANTICOAGULANT (ELIQUIS) 2.5 MG tablet Take 2.5 mg by mouth 2 times daily   Yes Unknown, Entered By History   atorvastatin (LIPITOR) 10 MG tablet Take 1 tablet (10 mg) by mouth daily 7/6/15  Yes Patric Pinedo MD   cholecalciferol 1000 UNITS TABS Take 1,000 Units by mouth daily 7/6/15  Yes Patric Pinedo MD   levETIRAcetam (KEPPRA) 100 MG/ML solution Take 1.25 mLs (125 mg) by mouth 2 times daily 10/7/20  Yes Susu Pereira NP   levothyroxine (SYNTHROID/LEVOTHROID) 75 MCG tablet Take 1 tablet (75 mcg) by mouth daily 7/22/20  Yes Heather Odonnell MD   lisinopril (ZESTRIL) 2.5 MG tablet Take 1 tablet by mouth daily 2/10/21  Yes Reported, Patient   melatonin 1 MG TABS tablet Take 1 mg by mouth nightly as needed for sleep    Yes Unknown, Entered By History   metoprolol tartrate (LOPRESSOR) 12.5 mg TABS half-tab Take 12.5 mg by mouth 2 times daily   Yes Unknown, Entered By History   nystatin (MYCOSTATIN) 731063 UNIT/GM external powder Apply topically 2 times daily as needed (intertriginal dermatitis)   Yes Unknown, Entered By History   senna-docusate  "(SENOKOT-S/PERICOLACE) 8.6-50 MG tablet Take 1 tablet by mouth 2 times daily 7/21/20  Yes Heather Odonnell MD       Scheduled Meds    apixaban ANTICOAGULANT  2.5 mg Oral BID     atorvastatin  10 mg Oral At Bedtime     levETIRAcetam  125 mg Oral BID     levothyroxine  75 mcg Oral QAM AC     lisinopril  2.5 mg Oral Daily     metoprolol tartrate  12.5 mg Oral BID     senna-docusate  1-2 tablet Oral or NG Tube BID       Infusion Meds    0.9% sodium chloride + KCl 20 mEq/L 75 mL/hr at 10/24/21 0801     sodium chloride         PRN Meds  acetaminophen **OR** acetaminophen **OR** acetaminophen, hydrALAZINE, labetalol, ondansetron **OR** ondansetron, sodium chloride    Allergies   Allergies   Allergen Reactions     Tramadol Unknown     Family History   Family History   Problem Relation Age of Onset     Cerebrovascular Disease Mother      Cerebrovascular Disease Father      Blood Disease Son      Social History   Social History     Tobacco Use     Smoking status: Never Smoker     Smokeless tobacco: Never Used   Substance Use Topics     Alcohol use: No     Drug use: No       Review of Systems   Review of systems not obtained due to patient factors - confusion       PHYSICAL EXAMINATION   Temp:  [97.5  F (36.4  C)-98.2  F (36.8  C)] 97.9  F (36.6  C)  Pulse:  [66-88] 88  Resp:  [16-18] 18  BP: (103-133)/(59-70) 131/70  SpO2:  [93 %-99 %] 94 %    General Exam  General:  Sitting up in chair in NAD  HEENT:  normocephalic/atraumatic    Neuro Exam  Mental Status: awake, alert, pleasant, reading food menu. Talking about a little boy who she ordered dinner for.  When asked orientation question she knows she's in [place] but regarding other questions states \"don't play games with me\".   Cranial Nerves:  visual fields intact, PERRL, EOMI with normal smooth pursuit, facial sensation intact and symmetric, facial movements symmetric, palate elevation symmetric and uvula midline, no dysarthria, shoulder shrug strong bilaterally, " tongue protrusion midline, very [place]  Motor:  normal muscle tone and bulk, no abnormal movements, able to move all limbs spontaneously, strength 5/5 throughout upper and lower extremities, no pronator drift  Reflexes:  toes down-going  Sensory:  unable to test, does not understand  Coordination:  unable to test, does not understand  Station/Gait:  deferred    Dysphagia Screen  Per Nursing    Stroke Scales    NIHSS  Interval transfer (10/23/21 1727)   Interval Comments     1a. Level of Consciousness 0-->Alert, keenly responsive   1b. LOC Questions 1-->Answers one question correctly   1c. LOC Commands 0-->Performs both tasks correctly   2.   Best Gaze 0-->Normal   3.   Visual 0-->No visual loss   4.   Facial Palsy 0-->Normal symmetrical movements   5a. Motor Arm, Left 0-->No drift, limb holds 90 (or 45) degrees for full 10 secs   5b. Motor Arm, Right 0-->No drift, limb holds 90 (or 45) degrees for full 10 secs   6a. Motor Leg, Left 0-->No drift, leg holds 30 degree position for full 5 secs   6b. Motor Leg, right 0-->No drift, leg holds 30 degree position for full 5 secs   7.   Limb Ataxia 0-->Absent   8.   Sensory 0-->Normal, no sensory loss   9.   Best Language 1-->Mild-to-moderate aphasia, some obvious loss of fluency or facility of comprehension, without significant limitation on ideas expressed or form of expression. Reduction of speech and/or comprehension, however, makes conversation. . . (see row details)   10. Dysarthria 0-->Normal   11. Extinction and Inattention  0-->No abnormality   Total 4 (10/23/21 1508)       Imaging  I personally reviewed all imaging; relevant findings per HPI.    Labs Data   CBC  Recent Labs   Lab 10/24/21  0451 10/23/21  1351   WBC 7.9 8.9   RBC 3.76* 3.92   HGB 11.8 12.4   HCT 35.7 37.5    170     Basic Metabolic Panel   Recent Labs   Lab 10/24/21  1221 10/24/21  0833 10/24/21  0726 10/24/21  0451 10/23/21  1721 10/23/21  1351   NA  --   --   --  140  --  141   POTASSIUM  --   3.5  --  3.2*  --  3.5   CHLORIDE  --   --   --  110*  --  109   CO2  --   --   --  25  --  28   BUN  --   --   --  11  --  13   CR  --   --   --  0.70  --  0.80   *  --  80 84   < > 147*   JAZLYN  --   --   --  8.4*  --  8.7    < > = values in this interval not displayed.     Liver Panel  No results for input(s): PROTTOTAL, ALBUMIN, BILITOTAL, ALKPHOS, AST, ALT, BILIDIRECT in the last 168 hours.  INR    Recent Labs   Lab Test 10/23/21  1351 08/09/20  1402 05/31/15  1613   INR 1.40* 1.13 1.07      Lipid Profile    Recent Labs   Lab Test 06/21/15  0935   CHOL 125   HDL 35*   LDL 62   TRIG 141   CHOLHDLRATIO 3.6     A1C  No lab results found.  Troponin I    Recent Labs   Lab 10/24/21  0451   TROPONIN 0.463*          Stroke Consult Data Data This was a non-emergent, non-telestroke consult.

## 2021-10-24 NOTE — PROGRESS NOTES
EEG CLINICAL NEUROPHYSIOLOGY PRELIMINARY REPORT    EEG performed mid morning reviewed. Findings are abnormal.    1) background activity consistent with moderate diffuse encephalopathy.  EEG is reactive and posterior dominant rhythm is seen.  There are some asymmetries that appear related to craniotomy defect.  2) infrequent right temporal epileptiform sharp waves are noted.  These findings are consistent with focal cortical irritability in this area.  Epileptiform discharges are highly associated with clinical diagnosis of seizures.  3) overt seizures were not recorded in this brief study.  There was no indication of nonconvulsive status epilepticus.    Full report in chart.    Francois Blanco MD  Contact information for physicians covering Epilepsy and EEG is available on [facility].  Click search, enter neurology adult/[facility] in group name box, click on neurology adult/[facility], then click Staff Epilepsy and EEG.

## 2021-10-24 NOTE — PROGRESS NOTES
[facility]    HOSPITALIST PROGRESS NOTE :   --------------------------------------------------    Date of Admission:  10/23/2021    Cumulative Summary: Joelle Freeman is a 89 year old female has medical history of presumed seizures, multiple CVAs and TIAs, NPH s/p ventriculostomy shunt, elevated troponin, left bundle branch block, essential hypertension, coronary artery disease, severe aortic stenosis, dysphagia, dementia and pulmonary emboli on chronic anticoagulation who presented with acute aphasia.  Patient was admitted for further evaluation and management.    Assessment & Plan     Acute aphasia, and altered mental status  Possible syncope, history of seizures, with concern for acute seizure  History of multiple TIAs/CVAs  History of NPH status post ventricular shunt  Bilateral hygromas versus chronic subdural hematomas   Patient has history of acute encephalopathy with fluctuations in LOC, suspected to be multifactorial (metabolic and infectious) including suspected seizures, UTI, and underlying advanced dementia on top of bilateral chronic subdural hygromas/hematomas and prior stroke and NPH.  Presents with acute onset of being unable to speak, witnessed by family at her LTC, and improved upon arrival to the ED.    --Patient care was assumed this morning, patient was seen and examined, bedside nursing also present.  --Stroke neurology was consulted in the emergency room, appreciate them following  --Neurology has recommended brain MRI and EEG  --We will place consult for neurosurgery as patient has ventricular shunt for MRI.  --Currently patient is n.p.o., will start patient on normal saline with KCl at 20 mEq at 75 mL/h for 1 L  --Gave 20 mEq of KCl for hypokalemia this morning  --BMP tomorrow morning  --EEG results are pending, her previous EEG done in August 2020 showed mild diffuse encephalopathy and epileptiform discharges with possible underlying seizure  --We will follow up  on current EEG and MRI results.  --Aphasia has resolved this morning, patient seems to be oriented to person, answering questions appropriately but does have significantly advanced underlying dementia  --No signs of ischemic changes on telemetry.  --Troponins slightly elevated, which seems to be the chronic flat pattern, will follow up on echocardiogram results, see below.  --Neurochecks every 4 hours.  --Continue PTA Keppra.  -- At this time no aspirin as ordered, continue PTA apixaban.  --Due to the concern for new stroke, permissive hypertension is allowed although blood pressure has been is stable.  --Patient has known history of severe aortic stenosis, last echocardiogram from July 2020 showed aortic valve area of 0.5 cm  and is slightly worsened from previous.  Echocardiogram has been ordered.  Once echo results are back then will discuss with family further if they would like to pursue any further cardiac interventions.  --TSH has been checked, in normal range.  --Leukocytosis, no fever, urine analysis is normal, COVID-19 PCR is also negative, currently no signs and symptoms of active infection  --Physical, occupational and speech therapy has been ordered.  Patient has been getting evaluated by speech therapy this morning  --Family communication: Contacted azra Brewer (POA) 10/23/2021      Chronically elevated troponin  LBBB  Hypertension  Hyperlipidemia  CAD -RCA stent 2001  Severe aortic stenosis  --Patient has known history of severe aortic stenosis, last echocardiogram from 7/2020 shows aortic valve area 0.5 cm  and is slightly worsened from prior study.  Previously considered not to be a surgical candidate.  --We will follow up on the echocardiogram results, once results are back then and to discuss further with patient and family if they would like to pursue any cardiac interventions.  Centering her multiple comorbidities and advanced age my recommendation would be for conservative management.  --Continue  PTA metoprolol and lisinopril with hold parameters  --Continue PTA atorvastatin  --Monitor for chest pain or anginal symptoms  --Continuous telemetry monitoring  --Trended serial troponin , flat pattern , will not check further      Dementia  Moderate oropharyngeal dysphagia  Failure to thrive  Inadequate oral intake  Per son report, patient has had very little to eat or drink over the last 7 days.  Family and staff and try to encourage patient to eat, but she has a very low appetite.  This seems to have been a problem for a while now, and is worsening.    -- working her up for reversible causes as above.  -- PT/OT, SW consulted  -- If no definite metabolic, infectious, cardiac or neurologic issue is identified, need to consider that her dementia is worsening, and given her advanced age and frailty she may not tolerate repeated hospitalizations and interventions.  Palliative care was brought up previously with family, but has not yet formally been pursued.  -- I will wait to get further medical info from her MRI,EEG and ECHO and then will plan to have further discussion regarding goals of care with family    History of bilateral pulmonary emboli  History of Heterozygous FVL mutation  --Continue treatment with apixaban  --Monitor vital signs, currently without hypoxia on room air, add supplemental O2 if needed     Hypothyroidism  --Check TSH  --Resume PTA levothyroxine when able     Depression/anxiety  --Resume PTA Lexapro when able     COVID-19 test is negative as of 10/23/2021    Clinically Significant Risk Factors Present on Admission          # Coagulation Defect: home medication list includes an anticoagulant medication       Diet: Combination Diet Soft and Bite Sized Diet (level 6); Thin Liquids (level 0)    Benítez Catheter: Not present  DVT Prophylaxis: DOAC and Pneumatic Compression Devices  Code Status: No CPR- Do NOT Intubate    The patient's care was discussed with the Bedside Nurse, Patient and  Consultant.    Disposition Plan   Expected discharge: 10/26/2021   recommended to prior living arrangement , she is a LTC resident once her medical workup is done .  40 minutes of the time was a spent in face-to-face patient care on floor, more than 60% of the time was a spent in counseling and coordination of the care.  Entered: Shantell Magaña MD 10/24/2021, 10:09 AM     Shantell Magaña MD, FACP  Text Page (7am - 6pm)    ----------------------------------------------------------------------------------------------------------------------    Interval History   Patient care was assumed this morning, patient was seen and examined.  Bedside nursing also present.  Patient aphasia has resolved, she does have underlying dementia but answering all the questions and no dysarthria or aphasia was noticed.  Patient does not seem to have any other neuro deficits at this point other than some pronator drift in right upper extremity, hard of hearing.  Remains disoriented to situation pleasant and cooperative.  Plan of care regarding MRI, possible neurosurgery consultation due to the presence of  shunting and echocardiogram was discussed with bedside nursing.    -Data reviewed today: I reviewed all new labs and imaging results over the last 24 hours.    I personally reviewed the scan of the head which showed chronic bilateral subdural fluid collections unchanged with multiple small old infarcts and right-sided ventriculostomy shunt catheter.    Physical Exam   Temp: 97.6  F (36.4  C) Temp src: Oral BP: 133/70 Pulse: 73   Resp: 16 SpO2: 98 % O2 Device: None (Room air)    Vitals:    10/23/21 1343   Weight: 46 kg (101 lb 6.6 oz)     Vital Signs with Ranges  Temp:  [97.5  F (36.4  C)-98.4  F (36.9  C)] 97.6  F (36.4  C)  Pulse:  [67-77] 73  Resp:  [16] 16  BP: ()/(59-77) 133/70  SpO2:  [93 %-99 %] 98 %  No intake/output data recorded.    GENERAL: Alert , awake and oriented to person only, NAD. Conversational, appropriate.  Elderly  female, lying in bed, answering questions.  HEENT: Normocephalic. EOMI. No icterus or injection. Nares normal.   LUNGS: Clear to auscultation. No dyspnea at rest.   HEART: Regular rate. Extremities perfused.   ABDOMEN: Soft, nontender, and nondistended. Positive bowel sounds.   EXTREMITIES: No LE edema noted.   NEUROLOGIC: Right upper extremity pronator drift, no motor or sensory deficit, gait was not checked    Medications     0.9% sodium chloride + KCl 20 mEq/L 75 mL/hr at 10/24/21 0801     sodium chloride         apixaban ANTICOAGULANT  2.5 mg Oral BID     atorvastatin  10 mg Oral At Bedtime     levETIRAcetam  125 mg Oral BID     levothyroxine  75 mcg Oral QAM AC     lisinopril  2.5 mg Oral Daily     metoprolol tartrate  12.5 mg Oral BID     senna-docusate  1-2 tablet Oral or NG Tube BID       Data   Recent Labs   Lab 10/24/21  0833 10/24/21  0726 10/24/21  0451 10/23/21  2202 10/23/21  2138 10/23/21  1842 10/23/21  1721 10/23/21  1351   WBC  --   --  7.9  --   --   --   --  8.9   HGB  --   --  11.8  --   --   --   --  12.4   MCV  --   --  95  --   --   --   --  96   PLT  --   --  154  --   --   --   --  170   INR  --   --   --   --   --   --   --  1.40*   NA  --   --  140  --   --   --   --  141   POTASSIUM 3.5  --  3.2*  --   --   --   --  3.5   CHLORIDE  --   --  110*  --   --   --   --  109   CO2  --   --  25  --   --   --   --  28   BUN  --   --  11  --   --   --   --  13   CR  --   --  0.70  --   --   --   --  0.80   ANIONGAP  --   --  5  --   --   --   --  4   JAZLYN  --   --  8.4*  --   --   --   --  8.7   GLC  --  80 84  --  94  --    < > 147*   TROPONIN  --   --  0.463* 0.495*  --  0.477*   < > 0.442*    < > = values in this interval not displayed.       Imaging:   Recent Results (from the past 24 hour(s))   CTA Head Neck with Contrast    Narrative    EXAM: CTA  HEAD NECK WITH CONTRAST  LOCATION: [facility]  DATE/TIME: 10/23/2021, 2:02 PM    INDICATION: Aphasia. Code  stroke.  COMPARISON: None.  CONTRAST: 70 mL [id number]-[id number].      TECHNIQUE: Axial helical CT images of the head and neck vessels obtained during the arterial phase of intravenous contrast administration. Axial 2D reconstructed images and multiplanar 3D MIP reconstructed images of the head and neck vessels were   performed by the technologist. Dose reduction techniques were used. All stenosis measurements made according to NASCET criteria unless otherwise specified.    FINDINGS:     HEAD CTA:  The vertebral arteries, basilar artery, and posterior cerebral arteries are patent. The internal carotid arteries, anterior cerebral arteries, and middle cerebral arteries are patent. Scattered atherosclerotic plaquing is present with multiple mild   stenoses. No evidence of large vessel occlusion or high-grade stenosis. No evidence of aneurysm. Presumed varix along the right anterior temporal lobe. Dural venous sinuses are unremarkable.    NECK CTA:  A three-vessel aortic arch is present. The bilateral common carotid, internal carotid, external carotid, and vertebral arteries are patent. Severe plaque at the bilateral carotid bifurcations. Approximately 10% stenosis of the proximal right internal   carotid artery. Approximately 10% stenosis of the proximal left internal carotid artery. Moderate stenosis at the right vertebral artery origin.    Presumed scattered pulmonary atelectasis. Cervical spine degenerative change. Cervical spine probable subdural vascular plexus engorgement.      Impression    IMPRESSION:     HEAD CTA:   1.  No evidence of large vessel occlusion or high-grade stenosis.  2.  Scattered atherosclerotic plaquing with multiple mild stenoses.    NECK CTA:  1.  No evidence of large vessel occlusion or high-grade stenosis.  2.  Severe plaquing of the bilateral carotid bifurcations without evidence of flow-limiting stenosis.  3.  Moderate stenosis at the right vertebral artery origin.     CT Head w/o Contrast     Narrative    EXAM: CT HEAD WITHOUT CONTRAST  LOCATION: [facility]  DATE/TIME: 10/23/2021, 2:01 PM    INDICATION: Aphasia, code stroke.  COMPARISON: Head CT 08/31/2020.  TECHNIQUE: Routine CT Head without IV contrast. Multiplanar reformats. Dose reduction techniques were used.    FINDINGS:  No CT evidence of acute ischemia. Postoperative change of right occipital ventriculostomy shunt catheter placement with tip crossing midline abutting the septum pellucidum. Bilateral subdural fluid collections are present measuring approximately 7-9 mm,   unchanged. Subdural collections probably also extend into the posterior fossa and along the clivus, unchanged. The fluid collections demonstrate predominantly hypoattenuation. Background mild parenchymal volume loss is present with white matter   hypoattenuation likely representing chronic small vessel ischemic change. Old basal ganglia lacunar infarcts. 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    Prior right-sided craniotomy. Opacification of the left sphenoid sinus. Right lens replacement. The tympanic and mastoid cavities appear well aerated      Impression    IMPRESSION:  1.  No CT evidence of acute ischemia or hemorrhage.  2.  Chronic bilateral subdural fluid collections, unchanged.  3.  Multiple small old infarcts.  4.  Right-sided ventriculostomy shunt catheter.

## 2021-10-24 NOTE — PLAN OF CARE
Occupational Therapy: Orders received. Chart reviewed and discussed with care team, including IP PT.?Per discussion with treatment team, pt resides in an extended care facility and receives assist in all I/ADLs. All therapy needs are being met with IP PT. No skilled IP OT warranted.  Will complete orders.      Scribe Attestation (For Scribes USE Only)... Attending Attestation (For Attendings USE Only).../Scribe Attestation (For Scribes USE Only)...

## 2021-10-24 NOTE — CONSULTS
"Care Management Initial Consult    General Information  Assessment completed with: Charla, azra Brewer - health care agent  Type of CM/SW Visit: Initial Assessment  Primary Care Provider verified and updated as needed: Yes (Dr. Ramon, [phone])   Readmission within the last 30 days:   n/a     Reason for Consult: discharge planning  Advance Care Planning:    Health Care Directive dated 08/07/13 on file in EPIC     Communication Assessment  Patient's communication style: spoken language (English or Bilingual)        Cognitive  Cognitive/Neuro/Behavioral: .WDL except  Level of Consciousness: confused  Arousal Level: opens eyes spontaneously  Orientation: disoriented to, time, place  Mood/Behavior: calm, cooperative  Best Language: 1 - Mild to moderate  Speech: clear, spontaneous    Living Environment:   People in home:       Current living Arrangements: residential facility ([facility] [phone])      Able to return to prior arrangements:         Family/Social Support:  Care provided by:  Facility staff   Provides care for:  No one     Current Resources:   Patient receiving home care services: No     Community Resources: Other (see comment) (facility resident)  Equipment currently used at home: walker, rolling, wheelchair, manual  Supplies currently used at home: Incontinence Supplies    Employment/Financial:  Employment Status:     retired   Financial Concerns: No concerns identified   Finance Comments: active MEDICARE/MEDICARE and [employer] insurance ; son states pt is on Elderly Waiver program as well    Lifestyle & Psychosocial Needs:  Outpatient \"social determinants of health\" assessment not done     Functional Status:  Prior to admission patient needed assistance: see note below     Mental Health Status:  Mental Health Status: No Current Concerns (cognitive concerns at baseline )       Chemical Dependency Status:  Chemical Dependency Status: No Current Concerns (hx alcohol " "use, in remission)       Values/Beliefs:  Spiritual, Cultural Beliefs, Episcopal Practices, Values that affect care:       none    Additional Information:    Called son (health care agent) to provide MAYEN.  He verbalizes understanding.    Confirmed: pt came from [facility].  Son states, \"she hates it there, they don't do anything for her. And that's where most of the COVID has been coming up.  Anything that can be done to help get her somewhere else is appreciated.\"  Reviewed therapy notes, at this time only ST has seen patient.  ST recommends LTC.  PT and OT are pending.  If TCU is recommended she may have a short stay somewhere else that may help with giving more time to make a new plan.  Otherwise, she may unfortunately have to return to [facility] and continue to work on placement for there.  I asked if he has toured anywhere or gotten on a wait list.  He states, \"I have called a few places like [facility] but they haven't called me back.\"  Encouraged son to tour, whether in person or virtual, and ask to be placed on wait lists to get a phone call when beds open up, at a minimum.  Son noted pt is on Elderly Waiver program.     CM-RN contacted [facility] ([phone]) to ask for baseline:   + Per nurse, pt is in LTC; at baseline pt iss tand-by-assist x1 no device for ADLs, med administration, meals provided--no feeding help (setup, a little slower, but able to do independently), incontinent Dr. Edenilson Ramon is PCP.        Lena Child RN, BSN, PHN  [facility]  Inpatient Care Management - FLOAT  Mobile: [phone] 10/24/21 until 4pm  (after today's date, please call the patient's unit)     "

## 2021-10-24 NOTE — PROGRESS NOTES
10/24/21 1130   Quick Adds   Quick Adds Certification   Type of Visit Initial PT Evaluation   Living Environment   People in home facility resident   Current Living Arrangements residential facility  ([facility] - [phone])   Home Accessibility no concerns   Transportation Anticipated   (none stated)   Living Environment Comments Pt lives at [place].   Self-Care   Usual Activity Tolerance fair   Current Activity Tolerance fair   Regular Exercise No   Equipment Currently Used at Home walker, rolling;wheelchair, manual   Activity/Exercise/Self-Care Comment Per discussion with [facility] pt receives 'extensive assist' (>50% assist) for bed mobility and transfers to w/c. Then has assist for w/c mobility. Also receives extensive assist for dressing/undressing.   Disability/Function   Fall history within last six months yes   Number of times patient has fallen within last six months   (pt reports falls, unsure of frequency or timing)   General Information   Onset of Illness/Injury or Date of Surgery 10/23/21   Referring Physician Dmitriy Garibay PA   Patient/Family Therapy Goals Statement (PT) To return home   Pertinent History of Current Problem (include personal factors and/or comorbidities that impact the POC) Pt is a 89 YOF admitted with aphasia and AMS for work up of possible CVA/TIA or seizure. PMH significant for medical history of presumed seizures, multiple CVAs and TIAs, NPH s/p ventriculostomy shunt, elevated troponin, left bundle branch block, essential hypertension, coronary artery disease, severe aortic stenosis, dysphagia, dementia and pulmonary emboli on chronic anticoagulation.   Existing Precautions/Restrictions fall;seizures   General Observations Pt pleasantly confused, son at bedside at start of session and supportive.   Cognition   Orientation Status (Cognition) oriented to;person;verbal cues/prompts needed for orientation   Affect/Mental Status (Cognition)  confused   Pain Assessment   Patient Currently in Pain No   Integumentary/Edema   Integumentary/Edema no deficits were identifed   Posture    Posture Comments Forward flexion, rounded shoulders   Range of Motion (ROM)   ROM Comment WFL for mobility   Strength   Strength Comments 3-/5 B LE   Bed Mobility   Comment (Bed Mobility) Mod A supine > sit with HOB elevated   Transfers   Transfer Safety Comments Min-mod A for sit <> stand at FWW. STatic standing with CGA to mod A (variable per trial). Able to stand to have assist pulling up brief. Stand pivot transfer with Min A and FWW.   Gait/Stairs (Locomotion)   Comment (Gait/Stairs) Able to step forward/back 2 steps with FWW and CGA. Per son and facility pt non-ambulatory at baseline.   Balance   Balance Comments Fair with UE support on FWW.   Sensory Examination   Sensory Perception Comments denies numbness/tingling   Clinical Impression   Criteria for Skilled Therapeutic Intervention evaluation only;current level of function same as previous level of function   Clinical Presentation Stable/Uncomplicated   Clinical Presentation Rationale Pt presentation and clinical judgement   Clinical Decision Making (Complexity) low complexity   Therapy Frequency (PT) Evaluation only   Predicted Duration of Therapy Intervention (days/wks) 1 day   Risk & Benefits of therapy have been explained evaluation/treatment results reviewed;care plan/treatment goals reviewed;risks/benefits reviewed;current/potential barriers reviewed;participants included;participants voiced agreement with care plan;patient   Clinical Impression Comments Pt pleasantly confused, able to assist with mobility to get to chair. Per discussion with facility this is better than baseline mobility. Encouraged nsg to continue assisting with transfers to chair.   PT Discharge Planning    PT Discharge Recommendation (DC Rec) Long term care facility  (Return to prior living environment with same level of assist)   PT  Rationale for DC Rec Pt requires min-mod A for bed mobility and transfers with FWW, this is baseline for pt. Appropriate to return to prior living situation when medically stable.   PT Brief overview of current status  min-mod A for bed mobility and transfers with FWW.    Total Evaluation Time   Total Evaluation Time (Minutes) 30

## 2021-10-24 NOTE — PROVIDER NOTIFICATION
MD Notification    Notified Person: MD    Notified Person Name:Akshat    Notification Date/Time:0417    Notification Interaction:Amcom    Purpose of Notification:Pt's trop level is trending up and there is no recheck again. Can you order for recheck again? Thank you    Orders Received:    Comments:

## 2021-10-24 NOTE — CONSULTS
"[facility]    Neurosurgery Consultation     Date of Admission:  10/23/2021  Date of Consult (When I saw the patient): 10/24/21    Assessment & Plan   Joelle Freeman is a 89 year old female who was admitted on 10/23/2021. I was asked to see the patient for \"please evaluate, MRI is ordered and patient does have  shunt in place thanks.\"    Active Problems:    Seizure disorder (H)    Assessment: chronic bilateral subdural fluid collections, unchanged. Right-sided ventriculostomy shunt catheter.     Plan: Strata shunt confirmed to be set at 1.0 which is consistent with previously documented shunt setting. Will recheck shunt setting post MRI.    I have discussed the following assessment and plan with Dr. Beckford who is in agreement with initial plan and will follow up with further consultation recommendations.    Latanya Angel, ROSA  [facility] Neurosurgery  [address]  [address] [place] [id number]    Tel [phone]  Pager [phone]        Code Status    No CPR- Do NOT Intubate    Reason for Consult   Reason for consult: I was asked by Dr. Shantell Magaña to evaluate this patient for \"please evaluate, MRI is ordered and patient does have  shunt in place thanks.\"    Primary Care Physician   Edenilson Ramon    Chief Complaint   AMS    History is obtained from the patient and EMR.     History of Present Illness   Joelle Freeman is a 89 year old female who presents with a history of presumed seizures, multiple CVAs and TIAs, NPH s/p  shunt, and dementia who presents for acute aphasia and AMS. A head CT was obtained and revealed stable bilateral subdural fluid collections. A brain MRI was ordered for further evaluation. Due to presence of  shunt, NSG was consulted to evaluate the shunt setting after the MRI is complete. Her last visit with NSG was on 8/31/2020. At that time her shunt was checked and found to be at 1.0. No change to " the shunt setting was made at that time. Today she was seen sitting up in the bed. She is awake and alert and cooperative. She follows commands. She has some right lower extremity weakness as well as generalized weakness. Shunt setting currently set at the 1.0 station.     Past Medical History   I have reviewed this patient's medical history and updated it with pertinent information if needed.   Past Medical History:   Diagnosis Date     Acute, but ill-defined, cerebrovascular disease 6/7/2003    Stroke     Anemia of chronic disease 4/6/2014     Asthma      CAD (coronary artery disease)     stent post RCA 2001     Cerebral ventriculomegaly      Chronic respiratory insufficiency 4/6/2014     Compression of brain (H) 3/6/2014     CVA (cerebral infarction) 2002     CVA (cerebral vascular accident) (H) 3/4/2014     Dementia (H)      Dementia (H)      Depression      Diverticulosis      Dyslipidemia      Heterozygous factor V Leiden mutation (H)      HTN (hypertension)      Hyperlipidemia      Hypothyroidism      Insomnia 6/24/2014     Migraine 6/7/2003    Migraine Without Aura     Normal pressure hydrocephalus (H)      Posterior reversible encephalopathy syndrome      Subarachnoid hemorrhage (H) 3/23/2013     Subdural hematoma (H)      Syncope 7/19/2020     TIA (transient ischaemic attack)     multiple     TIA (transient ischaemic attack)      Traumatic encephalopathy 4/6/2014     Unspecified cerebral artery occlusion with cerebral infarction      Urinary incontinence      UTI (lower urinary tract infection)     recurrent       Past Surgical History   I have reviewed this patient's surgical history and updated it with pertinent information if needed.  Past Surgical History:   Procedure Laterality Date     APPENDECTOMY       BIOPSY      breast     CARDIAC SURGERY      stent to the right coronary artery in 2001     CHOLECYSTECTOMY       ENT SURGERY      tonsillectomy     GENITOURINARY SURGERY      Bladder suspension      GYN SURGERY      D & C     GYN SURGERY      laproscopy oophorectomy unilateral     HERNIA REPAIR       HYSTERECTOMY       LUMBAR PUNCTURE       OPEN REDUCTION INTERNAL FIXATION HIP NAILING  11/18/2013    Procedure: OPEN REDUCTION INTERNAL FIXATION HIP NAILING;  RIGHT HIP FRACTURE;  Surgeon: John Venegas MD;  Location:  OR     OPTICAL TRACKING SYSTEM IMPLANT SHUNT VENTRICULOPERITONEAL  7/16/2013    Procedure: OPTICAL TRACKING SYSTEM IMPLANT SHUNT VENTRICULOPERITONEAL;  RIGHT OCCIPITAL VENTRICULOPERITONEAL SHUNT, IMAGE GUIDANCE, NEUROENDOSCOPY, INTRATHECAL ANTIBIOTICS (STEALTH AXIEM, STRATA II LEVEL 2.5, PATIENT IN SUPINE 30-80, GEL DONUT WITH HEAD TURNED TO LEFT)      ;  Surgeon: Tayo Conley MD;  Location:  OR     STENT  2001    RCA     wisdom teeth extraction[         Prior to Admission Medications   Prior to Admission Medications   Prescriptions Last Dose Informant Patient Reported? Taking?   acetaminophen (TYLENOL) 325 MG tablet Unknown at Unknown time Nursing Home Yes Yes   Sig: Take 650 mg by mouth every 6 hours as needed for pain    apixaban ANTICOAGULANT (ELIQUIS) 2.5 MG tablet 10/23/2021 at AM Nursing Home Yes Yes   Sig: Take 2.5 mg by mouth 2 times daily   atorvastatin (LIPITOR) 10 MG tablet 10/22/2021 at HS Nursing Home No Yes   Sig: Take 1 tablet (10 mg) by mouth daily   cholecalciferol 1000 UNITS TABS 10/23/2021 at AM Nursing Home No Yes   Sig: Take 1,000 Units by mouth daily   levETIRAcetam (KEPPRA) 100 MG/ML solution 10/23/2021 at AM Nursing Home No Yes   Sig: Take 1.25 mLs (125 mg) by mouth 2 times daily   levothyroxine (SYNTHROID/LEVOTHROID) 75 MCG tablet 10/23/2021 at 0700 Nursing Home No Yes   Sig: Take 1 tablet (75 mcg) by mouth daily   lisinopril (ZESTRIL) 2.5 MG tablet 10/23/2021 at AM Nursing Home Yes Yes   Sig: Take 1 tablet by mouth daily   melatonin 1 MG TABS tablet Unknown at Unknown time Nursing Home Yes Yes   Sig: Take 1 mg by mouth nightly as needed for  sleep    metoprolol tartrate (LOPRESSOR) 12.5 mg TABS half-tab 10/23/2021 at AM Nursing Home Yes Yes   Sig: Take 12.5 mg by mouth 2 times daily   nystatin (MYCOSTATIN) 429375 UNIT/GM external powder Unknown at Unknown time Nursing Home Yes Yes   Sig: Apply topically 2 times daily as needed (intertriginal dermatitis)   senna-docusate (SENOKOT-S/PERICOLACE) 8.6-50 MG tablet 10/23/2021 at AM Nursing Home No Yes   Sig: Take 1 tablet by mouth 2 times daily      Facility-Administered Medications: None     Allergies   Allergies   Allergen Reactions     Tramadol Unknown       Social History   I have reviewed this patient's social history and updated it with pertinent information if needed. Joelle Freeman  reports that she has never smoked. She has never used smokeless tobacco. She reports that she does not drink alcohol and does not use drugs.    Family History   I have reviewed this patient's family history and updated it with pertinent information if needed.   Family History   Problem Relation Age of Onset     Cerebrovascular Disease Mother      Cerebrovascular Disease Father      Blood Disease Son        Review of Systems   10 point ROS negative except noted above.    Physical Exam   Temp: 97.6  F (36.4  C) Temp src: Oral BP: 133/70 Pulse: 73   Resp: 16 SpO2: 98 % O2 Device: None (Room air)    Vital Signs with Ranges  Temp:  [97.5  F (36.4  C)-98.4  F (36.9  C)] 97.6  F (36.4  C)  Pulse:  [67-77] 73  Resp:  [16] 16  BP: ()/(59-77) 133/70  SpO2:  [93 %-99 %] 98 %  101 lbs 6.59 oz     , Blood pressure 133/70, pulse 73, temperature 97.6  F (36.4  C), temperature source Oral, resp. rate 16, weight 46 kg (101 lb 6.6 oz), SpO2 98 %.  101 lbs 6.59 oz  HEENT:  Normocephalic, atraumatic.  PERRLA.  EOM s intact.   Heart:  No peripheral edema  Lungs:  No SOB.  Skin:  Warm and dry, good capillary refill.  Extremities:  Good radial and dorsalis pedis pulses bilaterally, no edema, cyanosis or clubbing.    NEUROLOGICAL  EXAMINATION:   Mental status:  Alert and Oriented x 1. Follows commands.  Motor: BUE 4+/5 LLE 4+/5 RLE 2/5  Sensation:  intact    Data     CBC RESULTS:   Recent Labs   Lab Test 10/24/21  0451   WBC 7.9   RBC 3.76*   HGB 11.8   HCT 35.7   MCV 95   MCH 31.4   MCHC 33.1   RDW 12.3        Basic Metabolic Panel:  Lab Results   Component Value Date     10/24/2021     12/21/2020      Lab Results   Component Value Date    POTASSIUM 3.5 10/24/2021    POTASSIUM 3.8 02/24/2021     Lab Results   Component Value Date    CHLORIDE 110 10/24/2021    CHLORIDE 110 12/21/2020     Lab Results   Component Value Date    JAZLYN 8.4 10/24/2021    JAZLYN 9.5 12/21/2020     Lab Results   Component Value Date    CO2 25 10/24/2021    CO2 21 12/21/2020     Lab Results   Component Value Date    BUN 11 10/24/2021    BUN 21 12/21/2020     Lab Results   Component Value Date    CR 0.70 10/24/2021    CR 0.93 12/21/2020     Lab Results   Component Value Date    GLC 80 10/24/2021    GLC 84 10/24/2021     12/21/2020     INR:  Lab Results   Component Value Date    INR 1.40 10/23/2021    INR 1.13 08/09/2020    INR 1.07 05/31/2015    INR 1.01 01/20/2015    INR 1.01 11/17/2013    INR 0.98 06/27/2013

## 2021-10-24 NOTE — PLAN OF CARE
Reason for Admission: AMS    Cognitive/Mentation: A/Ox 3. Disoriented to time.   Neuros/CMS: Intact ex R drift, fixed R pupil, L eye sluggish  VS: stable. Tele: sinus with a bundle branch.  GI: BS hypoactive, last BM unknown. Continent.  : Incontinence brief  Pulmonary: LS diminished bilaterally.  Pain: denies.     Drains: none  Skin: scattered bruises on BUE  Activity: Assist x 1 with GB/W, turn and pivot  Diet: soft and bite size with thin liquids. Takes pills whole with water.     Therapies recs: LTC  Discharge: pending further work up     End of shift summary: Pt stable throughout shift. Potassium replaced, recheck at 1900. EEG completed. Neurosurg verified  shunt for MRI, MRI updated, still awaiting scan. Up in the chair for lunch.

## 2021-10-24 NOTE — PROGRESS NOTES
"Clinical Swallow Evaluation:    Minimal oral dysphagia. Recommend initiate soft and bite sized solids (IDDSI 6), thin liquids (IDDSI 0) when fully alert, positioned fully upright, assist with tray set up. Anticipate pt will progress to baseline of regular solids by discharge.     10/24/21 0919   General Information   Onset of Illness/Injury or Date of Surgery 10/23/21   Referring Physician Dmitriy Garibay PA   Patient/Family Therapy Goal Statement (SLP) To eat/drink   Pertinent History of Current Problem   Per H&P \"Joelle Freeman is a 89 year old female with a past medical history of presumed seizures, multiple CVAs and TIAs, NPH status post ventriculostomy shunt, elevated troponin, LBBB, hypertension, CAD, severe aortic stenosis, dysphagia, dementia, and pulmonary emboli on chronic anticoagulation who presents with acute aphasia\" NPO until SLP assessment.     General Observations Pt fully alert, positioned fully upright, conversing appropriately with writer today, able to self-feed s/p set up   Past History of Dysphagia   8/2020 admission d/c'd on puree, nectar/mildly  thick liquids via tsp.     Per baseline facility paperwork, current diet = regular/thin.      Pain Assessment   Patient Currently in Pain No   Type of Evaluation   Type of Evaluation Swallow Evaluation   Oral Motor   Oral Musculature generally intact   Structural Abnormalities none present   Mucosal Quality dry;sticky   Dentition (Oral Motor)   Dentition (Oral Motor) adequate dentition   Facial Symmetry (Oral Motor)   Facial Symmetry (Oral Motor) WNL   Lip Function (Oral Motor)   Lip Range of Motion (Oral Motor) WNL   Tongue Function (Oral Motor)   Tongue ROM (Oral Motor) WNL   Jaw Function (Oral Motor)   Jaw Function (Oral Motor) WNL   Cough/Swallow/Gag Reflex (Oral Motor)   Soft Palate/Velum (Oral Motor) WNL   Gag Reflex (Oral Motor) WNL   Volitional Throat Clear/Cough (Oral Motor) WNL   Volitional Swallow (Oral Motor) WNL   Vocal " Quality/Secretion Management (Oral Motor)   Vocal Quality (Oral Motor) WNL   Secretion Management (Oral Motor) WNL   General Swallowing Observations   Current Diet/Method of Nutritional Intake (General Swallowing Observations, NIS) NPO   Respiratory Support (General Swallowing Observations) none   Swallowing Evaluation Clinical swallow evaluation   Clinical Swallow Evaluation   Feeding Assistance set up only required   Clinical Swallow Evaluation Textures Trialed thin liquids;pureed;soft & bite-sized;solid foods   Clinical Swallow Eval: Thin Liquid Texture Trial   Mode of Presentation, Thin Liquids cup;straw;self-fed   Volume of Liquid or Food Presented 12 oz, x5 medications whole with thin liquids with RN   Oral Phase of Swallow WFL   Pharyngeal Phase of Swallow intact   Diagnostic Statement No overt clinical signs/sx aspiration   Clinical Swallow Evaluation: Puree Solid Texture Trial   Mode of Presentation, Puree spoon;self-fed   Volume of Puree Presented x4 bites   Oral Phase, Puree WFL   Pharyngeal Phase, Puree intact   Diagnostic Statement No overt clinical signs/sx aspiration   Clinical Swallow Eval: Soft & Bite Sized   Mode of Presentation spoon;self-fed   Volume Presented x2 bites   Oral Phase WFL   Pharyngeal Phase intact   Diagnostic Statement No overt clinical signs/sx aspiration   Clinical Swallow Evaluation: Solid Food Texture Trial   Mode of Presentation self-fed   Volume Presented 1/2 adriana cracker   Oral Phase   (slow mastication/oral phase)   Pharyngeal Phase intact   Diagnostic Statement No overt clinical signs/sx aspiration   Swallowing Recommendations   Diet Consistency Recommendations soft & bite-sized (level 6);thin liquids (level 0)   Supervision Level for Intake distant supervision needed   Monitoring/Assistance Required (Eating/Swallowing) stop eating activities when fatigue is present;monitor for cough or change in vocal quality with intake   Recommended Feeding/Eating Techniques (Swallow  Eval) maintain upright sitting position for eating;maintain upright posture during/after eating for 30 minutes;set-up and prepare tray   Medication Administration Recommendations, Swallowing (SLP) whole with thin as tolerated   General Therapy Interventions   Planned Therapy Interventions Dysphagia Treatment   SLP Therapy Assessment/Plan   Criteria for Skilled Therapeutic Interventions Met (SLP Eval) yes;treatment indicated   SLP Diagnosis minimal oral dysphagia   Rehab Potential (SLP Eval) good, to achieve stated therapy goals   Therapy Frequency (SLP Eval) 5 times/wk   Predicted Duration of Therapy Intervention (SLP Eval) 1 week   Comment, Therapy Assessment/Plan (SLP)   Pt presents with minimal oral dysphagia per clinical swallow evaluation. Pt fully alert, positioned fully upright, conversing appropriately with writer today, able to self-feed s/p set up. Clinical trials of thin liquids, puree, semi-solids, regular solids completed. Periodic  oral holding with thin liquids, otherwise  timely swallows with no overt clinical signs/sx aspiration. Mastication and oral transit mild prolonged with regular solids, improved with softer/moister. Appropriate for PO initiation.      Therapy Plan Review/Discharge Plan (SLP)   Therapy Plan Review (SLP) evaluation/treatment results reviewed;care plan/treatment goals reviewed;risks/benefits reviewed;current/potential barriers reviewed;participants voiced agreement with care plan;participants included;patient   SLP Discharge Planning    SLP Discharge Recommendation (DC Rec) Long term care facility   SLP Rationale for DC Rec Pt slightly below baseline of regular/thin, anticipate will meet  goals during IP stay   SLP Brief overview of current status    Minimal oral dysphagia. Recommend initiate soft and bite sized solids (IDDSI 6), thin liquids (IDDSI 0) when fully alert, positioned fully upright, assist with tray set up. Anticipate pt will progress to baseline of regular solids  by discharge.      Total Evaluation Time   Total Evaluation Time (Minutes) 38

## 2021-10-24 NOTE — PLAN OF CARE
Pt here w/altered mental status. Disoriented to situation. Neuros intact except for pronator drift present in RUE, [place], L pupil is sluggish and greater than R pupil.. VSS. Tele SR w/ BBB. Trop level 0.46 and K+ level 3.2. NPO. Held the PO med's. Pure wick for bladder w/ adequate urine. Straight cath yesterday evening shift to get the UA and drained 200 ml urine. Bladder scanned pt at 0200,  ml. Denies pain. Pt scoring green on the Aggression Stop Light Tool. Plan for MRI and swallow test today. Discharge pending.

## 2021-10-25 NOTE — UTILIZATION REVIEW
"  Admission Status; Secondary Review Determination         Under the authority of the Utilization Management Committee, the utilization review process indicated a secondary review on the above patient.  The review outcome is based on review of the medical records, discussions with staff, and applying clinical experience noted on the date of the review.        ()      Inpatient Status Appropriate - This patient's medical care is consistent with medical management for inpatient care and reasonable inpatient medical practice.      (xxx) Observation Status Appropriate - This patient does not meet hospital inpatient criteria and is placed in observation status. If this patient's primary payer is Medicare and was admitted as an inpatient, Condition Code 44 should be used and patient status changed to \"observation\".   () Admission Status NOT Appropriate - This patient's medical care is not consistent with medical management for Inpatient or Observation Status.          RATIONALE FOR DETERMINATION     Joelle Freeman is a 89 year old female with a history of CAD, HTN, HLD, Factor V Leiden, PRES, bilateral subdural hematoma versus hygromas, SAH, NPH ventriculostomy shunt, multiple strokes and TIAs, seizures (presumed, taking Keppra) who was brought to the ED 10/23 with speech difficulty which resolved.  Noncontrast CT showed no acute pathology, chronic b/l subdural fluid collections, unchanged, Multiple small old infarcts, R ventriculostomy shunt catheter.  MRI was negative for acute pathology.   Troponin was elevated and echo pending.  She has had neurosurgery and neuro stroke consultations.  Neurology consultation pending today.  Disposition plan pending this consultation.  Observation status appropriate at present.     If however, medical issues arise and additional workup or intervention requiring further hospitalization is indicated, consideration should be given to advancing to IP status at that time. I spoke with Dr." Estela who agrees.      The severity of illness, intensity of service provided, expected LOS and risk for adverse outcome make the care complex, high risk and appropriate for hospital admission.        The information on this document is developed by the utilization review team in order for the business office to ensure compliance.  This only denotes the appropriateness of proper admission status and does not reflect the quality of care rendered.         The definitions of Inpatient Status and Observation Status used in making the determination above are those provided in the CMS Coverage Manual, Chapter 1 and Chapter 6, section 70.4.      Sincerely,     Irena Schultz MD  Physician Advisor   Utilization Review/ Case Management  [facility].

## 2021-10-25 NOTE — PROGRESS NOTES
[facility]    Stroke Progress Note    Interval EventsMRI 10/24 showed no acute strokes, bilateral chronic subdural fluid collections overlying both cerebral hemispheres with prominent dural thickening and enhancement.    HPI Summary  Joelle Freeman is a 89 year old female with a history of CAD, HTN, HLD, Factor V Leiden, PRES, bilateral subdural hematoma versus hygromas, SAH, NPH ventriculostomy shunt, multiple strokes and TIAs, seizures (presumed, taking Keppra) who was brought to the ED 10/23 with speech difficulty.     A stroke code was called, see note from my colleague Dr. Duarte for details. She is on eliquis for PE.      Rapid improvement as aphasia resolved 10/24, appearing at baseline.    Evaluation Summarized    MRI and/or Head CT NCCT 10/23/21- no acute pathology, chronic b/l subdural fluid collections, unchanged, Multiple small old infarcts, R ventriculostomy shunt catheter.  MRI 10/24/21- negative for acute pathology (other evidence as CT above)   Intracranial Vasculature 10/23/21  HEAD CTA: No LVO or high-grade stenosis, Scattered atherosclerotic plaquing, multiple mild stenoses.   Cervical Vasculature 10/23/21  NECK CTA: No LVO or high-grade stenosis. Severe plaquing b/l carotid bifurcations without evidence of flow-limiting stenosis, Moderate stenosis R vertebral artery origin.     Echocardiogram NA   EKG/Telemetry ECG: SR with occasional PVCs, LAD  Left bundle branch block    Other Testing EEG shows sharp waves but no seizures     LDL No lab value available in past 30 days ordered and pending   A1C No lab value available in past 30 days ordered and pending     Impression   Encephalopathy and aphasia:    Broad differential, patient has not been eating, elevated trop, hx CAD, found unconscious with low bp suspect cardiogenic syncope 2/2 arrythmia/coronary event.  Differential includes seizure (no documented hx but is on Keppra), TIA lower on differential despite  "significant vascular disease and prior stroke because LOC is an uncommon presentation, low BP points to more of a cardiac cause    MRI negative 10/24 for acute pathology.    Plan  - continue Keppra, low dose due to age and low weight  - Continue cardiac workup, troponin elevated  - Continue Eliquis for PE  -recommend general neurology consult, MRI negative for acute stroke, she is optimally managed from stroke perspective    Patient Follow-up    F/u per general neuro recommendations    No further stroke evaluation is recommended, so we will sign off. Please contact us with any additional questions.    Betty Davison PA-C  Vascular Neurology  To page me or covering stroke neurology team member, click here: [facility]   Choose \"On Call\" tab at top, then search dropdown box for \"Neurology Adult\", select location, press Enter, then look for stroke/neuro ICU/telestroke.    ______________________________________________________    Clinically Significant Risk Factors Present on Admission            # Coagulation Defect: home medication list includes an anticoagulant medication   # Coma: based on GCS score of <8       Medications   Scheduled Meds    apixaban ANTICOAGULANT  2.5 mg Oral BID     atorvastatin  10 mg Oral At Bedtime     levETIRAcetam  125 mg Oral BID     levothyroxine  75 mcg Oral QAM AC     lisinopril  2.5 mg Oral Daily     metoprolol tartrate  12.5 mg Oral BID     senna-docusate  1-2 tablet Oral or NG Tube BID       Infusion Meds    sodium chloride         PRN Meds  acetaminophen **OR** acetaminophen **OR** acetaminophen, hydrALAZINE, labetalol, ondansetron **OR** ondansetron, sodium chloride       PHYSICAL EXAMINATION  Temp:  [97.2  F (36.2  C)-97.9  F (36.6  C)] 97.9  F (36.6  C)  Pulse:  [54-88] 54  Resp:  [16-18] 18  BP: (103-134)/(52-70) 123/58  SpO2:  [94 %-99 %] 96 %      Exam not completed today, signed off.    Imaging  I personally reviewed all imaging; relevant findings per HPI.     Lab Results Data "   CBC  Recent Labs   Lab 10/24/21  0451 10/23/21  1351   WBC 7.9 8.9   RBC 3.76* 3.92   HGB 11.8 12.4   HCT 35.7 37.5    170     Basic Metabolic Panel    Recent Labs   Lab 10/24/21  2201 10/24/21  2001 10/24/21  1704 10/24/21  1221 10/24/21  0833 10/24/21  0726 10/24/21  0451 10/23/21  1721 10/23/21  1351   NA  --   --   --   --   --   --  140  --  141   POTASSIUM  --  3.7  --   --  3.5  --  3.2*   < > 3.5   CHLORIDE  --   --   --   --   --   --  110*  --  109   CO2  --   --   --   --   --   --  25  --  28   BUN  --   --   --   --   --   --  11  --  13   CR  --   --   --   --   --   --  0.70  --  0.80   GLC 95  --  99 119*  --    < > 84   < > 147*   JAZLYN  --   --   --   --   --   --  8.4*  --  8.7    < > = values in this interval not displayed.     Liver Panel  No results for input(s): PROTTOTAL, ALBUMIN, BILITOTAL, ALKPHOS, AST, ALT, BILIDIRECT in the last 168 hours.  INR    Recent Labs   Lab Test 10/23/21  1351 08/09/20  1402 05/31/15  1613   INR 1.40* 1.13 1.07      Lipid Profile    Recent Labs   Lab Test 06/21/15  0935   CHOL 125   HDL 35*   LDL 62   TRIG 141   CHOLHDLRATIO 3.6     A1C  No lab results found.  Troponin I    Recent Labs   Lab 10/24/21  0451   TROPONIN 0.463*

## 2021-10-25 NOTE — PROVIDER NOTIFICATION
Paged Dr. Palmer, Hospitalist re: heart rate bradycardic.  fyi, denis down to 40s heart rate on telemetry, echo just phoned and on their way to complete test. Will await advisement and assess patient.     MD phoned right back and aware. Telephone order to Hold am dose of metoprolol.Will record blood pressure for provider.

## 2021-10-25 NOTE — CONSULTS
"            [facility]    Neurology Consultation    Joelle Freeman MRN# [id number]   YOB: 1928 Age: 89 year old    Code Status:No CPR- Do NOT Intubate   Date of Admission: 10/23/2021  Date of Consult:10/25/2021                                                                                       Assessment and Plan:                                         #. Acute encephalopathy/language change on admission.  Probable sz.  Improved.  --Levetiracetam level was subtherapeutic on admission.  Recommend increasing dose to 125 mg in the morning and 250 mg in the evening    #.  Dementia at baseline  --Patient's son expresses concern that she is \"sundowning\" most of the day.  This is attributable hospitalization related delirium.    Patient appears stable for discharge.  Please contact me if there are questions regarding plan      ----------------------------------------------------------------------------------  ----------------------------------------------------------------------------------  Reason for consult: as above       Chief Complaint:   Acute change in speech and responsiveness  History is obtained from the patient / chart       History of Present Illness:   This patient is a 89 year old female who presents with an acute change in ability to speak and responsiveness on the day of her admission October 23.  She lives at a long-term care facility.  The notes of the [facility] stroke team as well as the EEG report are reviewed.  The patient is improved as she is able to converse although as her son reports, she seems to be \"sundowning\" most of the day.  She does have baseline dementia         Past Medical History:     Past Medical History:   Diagnosis Date     Acute, but ill-defined, cerebrovascular disease 6/7/2003    Stroke     Anemia of chronic disease 4/6/2014     Asthma      CAD (coronary artery disease)     stent post RCA 2001     Cerebral ventriculomegaly      Chronic respiratory " insufficiency 4/6/2014     Compression of brain (H) 3/6/2014     CVA (cerebral infarction) 2002     CVA (cerebral vascular accident) (H) 3/4/2014     Dementia (H)      Dementia (H)      Depression      Diverticulosis      Dyslipidemia      Heterozygous factor V Leiden mutation (H)      HTN (hypertension)      Hyperlipidemia      Hypothyroidism      Insomnia 6/24/2014     Migraine 6/7/2003    Migraine Without Aura     Normal pressure hydrocephalus (H)      Posterior reversible encephalopathy syndrome      Subarachnoid hemorrhage (H) 3/23/2013     Subdural hematoma (H)      Syncope 7/19/2020     TIA (transient ischaemic attack)     multiple     TIA (transient ischaemic attack)      Traumatic encephalopathy 4/6/2014     Unspecified cerebral artery occlusion with cerebral infarction      Urinary incontinence      UTI (lower urinary tract infection)     recurrent         Past Surgical History:     Past Surgical History:   Procedure Laterality Date     APPENDECTOMY       BIOPSY      breast     CARDIAC SURGERY      stent to the right coronary artery in 2001     CHOLECYSTECTOMY       ENT SURGERY      tonsillectomy     GENITOURINARY SURGERY      Bladder suspension     GYN SURGERY      D & C     GYN SURGERY      laproscopy oophorectomy unilateral     HERNIA REPAIR       HYSTERECTOMY       LUMBAR PUNCTURE       OPEN REDUCTION INTERNAL FIXATION HIP NAILING  11/18/2013    Procedure: OPEN REDUCTION INTERNAL FIXATION HIP NAILING;  RIGHT HIP FRACTURE;  Surgeon: John Venegas MD;  Location:  OR     OPTICAL TRACKING SYSTEM IMPLANT SHUNT VENTRICULOPERITONEAL  7/16/2013    Procedure: OPTICAL TRACKING SYSTEM IMPLANT SHUNT VENTRICULOPERITONEAL;  RIGHT OCCIPITAL VENTRICULOPERITONEAL SHUNT, IMAGE GUIDANCE, NEUROENDOSCOPY, INTRATHECAL ANTIBIOTICS (STEALTH AXIEM, STRATA II LEVEL 2.5, PATIENT IN SUPINE 30-80, GEL DONUT WITH HEAD TURNED TO LEFT)      ;  Surgeon: Tayo Conley MD;  Location:  OR     STENT  2001     RCA     wisdom teeth extraction[            Social History:     Social History     Socioeconomic History     Marital status:      Spouse name: Not on file     Number of children: Not on file     Years of education: Not on file     Highest education level: Not on file   Occupational History     Not on file   Tobacco Use     Smoking status: Never Smoker     Smokeless tobacco: Never Used   Substance and Sexual Activity     Alcohol use: No     Drug use: No     Sexual activity: Not on file     Comment: not on file   Other Topics Concern     Parent/sibling w/ CABG, MI or angioplasty before 65F 55M? Not Asked   Social History Narrative     Not on file     Social Determinants of Health     Financial Resource Strain:      Difficulty of Paying Living Expenses:    Food Insecurity:      Worried About Running Out of Food in the Last Year:      Ran Out of Food in the Last Year:    Transportation Needs:      Lack of Transportation (Medical):      Lack of Transportation (Non-Medical):    Physical Activity:      Days of Exercise per Week:      Minutes of Exercise per Session:    Stress:      Feeling of Stress :    Social Connections:      Frequency of Communication with Friends and Family:      Frequency of Social Gatherings with Friends and Family:      Attends [employer] Services:      Active Member of Clubs or Organizations:      Attends Club or Organization Meetings:      Marital Status:    Intimate Partner Violence:      Fear of Current or Ex-Partner:      Emotionally Abused:      Physically Abused:      Sexually Abused:      Patient denies smoking, no significant alcohol intake, denies illicit drugs use lives in a nursing home      Family History:     Family History   Problem Relation Age of Onset     Cerebrovascular Disease Mother      Cerebrovascular Disease Father      Blood Disease Son      Reviewed and not felt to be contributory.       Home Medications:     Prior to Admission Medications   Prescriptions Last Dose  Informant Patient Reported? Taking?   acetaminophen (TYLENOL) 325 MG tablet Unknown at Unknown time Nursing Home Yes Yes   Sig: Take 650 mg by mouth every 6 hours as needed for pain    apixaban ANTICOAGULANT (ELIQUIS) 2.5 MG tablet 10/23/2021 at AM Nursing Home Yes Yes   Sig: Take 2.5 mg by mouth 2 times daily   atorvastatin (LIPITOR) 10 MG tablet 10/22/2021 at HS Nursing Home No Yes   Sig: Take 1 tablet (10 mg) by mouth daily   cholecalciferol 1000 UNITS TABS 10/23/2021 at AM Nursing Home No Yes   Sig: Take 1,000 Units by mouth daily   levETIRAcetam (KEPPRA) 100 MG/ML solution 10/23/2021 at AM Nursing Home No Yes   Sig: Take 1.25 mLs (125 mg) by mouth 2 times daily   levothyroxine (SYNTHROID/LEVOTHROID) 75 MCG tablet 10/23/2021 at 0700 Nursing Home No Yes   Sig: Take 1 tablet (75 mcg) by mouth daily   lisinopril (ZESTRIL) 2.5 MG tablet 10/23/2021 at AM Nursing Home Yes Yes   Sig: Take 1 tablet by mouth daily   melatonin 1 MG TABS tablet Unknown at Unknown time Nursing Home Yes Yes   Sig: Take 1 mg by mouth nightly as needed for sleep    metoprolol tartrate (LOPRESSOR) 12.5 mg TABS half-tab 10/23/2021 at AM Nursing Home Yes Yes   Sig: Take 12.5 mg by mouth 2 times daily   nystatin (MYCOSTATIN) 591155 UNIT/GM external powder Unknown at Unknown time Nursing Home Yes Yes   Sig: Apply topically 2 times daily as needed (intertriginal dermatitis)   senna-docusate (SENOKOT-S/PERICOLACE) 8.6-50 MG tablet 10/23/2021 at AM Nursing Home No Yes   Sig: Take 1 tablet by mouth 2 times daily      Facility-Administered Medications: None          Allergy:     Allergies   Allergen Reactions     Tramadol Unknown          Inpatient Medications:   Scheduled Meds:    apixaban ANTICOAGULANT  2.5 mg Oral BID     atorvastatin  10 mg Oral At Bedtime     levETIRAcetam  125 mg Oral BID     levothyroxine  75 mcg Oral QAM AC     lisinopril  2.5 mg Oral Daily     metoprolol tartrate  12.5 mg Oral BID     senna-docusate  1-2 tablet Oral or NG Tube  BID     PRN Meds: acetaminophen **OR** acetaminophen **OR** acetaminophen, hydrALAZINE, labetalol, ondansetron **OR** ondansetron, sodium chloride        Review of Systems    Unable to participate  NEURO: see HPI       Physical Exam:   Physical Exam   Vitals:  Height:Data Unavailable  Weight:101 lbs 6.59 oz   Temp: 97.3  F (36.3  C) Temp src: Oral BP: 135/70 Pulse: 72   Resp: 16 SpO2: 96 % O2 Device: None (Room air)    General Appearance:  No acute distress  Neuro:       Mental Status Exam: Oriented to self.  Disoriented to place or time other than she does know her age.  Language is hesitant and some expressive difficulty with word finding and grammar       Cranial Nerves:   Vision grossly intact.  Face symmetrical       Motor:   Upper extremity strength intact.  Moves both lower extremities although weakly       Reflexes: Symmetrically intact.  Plantar signs neutral       Sensory: Unable to cooperate                   Coordination:   Gross motor movements are intact x4       Gait: Unable to test  Neck: no nuchal rigidity, normal thyroid. No carotid bruits.      Extremities: No clubbing, no cyanosis, no edema       Data:   ROUTINE IP LABS   CBC RESULTS:     Recent Labs   Lab 10/24/21  0451 10/23/21  1351   WBC 7.9 8.9   RBC 3.76* 3.92   HGB 11.8 12.4   HCT 35.7 37.5    170     Basic Metabolic Panel:   Recent Labs   Lab Test 10/25/21  1045 10/24/21  2201 10/24/21  2001 10/24/21  1704 10/24/21  1221 10/24/21  0833 10/24/21  0726 10/24/21  0451 10/23/21  1721 10/23/21  1351     --   --   --   --   --   --  140  --  141   POTASSIUM 3.7  --  3.7  --   --  3.5   < > 3.2*   < > 3.5   CHLORIDE 112*  --   --   --   --   --   --  110*  --  109   CO2 21  --   --   --   --   --   --  25  --  28   BUN 9  --   --   --   --   --   --  11  --  13   CR 0.66  --   --   --   --   --   --  0.70  --  0.80   GLC 86 95  --  99   < >  --    < > 84   < > 147*   JAZLYN 8.5  --   --   --   --   --   --  8.4*  --  8.7    < > =  values in this interval not displayed.     Liver panel:  Recent Labs   Lab Test 08/13/20  0929 08/11/20  0618 08/02/20  1912 07/20/20  0345 06/29/19  1149   PROTTOTAL 6.1* 5.9* 7.7 5.6* 6.4*   ALBUMIN 3.0* 2.9* 3.9 2.6* 3.3*   BILITOTAL 1.3 0.7 1.0 0.6 0.5   ALKPHOS 68 68 88 54 93   AST 10 10 15 11 13   ALT 9 10 14 14 20     Lipid Profile:  Recent Labs   Lab Test 10/24/21  2001 06/21/15  0935   CHOL 123 125   HDL 34* 35*   LDL 65 62   TRIG 118 141   CHOLHDLRATIO  --  3.6     Thyroid Panel:  Recent Labs   Lab Test 10/23/21  1842 12/21/20  0000 08/10/20  0607 07/19/20 2000 03/17/20  1815   TSH 0.53 2.99 1.36 0.22* 3.97   T4  --   --   --  1.53*  --       Vitamin B12:   Recent Labs   Lab Test 07/20/20  0345 11/23/15  1655 11/23/15  0600   B12 534 419 Quantity not sufficient  NOTIFIED RN, TO BE REDRAWN      Results for RENA YING (MRN [id number]) as of 10/25/2021 15:38   Ref. Range 10/23/2021 13:47   Keppra (Levetiracetam) Level Latest Ref Range: 12 - 46 ug/mL 8 (L)          IMAGING:   All imaging studies were reviewed personally    MRI brain: 10/24   IMPRESSION:  1.  No acute infarct, mass, mass effect, or acute hemorrhage.  2.  Bilateral chronic subdural fluid collections overlying both cerebral hemispheres with prominent dural thickening and enhancement.  3.  Moderate atrophy with stable right parietal shunt tube.  4.  Mild chronic small vessel ischemia      EEG 10/24  IMPRESSION: Abnormal.  1) background activity consistent with moderate diffuse encephalopathy.  EEG is reactive in a posterior dominant rhythm is seen.  There are some asymmetries that appear related to craniotomy defect.  2) infrequent right temporal epileptiform sharp waves are noted.  These findings are consistent with focal cortical irritability in this area.  Epileptiform discharges are highly associated with clinical diagnosis of seizures.  3) overt seizures were not recorded in this brief study.  There was no indication of nonconvulsive  status epilepticus      Time: 40minutes evaluation and management.     Justyna Green M.D.  [facility], [facility].  Office [phone]

## 2021-10-25 NOTE — PROGRESS NOTES
Consulted yesterday to evaluate  shunt setting due to brain MRI ordered. MRI completed yesterday. Strata valve shunt evaluated this morning and confirmed to be set at 1.0 which was the previously known setting. Will obtain skull XR to confirm setting as well.     ADDENDUM:  Shunt XR confirmed stable shunt setting compared to prior shunt XR. Neurosurgery will sign off.    Discussed with Dr. Beckfrod.     Latanya Angel, [facility]surgery  [address] [place] [id number]  Tel [phone]  Fax [phone]

## 2021-10-25 NOTE — PLAN OF CARE
Encephalopathy/asphasic.  Disoriented to place/time/situation. Neuros/CMS - lethargic this am, became more awake; smiles a lot; difficult to assess due to cognitive deficits/difficulty following commands; words spoken improved as shift went on; generalized weakness. Hard of hearing/bilateral hearing aides in. VSS, room air. Tele - sinus bradycardia(md aware). Soft foods/set up & assisted with meal; pills whole one by one with water. Bedrest with 2 assist/ceiling lift assisted/turned & repositioned.  Seizure precautions maintained. Off floor at 1445 for xray.  Incontinent of bladder/no bm today/purwik utilized. Denies pain or discomfort. Pt scoring green on the Aggression Stop Light Tool. Anticipate TCU or LTC at discharge.  Mepilex on coccyx.

## 2021-10-25 NOTE — PLAN OF CARE
Pt here w/AMS. Oriented to self and place. Neuros intact except for [place], pupils sluggish, confused. History of dementia. VSS on RA. Soft/bite sized w/ thin liquid. Take pills whole, one at a time. Purewick for bladder w/ adequate urine. A1 GB/W. Q2 turns. Denies pain. Open area on coccyx covered with mepilex. Pt scoring green on the Aggression Stop Light Tool. Discharge pending.

## 2021-10-25 NOTE — PROGRESS NOTES
[facility]  Hospitalist Progress Note  Jose Martin Palmer MD  10/25/2021    Assessment & Plan   Joelle Freeman is a 89 year old female has medical history of presumed seizures, multiple CVAs and TIAs, NPH s/p ventriculostomy shunt, elevated troponin, left bundle branch block, essential hypertension, coronary artery disease, severe aortic stenosis, dysphagia, dementia and pulmonary emboli on chronic anticoagulation who presented with acute aphasia.  Patient was admitted for further evaluation and management.       Altered mental status and speech - suspected seizure  Subtherapeutic keppra level  History of multiple TIAs/CVAs  History of NPH status post ventricular shunt  Bilateral hygromas versus chronic subdural hematomas   Patient has history of acute encephalopathy with fluctuations in LOC, suspected to be multifactorial (metabolic and infectious) including suspected seizures, UTI, and underlying advanced dementia on top of bilateral chronic subdural hygromas/hematomas and prior stroke and NPH.  Presents with acute onset of being unable to speak, witnessed by family at her LTC, and improved upon arrival to the ED.     --Stroke neurology was consulted in the emergency room, appreciate them following  --Neurology has recommended brain MRI was negative for stroke  --EEG abnormal  IMPRESSION: Abnormal.  1) background activity consistent with moderate diffuse encephalopathy.  EEG is reactive in a posterior dominant rhythm is seen.  There are some asymmetries that appear related to craniotomy defect.  2) infrequent right temporal epileptiform sharp waves are noted.  These findings are consistent with focal cortical irritability in this area.  Epileptiform discharges are highly associated with clinical diagnosis of seizures.  3) overt seizures were not recorded in this brief study.  There was no indication of nonconvulsive status epilepticus.  --Neurosurgery consulted for  shunt setting which  was her prior setting, follow up xray noted.  --Aphasia is improving but she also has significant sundowning.  --Troponins slightly elevated, which seems to be the chronic flat pattern, will follow up on echocardiogram results, see below.  --Neurochecks every 4 hours.  --noted increased dosing of Keppra.  -- At this time no aspirin as ordered, continue PTA apixaban.     Echocardiogram has been ordered.  Once echo results are back then will discuss with family further if they would like to pursue any further cardiac interventions.  --TSH has been checked, in normal range.  --Leukocytosis, no fever, urine analysis is normal, COVID-19 PCR is also negative, currently no signs and symptoms of active infection  --Physical, occupational and speech therapy has been ordered.  Patient has been getting evaluated by speech therapy this morning  --Family communication: Contacted son Osman (POA) 10/23/2021      Chronically elevated troponin  LBBB  Hypertension  Hyperlipidemia  CAD -RCA stent 2001  Severe aortic stenosis  --Patient has known history of severe aortic stenosis, last echocardiogram from 7/2020 shows aortic valve area 0.5 cm  and is slightly worsened from prior study.  Previously considered not to be a surgical candidate.  -- echocardiogram results  Show trace aortic regurgitation. The calculated aortic valve are is 0.51 cm^2. The peak AoV pressure gradient is 93.8 mmHg. The mean AoV pressure  gradient is 53.4 mmHg. Severe valvular aortic stenosis.  --Continue PTA metoprolol and lisinopril with hold parameters  --Continue PTA atorvastatin  --Monitor for chest pain or anginal symptoms  --Continuous telemetry monitoring  --Trended serial troponin , flat pattern      Dementia  Moderate oropharyngeal dysphagia  Failure to thrive  Inadequate oral intake  Per son report, patient has had very little to eat or drink over the last 7 days.  Family and staff and try to encourage patient to eat, but she has a very low appetite.   This seems to have been a problem for a while now, and is worsening.  -- PT/OT, SW consulted    History of bilateral pulmonary emboli  History of Heterozygous FVL mutation  --Continue treatment with apixaban  --Monitor vital signs, currently without hypoxia on room air, add supplemental O2 if needed     Hypothyroidism  --Check TSH is 0.53 with FT4 at 1.53  --will decrease dose 75 mcg to 66 mcg     Depression/anxiety  --Resume Lexapro       COVID-19 test is negative as of 10/23/2021     Clinically Significant Risk Factors Present on Admission     # Coagulation Defect: home medication list includes an anticoagulant medication       Diet: Combination Diet Soft and Bite Sized Diet (level 6); Thin Liquids (level 0)    Benítez Catheter: Not present  DVT Prophylaxis: DOAC and Pneumatic Compression Devices  Code Status: No CPR- Do NOT Intubate     Disposition Plan     Expected discharge: 10/26/2021    Interval History   -- chart reviewed  -- stroke neurology, neurosurgery noted reviewed  -- appreciate gen neurology consultation  -- noted low keppra levels    -Data reviewed today: I reviewed all new labs and imaging over the last 24 hours. I personally reviewed no images or EKG's today.    Physical Exam    , Blood pressure 135/70, pulse 72, temperature 97.3  F (36.3  C), temperature source Oral, resp. rate 16, weight 46 kg (101 lb 6.6 oz), SpO2 96 %.  Vitals:    10/23/21 1343   Weight: 46 kg (101 lb 6.6 oz)     Vital Signs with Ranges  Temp:  [97.2  F (36.2  C)-97.9  F (36.6  C)] 97.3  F (36.3  C)  Pulse:  [54-72] 72  Resp:  [16-18] 16  BP: (105-135)/(51-70) 135/70  SpO2:  [95 %-97 %] 96 %  I/O's Last 24 hours  I/O last 3 completed shifts:  In: 360 [P.O.:360]  Out: 100 [Urine:100]    Constitutional: Awake, alert, cooperative, no apparent distress  Respiratory: Clear to auscultation bilaterally, no crackles or wheezing  Cardiovascular: Regular rate and rhythm, normal S1 and S2 + M  GI: Normal bowel sounds, soft, non-distended,  non-tender  Skin/Integumen: No rashes, no cyanosis, no edema  Other:      Medications   All medications were reviewed.    sodium chloride         apixaban ANTICOAGULANT  2.5 mg Oral BID     atorvastatin  10 mg Oral At Bedtime     [START ON 10/26/2021] levETIRAcetam  125 mg Oral QAM     levETIRAcetam  250 mg Oral QPM     levothyroxine  75 mcg Oral QAM AC     lisinopril  2.5 mg Oral Daily     metoprolol tartrate  12.5 mg Oral BID     senna-docusate  1-2 tablet Oral or NG Tube BID        Data   Recent Labs   Lab 10/25/21  1045 10/24/21  2201 10/24/21  2001 10/24/21  1704 10/24/21  1221 10/24/21  0833 10/24/21  0726 10/24/21  0451 10/23/21  2202 10/23/21  2138 10/23/21  1842 10/23/21  1721 10/23/21  1351 10/23/21  1351   WBC  --   --   --   --   --   --   --  7.9  --   --   --   --   --  8.9   HGB  --   --   --   --   --   --   --  11.8  --   --   --   --   --  12.4   MCV  --   --   --   --   --   --   --  95  --   --   --   --   --  96   PLT  --   --   --   --   --   --   --  154  --   --   --   --   --  170   INR  --   --   --   --   --   --   --   --   --   --   --   --   --  1.40*     --   --   --   --   --   --  140  --   --   --   --   --  141   POTASSIUM 3.7  --  3.7  --   --  3.5   < > 3.2*  --   --   --   --    < > 3.5   CHLORIDE 112*  --   --   --   --   --   --  110*  --   --   --   --   --  109   CO2 21  --   --   --   --   --   --  25  --   --   --   --   --  28   BUN 9  --   --   --   --   --   --  11  --   --   --   --   --  13   CR 0.66  --   --   --   --   --   --  0.70  --   --   --   --   --  0.80   ANIONGAP 8  --   --   --   --   --   --  5  --   --   --   --   --  4   JAZLYN 8.5  --   --   --   --   --   --  8.4*  --   --   --   --   --  8.7   GLC 86 95  --  99   < >  --    < > 84  --    < >  --    < >   < > 147*   TROPONIN  --   --   --   --   --   --   --  0.463* 0.495*  --  0.477*   < >  --  0.442*    < > = values in this interval not displayed.       Recent Results (from the past 24 hour(s))    MR Brain w/o & w Contrast    Narrative    EXAM: MR BRAIN W/O and W CONTRAST  LOCATION: [facility]  DATE/TIME: 10/24/2021 6:21 PM    INDICATION: Aphasia  COMPARISON: CT head 10/23/2021  CONTRAST: 4.5mL Gadavist  TECHNIQUE: Routine multiplanar multisequence head MRI without and with intravenous contrast.    FINDINGS:  INTRACRANIAL CONTENTS: No acute or subacute infarct. Bilateral chronic subdural fluid collections overlying both cerebral hemispheres with prominent dural thickening and enhancement; measuring up to 4 mm bilaterally. Scattered nonspecific T2/FLAIR   hyperintensities within the cerebral white matter most consistent with mild chronic microvascular ischemic change. Moderate generalized cerebral atrophy. No hydrocephalus. Stable right parietal shunt tube. 4 mm right to left midline shift, similar to   prior. Normal position of the cerebellar tonsils. Dural thickening and enhancement extends into cervical canal with mild canal narrowing. This is incompletely visualized.    SELLA: Mild upward convexity of pituitary gland without definite underlying lesion.    OSSEOUS STRUCTURES/SOFT TISSUES: Normal marrow signal. The major intracranial vascular flow voids are maintained.     ORBITS: No abnormality accounting for technique.     SINUSES/MASTOIDS: No paranasal sinus mucosal disease. No middle ear or mastoid effusion.       Impression    IMPRESSION:  1.  No acute infarct, mass, mass effect, or acute hemorrhage.  2.  Bilateral chronic subdural fluid collections overlying both cerebral hemispheres with prominent dural thickening and enhancement.  3.  Moderate atrophy with stable right parietal shunt tube.  4.  Mild chronic small vessel ischemia.       Echocardiogram Complete   Result Value    LVEF  55-60%    Narrative    [id number]  [id number]  [id number]  [id number]^^DIANE^[facility]  [facility]  Echocardiography Laboratory  [facility] [facility]  [facility] [address]  [place] [address]  Phone [phone]  Fax [phone]     Name: RENA YING  MRN: [id number]  : 1928  Study Date: 10/25/2021 08:12 AM  Age: 89 yrs  Gender: Female  Patient Location: [facility]  Reason For Study: Syncope  Ordering Physician: DIANE NIX  Referring Physician: DIANE NIX  Performed By: [facility] Whit Bridges     BSA: 1.5 m2  Height: 64 in  Weight: 101 lb  HR: 51  BP: 134/69 mmHg  ______________________________________________________________________________  Procedure  Complete Portable Echo Adult. Optison (NDC #[id number]) given intravenously.     ______________________________________________________________________________  Interpretation Summary     Severe valvular aortic stenosis.  The visual ejection fraction is 55-60%.  Left ventricular systolic function is normal.  There is mild to moderate (1-2+) mitral regurgitation.  Trivial pericardial effusion  The calculated aortic valve area is similar to before but the gradients across  the aortic valve are higher. The study was technically difficult.  ______________________________________________________________________________  Left Ventricle  The left ventricle is normal in size. There is borderline concentric left  ventricular hypertrophy. Diastolic Doppler findings (E/E' ratio and/or other  parameters) suggest left ventricular filling pressures are increased. Grade I  or early diastolic dysfunction. The visual ejection fraction is 55-60%. Left  ventricular systolic function is normal. Septal motion is consistent with  conduction abnormality.     Right Ventricle  The right ventricle is normal in size and function.     Atria  The left atrium is mildly dilated. Right atrial size is normal. There is no  color Doppler evidence of an atrial shunt.     Mitral Valve  There is moderate mitral annular calcification. The mitral valve leaflets are  mildly thickened. There is mild to moderate (1-2+) mitral  regurgitation. The  mean mitral valve gradient is 2.4 mmHg.     Tricuspid Valve  There is trace tricuspid regurgitation.     Aortic Valve  There is trace aortic regurgitation. The calculated aortic valve are is 0.51  cm^2. The peak AoV pressure gradient is 93.8 mmHg. The mean AoV pressure  gradient is 53.4 mmHg. Severe valvular aortic stenosis.     Pulmonic Valve  There is trace pulmonic valvular regurgitation. Normal pulmonic valve  velocity.     Vessels  The aortic root is normal size. The ascending aorta is Mildly dilated. IVC  diameter <2.1 cm collapsing >50% with sniff suggests a normal RA pressure of 3  mmHg.     Pericardium  Trivial pericardial effusion.     Rhythm  The rhythm was sinus bradycardia.     ______________________________________________________________________________  MMode/2D Measurements & Calculations  IVSd: 1.0 cm  LVIDd: 4.2 cm  LVIDs: 2.3 cm  LVPWd: 1.2 cm  FS: 44.4 %  LV mass(C)d: 158.3 grams  LV mass(C)dI: 108.2 grams/m2  Ao root diam: 3.3 cm  LA dimension: 3.5 cm     asc Aorta Diam: 3.6 cm  LA/Ao: 1.1  LVOT diam: 2.0 cm  LVOT area: 3.1 cm2  LA Volume (BP): 56.9 ml  LA Volume Index (BP): 39.0 ml/m2     LA Volume Indexed (AL/bp): 39.3 ml/m2  RWT: 0.57     Doppler Measurements & Calculations  MV E max jett: 76.7 cm/sec  MV A max jett: 103.7 cm/sec  MV E/A: 0.74  MV max P.1 mmHg  MV mean P.4 mmHg  MV V2 VTI: 39.2 cm  MVA(VTI): 1.6 cm2  MV dec time: 0.32 sec  Ao V2 max: 484.1 cm/sec  Ao max P.8 mmHg  Ao V2 mean: 341.8 cm/sec  Ao mean P.4 mmHg  Ao V2 VTI: 121.6 cm  FLIP(I,D): 0.51 cm2  FLIP(V,D): 0.58 cm2  LV V1 max PG: 3.0 mmHg  LV V1 max: 90.4 cm/sec  LV V1 VTI: 20.2 cm  SV(LVOT): 62.2 ml  SI(LVOT): 42.5 ml/m2  PA acc time: 0.10 sec  Pulm Sys Jett: 75.8 cm/sec  Pulm Benjamin Jett: 39.0 cm/sec  Pulm A Revs Jett: 27.9 cm/sec  Pulm S/D: 1.9  AV Jett Ratio (DI): 0.19  FLIP Index (cm2/m2): 0.35  E/E' av.7     Lateral E/e': 24.3  Medial E/e': 23.1      ______________________________________________________________________________  Report approved by: Walter Hernandez 10/25/2021 10:51 JUDITH Palmer MD  Text Page  (7am to 6pm)

## 2021-10-25 NOTE — PLAN OF CARE
Pt here w/altered mental status. A/Disoriented to time/situation. Neuros intact except for [place], L pupil is sluggish and R pupil is fixed and not accommodating. VSS. Soft/bite sized w/ thin liquid.  Take pills one at a time. Pure wick for bladder w/ adequate urine. MRI had done today w/  Bilateral chronic subdural fluid collections overlying both cerebral hemispheres with prominent dural thickening and enhancement and small vessel ischemia. K+ replaced during the day shift and rechecked at 1900 and came back w/ 3.7. EEG shows sharp waves but no seizures. Denies pain. Pt scoring green on the Aggression Stop Light Tool. Discharge pending.

## 2021-10-26 NOTE — UTILIZATION REVIEW
Concurrent stay review; Secondary Review Determination    Under the authority of the Utilization Management Committee, the utilization review process indicated a secondary review on the above patient. The review outcome is based on review of the medical records, discussions with staff, and applying clinical experience noted on the date of the review.    (x) Observation Status Appropriate - Concurrent stay review        RATIONALE FOR DETERMINATION: See utilization review note from 10/25/2021.  Delay in discharge as son was trying for a new nursing home upon discharge and of specified facilities had no vacancies then okay to return to her prior long-term care facility.  Discharge pending disposition.    Patient is clinically improving and there is no clear indication to change patient's status to inpatient. The severity of illness, intensity of service provided, expected LOS and risk for adverse outcome make the care appropriate for observation.    This document was produced using voice recognition software    The information on this document is developed by the utilization review team in order for the business office to ensure compliance. This only denotes the appropriateness of proper admission status and does not reflect the quality of care rendered.    The definitions of Inpatient Status and Observation Status used in making the determination above are those provided in the CMS Coverage Manual, Chapter 1 and Chapter 6, section 70.4.    Sincerely,    Osman Mattson MD  Utilization Review  Physician Advisor  [facility].

## 2021-10-26 NOTE — PLAN OF CARE
Pt here with aphasia and AMS. A&O to self; dementia at baseline. RUE weakness/drift? Difficult to assess. Trouble with some command following. Appears to be grossly intact. Generalized weakness. VSS. Tele SR with BBB. Minced and moist diet, thin liquids--some assistance with feeding. Takes pills crushed. Up with A2/lift. Purewick in place. No BM this shift. Open area on coccyx--mepilex in place. Denies pain. Pt scoring green on the Aggression Stop Light Tool. Plan pending medical stability. Discharge to TCU pending.   Potassium replaced and recheck pending  No home meds  Patient belongings remain in room  Patient has L and R hearing aides

## 2021-10-26 NOTE — PROGRESS NOTES
Pt here with altered mental status/aphasia. Disoriented to time/place/situation. Lethargic with periods of alertness. Confusion and difficulty following some commands. [place] with bilateral hearing aides. Generalized weakness. Neuros otherwise intact. VSS on RA. Tele SR w/BBB. Soft diet with thin liquids. Pills administered crushed w/applesause. Bedrest with 2 assist/lift. Incontinent of bowel and bladder. Purewick external catheter for voiding. No BM on shift. Mepilex on coccyx. Denies pain. Pt scoring green on the Aggression Stop Light Tool. Discharge to TCU pending.

## 2021-10-26 NOTE — PROVIDER NOTIFICATION
"Text page to Dr. Green: \"per family patient much more lethargic today. Not following commands, not very responsive/unable to complete assessment. Unable to take medications\"     Call back received: will order repeat EEG for now and see patient in rounds. Aware patient perking up and appears more alert.     "

## 2021-10-26 NOTE — PROGRESS NOTES
[facility]  portable, ordered by Dr. Green.   Pt is lethargic-appearing, confused.   Can speak softly, responds to tech.     No activations.   Follows simple commands

## 2021-10-26 NOTE — PROGRESS NOTES
[facility]  Hospitalist Progress Note  Jose Martin Palmer MD  10/26/2021    Assessment & Plan   Joelle Freeman is a 89 year old female has medical history of presumed seizures, multiple CVAs and TIAs, NPH s/p ventriculostomy shunt, elevated troponin, left bundle branch block, essential hypertension, coronary artery disease, severe aortic stenosis, dysphagia, dementia and pulmonary emboli on chronic anticoagulation who presented with acute aphasia.  Patient was admitted for further evaluation and management.       Altered mental status and speech - suspected seizure  Subtherapeutic keppra level  History of multiple TIAs/CVAs  History of NPH status post ventricular shunt  Bilateral hygromas versus chronic subdural hematomas   Patient has history of acute encephalopathy with fluctuations in LOC, suspected to be multifactorial (metabolic and infectious) including suspected seizures, UTI, and underlying advanced dementia on top of bilateral chronic subdural hygromas/hematomas and prior stroke and NPH.  Presents with acute onset of being unable to speak, witnessed by family at her LTC, and improved upon arrival to the ED.  --Stroke neurology was consulted in the emergency room, appreciate them following  --Neurology has recommended brain MRI was negative for stroke  --EEG abnormal  IMPRESSION: Abnormal.  1) background activity consistent with moderate diffuse encephalopathy.  EEG is reactive in a posterior dominant rhythm is seen.  There are some asymmetries that appear related to craniotomy defect.  2) infrequent right temporal epileptiform sharp waves are noted.  These findings are consistent with focal cortical irritability in this area.  Epileptiform discharges are highly associated with clinical diagnosis of seizures.  3) overt seizures were not recorded in this brief study.  There was no indication of nonconvulsive status epilepticus.  --Neurosurgery consulted for  shunt setting which  was her prior setting, follow up xray noted.  --Aphasia is improving but she also has significant sundowning.  --Troponins slightly elevated, which seems to be the chronic flat pattern, will follow up on echocardiogram results, see below.  --Neurochecks every 4 hours.  --noted increased dosing of Keppra.  -- At this time no aspirin as ordered, continue PTA apixaban.  -- was somnolent today 10/26, EEG re-ordered, but after test she perked up     Echocardiogram has been ordered.  Once echo results are back then will discuss with family further if they would like to pursue any further cardiac interventions.  --TSH has been checked, in normal range.  --Leukocytosis, no fever, urine analysis is normal, COVID-19 PCR is also negative, currently no signs and symptoms of active infection  --Physical, occupational and speech therapy has been ordered.  Patient has been getting evaluated by speech therapy this morning  --Family communication: Contacted son Osman (POA) 10/23/2021      Chronically elevated troponin  LBBB  Hypertension  Hyperlipidemia  CAD -RCA stent 2001  Severe aortic stenosis  --Patient has known history of severe aortic stenosis, last echocardiogram from 7/2020 shows aortic valve area 0.5 cm  and is slightly worsened from prior study.  Previously considered not to be a surgical candidate.  -- echocardiogram results  Show trace aortic regurgitation. The calculated aortic valve are is 0.51 cm^2. The peak AoV pressure gradient is 93.8 mmHg. The mean AoV pressure  gradient is 53.4 mmHg. Severe valvular aortic stenosis.  --Continue PTA metoprolol and lisinopril with hold parameters  --Continue PTA atorvastatin  --Monitor for chest pain or anginal symptoms  --Continuous telemetry monitoring  --Trended serial troponin , flat pattern      Dementia  Moderate oropharyngeal dysphagia  Failure to thrive  Inadequate oral intake  Per son report, patient has had very little to eat or drink over the last 7 days.  Family and  staff and try to encourage patient to eat, but she has a very low appetite.  This seems to have been a problem for a while now, and is worsening.  -- PT/OT, SW consulted    History of bilateral pulmonary emboli  History of Heterozygous FVL mutation  --Continue treatment with apixaban  --Monitor vital signs, currently without hypoxia on room air, add supplemental O2 if needed     Hypothyroidism  --Check TSH is 0.53 with FT4 at 1.53  --will decrease dose 75 mcg to 66 mcg     Depression/anxiety  --Resume Lexapro       COVID-19 test is negative as of 10/23/2021     Clinically Significant Risk Factors Present on Admission     # Coagulation Defect: home medication list includes an anticoagulant medication       Diet: Combination Diet Soft and Bite Sized Diet (level 6); Thin Liquids (level 0)    Benítez Catheter: Not present  DVT Prophylaxis: DOAC and Pneumatic Compression Devices  Code Status: No CPR- Do NOT Intubate     Disposition Plan     Expected discharge: 10/27 back to [facility] vs other facility, per son.    Interval History   -- chart reviewed  -- stroke neurology, neurosurgery noted reviewed  -- was somnolent earlier and EEG was repeated  -- now back to baseline, but [place]    -Data reviewed today: I reviewed all new labs and imaging over the last 24 hours. I personally reviewed no images or EKG's today.    Physical Exam    , Blood pressure (!) 141/70, pulse 64, temperature 97.5  F (36.4  C), temperature source Axillary, resp. rate 16, weight 47.9 kg (105 lb 9.6 oz), SpO2 96 %.  Vitals:    10/23/21 1343 10/26/21 0643   Weight: 46 kg (101 lb 6.6 oz) 47.9 kg (105 lb 9.6 oz)     Vital Signs with Ranges  Temp:  [97.3  F (36.3  C)-98.1  F (36.7  C)] 97.5  F (36.4  C)  Pulse:  [60-68] 64  Resp:  [16] 16  BP: (113-148)/(64-74) 141/70  SpO2:  [95 %-97 %] 96 %  I/O's Last 24 hours  I/O last 3 completed shifts:  In: 360 [P.O.:360]  Out: 700 [Urine:700]    Constitutional: Awake, alert, cooperative, no apparent  distress  Respiratory: Clear to auscultation bilaterally, no crackles or wheezing  Cardiovascular: Regular rate and rhythm, normal S1 and S2 + M  GI: Normal bowel sounds, soft, non-distended, non-tender  Skin/Integumen: No rashes, no cyanosis, no edema  Other:      Medications   All medications were reviewed.    sodium chloride         apixaban ANTICOAGULANT  2.5 mg Oral BID     atorvastatin  10 mg Oral At Bedtime     levETIRAcetam  125 mg Oral QAM     levETIRAcetam  250 mg Oral QPM     levothyroxine  68.5 mcg Oral QAM AC     lisinopril  2.5 mg Oral Daily     metoprolol tartrate  12.5 mg Oral BID     senna-docusate  1-2 tablet Oral or NG Tube BID        Data   Recent Labs   Lab 10/26/21  1247 10/26/21  0930 10/25/21  1045 10/24/21  2201 10/24/21  2001 10/24/21  1704 10/24/21  0726 10/24/21  0451 10/23/21  2202 10/23/21  2138 10/23/21  1842 10/23/21  1721 10/23/21  1351 10/23/21  1351   WBC  --   --   --   --   --   --   --  7.9  --   --   --   --   --  8.9   HGB  --   --   --   --   --   --   --  11.8  --   --   --   --   --  12.4   MCV  --   --   --   --   --   --   --  95  --   --   --   --   --  96   PLT  --   --   --   --   --   --   --  154  --   --   --   --   --  170   INR  --   --   --   --   --   --   --   --   --   --   --   --   --  1.40*   NA  --   --  141  --   --   --   --  140  --   --   --   --   --  141   POTASSIUM  --  3.4 3.7  --  3.7  --    < > 3.2*  --   --   --   --    < > 3.5   CHLORIDE  --   --  112*  --   --   --   --  110*  --   --   --   --   --  109   CO2  --   --  21  --   --   --   --  25  --   --   --   --   --  28   BUN  --   --  9  --   --   --   --  11  --   --   --   --   --  13   CR  --   --  0.66  --   --   --   --  0.70  --   --   --   --   --  0.80   ANIONGAP  --   --  8  --   --   --   --  5  --   --   --   --   --  4   JAZLYN  --   --  8.5  --   --   --   --  8.4*  --   --   --   --   --  8.7   *  --  86 95  --    < >   < > 84  --    < >  --    < >   < > 147*   TROPONIN   --   --   --   --   --   --   --  0.463* 0.495*  --  0.477*   < >  --  0.442*    < > = values in this interval not displayed.       Recent Results (from the past 24 hour(s))   XR Skull 1/3 Views    Narrative    XR SKULL 1/3 VIEWS 10/25/2021 3:59 PM    INDICATION: Please confirm Strata valve setting on shunt  COMPARISON: None      Impression    IMPRESSION:   Skull radiographs demonstrate a Strata Shunt valve, at setting P/L  either at 0.5 or 1.0.  Setting is favored to be at  P/L 0.5.    FANTASMA DOWNING MD         SYSTEM ID:  [id number]       Jose Martin Palmer MD  Text Page  (7am to 6pm)

## 2021-10-26 NOTE — PLAN OF CARE
Pt here with altered mental status/ aphasia. Disoriented to time/ place/situation. Neuros intact except for [place], L pupil sluggish, R pupil accommodation absent and R pronator drift present. VSS except for slightly hypertensive. SR w/ BBB. IDDSI level Soft/bite sized diet with thin liquids. Takes pills one at a time. Up with lift. Ext cath for voiding and no BM for the shift. Denies pain. Pt scoring green on the Aggression Stop Light Tool. Skull X ray done yesterday and confirmed stable shunt setting compared to prior shunt XR. Neurosurgery signed off.SW send the referral to TCU. Discharge pending.

## 2021-10-26 NOTE — PROGRESS NOTES
Care Management Follow Up    Length of Stay (days): 0    Expected Discharge Date: 10/26/2021     Concerns to be Addressed: discharge planning     Patient plan of care discussed at interdisciplinary rounds: Yes    Anticipated Discharge Disposition: Skilled Nursing Facilty  Disposition Comments: Pt is from LTC at [facility]  Anticipated Discharge Services:    Anticipated Discharge DME:      Patient/family educated on Medicare website which has current facility and service quality ratings: no  Education Provided on the Discharge Plan:    Patient/Family in Agreement with the Plan:      Referrals Placed by CM/SW:    Private pay costs discussed: Not applicable    Additional Information:    SW received update that pt is medically ready to return to LTC. SYED spoke w/Mary Ann from [facility] where pt is on an 18 day bedhold, stating she heard [facility] that son may not want pt to return there.  SW will call son to discuss.      SUSANNE Eaton   [facility]  [phone]

## 2021-10-26 NOTE — PROGRESS NOTES
"[facility]  Neuro[facility] [place]  Neurology Daily Note      Admission Date:10/23/2021   Date of service: 10/26/2021   Hospital Day: 4                                                   Assessment and Plan:   #. Acute encephalopathy/language change on admission.  Probable sz.  Would expect   periods of decreased responsiveness like this am.    --Levetiracetam level was subtherapeutic on admission.  Recommend increasing dose to 125 mg in the morning and 250 mg in the evening  Repeat EEG unchanged with Right hemisphere sharp waves but no nonconvulsive sz.       #.  Dementia at baseline  --Patient's son expressed concern that she is \"sundowning\" most of the day.  This is attributable hospitalization related delirium.     Patient appears stable for discharge.  Please contact me if there are questions regarding plan  D/w Dr. Palmer      Interval History:   Another episode of decreased responsiveness this am.  Recovered to baseline within an hour.    EEG reviewed.    Discussed with nurse this am.              Medications:   Scheduled Meds:    apixaban ANTICOAGULANT  2.5 mg Oral BID     atorvastatin  10 mg Oral At Bedtime     levETIRAcetam  125 mg Oral QAM     levETIRAcetam  250 mg Oral QPM     levothyroxine  68.5 mcg Oral QAM AC     lisinopril  2.5 mg Oral Daily     metoprolol tartrate  12.5 mg Oral BID     senna-docusate  1-2 tablet Oral or NG Tube BID     PRN Meds: acetaminophen **OR** acetaminophen **OR** acetaminophen, hydrALAZINE, labetalol, ondansetron **OR** ondansetron, sodium chloride        Physical Exam:   Vitals: Temp: 97.7  F (36.5  C) Temp src: Axillary BP: 138/77 Pulse: 76   Resp: 16 SpO2: 95 % O2 Device: None (Room air)    Vital Signs with Ranges: Temp:  [97.3  F (36.3  C)-98.1  F (36.7  C)] 97.7  F (36.5  C)  Pulse:  [60-76] 76  Resp:  [16] 16  BP: (113-148)/(64-77) 138/77  SpO2:  [95 %-97 %] 95 %  General Appearance:  No acute distress  Neuro:       Mental Status Exam: Oriented " to self.  Disoriented to place or time other than she does know her age.  Language is hesitant and some expressive difficulty with word finding and grammar       Cranial Nerves:   Vision grossly intact.  Face symmetrical       Motor:   Upper extremity strength intact.  Moves both lower extremities although weakly       Reflexes: Symmetrically intact.  Plantar signs neutral       Sensory: Unable to cooperate                   Coordination:   Gross motor movements are intact x4       Gait: Unable to test  Neck: no nuchal rigidity, normal thyroid. No carotid bruits.       Extremities: No clubbing, no cyanosis, no edema        TIME     35minutes Evaluation/management time       Justyna Green M.D.  Neurologist  [facility]  Office [phone]

## 2021-10-27 NOTE — PROGRESS NOTES
"[facility] GERIATRIC SERVICES    PRIMARY CARE PROVIDER AND CLINIC:  Edenilson Ramon MD, [address] / [facility] [address]  Chief Complaint   Patient presents with     Hospital F/U      [place] Medical Record Number:  [id number]  Place of Service where encounter took place:  [facility] ([facility]) [[id number]]    Joelle Freeman  is a 89 year old  (5/25/1928), admitted to the above facility from  [facility]. Hospital stay 10/23/21 through 10/27/21..   HPI:    Hospitalized due to altered mental status: suspect seizure activity, found to have subtherapeutic keppra level, Hx of multiple TIAs/CVAs, Hx of NPH s/p ventricular shunt, bilateral hygromas vs subdural hematomas chronic. Stroke neurology followed during hospitalization, EEG abnormal consistent with moderate diffuse encephalopathy, seizure activity noted as well. Neurology consulted, did f/u  shunt with is in good working order. Keppra dose was increased. Noted somnolence waxing and waning throughout stay with \"significant sundowning\"   CAD/severe AS, HTN, chronically elevated troponin: known severe aortic stenosis, not a surgical candidate: echo done see hospital noted, no cardiac intervention during hospitalization.   Dementia/dyaphagia/failure to thrive: noted ongoing   Hx of bilateral PE, Hx of heterozygous FVL mutation:  On apixaban no change.   On exam today : patient sitting up in w/c, head is down, patient quiet, does not offer much response on exam, ROS difficult, patient poor historian, denies pain or discomfort, SOB, CP, palpitations.     CODE STATUS/ADVANCE DIRECTIVES DISCUSSION:  No CPR- Do NOT Intubate  DNR / DNI  ALLERGIES:   Allergies   Allergen Reactions     Tramadol Unknown      PAST MEDICAL HISTORY:   Past Medical History:   Diagnosis Date     Acute, but ill-defined, cerebrovascular disease 6/7/2003    Stroke     Anemia of chronic disease 4/6/2014     Asthma      CAD (coronary artery disease)     stent " post RCA 2001     Cerebral ventriculomegaly      Chronic respiratory insufficiency 4/6/2014     Compression of brain (H) 3/6/2014     CVA (cerebral infarction) 2002     CVA (cerebral vascular accident) (H) 3/4/2014     Dementia (H)      Dementia (H)      Depression      Diverticulosis      Dyslipidemia      Heterozygous factor V Leiden mutation (H)      HTN (hypertension)      Hyperlipidemia      Hypothyroidism      Insomnia 6/24/2014     Migraine 6/7/2003    Migraine Without Aura     Normal pressure hydrocephalus (H)      Posterior reversible encephalopathy syndrome      Subarachnoid hemorrhage (H) 3/23/2013     Subdural hematoma (H)      Syncope 7/19/2020     TIA (transient ischaemic attack)     multiple     TIA (transient ischaemic attack)      Traumatic encephalopathy 4/6/2014     Unspecified cerebral artery occlusion with cerebral infarction      Urinary incontinence      UTI (lower urinary tract infection)     recurrent      PAST SURGICAL HISTORY:   has a past surgical history that includes Cholecystectomy; hernia repair; Hysterectomy; Stent (2001); lumbar puncture; Cardiac surgery; Abdomen surgery; appendectomy; biopsy; ENT surgery; wisdom teeth extraction[; GYN surgery; GYN surgery; Optical tracking system implant shunt ventriculoperitoneal (7/16/2013); and Open reduction internal fixation hip nailing (11/18/2013).  FAMILY HISTORY: family history includes Blood Disease in her son; Cerebrovascular Disease in her father and mother.  SOCIAL HISTORY:   reports that she has never smoked. She has never used smokeless tobacco. She reports that she does not drink alcohol and does not use drugs.  Patient's living condition: lives in a skilled nursing facility    Post Discharge Medication Reconciliation Status: discharge medications reconciled, continue medications without change  Current Outpatient Medications   Medication Sig     acetaminophen (TYLENOL) 325 MG tablet Take 650 mg by mouth every 6 hours as needed for  "pain      apixaban ANTICOAGULANT (ELIQUIS) 2.5 MG tablet Take 2.5 mg by mouth 2 times daily     atorvastatin (LIPITOR) 10 MG tablet Take 1 tablet (10 mg) by mouth daily     levETIRAcetam (KEPPRA) 100 MG/ML solution Take 1.25 mLs (125 mg) by mouth every morning     levETIRAcetam (KEPPRA) 100 MG/ML solution Take 2.5 mLs (250 mg) by mouth every evening     levothyroxine (SYNTHROID/LEVOTHROID) 75 MCG tablet Take 1 tablet (75 mcg) by mouth daily     lisinopril (ZESTRIL) 2.5 MG tablet Take 1 tablet by mouth daily     melatonin 1 MG TABS tablet Take 1 mg by mouth nightly as needed for sleep      metoprolol tartrate (LOPRESSOR) 12.5 mg TABS half-tab Take 12.5 mg by mouth 2 times daily     nystatin (MYCOSTATIN) 160285 UNIT/GM external powder Apply topically 2 times daily as needed (intertriginal dermatitis)     senna-docusate (SENOKOT-S/PERICOLACE) 8.6-50 MG tablet Take 1 tablet by mouth 2 times daily     cholecalciferol 1000 UNITS TABS Take 1,000 Units by mouth daily     No current facility-administered medications for this visit.       ROS:  10 point ROS of systems including Constitutional, Eyes, Respiratory, Cardiovascular, Gastroenterology, Genitourinary, Integumentary, Musculoskeletal, Psychiatric were all negative except for pertinent positives noted in my HPI.    Vitals:  /67   Pulse 75   Temp 97.4  F (36.3  C)   Resp 18   Ht 1.626 m (5' 4\")   Wt 46.9 kg (103 lb 8 oz)   SpO2 96%   BMI 17.77 kg/m    Exam:  GENERAL APPEARANCE:  Alert, in no distress, thin  ENT:  Mouth and posterior oropharynx normal, moist mucous membranes, [place]  EYES:  EOM, conjunctivae, lids, pupils and irises normal, PERRL  RESP:  respiratory effort and palpation of chest normal, lungs clear to auscultation , no respiratory distress  CV:  Palpation and auscultation of heart done , no edema, grade 4/6 murmur  ABDOMEN:  normal bowel sounds, soft, nontender, no hepatosplenomegaly or other masses  M/S:   patient sitting up in w/c, able to " move all 4 extremities  SKIN:  Inspection of skin and subcutaneous tissue baseline  NEURO:   speech WNL  PSYCH:  affect and mood normal    Lab/Diagnostic data:  Recent labs in [facility] reviewed by me today.     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     (F03.90) Dementia without behavioral disturbance, unspecified dementia type (H)  (R13.12) Oropharyngeal dysphagia  (E43) Severe Protein-calorie malnutrition  Comment: acute/ongoing  Plan: dietician to follow, supplements per dietician, mighty shake 4oz TID    (I25.10) Coronary artery disease involving native coronary artery of native heart without angina pectoris  (I35.0) Severe aortic stenosis  (I12.9,  N18.30) Benign hypertension with chronic kidney disease, stage III (H)  (E87.6) Hypokalemia  Comment: ongoing  Plan: BMP follow, continue metoprolol 12.5mg BID, lisinopril 2.5mg QD, apixaban  2.5mg BID for Hx of PE    (Z86.711) History of pulmonary embolism  (D68.51) Factor V Leiden (H)  Comment: ongoing  Plan: apixaban 2.5mg BID    Orders:  BMP and keppra level in 2 weeks      Total time spent with patient visit at the skilled nursing facility was 25 min including patient visit and review of past records. Greater than 50% of total time spent with counseling and coordinating care due to discussed POC, medications with patient, discussed POC with nurse manager. .     Electronically signed by:  Tonya Lynn Haase, APRN CNP

## 2021-10-27 NOTE — PROGRESS NOTES
Care Management Discharge Note    Discharge Date: 10/27/2021       Discharge Disposition: Skilled Nursing [facility]    Discharge Services:      Discharge DME:      Discharge Transportation: health plan transportation    Private pay costs discussed: Not applicable    PAS Confirmation Code:  N/A- pt was on a bedhold  Patient/family educated on Medicare website which has current facility and service quality ratings: other (see comments)    Education Provided on the Discharge Plan:    Persons Notified of Discharge Plans: [facility] confirmed pt could return. Physician was to call son. Per Physician, he attempted but son did not answer  Patient/Family in Agreement with the Plan:      Handoff Referral Completed: Yes    Additional Information:    Orders were faxed to [facility]. No PAS required; pt was on a bed hold. Per Physician, he attempted to call [facility], but reported to SW he was unable to reach him.  No further  interventions anticipated at this time.         SUSANNE Eaton   [facility]  [phone]

## 2021-10-27 NOTE — DISCHARGE SUMMARY
[facility]  Discharge Summary        Joelle Freeman MRN# [id number]   YOB: 1928 Age: 89 year old     Date of Admission:  10/23/2021  Date of Discharge:  10/27/2021  Admitting Physician:  Daren Hernandez MD  Discharge Physician: Jose Martin Palmer MD  Discharging Service: Hospitalist     Primary Provider:  Edenilson Ramon  Primary Care Physician Phone Number: [phone]         Discharge Diagnoses/Problem Oriented Hospital Course (Providers):    Joelle Freeman was admitted on 10/23/2021 by Daren Hernandez MD and I would refer you to their history and physical.  The following problems were addressed during her hospitalization:    Joelle Freeman is a 89 year old female has medical history of presumed seizures, multiple CVAs and TIAs, NPH s/p ventriculostomy shunt, elevated troponin, left bundle branch block, essential hypertension, coronary artery disease, severe aortic stenosis, dysphagia, dementia and pulmonary emboli on chronic anticoagulation who presented with acute aphasia.  Patient was admitted for further evaluation and management.        Altered mental status and speech - suspected seizure  Subtherapeutic keppra level  History of multiple TIAs/CVAs  History of NPH status post ventricular shunt  Bilateral hygromas versus chronic subdural hematomas   Patient has history of acute encephalopathy with fluctuations in LOC, suspected to be multifactorial (metabolic and infectious) including suspected seizures, UTI, and underlying advanced dementia on top of bilateral chronic subdural hygromas/hematomas and prior stroke and NPH.  Presents with acute onset of being unable to speak, witnessed by family at her LTC, and improved upon arrival to the ED.  --Stroke neurology was consulted in the emergency room, appreciate them following  --Neurology has recommended brain MRI was negative for stroke  --EEG abnormal  IMPRESSION: Abnormal.  1) background activity consistent  with moderate diffuse encephalopathy.  EEG is reactive in a posterior dominant rhythm is seen.  There are some asymmetries that appear related to craniotomy defect.  2) infrequent right temporal epileptiform sharp waves are noted.  These findings are consistent with focal cortical irritability in this area.  Epileptiform discharges are highly associated with clinical diagnosis of seizures.  3) overt seizures were not recorded in this brief study.  There was no indication of nonconvulsive status epilepticus.  --Neurosurgery consulted for  shunt setting which was her prior setting, follow up xray shows stable  shunt setting.  --Aphasia is improving but she also has significant sundowning.  --Troponins slightly elevated, which seems to be the chronic flat pattern, will follow up on echocardiogram results, see below.  --noted increased dosing of Keppra.  -- At this time no aspirin as ordered, continue PTA apixaban.  -- was somnolent today 10/26, EEG re-ordered, but after test she perked up      Echocardiogram has been ordered.  Once echo results are back then will discuss with family further if they would like to pursue any further cardiac interventions.  --TSH has been checked, in normal range.  --Leukocytosis, no fever, urine analysis is normal, COVID-19 PCR is also negative, currently no signs and symptoms of active infection  --Physical, occupational and speech therapy has been ordered.  Patient has been getting evaluated by speech therapy this morning  --Family communication: Contacted son Osman (POA) 10/23/2021      Chronically elevated troponin  LBBB  Hypertension  Hyperlipidemia  CAD -RCA stent 2001  Severe aortic stenosis  --Patient has known history of severe aortic stenosis, last echocardiogram from 7/2020 shows aortic valve area 0.5 cm  and is slightly worsened from prior study.  Previously considered not to be a surgical candidate.  -- echocardiogram results  Show trace aortic regurgitation. The  calculated aortic valve are is 0.51 cm^2. The peak AoV pressure gradient is 93.8 mmHg. The mean AoV pressure  gradient is 53.4 mmHg. Severe valvular aortic stenosis.  --Continue PTA metoprolol and lisinopril with hold parameters  --Continue PTA atorvastatin  --Monitor for chest pain or anginal symptoms  --Continuous telemetry monitoring  --Trended serial troponin , flat pattern      Dementia  Moderate oropharyngeal dysphagia  Failure to thrive  Inadequate oral intake  Per son report, patient has had very little to eat or drink over the last 7 days.  Family and staff and try to encourage patient to eat, but she has a very low appetite.  This seems to have been a problem for a while now, and is worsening.  -- PT/OT, SW consulted     History of bilateral pulmonary emboli  History of Heterozygous FVL mutation  --Continue treatment with apixaban  --Monitor vital signs, currently without hypoxia on room air, add supplemental O2 if needed     Hypothyroidism  --Check TSH is 0.53 with FT4 at 1.53  --will decrease dose 75 mcg to 66 mcg     Depression/anxiety  --Resume Lexapro       COVID-19 test is negative as of 10/23/2021     Clinically Significant Risk Factors Present on Admission      # Coagulation Defect: home medication list includes an anticoagulant medication       Diet: Combination Diet Soft and Bite Sized Diet (level 6); Thin Liquids (level 0)    Benítez Catheter: Not present  DVT Prophylaxis: DOAC and Pneumatic Compression Devices  Code Status: No CPR- Do NOT Intubate     Disposition Plan     Expected discharge: 10/27 back to [facility] vs other facility, per son.         Code Status:      DNR / DNI         Important Results:      NAD  HEENT [place]  PULM CTA  CV RRR  GI SOFT +BS  MS NO EDEMA  NEURO NON FOCAL         Pending Results:        Unresulted Labs Ordered in the Past 30 Days of this Admission     No orders found from 9/23/2021 to 10/24/2021.               Discharge Instructions and Follow-Up:      Follow-up  Appointments     Follow Up and recommended labs and tests      Follow up with Nursing home physician.  No follow up labs or test are   needed.                  Discharge Disposition:      Discharged to long-term care facility         Discharge Medications:        Current Discharge Medication List      CONTINUE these medications which have CHANGED    Details   !! levETIRAcetam (KEPPRA) 100 MG/ML solution Take 1.25 mLs (125 mg) by mouth every morning  Qty: 37.5 mL, Refills: 0    Associated Diagnoses: Seizure disorder (H)      !! levETIRAcetam (KEPPRA) 100 MG/ML solution Take 2.5 mLs (250 mg) by mouth every evening    Associated Diagnoses: Seizure disorder (H)       !! - Potential duplicate medications found. Please discuss with provider.      CONTINUE these medications which have NOT CHANGED    Details   acetaminophen (TYLENOL) 325 MG tablet Take 650 mg by mouth every 6 hours as needed for pain       apixaban ANTICOAGULANT (ELIQUIS) 2.5 MG tablet Take 2.5 mg by mouth 2 times daily      atorvastatin (LIPITOR) 10 MG tablet Take 1 tablet (10 mg) by mouth daily  Qty: 30 tablet, Refills: 1    Associated Diagnoses: Hyperlipidemia      cholecalciferol 1000 UNITS TABS Take 1,000 Units by mouth daily  Qty: 30 tablet, Refills: 1    Associated Diagnoses: Vitamin D deficiency      levothyroxine (SYNTHROID/LEVOTHROID) 75 MCG tablet Take 1 tablet (75 mcg) by mouth daily  Qty: 30 tablet, Refills: 0    Comments: Future refills by PCP Dr. EDNA CARCAMO with phone number [phone].  Associated Diagnoses: Hypothyroidism, unspecified type      lisinopril (ZESTRIL) 2.5 MG tablet Take 1 tablet by mouth daily      melatonin 1 MG TABS tablet Take 1 mg by mouth nightly as needed for sleep       metoprolol tartrate (LOPRESSOR) 12.5 mg TABS half-tab Take 12.5 mg by mouth 2 times daily      nystatin (MYCOSTATIN) 956361 UNIT/GM external powder Apply topically 2 times daily as needed (intertriginal dermatitis)      senna-docusate  (SENOKOT-S/PERICOLACE) 8.6-50 MG tablet Take 1 tablet by mouth 2 times daily  Qty: 60 tablet, Refills: 0    Associated Diagnoses: Slow transit constipation                  Allergies:         Allergies   Allergen Reactions     Tramadol Unknown            Consultations This Hospital Stay:      Consultation during this admission received from neurology          Discharge Orders for Skilled Facility (from Discharge Orders):        After Care Instructions     Activity - Up with nursing assistance      Diet      Follow this diet upon discharge: Orders Placed This Encounter      Combination Diet Minced and Moist Diet (level 5); Thin Liquids (level 0)         Fall precautions      General info for SNF      Length of Stay Estimate: Long Term Care  Condition at Discharge: Stable  Level of care:board and care  Rehabilitation Potential: Fair  Admission H&P remains valid and up-to-date: Yes  Recent Chemotherapy: N/A  Use Nursing Home Standing Orders: Yes         Mantoux instructions      Give two-step Mantoux (PPD) Per Facility Policy Yes                    Discharge Time:       Greater than 30 minutes.        Image Results From This Hospital Stay (For Non-EPIC Providers):        Results for orders placed or performed during the hospital encounter of 10/23/21   CT Head w/o Contrast    Narrative    EXAM: CT HEAD WITHOUT CONTRAST  LOCATION: [facility]  DATE/TIME: 10/23/2021, 2:01 PM    INDICATION: Aphasia, code stroke.  COMPARISON: Head CT 08/31/2020.  TECHNIQUE: Routine CT Head without IV contrast. Multiplanar reformats. Dose reduction techniques were used.    FINDINGS:  No CT evidence of acute ischemia. Postoperative change of right occipital ventriculostomy shunt catheter placement with tip crossing midline abutting the septum pellucidum. Bilateral subdural fluid collections are present measuring approximately 7-9 mm,   unchanged. Subdural collections probably also extend into the posterior fossa and  along the clivus, unchanged. The fluid collections demonstrate predominantly hypoattenuation. Background mild parenchymal volume loss is present with white matter   hypoattenuation likely representing chronic small vessel ischemic change. Old basal ganglia lacunar infarcts. 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    Prior right-sided craniotomy. Opacification of the left sphenoid sinus. Right lens replacement. The tympanic and mastoid cavities appear well aerated      Impression    IMPRESSION:  1.  No CT evidence of acute ischemia or hemorrhage.  2.  Chronic bilateral subdural fluid collections, unchanged.  3.  Multiple small old infarcts.  4.  Right-sided ventriculostomy shunt catheter.     CTA Head Neck with Contrast    Narrative    EXAM: CTA  HEAD NECK WITH CONTRAST  LOCATION: [facility]  DATE/TIME: 10/23/2021, 2:02 PM    INDICATION: Aphasia. Code stroke.  COMPARISON: None.  CONTRAST: 70 mL [id number].      TECHNIQUE: Axial helical CT images of the head and neck vessels obtained during the arterial phase of intravenous contrast administration. Axial 2D reconstructed images and multiplanar 3D MIP reconstructed images of the head and neck vessels were   performed by the technologist. Dose reduction techniques were used. All stenosis measurements made according to NASCET criteria unless otherwise specified.    FINDINGS:     HEAD CTA:  The vertebral arteries, basilar artery, and posterior cerebral arteries are patent. The internal carotid arteries, anterior cerebral arteries, and middle cerebral arteries are patent. Scattered atherosclerotic plaquing is present with multiple mild   stenoses. No evidence of large vessel occlusion or high-grade stenosis. No evidence of aneurysm. Presumed varix along the right anterior temporal lobe. Dural venous sinuses are unremarkable.    NECK CTA:  A three-vessel aortic arch is present. The bilateral common  carotid, internal carotid, external carotid, and vertebral arteries are patent. Severe plaque at the bilateral carotid bifurcations. Approximately 10% stenosis of the proximal right internal   carotid artery. Approximately 10% stenosis of the proximal left internal carotid artery. Moderate stenosis at the right vertebral artery origin.    Presumed scattered pulmonary atelectasis. Cervical spine degenerative change. Cervical spine probable subdural vascular plexus engorgement.      Impression    IMPRESSION:     HEAD CTA:   1.  No evidence of large vessel occlusion or high-grade stenosis.  2.  Scattered atherosclerotic plaquing with multiple mild stenoses.    NECK CTA:  1.  No evidence of large vessel occlusion or high-grade stenosis.  2.  Severe plaquing of the bilateral carotid bifurcations without evidence of flow-limiting stenosis.  3.  Moderate stenosis at the right vertebral artery origin.     MR Brain w/o & w Contrast    Narrative    EXAM: MR BRAIN W/O and W CONTRAST  LOCATION: [facility]  DATE/TIME: 10/24/2021 6:21 PM    INDICATION: Aphasia  COMPARISON: CT head 10/23/2021  CONTRAST: 4.5mL Gadavist  TECHNIQUE: Routine multiplanar multisequence head MRI without and with intravenous contrast.    FINDINGS:  INTRACRANIAL CONTENTS: No acute or subacute infarct. Bilateral chronic subdural fluid collections overlying both cerebral hemispheres with prominent dural thickening and enhancement; measuring up to 4 mm bilaterally. Scattered nonspecific T2/FLAIR   hyperintensities within the cerebral white matter most consistent with mild chronic microvascular ischemic change. Moderate generalized cerebral atrophy. No hydrocephalus. Stable right parietal shunt tube. 4 mm right to left midline shift, similar to   prior. Normal position of the cerebellar tonsils. Dural thickening and enhancement extends into cervical canal with mild canal narrowing. This is incompletely visualized.    SELLA: Mild  upward convexity of pituitary gland without definite underlying lesion.    OSSEOUS STRUCTURES/SOFT TISSUES: Normal marrow signal. The major intracranial vascular flow voids are maintained.     ORBITS: No abnormality accounting for technique.     SINUSES/MASTOIDS: No paranasal sinus mucosal disease. No middle ear or mastoid effusion.       Impression    IMPRESSION:  1.  No acute infarct, mass, mass effect, or acute hemorrhage.  2.  Bilateral chronic subdural fluid collections overlying both cerebral hemispheres with prominent dural thickening and enhancement.  3.  Moderate atrophy with stable right parietal shunt tube.  4.  Mild chronic small vessel ischemia.       XR Skull 1/3 Views    Narrative    XR SKULL 1/3 VIEWS 10/25/2021 3:59 PM    INDICATION: Please confirm Strata valve setting on shunt  COMPARISON: None      Impression    IMPRESSION:   Skull radiographs demonstrate a Strata Shunt valve, at setting P/L  either at 0.5 or 1.0.  Setting is favored to be at  P/L 0.5.    FANTASMA DOWNING MD         SYSTEM ID:  [id number]   Echocardiogram Complete     Value    LVEF  55-60%    Narrative    [id number]  [id number]  [id number]  [id number]^^DIANE^REE     [facility]  Echocardiography Laboratory  [facility]  [place] [address]  Phone [phone]  Fax [phone]     Name: RENA YING  MRN: [id number]  : 1928  Study Date: 10/25/2021 08:12 AM  Age: 89 yrs  Gender: Female  Patient Location: [facility]  Reason For Study: Syncope  Ordering Physician: DIANE NIX  Referring Physician: DIANE NIX  Performed By: PATRICIO Bridges     BSA: 1.5 m2  Height: 64 in  Weight: 101 lb  HR: 51  BP: 134/69 mmHg  ______________________________________________________________________________  Procedure  Complete Portable Echo Adult. Optison (NDC #2100-1325) given intravenously.      ______________________________________________________________________________  Interpretation Summary     Severe valvular aortic stenosis.  The visual ejection fraction is 55-60%.  Left ventricular systolic function is normal.  There is mild to moderate (1-2+) mitral regurgitation.  Trivial pericardial effusion  The calculated aortic valve area is similar to before but the gradients across  the aortic valve are higher. The study was technically difficult.  ______________________________________________________________________________  Left Ventricle  The left ventricle is normal in size. There is borderline concentric left  ventricular hypertrophy. Diastolic Doppler findings (E/E' ratio and/or other  parameters) suggest left ventricular filling pressures are increased. Grade I  or early diastolic dysfunction. The visual ejection fraction is 55-60%. Left  ventricular systolic function is normal. Septal motion is consistent with  conduction abnormality.     Right Ventricle  The right ventricle is normal in size and function.     Atria  The left atrium is mildly dilated. Right atrial size is normal. There is no  color Doppler evidence of an atrial shunt.     Mitral Valve  There is moderate mitral annular calcification. The mitral valve leaflets are  mildly thickened. There is mild to moderate (1-2+) mitral regurgitation. The  mean mitral valve gradient is 2.4 mmHg.     Tricuspid Valve  There is trace tricuspid regurgitation.     Aortic Valve  There is trace aortic regurgitation. The calculated aortic valve are is 0.51  cm^2. The peak AoV pressure gradient is 93.8 mmHg. The mean AoV pressure  gradient is 53.4 mmHg. Severe valvular aortic stenosis.     Pulmonic Valve  There is trace pulmonic valvular regurgitation. Normal pulmonic valve  velocity.     Vessels  The aortic root is normal size. The ascending aorta is Mildly dilated. IVC  diameter <2.1 cm collapsing >50% with sniff suggests a normal RA pressure of  3  mmHg.     Pericardium  Trivial pericardial effusion.     Rhythm  The rhythm was sinus bradycardia.     ______________________________________________________________________________  MMode/2D Measurements & Calculations  IVSd: 1.0 cm  LVIDd: 4.2 cm  LVIDs: 2.3 cm  LVPWd: 1.2 cm  FS: 44.4 %  LV mass(C)d: 158.3 grams  LV mass(C)dI: 108.2 grams/m2  Ao root diam: 3.3 cm  LA dimension: 3.5 cm     asc Aorta Diam: 3.6 cm  LA/Ao: 1.1  LVOT diam: 2.0 cm  LVOT area: 3.1 cm2  LA Volume (BP): 56.9 ml  LA Volume Index (BP): 39.0 ml/m2     LA Volume Indexed (AL/bp): 39.3 ml/m2  RWT: 0.57     Doppler Measurements & Calculations  MV E max jett: 76.7 cm/sec  MV A max jett: 103.7 cm/sec  MV E/A: 0.74  MV max P.1 mmHg  MV mean P.4 mmHg  MV V2 VTI: 39.2 cm  MVA(VTI): 1.6 cm2  MV dec time: 0.32 sec  Ao V2 max: 484.1 cm/sec  Ao max P.8 mmHg  Ao V2 mean: 341.8 cm/sec  Ao mean P.4 mmHg  Ao V2 VTI: 121.6 cm  FLIP(I,D): 0.51 cm2  FLIP(V,D): 0.58 cm2  LV V1 max PG: 3.0 mmHg  LV V1 max: 90.4 cm/sec  LV V1 VTI: 20.2 cm  SV(LVOT): 62.2 ml  SI(LVOT): 42.5 ml/m2  PA acc time: 0.10 sec  Pulm Sys Jett: 75.8 cm/sec  Pulm Benjamin Jett: 39.0 cm/sec  Pulm A Revs Jett: 27.9 cm/sec  Pulm S/D: 1.9  AV Jett Ratio (DI): 0.19  FLIP Index (cm2/m2): 0.35  E/E' av.7     Lateral E/e': 24.3  Medial E/e': 23.1     ______________________________________________________________________________  Report approved by: Walter Hernandez 10/25/2021 10:51 AM                 Most Recent Lab Results In EPIC (For Non-EPIC Providers):    Most Recent 3 CBC's:  Recent Labs   Lab Test 10/24/21  0451 10/23/21  1351 20  0000   WBC 7.9 8.9 10.0   HGB 11.8 12.4 14.8   MCV 95 96 102.9*    170 169      Most Recent 3 BMP's:  Recent Labs   Lab Test 10/26/21  2006 10/26/21  1247 10/26/21  0930 10/25/21  1045 10/24/21  2201 10/24/21  2001 10/24/21  0726 10/24/21  0451 10/23/21  1721 10/23/21  1351   NA  --   --   --  141  --   --   --  140  --  141    POTASSIUM 3.8  --  3.4 3.7  --    < >   < > 3.2*   < > 3.5   CHLORIDE  --   --   --  112*  --   --   --  110*  --  109   CO2  --   --   --  21  --   --   --  25  --  28   BUN  --   --   --  9  --   --   --  11  --  13   CR  --   --   --  0.66  --   --   --  0.70  --  0.80   ANIONGAP  --   --   --  8  --   --   --  5  --  4   JAZLYN  --   --   --  8.5  --   --   --  8.4*  --  8.7   GLC  --  104*  --  86 95  --    < > 84   < > 147*    < > = values in this interval not displayed.     Most Recent 3 Troponin's:  Recent Labs   Lab Test 10/24/21  0451 10/23/21  2202 10/23/21  1842 10/23/21  1351 08/10/20  0607 08/09/20  1402 08/03/20  0610   TROPI  --   --   --   --  0.340* 0.364* 0.369*   TROPONIN 0.463* 0.495* 0.477*   < >  --   --   --     < > = values in this interval not displayed.     Most Recent 3 INR's:  Recent Labs   Lab Test 10/23/21  1351 08/09/20  1402 05/31/15  1613   INR 1.40* 1.13 1.07     Most Recent 2 LFT's:  Recent Labs   Lab Test 08/13/20  0929 08/11/20  0618   AST 10 10   ALT 9 10   ALKPHOS 68 68   BILITOTAL 1.3 0.7     Most Recent Cholesterol Panel:  Recent Labs   Lab Test 10/24/21  2001   CHOL 123   LDL 65   HDL 34*   TRIG 118     Most Recent 6 Bacteria Isolates From Any Culture (See EPIC Reports for Culture Details):  Recent Labs   Lab Test 08/09/20  1704 08/03/20  1609 08/03/20  1440 07/19/20  1959 07/19/20  1140 07/17/20  1000   CULT >100,000 colonies/mL  Candida albicans / dubliniensis  Candida albicans and Candida dubliniensis are not routinely speciated  *  Susceptibility testing not routinely done No growth 50,000 to 100,000 colonies/mL  Enterococcus faecalis  * No growth 50,000 to 100,000 colonies/mL  Escherichia coli  *  50,000 to 100,000 colonies/mL  Strain 2  Escherichia coli  * 50,000 to 100,000 colonies/mL  mixed urogenital fabiola  Susceptibility testing not routinely done       Most Recent TSH, T4 and HgbA1c:  Recent Labs   Lab Test 10/24/21  0451 10/23/21  1842 08/10/20  0607  07/19/20 2000   TSH  --  0.53   < > 0.22*   T4  --   --   --  1.53*   A1C 5.5  --   --   --     < > = values in this interval not displayed.

## 2021-10-27 NOTE — PROGRESS NOTES
X cover Captain call    - was called by SW due to discrepancy of synthroid dose in discharge note (planned change from 75 to 66) and discharge order (75 mcg); currently patient was getting 68.5 mcg (rounded from 66)    - looking at the TFTs, TSH is normal at 0.53; free T4 of 1.53 is from 7/19/2020    - no need to make changes in the synthroid dose and patient should continue with the PTA dose of 75 mcg    - discussed with Dr Palmer and

## 2021-10-27 NOTE — PLAN OF CARE
Speech Language Therapy Discharge Summary    Reason for therapy discharge:    Discharged to long term care facility.    Progress towards therapy goal(s). See [facility] on [facility] record for goal details.  Goals not met.  Barriers to achieving goals:   discharge from facility.    Therapy recommendation(s):    Continued therapy is recommended.  Rationale/Recommendations:  Patient needs to continue skilled ST needs for dysphagia management for the least restrictive diet. Patient discharged on a IDDSI level 5 minced and moist with thin liquids. .    SLP: Attempted to see patient for swallow treatment but she was fully dressed and sound asleep. Per her nurse she is discharging back to LTC at 16:00.

## 2021-10-27 NOTE — PROGRESS NOTES
Care Management Follow Up    Length of Stay (days): 0    Expected Discharge Date: 10/27/2021     Concerns to be Addressed: discharge planning     Patient plan of care discussed at interdisciplinary rounds: Yes    Anticipated Discharge Disposition: Skilled Nursing Facilty  Disposition Comments: Pt is from LTC at [facility]  Anticipated Discharge Services:    Anticipated Discharge DME:      Patient/family educated on Medicare website which has current facility and service quality ratings: no  Education Provided on the Discharge Plan:    Patient/Family in Agreement with the Plan:      Referrals Placed by CM/SW:    Private pay costs discussed: transportation costs, if applicable    Additional Information:    SYED received an update pt is ready for discharge. SYED attempted to reach sonOsman to update him of the denials at the alternate LTC facilities he requested, however both of his mailboxes on his phones were full. SYED did reach Robert oquendo on his cell phone (home MB was full) who confrmed pt could return to [facility] and they would continue to look for a move to another LTC on their own time. Robert did state he does not think pt is ready for discharge and requests a call from Physician.     SYED confirmed /[facility] pt can return anytime today, thus SYED arranged a  stretcher for: 4pm anticipating a discharge today once  Physician speaks w/son. PCS completed (requires supervision for dementia, weakness and aphasia), faxed to  and placed on chart. SYED updated Charge RN.  Awaiting orders.       Nicole Barlow, SUSANNE   [facility]  [phone]

## 2021-10-27 NOTE — PLAN OF CARE
PT:  Orders received and chart reviewed. Call placed to facility to gain PLOF. Patient is w/c bound at baseline, transfers with A x 1 at LTC facility. Per chart review, physical therapy evaluation completed on 10/24 with recommendation to return to LTC facility. Nursing documentation states patient is up with A x 1. No further skilled physical therapy needs identified, completing orders.

## 2021-10-27 NOTE — PLAN OF CARE
Reason for Admission: AMS     Cognitive/Mentation: A/Ox self   Neuros/CMS: Intact ex word finding difficulty, generalized weakness, confused  VS: stable. Tele: SB.  GI: BS hypoactive, last BM unknown.  : purwick/incontinence pad in place. incontinent.  Pulmonary: LS clear bilaterally.  Pain: denies.     Drains: none  Skin: small wound on coccyx, mepilex in place  Activity: Assist x 2 with lift.  Diet: minced and moist with thin liquids. Prefers pills whole with water.     Discharge: [facility] today at 1600.    End of shift summary: Pt slept most of the AM. Able to administer medication, crushed in applesauce. Plan for discharge today to [facility].

## 2021-10-29 NOTE — LETTER
"    10/29/2021        RE: Joelle Freeman  [address] [facility] [address]        [facility] GERIATRIC SERVICES    PRIMARY CARE PROVIDER AND CLINIC:  Edenilson Ramon MD, [address]  / [facility] [address]  Chief Complaint   Patient presents with     Hospital F/U      [facility] Medical Record Number:  [id number]  Place of Service where encounter took place:  [facility] () [[id number]]    Joelle Freeman  is a 89 year old  (5/25/1928), admitted to the above facility from  [facility]. Hospital stay 10/23/21 through 10/27/21..   HPI:    Hospitalized due to altered mental status: suspect seizure activity, found to have subtherapeutic keppra level, Hx of multiple TIAs/CVAs, Hx of NPH s/p ventricular shunt, bilateral hygromas vs subdural hematomas chronic. Stroke neurology followed during hospitalization, EEG abnormal consistent with moderate diffuse encephalopathy, seizure activity noted as well. Neurology consulted, did f/u  shunt with is in good working order. Keppra dose was increased. Noted somnolence waxing and waning throughout stay with \"significant sundowning\"   CAD/severe AS, HTN, chronically elevated troponin: known severe aortic stenosis, not a surgical candidate: echo done see hospital noted, no cardiac intervention during hospitalization.   Dementia/dyaphagia/failure to thrive: noted ongoing   Hx of bilateral PE, Hx of heterozygous FVL mutation:  On apixaban no change.   On exam today : patient sitting up in w/c, head is down, patient quiet, does not offer much response on exam, ROS difficult, patient poor historian, denies pain or discomfort, SOB, CP, palpitations.     CODE STATUS/ADVANCE DIRECTIVES DISCUSSION:  No CPR- Do NOT Intubate  DNR / DNI  ALLERGIES:   Allergies   Allergen Reactions     Tramadol Unknown      PAST MEDICAL HISTORY:   Past Medical History:   Diagnosis Date     Acute, but ill-defined, cerebrovascular disease 6/7/2003    Stroke "     Anemia of chronic disease 4/6/2014     Asthma      CAD (coronary artery disease)     stent post RCA 2001     Cerebral ventriculomegaly      Chronic respiratory insufficiency 4/6/2014     Compression of brain (H) 3/6/2014     CVA (cerebral infarction) 2002     CVA (cerebral vascular accident) (H) 3/4/2014     Dementia (H)      Dementia (H)      Depression      Diverticulosis      Dyslipidemia      Heterozygous factor V Leiden mutation (H)      HTN (hypertension)      Hyperlipidemia      Hypothyroidism      Insomnia 6/24/2014     Migraine 6/7/2003    Migraine Without Aura     Normal pressure hydrocephalus (H)      Posterior reversible encephalopathy syndrome      Subarachnoid hemorrhage (H) 3/23/2013     Subdural hematoma (H)      Syncope 7/19/2020     TIA (transient ischaemic attack)     multiple     TIA (transient ischaemic attack)      Traumatic encephalopathy 4/6/2014     Unspecified cerebral artery occlusion with cerebral infarction      Urinary incontinence      UTI (lower urinary tract infection)     recurrent      PAST SURGICAL HISTORY:   has a past surgical history that includes Cholecystectomy; hernia repair; Hysterectomy; Stent (2001); lumbar puncture; Cardiac surgery; Abdomen surgery; appendectomy; biopsy; ENT surgery; wisdom teeth extraction[; GYN surgery; GYN surgery; Optical tracking system implant shunt ventriculoperitoneal (7/16/2013); and Open reduction internal fixation hip nailing (11/18/2013).  FAMILY HISTORY: family history includes Blood Disease in her son; Cerebrovascular Disease in her father and mother.  SOCIAL HISTORY:   reports that she has never smoked. She has never used smokeless tobacco. She reports that she does not drink alcohol and does not use drugs.  Patient's living condition: lives in a skilled nursing facility    Post Discharge Medication Reconciliation Status: discharge medications reconciled, continue medications without change  Current Outpatient Medications   Medication  "Sig     acetaminophen (TYLENOL) 325 MG tablet Take 650 mg by mouth every 6 hours as needed for pain      apixaban ANTICOAGULANT (ELIQUIS) 2.5 MG tablet Take 2.5 mg by mouth 2 times daily     atorvastatin (LIPITOR) 10 MG tablet Take 1 tablet (10 mg) by mouth daily     levETIRAcetam (KEPPRA) 100 MG/ML solution Take 1.25 mLs (125 mg) by mouth every morning     levETIRAcetam (KEPPRA) 100 MG/ML solution Take 2.5 mLs (250 mg) by mouth every evening     levothyroxine (SYNTHROID/LEVOTHROID) 75 MCG tablet Take 1 tablet (75 mcg) by mouth daily     lisinopril (ZESTRIL) 2.5 MG tablet Take 1 tablet by mouth daily     melatonin 1 MG TABS tablet Take 1 mg by mouth nightly as needed for sleep      metoprolol tartrate (LOPRESSOR) 12.5 mg TABS half-tab Take 12.5 mg by mouth 2 times daily     nystatin (MYCOSTATIN) 060913 UNIT/GM external powder Apply topically 2 times daily as needed (intertriginal dermatitis)     senna-docusate (SENOKOT-S/PERICOLACE) 8.6-50 MG tablet Take 1 tablet by mouth 2 times daily     cholecalciferol 1000 UNITS TABS Take 1,000 Units by mouth daily     No current facility-administered medications for this visit.       ROS:  10 point ROS of systems including Constitutional, Eyes, Respiratory, Cardiovascular, Gastroenterology, Genitourinary, Integumentary, Musculoskeletal, Psychiatric were all negative except for pertinent positives noted in my HPI.    Vitals:  /67   Pulse 75   Temp 97.4  F (36.3  C)   Resp 18   Ht 1.626 m (5' 4\")   Wt 46.9 kg (103 lb 8 oz)   SpO2 96%   BMI 17.77 kg/m    Exam:  GENERAL APPEARANCE:  Alert, in no distress, thin  ENT:  Mouth and posterior oropharynx normal, moist mucous membranes, [place]  EYES:  EOM, conjunctivae, lids, pupils and irises normal, PERRL  RESP:  respiratory effort and palpation of chest normal, lungs clear to auscultation , no respiratory distress  CV:  Palpation and auscultation of heart done , no edema, grade 4/6 murmur  ABDOMEN:  normal bowel sounds, soft, " nontender, no hepatosplenomegaly or other masses  M/S:   patient sitting up in w/c, able to move all 4 extremities  SKIN:  Inspection of skin and subcutaneous tissue baseline  NEURO:   speech WNL  PSYCH:  affect and mood normal    Lab/Diagnostic data:  Recent labs in [facility] reviewed by me today.     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     (F03.90) Dementia without behavioral disturbance, unspecified dementia type (H)  (R13.12) Oropharyngeal dysphagia  (E43) Severe Protein-calorie malnutrition  Comment: acute/ongoing  Plan: dietician to follow, supplements per dietician, mighty shake 4oz TID    (I25.10) Coronary artery disease involving native coronary artery of native heart without angina pectoris  (I35.0) Severe aortic stenosis  (I12.9,  N18.30) Benign hypertension with chronic kidney disease, stage III (H)  (E87.6) Hypokalemia  Comment: ongoing  Plan: BMP follow, continue metoprolol 12.5mg BID, lisinopril 2.5mg QD, apixaban  2.5mg BID for Hx of PE    (Z86.711) History of pulmonary embolism  (D68.51) Factor V Leiden (H)  Comment: ongoing  Plan: apixaban 2.5mg BID    Orders:  BMP and keppra level in 2 weeks      Total time spent with patient visit at the skilled nursing facility was 25 min including patient visit and review of past records. Greater than 50% of total time spent with counseling and coordinating care due to discussed POC, medications with patient, discussed POC with nurse manager. .     Electronically signed by:  Tonya Lynn Haase, APRN CNP                    Sincerely,        Tonya Lynn Haase, APRN CNP

## 2021-11-16 NOTE — TELEPHONE ENCOUNTER
[facility] Nurse Triage Telephone Note    Provider: Susu Pereira NP   Facility: [facility] Facility Type:  LTC    Caller: Greta  Call Back Number: [phone]    Allergies:    Allergies   Allergen Reactions     Tramadol Unknown        Reason for call: Nurse called to report that patient has been declining.  Patient is not eating even when family is present and encourages patient.  Patient's family is considering hospice services due to decline.      Verbal Order/Direction given by Provider: Okay for hospice eval and treat per family request.     Provider giving Order:  Susu Pereira NP     Verbal Order given to: Greta Miller RN

## 2021-11-16 NOTE — TELEPHONE ENCOUNTER
[facility] Nurse Triage Telephone Note    Provider: Susu Pereira NP   Facility: [facility] Facility Type:  [facility]    Caller: Greta   Call Back Number: [phone]    Allergies:    Allergies   Allergen Reactions     Tramadol Unknown        Reason for call: Pt has been c/o of bilateral foot pain on the bottom of her feet, no bruising, no swelling. Pt has tylenol 650mg q6h prn that is helping.     Verbal Order/Direction given by Provider: Schedule tylenol 650mg tid and keep the prn tylenol. Max 4g/24h    Provider giving Order:  Susu Pereira NP     Verbal Order given to: Greta Pickering RN

## 2021-11-18 NOTE — PROGRESS NOTES
"[facility] GERIATRIC SERVICES    Chief Complaint   Patient presents with     RECHECK     decline, hospice, ft pain     HPI:  Joelle Freeman is a 89 year old  (5/25/1928), who is being seen today for an episodic care visit at: [facility] () [[id number]]. Today's concern is: patient admitted to [facility] yesterday for encephalopathy. Recently in hospital for non-responsive episode. \"Res is noted to have been declined even before recent hospitalization. She has been downgraded to a pureed diet and is eating very little even with encouragement from family and staff.\"    Allergies, and PMH/PSH reviewed in EPIC today.  REVIEW OF SYSTEMS:  Limited secondary to cognitive impairment     Objective:   /74   Pulse 75   Temp 97.7  F (36.5  C)   Resp 18   Ht 1.626 m (5' 4\")   Wt 46.4 kg (102 lb 6.4 oz)   SpO2 97%   BMI 17.58 kg/m    GENERAL APPEARANCE:  Alert, in no distress, thin  ENT:  [place], oral mucous membranes moist  EYES:  Conjunctiva and lids normal  RESP:  lungs clear to auscultation , no respiratory distress- no notable cough or wheeze, good air movement   CV:  RRR  ABDOMEN:  bowel sounds normal  M/S:   no edema, decreased muscle tone   PSYCH:  insight and judgement impaired, memory impaired , affect and mood normal    Recent labs in [facility] reviewed by me today.     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 No new orders    Electronically signed by: Susu Pereira NP     "

## 2025-04-11 NOTE — PLAN OF CARE
Pt is here with AMS and aphasia. A&O to self. Dementia at baseline otherwise neuros intact. Generalized weakness. VSS. Tele SR with BBB. Up with A1, gb and walker, Minced and moist diet with thin liquid. Incontinent of bladder, purewick in place. Denies pain. Pt agitated when woken up for vitals and assessments. Pt scoring yellow on the aggression stoplight tool. Discharge to TCU pending placement.    15